# Patient Record
Sex: FEMALE | Race: WHITE | NOT HISPANIC OR LATINO | Employment: OTHER | URBAN - NONMETROPOLITAN AREA
[De-identification: names, ages, dates, MRNs, and addresses within clinical notes are randomized per-mention and may not be internally consistent; named-entity substitution may affect disease eponyms.]

---

## 2017-01-06 ENCOUNTER — HOSPITAL ENCOUNTER (EMERGENCY)
Facility: HOSPITAL | Age: 27
Discharge: HOME | End: 2017-01-06
Attending: NURSE PRACTITIONER | Admitting: NURSE PRACTITIONER
Payer: COMMERCIAL

## 2017-01-06 VITALS
OXYGEN SATURATION: 97 % | TEMPERATURE: 98.1 F | SYSTOLIC BLOOD PRESSURE: 109 MMHG | DIASTOLIC BLOOD PRESSURE: 70 MMHG | HEART RATE: 94 BPM | RESPIRATION RATE: 18 BRPM

## 2017-01-06 DIAGNOSIS — R07.0 THROAT PAIN: ICD-10-CM

## 2017-01-06 LAB
DEPRECATED S PYO AG THROAT QL EIA: NORMAL
MICRO REPORT STATUS: NORMAL
SPECIMEN SOURCE: NORMAL

## 2017-01-06 PROCEDURE — 99213 OFFICE O/P EST LOW 20 MIN: CPT

## 2017-01-06 PROCEDURE — 87081 CULTURE SCREEN ONLY: CPT | Performed by: FAMILY MEDICINE

## 2017-01-06 PROCEDURE — 99213 OFFICE O/P EST LOW 20 MIN: CPT | Performed by: NURSE PRACTITIONER

## 2017-01-06 PROCEDURE — 87880 STREP A ASSAY W/OPTIC: CPT | Performed by: FAMILY MEDICINE

## 2017-01-06 ASSESSMENT — ENCOUNTER SYMPTOMS
FEVER: 0
APPETITE CHANGE: 0
SORE THROAT: 1
COUGH: 1
NAUSEA: 1
FATIGUE: 1

## 2017-01-06 NOTE — ED AVS SNAPSHOT
[facility]    [address] [address]    Phone:  [phone]                                       Kathia Milton   MRN: [id number]    Department:  [facility]   Date of Visit:  1/6/2017           After Visit Summary Signature Page     I have received my discharge instructions, and my questions have been answered. I have discussed any challenges I see with this plan with the nurse or doctor.    ..........................................................................................................................................  Patient/Patient Representative Signature      ..........................................................................................................................................  Patient Representative Print Name and Relationship to Patient    ..................................................               ................................................  Date                                            Time    ..........................................................................................................................................  Reviewed by Signature/Title    ...................................................              ..............................................  Date                                                            Time

## 2017-01-06 NOTE — ED PROVIDER NOTES
"  History     Chief Complaint   Patient presents with     Pharyngitis     since yesterday am     The history is provided by the patient. No  was used.     Kathia Milton is a 26 year old female who presents with a CC of sore throat and nausea x 2 days.  She has a positive exposure to strep, there are 4 others in her household who have strep.  No fever.  She has been eating and drinking ok.  She has not taken anything for pain.      I have reviewed the Medications, Allergies, Past Medical and Surgical History, and Social History in the Epic system.    Review of Systems   Constitutional: Positive for fatigue. Negative for fever and appetite change.   HENT: Positive for sore throat. Negative for ear pain.    Respiratory: Positive for cough (\"off and on\").    Gastrointestinal: Positive for nausea.       Physical Exam   BP: 109/70 mmHg  Pulse: 94  Temp: 98.1  F (36.7  C)  Resp: 18  SpO2: 97 %    Physical Exam   Constitutional: She is oriented to person, place, and time. She appears well-developed and well-nourished. She is cooperative.  Non-toxic appearance. She appears ill.   HENT:   Head: Normocephalic and atraumatic.   Right Ear: Tympanic membrane, external ear and ear canal normal.   Left Ear: Tympanic membrane, external ear and ear canal normal.   Mouth/Throat: Uvula is midline. No posterior oropharyngeal edema or posterior oropharyngeal erythema.   Eyes: Conjunctivae are normal.   Neck: Normal range of motion. Neck supple.   Cardiovascular: Normal rate.    Pulmonary/Chest: Effort normal.   Neurological: She is alert and oriented to person, place, and time.   Psychiatric: She has a normal mood and affect. Her behavior is normal.   Nursing note and vitals reviewed.      ED Course   Procedures    Results for orders placed or performed during the hospital encounter of 01/06/17   Rapid strep screen   Result Value Ref Range    Specimen Description Throat     Rapid Strep A Screen       NEGATIVE: No " "Group A streptococcal antigen detected by immunoassay, await   culture report.      Micro Report Status FINAL 01/06/2017    Beta strep group A culture   Result Value Ref Range    Specimen Description Throat     Culture Micro Culture negative monitoring continues     Micro Report Status Pending        Assessments & Plan (with Medical Decision Making)     I have reviewed the nursing notes.    I have reviewed the findings, diagnosis, plan and need for follow up with the patient.  ASSESSMENT / PLAN:  (R07.0) Throat pain  Comment: mild  Plan:  The rapid strep was negative, the strep culture is pending, we will call you with positive results only and treat with antibiotics as needed   Warm salt water gargles several times per day   Ibuprofen and tylenol per package directions for pain/fever   Cepacol lozenges OTC per package directions   Increase fluids, wash hands frequently, rest   Patient verbally educated and given appropriate education sheets for their diagnoses and has no questions.   Return to ED/UC if symptoms increase or concerns develop, red flag symptoms as discussed and per discharge instructions, increasing throat pain, trouble swallowing,  \"hot potato voice\", drooling, shortness of breath/airway compromise   Follow up with your Primary Care provider if symptoms do not improve in 2-3 days        Discharge Medication List as of 1/6/2017 11:27 AM          Final diagnoses:   Throat pain       1/6/2017   [facility]      Lissette Whittaker NP  01/07/17 1348  "

## 2017-01-06 NOTE — ED AVS SNAPSHOT
[facility]    [address] [address]    Phone:  [phone]                                       Kathia Milton   MRN: [id number]    Department:  [facility]   Date of Visit:  1/6/2017           Patient Information     Date Of Birth          1990        Your diagnoses for this visit were:     Throat pain        You were seen by Lissette Whittaker NP.      Follow-up Information     Follow up with [facility].    Specialty:  EMERGENCY MEDICINE    Why:  As needed, If symptoms worsen, or concerns develop    Contact information:    [address] [address] [phone]    Additional information:    From [facility]: Take [address]. Turn left at [address]. Turn left at the first stoplight on [address]. At the first stop sign, take a right onto [address]. Take a left into the parking lot and continue through until you reach the North enterance of the building.       From [place]: Take [address]. Take the [address] ramp towards [place]. Turn left onto [address]. Take a slight right onto [address]. Turn left at the first stoplight on [address]. At the first stop sign, take a right onto [address]. Take a left into the parking lot and continue through until you reach the North enterance of the building.       From [place]: Take [address]. Take a right at [address]. At the first stop sign, take a right onto [address]. Take a left into the parking lot and continue through until you reach the North enterance of the building.         Follow up with Lainey Rose MD.    Specialty:  Family Practice    Why:  As needed, if symptoms do not improve    Contact information:     FAMILY PRACTICE  [address] [facility] [address]  [phone]           Review of your medicines      Our records show that you are taking the medicines  "listed below. If these are incorrect, please call your family doctor or clinic.        Dose / Directions Last dose taken    azelastine 0.05 % Soln ophthalmic solution   Commonly known as:  OPTIVAR   Dose:  1 drop   Quantity:  1 Bottle        Apply 1 drop to eye 2 times daily   Refills:  3        etonogestrel 68 MG Impl   Commonly known as:  IMPLANON/NEXPLANON   Dose:  1 each        1 each (68 mg) by Subdermal route continuous   Refills:  0                Procedures and tests performed during your visit     Beta strep group A culture    Rapid strep screen      Orders Needing Specimen Collection     None      Pending Results     Date and Time Order Name Status Description    2017 1030 Beta strep group A culture In process             Pending Culture Results     Date and Time Order Name Status Description    2017 1030 Beta strep group A culture In process             Thank you for choosing [facility]       Thank you for choosing [facility] for your care. Our goal is always to provide you with excellent care. Hearing back from our patients is one way we can continue to improve our services. Please take a few minutes to complete the written survey that you may receive in the mail after you visit with us. Thank you!        [employer] Information     [employer] lets you send messages to your doctor, view your test results, renew your prescriptions, schedule appointments and more. To sign up, go to [email or web address] . Click on \"Log in\" on the left side of the screen, which will take you to the Welcome page. Then click on \"Sign up Now\" on the right side of the page.     You will be asked to enter the access code listed below, as well as some personal information. Please follow the directions to create your username and password.     Your access code is: [email or web address]  Expires: 2017 11:27 AM     Your access code will  in 90 days. If you need help or a new code, please call your [facility] clinic or [phone].   "      Care EveryWhere ID     This is your Care EveryWhere ID. This could be used by other organizations to access your [facility] medical records  [id number]        After Visit Summary       This is your record. Keep this with you and show to your community pharmacist(s) and doctor(s) at your next visit.

## 2017-01-08 LAB
BACTERIA SPEC CULT: NORMAL
MICRO REPORT STATUS: NORMAL
SPECIMEN SOURCE: NORMAL

## 2017-11-26 ENCOUNTER — HEALTH MAINTENANCE LETTER (OUTPATIENT)
Age: 27
End: 2017-11-26

## 2018-01-18 ENCOUNTER — TRANSFERRED RECORDS (OUTPATIENT)
Dept: HEALTH INFORMATION MANAGEMENT | Facility: HOSPITAL | Age: 28
End: 2018-01-18

## 2018-01-18 LAB — PAP-ABSTRACT: NORMAL

## 2018-01-22 ENCOUNTER — APPOINTMENT (OUTPATIENT)
Dept: GENERAL RADIOLOGY | Facility: HOSPITAL | Age: 28
End: 2018-01-22
Attending: NURSE PRACTITIONER
Payer: COMMERCIAL

## 2018-01-22 ENCOUNTER — HOSPITAL ENCOUNTER (EMERGENCY)
Facility: HOSPITAL | Age: 28
Discharge: HOME | End: 2018-01-22
Attending: NURSE PRACTITIONER | Admitting: NURSE PRACTITIONER
Payer: COMMERCIAL

## 2018-01-22 VITALS
TEMPERATURE: 97.4 F | SYSTOLIC BLOOD PRESSURE: 140 MMHG | DIASTOLIC BLOOD PRESSURE: 84 MMHG | OXYGEN SATURATION: 100 % | RESPIRATION RATE: 16 BRPM

## 2018-01-22 DIAGNOSIS — S09.92XA NASAL TRAUMA, INITIAL ENCOUNTER: ICD-10-CM

## 2018-01-22 DIAGNOSIS — J34.89 NASAL PAIN: ICD-10-CM

## 2018-01-22 PROCEDURE — 25000128 H RX IP 250 OP 636: Performed by: NURSE PRACTITIONER

## 2018-01-22 PROCEDURE — 99213 OFFICE O/P EST LOW 20 MIN: CPT | Performed by: NURSE PRACTITIONER

## 2018-01-22 PROCEDURE — 70160 X-RAY EXAM OF NASAL BONES: CPT | Mod: TC

## 2018-01-22 PROCEDURE — 96372 THER/PROPH/DIAG INJ SC/IM: CPT

## 2018-01-22 PROCEDURE — G0463 HOSPITAL OUTPT CLINIC VISIT: HCPCS | Mod: 25

## 2018-01-22 RX ORDER — KETOROLAC TROMETHAMINE 10 MG/1
10 TABLET, FILM COATED ORAL EVERY 6 HOURS PRN
Qty: 20 TABLET | Refills: 0 | Status: SHIPPED | OUTPATIENT
Start: 2018-01-22 | End: 2018-05-15

## 2018-01-22 RX ORDER — KETOROLAC TROMETHAMINE 30 MG/ML
60 INJECTION, SOLUTION INTRAMUSCULAR; INTRAVENOUS ONCE
Status: COMPLETED | OUTPATIENT
Start: 2018-01-22 | End: 2018-01-22

## 2018-01-22 RX ADMIN — KETOROLAC TROMETHAMINE 60 MG: 60 INJECTION, SOLUTION INTRAMUSCULAR at 21:59

## 2018-01-22 ASSESSMENT — ENCOUNTER SYMPTOMS
FATIGUE: 0
CHILLS: 0
SINUS PAIN: 1
DIAPHORESIS: 0
APPETITE CHANGE: 0
FEVER: 0
SORE THROAT: 0

## 2018-01-22 NOTE — ED AVS SNAPSHOT
[facility]    [address] [address]    Phone:  [phone]                                       Kathia Milton   MRN: [id number]    Department[facility]  [facility]   Date of Visit:  1/22/2018           After Visit Summary Signature Page     I have received my discharge instructions, and my questions have been answered. I have discussed any challenges I see with this plan with the nurse or doctor.    ..........................................................................................................................................  Patient/Patient Representative Signature      ..........................................................................................................................................  Patient Representative Print Name and Relationship to Patient    ..................................................               ................................................  Date                                            Time    ..........................................................................................................................................  Reviewed by Signature/Title    ...................................................              ..............................................  Date                                                            Time

## 2018-01-22 NOTE — ED AVS SNAPSHOT
[facility]    [address] [address]    Phone:  [phone]                                       Kathia Milton   MRN: [id number]    Department[facility]   Date of Visit:  1/22/2018           Patient Information     Date Of Birth          1990        Your diagnoses for this visit were:     Nasal trauma, initial encounter     Nasal pain        You were seen by Lissette Whittaker NP.      Follow-up Information     Follow up with [facility].    Specialty:  EMERGENCY MEDICINE    Why:  As needed, If symptoms worsen, or concerns develop    Contact information:    [address] [place] [address] [phone]    Additional information:    From [facility]: Take [address]. Turn left at [address]. Turn left at the first stoplight on [address]. At the first stop sign, take a right onto [address]. Take a left into the parking lot and continue through until you reach the North enterance of the building.       From [place]: Take [address]. Take the [address] ramp towards [place]. Turn left onto [address]. Take a slight right onto [address]. Turn left at the first stoplight on [address]. At the first stop sign, take a right onto [address]. Take a left into the parking lot and continue through until you reach the North enterance of the building.       From [place]: Take [address]. Take a right at [address]. At the first stop sign, take a right onto [address]. Take a left into the parking lot and continue through until you reach the North enterance of the building.         Follow up with Lainey Rose MD.    Specialty:  Family Practice    Why:  As needed, if symptoms do not improve    Contact information:    [facility]  [address]  [facility] [address]  [phone]        Discharge References/Attachments     NASAL  CONTUSION (ENGLISH)         Review of your medicines      Our records show that you are taking the medicines listed below. If these are incorrect, please call your family doctor or clinic.        Dose / Directions Last dose taken    azelastine 0.05 % Soln ophthalmic solution   Commonly known as:  OPTIVAR   Dose:  1 drop   Quantity:  1 Bottle        Apply 1 drop to eye 2 times daily   Refills:  3        etonogestrel 68 MG Impl   Commonly known as:  IMPLANON/NEXPLANON   Dose:  1 each        1 each (68 mg) by Subdermal route continuous   Refills:  0                Procedures and tests performed during your visit     XR Nasal Bones 3 Views      Orders Needing Specimen Collection     None      Pending Results     Date and Time Order Name Status Description    1/22/2018 2110 XR Nasal Bones 3 Views In process             Pending Culture Results     No orders found from 1/20/2018 to 1/23/2018.            Thank you for choosing [facility]       Thank you for choosing [facility] for your care. Our goal is always to provide you with excellent care. Hearing back from our patients is one way we can continue to improve our services. Please take a few minutes to complete the written survey that you may receive in the mail after you visit with us. Thank you!        [employer] Information     [employer] gives you secure access to your electronic health record. If you see a primary care provider, you can also send messages to your care team and make appointments. If you have questions, please call your primary care clinic.  If you do not have a primary care provider, please call [phone] and they will assist you.        Care EveryWhere ID     This is your Care EveryWhere ID. This could be used by other organizations to access your [facility] medical records  [id number]        Equal Access to Services     JOESPH OAKLEY : Leighton bowmano Queta, waaxda lubonadaha, qaybta kaaltyra goodson, terrell angel. So  [facility] [phone].    ATENCIÓN: Si habla español, tiene a black disposición servicios gratuitos de asistencia lingüística. Llame al [phone].    We comply with applicable federal civil rights laws and [place] laws. We do not discriminate on the basis of race, color, national origin, age, disability, sex, sexual orientation, or gender identity.            After Visit Summary       This is your record. Keep this with you and show to your community pharmacist(s) and doctor(s) at your next visit.
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
## 2018-01-29 ENCOUNTER — TRANSFERRED RECORDS (OUTPATIENT)
Dept: HEALTH INFORMATION MANAGEMENT | Facility: HOSPITAL | Age: 28
End: 2018-01-29

## 2018-05-15 ENCOUNTER — HOSPITAL ENCOUNTER (EMERGENCY)
Facility: HOSPITAL | Age: 28
Discharge: HOME | End: 2018-05-15
Attending: PHYSICIAN ASSISTANT | Admitting: PHYSICIAN ASSISTANT
Payer: COMMERCIAL

## 2018-05-15 ENCOUNTER — APPOINTMENT (OUTPATIENT)
Dept: CT IMAGING | Facility: HOSPITAL | Age: 28
End: 2018-05-15
Attending: PHYSICIAN ASSISTANT
Payer: COMMERCIAL

## 2018-05-15 ENCOUNTER — APPOINTMENT (OUTPATIENT)
Dept: ULTRASOUND IMAGING | Facility: HOSPITAL | Age: 28
End: 2018-05-15
Attending: PHYSICIAN ASSISTANT
Payer: COMMERCIAL

## 2018-05-15 VITALS
OXYGEN SATURATION: 98 % | RESPIRATION RATE: 16 BRPM | SYSTOLIC BLOOD PRESSURE: 109 MMHG | TEMPERATURE: 99.1 F | HEART RATE: 106 BPM | DIASTOLIC BLOOD PRESSURE: 65 MMHG

## 2018-05-15 DIAGNOSIS — N83.202 CYST OF LEFT OVARY: ICD-10-CM

## 2018-05-15 LAB
ALBUMIN UR-MCNC: NEGATIVE MG/DL
ANION GAP SERPL CALCULATED.3IONS-SCNC: 5 MMOL/L (ref 3–14)
APPEARANCE UR: CLEAR
BACTERIA #/AREA URNS HPF: ABNORMAL /HPF
BILIRUB UR QL STRIP: NEGATIVE
BUN SERPL-MCNC: 4 MG/DL (ref 7–30)
CALCIUM SERPL-MCNC: 8.5 MG/DL (ref 8.5–10.1)
CHLORIDE SERPL-SCNC: 107 MMOL/L (ref 94–109)
CO2 SERPL-SCNC: 27 MMOL/L (ref 20–32)
COLOR UR AUTO: ABNORMAL
CREAT SERPL-MCNC: 0.57 MG/DL (ref 0.52–1.04)
CRP SERPL-MCNC: <2.9 MG/L (ref 0–8)
ERYTHROCYTE [DISTWIDTH] IN BLOOD BY AUTOMATED COUNT: 13.2 % (ref 10–15)
GFR SERPL CREATININE-BSD FRML MDRD: >90 ML/MIN/1.7M2
GLUCOSE SERPL-MCNC: 90 MG/DL (ref 70–99)
GLUCOSE UR STRIP-MCNC: NEGATIVE MG/DL
HCG UR QL: NEGATIVE
HCT VFR BLD AUTO: 37.9 % (ref 35–47)
HGB BLD-MCNC: 13 G/DL (ref 11.7–15.7)
HGB UR QL STRIP: NEGATIVE
KETONES UR STRIP-MCNC: NEGATIVE MG/DL
LEUKOCYTE ESTERASE UR QL STRIP: ABNORMAL
MCH RBC QN AUTO: 28.8 PG (ref 26.5–33)
MCHC RBC AUTO-ENTMCNC: 34.3 G/DL (ref 31.5–36.5)
MCV RBC AUTO: 84 FL (ref 78–100)
MUCOUS THREADS #/AREA URNS LPF: PRESENT /LPF
NITRATE UR QL: NEGATIVE
PH UR STRIP: 7 PH (ref 4.7–8)
PLATELET # BLD AUTO: 289 10E9/L (ref 150–450)
POTASSIUM SERPL-SCNC: 3.8 MMOL/L (ref 3.4–5.3)
RBC # BLD AUTO: 4.51 10E12/L (ref 3.8–5.2)
RBC #/AREA URNS AUTO: 1 /HPF (ref 0–2)
SODIUM SERPL-SCNC: 139 MMOL/L (ref 133–144)
SOURCE: ABNORMAL
SP GR UR STRIP: 1 (ref 1–1.03)
SQUAMOUS #/AREA URNS AUTO: 5 /HPF (ref 0–1)
UROBILINOGEN UR STRIP-MCNC: NORMAL MG/DL (ref 0–2)
WBC # BLD AUTO: 7.5 10E9/L (ref 4–11)
WBC #/AREA URNS AUTO: 4 /HPF (ref 0–5)

## 2018-05-15 PROCEDURE — 96375 TX/PRO/DX INJ NEW DRUG ADDON: CPT

## 2018-05-15 PROCEDURE — 81025 URINE PREGNANCY TEST: CPT | Performed by: FAMILY MEDICINE

## 2018-05-15 PROCEDURE — 96361 HYDRATE IV INFUSION ADD-ON: CPT

## 2018-05-15 PROCEDURE — 36415 COLL VENOUS BLD VENIPUNCTURE: CPT | Performed by: PHYSICIAN ASSISTANT

## 2018-05-15 PROCEDURE — 96376 TX/PRO/DX INJ SAME DRUG ADON: CPT

## 2018-05-15 PROCEDURE — 25000132 ZZH RX MED GY IP 250 OP 250 PS 637: Performed by: PHYSICIAN ASSISTANT

## 2018-05-15 PROCEDURE — 99285 EMERGENCY DEPT VISIT HI MDM: CPT | Mod: 25

## 2018-05-15 PROCEDURE — 85027 COMPLETE CBC AUTOMATED: CPT | Performed by: PHYSICIAN ASSISTANT

## 2018-05-15 PROCEDURE — 87086 URINE CULTURE/COLONY COUNT: CPT | Performed by: PHYSICIAN ASSISTANT

## 2018-05-15 PROCEDURE — 81001 URINALYSIS AUTO W/SCOPE: CPT | Performed by: FAMILY MEDICINE

## 2018-05-15 PROCEDURE — 74176 CT ABD & PELVIS W/O CONTRAST: CPT | Mod: TC

## 2018-05-15 PROCEDURE — 99284 EMERGENCY DEPT VISIT MOD MDM: CPT | Performed by: PHYSICIAN ASSISTANT

## 2018-05-15 PROCEDURE — 80048 BASIC METABOLIC PNL TOTAL CA: CPT | Performed by: PHYSICIAN ASSISTANT

## 2018-05-15 PROCEDURE — 96374 THER/PROPH/DIAG INJ IV PUSH: CPT

## 2018-05-15 PROCEDURE — 86140 C-REACTIVE PROTEIN: CPT | Performed by: PHYSICIAN ASSISTANT

## 2018-05-15 PROCEDURE — 25000128 H RX IP 250 OP 636: Performed by: PHYSICIAN ASSISTANT

## 2018-05-15 PROCEDURE — 76830 TRANSVAGINAL US NON-OB: CPT | Mod: TC

## 2018-05-15 RX ORDER — ONDANSETRON 4 MG
4 TABLET,DISINTEGRATING ORAL ONCE
Status: DISCONTINUED | OUTPATIENT
Start: 2018-05-15 | End: 2018-05-16 | Stop reason: HOSPADM

## 2018-05-15 RX ORDER — HYDROCODONE BITARTRATE AND ACETAMINOPHEN 5; 325 MG/1; MG/1
1 TABLET ORAL ONCE
Status: COMPLETED | OUTPATIENT
Start: 2018-05-15 | End: 2018-05-15

## 2018-05-15 RX ORDER — ONDANSETRON 4 MG/1
4 TABLET, ORALLY DISINTEGRATING ORAL EVERY 8 HOURS PRN
Qty: 10 TABLET | Refills: 0 | Status: SHIPPED | OUTPATIENT
Start: 2018-05-15 | End: 2019-02-05

## 2018-05-15 RX ORDER — CETIRIZINE HYDROCHLORIDE 10 MG/1
10 TABLET ORAL DAILY
COMMUNITY
End: 2018-08-06

## 2018-05-15 RX ORDER — ONDANSETRON 2 MG/ML
4 INJECTION INTRAMUSCULAR; INTRAVENOUS ONCE
Status: COMPLETED | OUTPATIENT
Start: 2018-05-15 | End: 2018-05-15

## 2018-05-15 RX ORDER — MORPHINE SULFATE 4 MG/ML
2 INJECTION, SOLUTION INTRAMUSCULAR; INTRAVENOUS ONCE
Status: COMPLETED | OUTPATIENT
Start: 2018-05-15 | End: 2018-05-15

## 2018-05-15 RX ORDER — KETOROLAC TROMETHAMINE 30 MG/ML
30 INJECTION, SOLUTION INTRAMUSCULAR; INTRAVENOUS ONCE
Status: COMPLETED | OUTPATIENT
Start: 2018-05-15 | End: 2018-05-15

## 2018-05-15 RX ORDER — HYDROCODONE BITARTRATE AND ACETAMINOPHEN 5; 325 MG/1; MG/1
1 TABLET ORAL EVERY 6 HOURS PRN
Qty: 6 TABLET | Refills: 0 | Status: SHIPPED | OUTPATIENT
Start: 2018-05-15 | End: 2018-05-23

## 2018-05-15 RX ADMIN — KETOROLAC TROMETHAMINE 30 MG: 30 INJECTION, SOLUTION INTRAMUSCULAR at 16:53

## 2018-05-15 RX ADMIN — MORPHINE SULFATE 2 MG: 4 INJECTION INTRAVENOUS at 18:05

## 2018-05-15 RX ADMIN — HYDROCODONE BITARTRATE AND ACETAMINOPHEN 1 TABLET: 5; 325 TABLET ORAL at 21:09

## 2018-05-15 RX ADMIN — MORPHINE SULFATE 2 MG: 4 INJECTION INTRAVENOUS at 18:53

## 2018-05-15 RX ADMIN — ONDANSETRON 4 MG: 2 INJECTION INTRAMUSCULAR; INTRAVENOUS at 18:33

## 2018-05-15 RX ADMIN — ONDANSETRON 4 MG: 2 INJECTION INTRAMUSCULAR; INTRAVENOUS at 16:51

## 2018-05-15 RX ADMIN — SODIUM CHLORIDE 1000 ML: 9 INJECTION, SOLUTION INTRAVENOUS at 16:50

## 2018-05-15 ASSESSMENT — ENCOUNTER SYMPTOMS
NAUSEA: 1
HEMATURIA: 0
RESPIRATORY NEGATIVE: 1
DIFFICULTY URINATING: 0
ABDOMINAL PAIN: 1
VOMITING: 0
CARDIOVASCULAR NEGATIVE: 1
CHILLS: 0
FREQUENCY: 1
FLANK PAIN: 1
DYSURIA: 1
FEVER: 0
CONSTITUTIONAL NEGATIVE: 1

## 2018-05-15 NOTE — ED NOTES
Pt ambulated to room 7 independently, gown placed, call light in reach. Voided UA specimen obtained.  Intermittent abd pain x 2 weeks.  Back pain started this past week, getting worse. Nausea for the past week.  Using AZO.  Reports abd pain pain 9, constant aching, pain goal 2.

## 2018-05-15 NOTE — LETTER
[address] [address]  Main: [phone]  Dept: [phone]      May 15, 2018      Re: Kathia Milton      TO WHOM IT MAY CONCERN:    Kathia Milton was evaluated in the emergency room for acute illness. She may return to work on 5/17/18.       Sincerely,    STACI Paulino

## 2018-05-15 NOTE — ED AVS SNAPSHOT
[facility]    [address] [address]    Phone:  [phone]                                       Kathia Milton   MRN: [id number]    Department[facility]  [facility]   Date of Visit:  5/15/2018           After Visit Summary Signature Page     I have received my discharge instructions, and my questions have been answered. I have discussed any challenges I see with this plan with the nurse or doctor.    ..........................................................................................................................................  Patient/Patient Representative Signature      ..........................................................................................................................................  Patient Representative Print Name and Relationship to Patient    ..................................................               ................................................  Date                                            Time    ..........................................................................................................................................  Reviewed by Signature/Title    ...................................................              ..............................................  Date                                                            Time

## 2018-05-15 NOTE — ED PROVIDER NOTES
History     Chief Complaint   Patient presents with     Flank Pain     left flank pain, frequency, dysuria     HPI  Kathia Milton is a 28 year old female with Hx endometriosis who presents ambulatory to the ER c/o left flank and left sided abdominal pain for 1 week. She thought she had a UTI, took AZO with some relief for 2 days, but symptoms returned. Pain is achy to stabbing and associated with intermittent nausea. She denies hematuria. No Hx kidney stones. No pelvic pain, discharge.     Problem List:    Patient Active Problem List    Diagnosis Date Noted     Seasonal allergies      Priority: Medium     Anemia in pregnancy 2013     Priority: Medium      labor 2013     Priority: Medium     Abdominal pain, acute 2013     Priority: Medium     Encounter for supervision of other normal pregnancy 2013     Priority: Medium     Diagnosis updated by automated process. Provider to review and confirm.       Tobacco use disorder 10/18/2012     Priority: Medium        Past Medical History:    Past Medical History:   Diagnosis Date     Benign neoplasm of vagina 2011     Condyloma acuminatum 2011     Seasonal allergies      Supervision of other normal pregnancy      Tobacco use disorder 10/18/2012       Past Surgical History:    Past Surgical History:   Procedure Laterality Date     APPENDECTOMY  14    Dongola's      SECTION      C section     COLONOSCOPY  11/10/15    Dr. Singh     DILATION AND CURETTAGE SUCTION, TREAT MISSED , 1ST TRIMESTER       ENDOSCOPY  11/10/15    Dr. Singh      LAPAROSCOPY  11/30/15    Dr. Sun; lysis of pelvic adhesions, cauterization of minimal endometriosis     LAPAROSCOPY  2018    lysis of adhesions; pelvic pain, endometriosis; Dr. Sun       Family History:    Family History   Problem Relation Age of Onset     DIABETES Other      Grandmother     HEART DISEASE Other      Heart Disease - Grandmother     Other - See  Comments Other      Renal Disease - Grandmother     Breast Cancer No family hx of      Unknown/Adopted Mother      Unknown/Adopted Father      Unknown/Adopted Maternal Grandmother      Unknown/Adopted Maternal Grandfather      Unknown/Adopted Paternal Grandmother      Unknown/Adopted Paternal Grandfather        Social History:  Marital Status:  Single [1]  Social History   Substance Use Topics     Smoking status: Current Some Day Smoker     Types: Cigarettes     Last attempt to quit: 11/26/2012     Smokeless tobacco: Never Used      Comment: Tried to Quit (YES) (2011)     Alcohol use 0.0 oz/week     0 Standard drinks or equivalent per week      Comment: occasional        Medications:      cetirizine (ZYRTEC) 10 MG tablet   HYDROcodone-acetaminophen (NORCO) 5-325 MG per tablet   ondansetron (ZOFRAN ODT) 4 MG ODT tab   azelastine (OPTIVAR) 0.05 % SOLN   estradiol cypionate (DEPO-ESTRADIOL) 5 MG/ML injection         Review of Systems   Constitutional: Negative.  Negative for chills and fever.   Respiratory: Negative.    Cardiovascular: Negative.    Gastrointestinal: Positive for abdominal pain and nausea. Negative for vomiting.   Genitourinary: Positive for dysuria, flank pain, frequency and urgency. Negative for difficulty urinating, hematuria, pelvic pain, vaginal bleeding, vaginal discharge and vaginal pain.   Skin: Negative.  Negative for rash.       Physical Exam   BP: 124/76  Pulse: 106  Heart Rate: 88  Temp: 98.1  F (36.7  C)  Resp: 16  SpO2: 99 %      Physical Exam   Constitutional: She is oriented to person, place, and time. She appears well-developed and well-nourished. No distress.   HENT:   Head: Normocephalic and atraumatic.   Right Ear: External ear normal.   Left Ear: External ear normal.   Mouth/Throat: Oropharynx is clear and moist.   Eyes: Conjunctivae are normal.   Cardiovascular: Normal rate and normal heart sounds.    Pulmonary/Chest: Effort normal and breath sounds normal.   Abdominal: Soft. Bowel  sounds are normal. There is tenderness. There is CVA tenderness (left).       Neurological: She is alert and oriented to person, place, and time.   Skin: Skin is warm and dry.   Psychiatric: She has a normal mood and affect.   Nursing note and vitals reviewed.      ED Course     ED Course     Procedures        Results for orders placed or performed during the hospital encounter of 05/15/18 (from the past 24 hour(s))   UA reflex to Microscopic and Culture   Result Value Ref Range    Color Urine Straw     Appearance Urine Clear     Glucose Urine Negative NEG^Negative mg/dL    Bilirubin Urine Negative NEG^Negative    Ketones Urine Negative NEG^Negative mg/dL    Specific Gravity Urine 1.004 1.003 - 1.035    Blood Urine Negative NEG^Negative    pH Urine 7.0 4.7 - 8.0 pH    Protein Albumin Urine Negative NEG^Negative mg/dL    Urobilinogen mg/dL Normal 0.0 - 2.0 mg/dL    Nitrite Urine Negative NEG^Negative    Leukocyte Esterase Urine Trace (A) NEG^Negative    Source Midstream Urine     RBC Urine 1 0 - 2 /HPF    WBC Urine 4 0 - 5 /HPF    Bacteria Urine Few (A) NEG^Negative /HPF    Squamous Epithelial /HPF Urine 5 (H) 0 - 1 /HPF    Mucous Urine Present (A) NEG^Negative /LPF   HCG qualitative urine (UPT)   Result Value Ref Range    HCG Qual Urine Negative NEG^Negative   CBC with platelets   Result Value Ref Range    WBC 7.5 4.0 - 11.0 10e9/L    RBC Count 4.51 3.8 - 5.2 10e12/L    Hemoglobin 13.0 11.7 - 15.7 g/dL    Hematocrit 37.9 35.0 - 47.0 %    MCV 84 78 - 100 fl    MCH 28.8 26.5 - 33.0 pg    MCHC 34.3 31.5 - 36.5 g/dL    RDW 13.2 10.0 - 15.0 %    Platelet Count 289 150 - 450 10e9/L   CRP inflammation   Result Value Ref Range    CRP Inflammation <2.9 0.0 - 8.0 mg/L   Basic metabolic panel   Result Value Ref Range    Sodium 139 133 - 144 mmol/L    Potassium 3.8 3.4 - 5.3 mmol/L    Chloride 107 94 - 109 mmol/L    Carbon Dioxide 27 20 - 32 mmol/L    Anion Gap 5 3 - 14 mmol/L    Glucose 90 70 - 99 mg/dL    Urea Nitrogen 4 (L)  7 - 30 mg/dL    Creatinine 0.57 0.52 - 1.04 mg/dL    GFR Estimate >90 >60 mL/min/1.7m2    GFR Estimate If Black >90 >60 mL/min/1.7m2    Calcium 8.5 8.5 - 10.1 mg/dL   Abd/pelvis CT - no contrast - Stone Protocol    Narrative    EXAMINATION: CT ABDOMEN PELVIS W/O CONTRAST, 5/15/2018 6:02 PM    TECHNIQUE:  Helical CT images from the lung bases through the  symphysis pubis were obtained  with IV contrast. Contrast dose:    COMPARISON: none    HISTORY: left flank/abdominal pain;     FINDINGS:    There is dependent atelectasis at the lung bases.    The liver is free of masses or biliary ductal enlargement. No  calcified gallstones are seen.    The the spleen and pancreas appear normal.    The adrenal glands are normal.    The right and left kidneys are free of masses or hydronephrosis. No  renal or ureteric calculi are seen.    The periaortic lymph nodes are normal in caliber.    No intraperitoneal masses or inflammatory changes are noted.    In the pelvis the bladder and rectum appear normal. There is a 5.8 cm  in diameter left adnexal mass. This is most likely ovarian. Ultrasound  evaluation is recommended. It is low density and most likely  represents an ovarian cystic mass.    The regional skeleton is intact      Impression    IMPRESSION: 5.8 cm left adnexal mass most likely a cystic mass of the  left ovary. Ultrasound follow-up is recommended     JORDON RAINEY MD   US Pelvic Complete with Transvaginal    Narrative    PROCEDURE: US PELVIC COMPLETE WITH TRANSVAGINAL    HISTORY: r/o torsion;     TECHNIQUE: Transabdominal and transvaginal ultrasound of the pelvis.    COMPARISON: none    FINDINGS:     MEASUREMENTS:  Uterus: 8.5 x 3.9 x 5.8 cm (Length x Height x Width)  Endometrium: 5 mm in thickness  Right Ovary: 3.1 x 1.7 x 3.0 cm (Length x Height x Width)  Left Ovary:  6.4 x 5.2 x 5.8 cm (Length x Height x Width)    UTERUS: Uterus is normal in appearance.    ADNEXA: There is a cystic mass in the left ovary measuring  5.4 x 2.8 x  4.3 cm.    MISC: Arterial and venous flow was identified both ovaries. There is  no evidence of ovarian torsion. There is trace free fluid in the  pelvic cul-de-sac.      Impression    IMPRESSION: 5.4 cm cystic mass in the left ovary.        JORDON RAINEY MD       Medications   ondansetron (ZOFRAN-ODT) 4 tablets ed starter pack 4 mg (4 mg Oral Not Given 5/15/18 2203)   HYDROcodone-acetaminophen (NORCO) 5-325 MG 6 tab ED starter pack 1 tablet (1 tablet Oral Not Given 5/15/18 2203)   0.9% sodium chloride BOLUS (0 mLs Intravenous Stopped 5/15/18 1804)   ondansetron (ZOFRAN) injection 4 mg (4 mg Intravenous Given 5/15/18 1651)   ketorolac (TORADOL) injection 30 mg (30 mg Intravenous Given 5/15/18 1653)   morphine (PF) injection 2 mg (2 mg Intravenous Given 5/15/18 1805)   morphine (PF) injection 2 mg (2 mg Intravenous Given 5/15/18 1853)   ondansetron (ZOFRAN) injection 4 mg (4 mg Intravenous Given 5/15/18 1833)   HYDROcodone-acetaminophen (NORCO) 5-325 MG per tablet 1 tablet (1 tablet Oral Given 5/15/18 2109)       Assessments & Plan (with Medical Decision Making)     I have reviewed the nursing notes.    I have reviewed the findings, diagnosis, plan and need for follow up with the patient.      Discharge Medication List as of 5/15/2018  9:00 PM      START taking these medications    Details   HYDROcodone-acetaminophen (NORCO) 5-325 MG per tablet Take 1 tablet by mouth every 6 hours as needed for pain, Disp-6 tablet, R-0, Local Print      ondansetron (ZOFRAN ODT) 4 MG ODT tab Take 1 tablet (4 mg) by mouth every 8 hours as needed for nausea, Disp-10 tablet, R-0, E-Prescribe             Final diagnoses:   Cyst of left ovary   Labs acceptable. Urine with epis, so culture added on and pending. CT reveals left adnexal mass 5.3cm, so US is ordered and torsion is ruled out. Pt is advised to f/u with PCP Friday/Monday. She is requesting referral to OBGYN that completed her recent surgery for endometriosis and a  referral is placed. She will rotate ibu/tylenol and may use lortab for pain >5/10 sparingly until f/u. Zofran for nausea as needed. She is agreeable to plan and discharged in stable condition. She will return here with fevers, worsening, concerns prior to follow up.     5/15/2018   [facility]     Chip Bassett PA  05/15/18 0625

## 2018-05-15 NOTE — ED AVS SNAPSHOT
[facility]    [address] [address]    Phone:  [phone]                                       Kathia Milton   MRN: [id number]    Department:  [facility]   Date of Visit:  5/15/2018           Patient Information     Date Of Birth          1990        Your diagnoses for this visit were:     Cyst of left ovary        You were seen by Chip Bassett PA.      Follow-up Information     Follow up with Lainey Rose MD.    Specialty:  Family Practice    Why:  Friday ideally    Contact information:    [address]  [place] [address] [phone]          Follow up with [facility].    Specialty:  EMERGENCY MEDICINE    Why:  If symptoms worsen    Contact information:    [address] [address] [phone]    Additional information:    From [facility]: Take [address]. Turn left at [address]. Turn left at the first stoplight on [address]. At the first stop sign, take a right onto [facility]. Take a left into the parking lot and continue through until you reach the North enterance of the building.       From [place]: Take [address]. Take the [address] ramp towards [place]. Turn left onto [address]. Take a slight right onto [address]. Turn left at the first stoplight on [address]. At the first stop sign, take a right onto [address]. Take a left into the parking lot and continue through until you reach the North enterance of the building.       From [place]: Take [address]. Take a right at [address]. At the first stop sign, take a right onto [address]. Take a left into the parking lot and continue through until you reach the North enterance of the building.         Discharge Instructions       1. zofran for nausea  2. Rotate ibuprofen and tylenol every 3-6 hrs for 2-3 days. Lortab for pain >5/10. NO more than 4000mg  tylenol in 24 hrs.   3. Follow up with primary later this week ideally, OBGYN as discussed    * Back here with fevers/worsening.     Discharge References/Attachments     CYSTS, OVARIAN (ENGLISH)    OVARIAN CYSTS, TREATMENT FOR  (ENGLISH)      ED Discharge Orders     OB/GYN REFERRAL       Your provider has referred you to: Tony Sorenson    Please be aware that coverage of these services is subject to the terms and limitations of your health insurance plan.  Call member services at your health plan with any benefit or coverage questions.      Please bring the following with you to your appointment:    (1) Any X-Rays, CTs or MRIs which have been performed.  Contact the facility where they were done to arrange for  prior to your scheduled appointment.   (2) List of current medications   (3) This referral request   (4) Any documents/labs given to you for this referral                     Review of your medicines      START taking        Dose / Directions Last dose taken    HYDROcodone-acetaminophen 5-325 MG per tablet   Commonly known as:  NORCO   Dose:  1 tablet   Quantity:  6 tablet        Take 1 tablet by mouth every 6 hours as needed for pain   Refills:  0        ondansetron 4 MG ODT tab   Commonly known as:  ZOFRAN ODT   Dose:  4 mg   Quantity:  10 tablet        Take 1 tablet (4 mg) by mouth every 8 hours as needed for nausea   Refills:  0          Our records show that you are taking the medicines listed below. If these are incorrect, please call your family doctor or clinic.        Dose / Directions Last dose taken    azelastine 0.05 % Soln ophthalmic solution   Commonly known as:  OPTIVAR   Dose:  1 drop   Quantity:  1 Bottle        Apply 1 drop to eye 2 times daily   Refills:  3        cetirizine 10 MG tablet   Commonly known as:  zyrTEC   Dose:  10 mg        Take 10 mg by mouth daily   Refills:  0        estradiol cypionate 5 MG/ML injection   Commonly known as:  DEPO-ESTRADIOL         Inject into the muscle every 28 days   Refills:  0                Information about OPIOIDS     PRESCRIPTION OPIOIDS: WHAT YOU NEED TO KNOW   You have a prescription for an opioid (narcotic) pain medicine. Opioids can cause addiction. If you have a history of chemical dependency of any type, you are at a higher risk of becoming addicted to opioids. Only take this medicine after all other options have been tried. Take it for as short a time and as few doses as possible.     Do not:    Drive. If you drive while taking these medicines, you could be arrested for driving under the influence (DUI).    Operate heavy machinery    Do any other dangerous activities while taking these medicines.     Drink any alcohol while taking these medicines.      Take with any other medicines that contain acetaminophen. Read all labels carefully. Look for the word  acetaminophen  or  Tylenol.  Ask your pharmacist if you have questions or are unsure.    Store your pills in a secure place, locked if possible. We will not replace any lost or stolen medicine. If you don t finish your medicine, please throw away (dispose) as directed by your pharmacist. The [place] Pollution Control Agency has more information about safe disposal: https://www.pca.[facility].[place].us/living-green/managing-unwanted-medications    All opioids tend to cause constipation. Drink plenty of water and eat foods that have a lot of fiber, such as fruits, vegetables, prune juice, apple juice and high-fiber cereal. Take a laxative (Miralax, milk of magnesia, Colace, Senna) if you don t move your bowels at least every other day.         Prescriptions were sent or printed at these locations (2 Prescriptions)                   [facility] [address] [place] [address] [address] AT [facility] [address] [place] [address]    Telephone:  [phone]   Fax:  [phone]   Hours:                  E-Prescribed (1 of 2)         ondansetron (ZOFRAN ODT) 4 MG  ODT tab                 Printed at Department/Unit printer (1 of 2)         HYDROcodone-acetaminophen (NORCO) 5-325 MG per tablet                Procedures and tests performed during your visit     Abd/pelvis CT - no contrast - Stone Protocol    Basic metabolic panel    CBC with platelets    CRP inflammation    HCG qualitative urine (UPT)    UA reflex to Microscopic and Culture    US Pelvic Complete with Transvaginal      Orders Needing Specimen Collection     None      Pending Results     No orders found from 5/13/2018 to 5/16/2018.            Pending Culture Results     No orders found from 5/13/2018 to 5/16/2018.            Thank you for choosing [facility]       Thank you for choosing [facility] for your care. Our goal is always to provide you with excellent care. Hearing back from our patients is one way we can continue to improve our services. Please take a few minutes to complete the written survey that you may receive in the mail after you visit with us. Thank you!        [employer] Information     [employer] gives you secure access to your electronic health record. If you see a primary care provider, you can also send messages to your care team and make appointments. If you have questions, please call your primary care clinic.  If you do not have a primary care provider, please call [phone] and they will assist you.        Care EveryWhere ID     This is your Care EveryWhere ID. This could be used by other organizations to access your [facility] medical records  [id number]        Equal Access to Services     JOESPH OAKLEY : Leighton Birch, waaxda lubonadaha, qaybta kaalmada kellee, terrell angel. So [facility] [phone].    ATENCIÓN: Si habla español, tiene a black disposición servicios gratuitos de asistencia lingüística. Llame al [phone].    We comply with applicable federal civil rights laws and [place] laws. We do not discriminate on the basis of race, color,  national origin, age, disability, sex, sexual orientation, or gender identity.            After Visit Summary       This is your record. Keep this with you and show to your community pharmacist(s) and doctor(s) at your next visit.

## 2018-05-16 NOTE — ED NOTES
Patient brought back her original RX.  TH packs dispensed as documented.  Home with friends/family.

## 2018-05-16 NOTE — ED NOTES
Patient given US disk from DI.  Patient is suppose to have TH medications 2nd to the pharmacy being closed before she got there.  Patient is aware that before the narcotic can be filled we would need the printed script back from earlier.  Patient states her mom has this in the car, she will run out to get it and be right back.

## 2018-05-16 NOTE — DISCHARGE INSTRUCTIONS
1. zofran for nausea  2. Rotate ibuprofen and tylenol every 3-6 hrs for 2-3 days. Lortab for pain >5/10. NO more than 4000mg tylenol in 24 hrs.   3. Follow up with primary later this week ideally, OBGYN as discussed    * Back here with fevers/worsening.

## 2018-05-17 LAB
BACTERIA SPEC CULT: ABNORMAL
SPECIMEN SOURCE: ABNORMAL

## 2018-05-19 ENCOUNTER — MYC REFILL (OUTPATIENT)
Dept: FAMILY MEDICINE | Facility: OTHER | Age: 28
End: 2018-05-19

## 2018-05-19 DIAGNOSIS — J30.2 SEASONAL ALLERGIC RHINITIS, UNSPECIFIED CHRONICITY, UNSPECIFIED TRIGGER: Primary | ICD-10-CM

## 2018-05-19 DIAGNOSIS — J30.2 SEASONAL ALLERGIES: ICD-10-CM

## 2018-05-21 RX ORDER — AZELASTINE HYDROCHLORIDE 0.5 MG/ML
1 SOLUTION/ DROPS OPHTHALMIC 2 TIMES DAILY
Qty: 1 BOTTLE | Refills: 0 | Status: SHIPPED | OUTPATIENT
Start: 2018-05-21 | End: 2018-08-06

## 2018-05-21 NOTE — TELEPHONE ENCOUNTER
Message from [employer]:  Original authorizing provider: MD Kathia Billings would like a refill of the following medications:  azelastine (OPTIVAR) 0.05 % SOLN [Lainey Jones MD]    Preferred pharmacy: [facility] [address] [place] [address] [address] AT [facility] OF  [address]    Comment:

## 2018-05-21 NOTE — TELEPHONE ENCOUNTER
Optivar last filled on 5.10.16 #1 bottle with 3 refills.Last office visit on 11.3.15.No upcoming appointments.Pended.Thank you.

## 2018-05-22 ENCOUNTER — TELEPHONE (OUTPATIENT)
Dept: FAMILY MEDICINE | Facility: OTHER | Age: 28
End: 2018-05-22

## 2018-05-22 NOTE — TELEPHONE ENCOUNTER
7:52 AM    Reason for Call: OVERBOOK    Patient is having the following symptoms: ER follow up/ovarian cyst for 1 weeks.    The patient is requesting an appointment for later this afternoon with Dr Gupta (PCP out).    Was an appointment offered for this call? Yes  If yes : Appointment type short with PCP              Date  05/23/18    Preferred method for responding to this message: Telephone Call  What is your phone number ? [phone]    If we cannot reach you directly, may we leave a detailed response at the number you provided? Yes    Can this message wait until your PCP/provider returns, if unavailable today? No, PCP out    Ewa Mead

## 2018-05-23 ENCOUNTER — OFFICE VISIT (OUTPATIENT)
Dept: FAMILY MEDICINE | Facility: OTHER | Age: 28
End: 2018-05-23
Attending: FAMILY MEDICINE
Payer: COMMERCIAL

## 2018-05-23 VITALS
HEART RATE: 107 BPM | TEMPERATURE: 99.5 F | WEIGHT: 147 LBS | RESPIRATION RATE: 18 BRPM | DIASTOLIC BLOOD PRESSURE: 76 MMHG | OXYGEN SATURATION: 99 % | SYSTOLIC BLOOD PRESSURE: 102 MMHG | BODY MASS INDEX: 24.46 KG/M2

## 2018-05-23 DIAGNOSIS — R10.32 LLQ ABDOMINAL PAIN: Primary | ICD-10-CM

## 2018-05-23 DIAGNOSIS — N83.8 OVARIAN MASS, LEFT: ICD-10-CM

## 2018-05-23 PROCEDURE — 99213 OFFICE O/P EST LOW 20 MIN: CPT | Performed by: FAMILY MEDICINE

## 2018-05-23 ASSESSMENT — ANXIETY QUESTIONNAIRES
1. FEELING NERVOUS, ANXIOUS, OR ON EDGE: NOT AT ALL
GAD7 TOTAL SCORE: 3
3. WORRYING TOO MUCH ABOUT DIFFERENT THINGS: SEVERAL DAYS
4. TROUBLE RELAXING: SEVERAL DAYS
5. BEING SO RESTLESS THAT IT IS HARD TO SIT STILL: NOT AT ALL
2. NOT BEING ABLE TO STOP OR CONTROL WORRYING: NOT AT ALL
7. FEELING AFRAID AS IF SOMETHING AWFUL MIGHT HAPPEN: NOT AT ALL
6. BECOMING EASILY ANNOYED OR IRRITABLE: SEVERAL DAYS
IF YOU CHECKED OFF ANY PROBLEMS ON THIS QUESTIONNAIRE, HOW DIFFICULT HAVE THESE PROBLEMS MADE IT FOR YOU TO DO YOUR WORK, TAKE CARE OF THINGS AT HOME, OR GET ALONG WITH OTHER PEOPLE: SOMEWHAT DIFFICULT

## 2018-05-23 ASSESSMENT — PAIN SCALES - GENERAL: PAINLEVEL: EXTREME PAIN (8)

## 2018-05-23 NOTE — NURSING NOTE
"Chief Complaint   Patient presents with     ER F/U       Initial /76 (BP Location: Right arm, Patient Position: Chair, Cuff Size: Adult Regular)  Pulse 107  Temp 99.5  F (37.5  C) (Tympanic)  Resp 18  Wt 147 lb (66.7 kg)  SpO2 99%  BMI 24.46 kg/m2 Estimated body mass index is 24.46 kg/(m^2) as calculated from the following:    Height as of 1/28/16: 5' 5\" (1.651 m).    Weight as of this encounter: 147 lb (66.7 kg).  Medication Reconciliation: complete    Nicci Saleh LPN    "

## 2018-05-23 NOTE — MR AVS SNAPSHOT
After Visit Summary   5/23/2018    Kathia Milton    MRN: [id number]           Patient Information     Date Of Birth          1990        Visit Information        Provider Department      5/23/2018 3:00 PM Lainey Rose MD [facility]        Today's Diagnoses     LLQ abdominal pain    -  1    Ovarian mass, left          Care Instructions    Page out to Dr. Fletcher, who is on call.  Keep appointment with Dr. Sun.  If pain acutely worsening, present to ER.            Follow-ups after your visit        Additional Services     OB/GYN REFERRAL       Your provider has referred you to:  [facility] [phone]  [email or web address]    Has seen Dr. Dino Jimenez.  Gone this week.  Worsening pain with 5 cm ovarian cyst.      Please be aware that coverage of these services is subject to the terms and limitations of your health insurance plan.  Call member services at your health plan with any benefit or coverage questions.      Please bring the following with you to your appointment:    (1) Any X-Rays, CTs or MRIs which have been performed.  Contact the facility where they were done to arrange for  prior to your scheduled appointment.   (2) List of current medications   (3) This referral request   (4) Any documents/labs given to you for this referral                  Who to contact     If you have questions or need follow up information about today's clinic visit or your schedule please contact [facility] directly at [phone].  Normal or non-critical lab and imaging results will be communicated to you by MyChart, letter or phone within 4 business days after the clinic has received the results. If you do not hear from us within 7 days, please contact the clinic through MyChart or phone. If you have a critical or abnormal lab result, we will notify you by phone as soon as possible.  Submit refill  requests through [employer] or call your pharmacy and they will forward the refill request to us. Please allow 3 business days for your refill to be completed.          Additional Information About Your Visit        MD Lingohart Information     [employer] gives you secure access to your electronic health record. If you see a primary care provider, you can also send messages to your care team and make appointments. If you have questions, please call your primary care clinic.  If you do not have a primary care provider, please call [phone] and they will assist you.        Care EveryWhere ID     This is your Care EveryWhere ID. This could be used by other organizations to access your [place] medical records  [id number]        Your Vitals Were     Pulse Temperature Respirations Pulse Oximetry BMI (Body Mass Index)       107 99.5  F (37.5  C) (Tympanic) 18 99% 24.46 kg/m2        Blood Pressure from Last 3 Encounters:   05/23/18 102/76   05/15/18 109/65   01/22/18 140/84    Weight from Last 3 Encounters:   05/23/18 147 lb (66.7 kg)   01/28/16 140 lb (63.5 kg)   11/13/15 143 lb (64.9 kg)              We Performed the Following     OB/GYN REFERRAL        Primary Care Provider Office Phone # Fax #    Lainey Rose [phone]  [facility] [address]        Equal Access to Services     [facility]: Hadii aad ku hadasho Soomaali, waaxda luqadaha, qaybta kaalmada adeegyada, terrell resendiz . So [facility] [phone].    ATENCIÓN: Si habla español, tiene a black disposición servicios gratuitos de asistencia lingüística. Llame al [phone].    We comply with applicable federal civil rights laws and [place] laws. We do not discriminate on the basis of race, color, national origin, age, disability, sex, sexual orientation, or gender identity.            Thank you!     Thank you for choosing [facility]  for your care. Our goal is always to provide you with  excellent care. Hearing back from our patients is one way we can continue to improve our services. Please take a few minutes to complete the written survey that you may receive in the mail after your visit with us. Thank you!             Your Updated Medication List - Protect others around you: Learn how to safely use, store and throw away your medicines at www.disposemymeds.org.          This list is accurate as of 5/23/18  3:39 PM.  Always use your most recent med list.                   Brand Name Dispense Instructions for use Diagnosis    azelastine 0.05 % Soln ophthalmic solution    OPTIVAR    1 Bottle    Apply 1 drop to eye 2 times daily    Seasonal allergic rhinitis, unspecified chronicity, unspecified trigger       cetirizine 10 MG tablet    zyrTEC     Take 10 mg by mouth daily        estradiol cypionate 5 MG/ML injection    DEPO-ESTRADIOL     Inject into the muscle every 28 days        ZOFRAN PO      Take 4 mg by mouth every 8 hours as needed for nausea or vomiting

## 2018-05-23 NOTE — PATIENT INSTRUCTIONS
Page out to Dr. Fletcher, who is on call.  Keep appointment with Dr. Sun.  If pain acutely worsening, present to ER.

## 2018-05-23 NOTE — PROGRESS NOTES
SUBJECTIVE:   Kathia Milton is a 28 year old female who presents to clinic today for the following health issues:      ED/UC Followup:    [place]  [facility] of visit: 05/15/18  Reason for visit: Left abdominal pain and flank pain  Current Status: still having pain at times its worse then when she went to the Er     U/S pelvis, showed 5.4 cm cystic mass in left ovary.  CT showed 5.8 cm left adnexal mass, mostly likely cystic.  Labs were negative - CBC, CRP, BMP, urine pregnancy test.  Did have GYN referral placed by ED staff to her GYN whom did her endometriosis laparoscopy.    She did sese Dr. Sun, her GYN, and they advised monitoring.  If still present after 2 cycles or worsening pain, consider surgical intervention.  Has follow up scheduled in upcoming weeks.  Dr. Sun is out of office this week.    Patient having ongoing symptoms.  Wanting to get in sooner.    Problem list and histories reviewed & adjusted, as indicated.  Additional history: as documented    Current Outpatient Prescriptions   Medication     Ondansetron HCl (ZOFRAN PO)     azelastine (OPTIVAR) 0.05 % SOLN ophthalmic solution     cetirizine (ZYRTEC) 10 MG tablet     estradiol cypionate (DEPO-ESTRADIOL) 5 MG/ML injection     No current facility-administered medications for this visit.        Patient Active Problem List   Diagnosis     Encounter for supervision of other normal pregnancy     Abdominal pain, acute      labor     Anemia in pregnancy     Seasonal allergies     Tobacco use disorder     Endometriosis of pelvis     Condyloma acuminatum due to human papillomavirus      delivery     Past Surgical History:   Procedure Laterality Date     APPENDECTOMY  15    [place]guada's      SECTION      C section     COLONOSCOPY  11/10/15    Dr. Singh     DILATION AND CURETTAGE SUCTION, TREAT MISSED , 1ST TRIMESTER       ENDOSCOPY  11/10/15    Dr. Singh      LAPAROSCOPY  11/30/15    Dr. Sun; lysis of pelvic  adhesions, cauterization of minimal endometriosis     LAPAROSCOPY  01/29/2018    lysis of adhesions; pelvic pain, endometriosis; Dr. Sun       Social History   Substance Use Topics     Smoking status: Current Some Day Smoker     Types: Cigarettes     Last attempt to quit: 11/26/2012     Smokeless tobacco: Never Used      Comment: Tried to Quit (YES) (2011)     Alcohol use 0.0 oz/week     0 Standard drinks or equivalent per week      Comment: occasional     Family History   Problem Relation Age of Onset     DIABETES Other      Grandmother     HEART DISEASE Other      Heart Disease - Grandmother     Other - See Comments Other      Renal Disease - Grandmother     Unknown/Adopted Mother      Unknown/Adopted Father      Unknown/Adopted Maternal Grandmother      Unknown/Adopted Maternal Grandfather      Unknown/Adopted Paternal Grandmother      Unknown/Adopted Paternal Grandfather      Breast Cancer No family hx of            Reviewed and updated as needed this visit by clinical staff  Tobacco  Allergies  Meds  Problems  Med Hx  Surg Hx  Fam Hx  Soc Hx        Reviewed and updated as needed this visit by Provider  Tobacco  Allergies  Meds  Problems  Med Hx  Surg Hx  Fam Hx  Soc Hx          ROS:  CONSTITUTIONAL:NEGATIVE for fever, chills, change in weight  INTEGUMENTARY/SKIN: NEGATIVE for worrisome rashes  GI: POSITIVE for abdominal pain LLQ and nausea and NEGATIVE for vomiting  : as above    OBJECTIVE:     /76 (BP Location: Right arm, Patient Position: Chair, Cuff Size: Adult Regular)  Pulse 107  Temp 99.5  F (37.5  C) (Tympanic)  Resp 18  Wt 147 lb (66.7 kg)  SpO2 99%  BMI 24.46 kg/m2  Body mass index is 24.46 kg/(m^2).     GENERAL: healthy, alert and no distress  RESP: lungs clear to auscultation - no rales, rhonchi or wheezes  CV: regular rate and rhythm, normal S1 S2, no S3 or S4, no murmur, click or rub, no peripheral edema and peripheral pulses strong  ABDOMEN: bowel sounds normal and  soft, non-distended, no organomegaly or masses; tender to palpation left lower quadrant  SKIN: no suspicious lesions or rashes  PSYCH: mentation appears normal and affect flat      ASSESSMENT/PLAN:     (R10.32) LLQ abdominal pain  (primary encounter diagnosis)    (N83.9) Ovarian mass, left    Dr Fletcher in surgery. He is on call.  Will discuss case with him when available.  See if appropriate to get her in sooner or repeat imaging if necessary.    Patient Instructions   Page out to Dr. Fletcher, who is on call.  Keep appointment with Dr. Sun.  If pain acutely worsening, present to ER.        Addendum: Did discuss with Dr. Fletcher.  He will see patient tomorrow at 10am.  Will order U/S and see if can be done prior to appointment.  If not, will still see patient.  May need surgery.    Lainey Jones MD  [facility]

## 2018-05-24 ENCOUNTER — OFFICE VISIT (OUTPATIENT)
Dept: OBGYN | Facility: OTHER | Age: 28
End: 2018-05-24
Attending: FAMILY MEDICINE
Payer: COMMERCIAL

## 2018-05-24 ENCOUNTER — HOSPITAL ENCOUNTER (OUTPATIENT)
Dept: ULTRASOUND IMAGING | Facility: HOSPITAL | Age: 28
Discharge: HOME | End: 2018-05-24
Attending: FAMILY MEDICINE | Admitting: FAMILY MEDICINE
Payer: COMMERCIAL

## 2018-05-24 ENCOUNTER — ANESTHESIA EVENT (OUTPATIENT)
Dept: SURGERY | Facility: HOSPITAL | Age: 28
End: 2018-05-24
Payer: COMMERCIAL

## 2018-05-24 VITALS
BODY MASS INDEX: 24.49 KG/M2 | HEART RATE: 64 BPM | WEIGHT: 147 LBS | TEMPERATURE: 98.4 F | DIASTOLIC BLOOD PRESSURE: 68 MMHG | HEIGHT: 65 IN | SYSTOLIC BLOOD PRESSURE: 110 MMHG

## 2018-05-24 DIAGNOSIS — R10.32 LLQ ABDOMINAL PAIN: ICD-10-CM

## 2018-05-24 DIAGNOSIS — R10.2 PELVIC PAIN IN FEMALE: ICD-10-CM

## 2018-05-24 DIAGNOSIS — N83.8 OVARIAN MASS, LEFT: ICD-10-CM

## 2018-05-24 DIAGNOSIS — N80.9 ENDOMETRIOSIS: ICD-10-CM

## 2018-05-24 DIAGNOSIS — N83.202 CYST OF LEFT OVARY: Primary | ICD-10-CM

## 2018-05-24 PROCEDURE — 76856 US EXAM PELVIC COMPLETE: CPT | Mod: TC

## 2018-05-24 PROCEDURE — 99203 OFFICE O/P NEW LOW 30 MIN: CPT | Mod: 57 | Performed by: OBSTETRICS & GYNECOLOGY

## 2018-05-24 RX ORDER — OXYCODONE AND ACETAMINOPHEN 5; 325 MG/1; MG/1
1-2 TABLET ORAL EVERY 6 HOURS PRN
Qty: 30 TABLET | Refills: 0 | Status: SHIPPED | OUTPATIENT
Start: 2018-05-24 | End: 2018-06-16

## 2018-05-24 ASSESSMENT — PAIN SCALES - GENERAL: PAINLEVEL: EXTREME PAIN (9)

## 2018-05-24 ASSESSMENT — PATIENT HEALTH QUESTIONNAIRE - PHQ9: SUM OF ALL RESPONSES TO PHQ QUESTIONS 1-9: 5

## 2018-05-24 ASSESSMENT — ANXIETY QUESTIONNAIRES: GAD7 TOTAL SCORE: 3

## 2018-05-24 NOTE — NURSING NOTE
"Chief Complaint   Patient presents with     Consult     Consult from ER ovarian cyst       Initial /68 (BP Location: Left arm, Patient Position: Sitting, Cuff Size: Adult Regular)  Pulse 64  Temp 98.4  F (36.9  C) (Tympanic)  Ht 5' 5\" (1.651 m)  Wt 147 lb (66.7 kg)  BMI 24.46 kg/m2 Estimated body mass index is 24.46 kg/(m^2) as calculated from the following:    Height as of this encounter: 5' 5\" (1.651 m).    Weight as of this encounter: 147 lb (66.7 kg).  Medication Reconciliation: complete         ARIE JUNIOR LPN      "

## 2018-05-24 NOTE — MR AVS SNAPSHOT
After Visit Summary   5/24/2018    Kathia Milton    MRN: [id number]           Patient Information     Date Of Birth          1990        Visit Information        Provider Department      5/24/2018 10:00 AM Damon Fletcher MD [facility]        Today's Diagnoses     Cyst of left ovary    -  1    Pelvic pain in female        Endometriosis          Care Instructions    Nothing to eat or drink after midnight prior to procedure.            Follow-ups after your visit        Your next 10 appointments already scheduled     May 25, 2018   Procedure with Damon Fletcher MD   [facility] ([facility] )    [address] [place] [id number] [phone]              Future tests that were ordered for you today     Open Future Orders        Priority Expected Expires Ordered    US Pelvic Complete with Transvaginal Routine  5/23/2019 5/23/2018            Who to contact     If you have questions or need follow up information about today's clinic visit or your schedule please contact [facility] directly at [phone].  Normal or non-critical lab and imaging results will be communicated to you by [employer], letter or phone within 4 business days after the clinic has received the results. If you do not hear from us within 7 days, please contact the clinic through [employer] or phone. If you have a critical or abnormal lab result, we will notify you by phone as soon as possible.  Submit refill requests through [employer] or call your pharmacy and they will forward the refill request to us. Please allow 3 business days for your refill to be completed.          Additional Information About Your Visit        MyChart Information     [employer] gives you secure access to your electronic health record. If you see a primary care provider, you can also send messages to your care team and make appointments. If you have questions, please call your primary care clinic.  If you  "do not have a primary care provider, please call [phone] and they will assist you.        Care EveryWhere ID     This is your Care EveryWhere ID. This could be used by other organizations to access your [place] medical records  [id number]        Your Vitals Were     Pulse Temperature Height BMI (Body Mass Index)          64 98.4  F (36.9  C) (Tympanic) 5' 5\" (1.651 m) 24.46 kg/m2         Blood Pressure from Last 3 Encounters:   05/24/18 110/68   05/23/18 102/76   05/15/18 109/65    Weight from Last 3 Encounters:   05/24/18 147 lb (66.7 kg)   05/23/18 147 lb (66.7 kg)   01/28/16 140 lb (63.5 kg)              We Performed the Following     SURGERY ORDER          Today's Medication Changes          These changes are accurate as of 5/24/18  1:01 PM.  If you have any questions, ask your nurse or doctor.               Start taking these medicines.        Dose/Directions    oxyCODONE-acetaminophen 5-325 MG per tablet   Commonly known as:  PERCOCET   Used for:  Cyst of left ovary   Started by:  Damon Fletcher MD        Dose:  1-2 tablet   Take 1-2 tablets by mouth every 6 hours as needed for pain   Quantity:  30 tablet   Refills:  0            Where to get your medicines      Some of these will need a paper prescription and others can be bought over the counter.  Ask your nurse if you have questions.     Bring a paper prescription for each of these medications     oxyCODONE-acetaminophen 5-325 MG per tablet               Information about OPIOIDS     PRESCRIPTION OPIOIDS: WHAT YOU NEED TO KNOW   You have a prescription for an opioid (narcotic) pain medicine. Opioids can cause addiction. If you have a history of chemical dependency of any type, you are at a higher risk of becoming addicted to opioids. Only take this medicine after all other options have been tried. Take it for as short a time and as few doses as possible.     Do not:    Drive. If you drive while taking these medicines, you could be arrested for " driving under the influence (DUI).    Operate heavy machinery    Do any other dangerous activities while taking these medicines.     Drink any alcohol while taking these medicines.      Take with any other medicines that contain acetaminophen. Read all labels carefully. Look for the word  acetaminophen  or  Tylenol.  Ask your pharmacist if you have questions or are unsure.    Store your pills in a secure place, locked if possible. We will not replace any lost or stolen medicine. If you don t finish your medicine, please throw away (dispose) as directed by your pharmacist. The [place] Pollution Control Agency has more information about safe disposal: https://www.pca.[facility].mn.us/living-green/managing-unwanted-medications    All opioids tend to cause constipation. Drink plenty of water and eat foods that have a lot of fiber, such as fruits, vegetables, prune juice, apple juice and high-fiber cereal. Take a laxative (Miralax, milk of magnesia, Colace, Senna) if you don t move your bowels at least every other day.          Primary Care Provider Office Phone # Fax #    Lainey Rose [phone] [address]  [facility] [address]        Equal Access to Services     [facility] : Hadii aad ku hadasho Soomaali, waaxda luqadaha, qaybta kaalmada adeizzyyada, terrell resendiz . So [facility] [phone].    ATENCIÓN: Si habla español, tiene a black disposición servicios gratuitos de asistencia lingüística. Llame al [phone].    We comply with applicable federal civil rights laws and [place] laws. We do not discriminate on the basis of race, color, national origin, age, disability, sex, sexual orientation, or gender identity.            Thank you!     Thank you for choosing [facility]  for your care. Our goal is always to provide you with excellent care. Hearing back from our patients is one way we can continue to improve our services. Please take a few minutes  to complete the written survey that you may receive in the mail after your visit with us. Thank you!             Your Updated Medication List - Protect others around you: Learn how to safely use, store and throw away your medicines at www.disposemymeds.org.          This list is accurate as of 5/24/18  1:01 PM.  Always use your most recent med list.                   Brand Name Dispense Instructions for use Diagnosis    azelastine 0.05 % Soln ophthalmic solution    OPTIVAR    1 Bottle    Apply 1 drop to eye 2 times daily    Seasonal allergic rhinitis, unspecified chronicity, unspecified trigger       cetirizine 10 MG tablet    zyrTEC     Take 10 mg by mouth daily        estradiol cypionate 5 MG/ML injection    DEPO-ESTRADIOL     Inject into the muscle every 28 days        oxyCODONE-acetaminophen 5-325 MG per tablet    PERCOCET    30 tablet    Take 1-2 tablets by mouth every 6 hours as needed for pain    Cyst of left ovary       ZOFRAN PO      Take 4 mg by mouth every 8 hours as needed for nausea or vomiting

## 2018-05-24 NOTE — PROGRESS NOTES
Chief Complaint:  Consult from Dr. Rose for  for Pelvic pain/ovarian cyst  HPI:  Kathia Milton is a 28 year old female is a   Para1 (CS).  No LMP recorded. Patient has had an injection.  Menses are rare (on Depo-Provera) She describes pelvic pain for  1-2  Months.She had nml labs in ED. Ct neg except for left ovarian cyst.  Pelvic US showed 5cm mostly simple ovarian cyst.  She has h/o endometriosis and usually sees Dr. Triana in [place] but he is gone this week. H/o endometriosis/adhesions with laparoscopy  and  and previous Depo-Lupron therapy.   Pain is gradually worsening and not improving.  Denies fever.  + nausea, urinary frequency, LBP.   See previous evals by ED and Dr. Rose.    Location: left side  Aggravating factors activity  Alleviating factors: rest  Radiation no  Prior treatment/tests yes    Patients records are available and reviewed at today's visit.    Past GYN history: As above       Last PAP smear:  Normal      Past Medical History:   Diagnosis Date     Benign neoplasm of vagina 2011     Condyloma acuminatum 2011     Seasonal allergies      Supervision of other normal pregnancy     MICHELLE: 2013     Tobacco use disorder 10/18/2012       Past Surgical History:   Procedure Laterality Date     APPENDECTOMY  14    [facility]      SECTION      C section     COLONOSCOPY  11/10/15    Dr. Singh     DILATION AND CURETTAGE SUCTION, TREAT MISSED , 1ST TRIMESTER       ENDOSCOPY  11/10/15    Dr. Singh      LAPAROSCOPY  11/30/15    Dr. Sun; lysis of pelvic adhesions, cauterization of minimal endometriosis     LAPAROSCOPY  2018    lysis of adhesions; pelvic pain, endometriosis; Dr. Sun       Family History   Problem Relation Age of Onset     DIABETES Other      Grandmother     HEART DISEASE Other      Heart Disease - Grandmother     Other - See Comments Other      Renal Disease - Grandmother     Unknown/Adopted Mother      Unknown/Adopted Father   "    Unknown/Adopted Maternal Grandmother      Unknown/Adopted Maternal Grandfather      Unknown/Adopted Paternal Grandmother      Unknown/Adopted Paternal Grandfather      Breast Cancer No family hx of        Allergies: Seafood and Shellfish allergy      ROS:  CONSTITUTIONAL: NEGATIVE for fever, chills, change in weight  GI: NEGATIVE for  heartburn, or change in bowel habits  : NEGATIVE for, dysuria, hematuria, vaginal discharge, or irregular bleeding      EXAM:  Blood pressure 110/68, pulse 64, temperature 98.4  F (36.9  C), temperature source Tympanic, height 5' 5\" (1.651 m), weight 147 lb (66.7 kg).   BMI= Body mass index is 24.46 kg/(m^2).  General - pleasant female in no acute distress.   Neurological -  mental status normal.  Alert and oriented.  Abdomen - soft, nontender, nondistended, no hepatosplenomegaly.  Pelvic - not repeated.   Rectovaginal - deferred.  Ext:  No edema  Neurological -  mental status normal.  Alert and oriented.  Pap smear done:  No  Cervical cultures:No          Damon Fletcher MD        Results for orders placed or performed during the hospital encounter of 05/15/18 (from the past 24 hour(s))   UA reflex to Microscopic and Culture   Result Value Ref Range     Color Urine Straw       Appearance Urine Clear       Glucose Urine Negative NEG^Negative mg/dL     Bilirubin Urine Negative NEG^Negative     Ketones Urine Negative NEG^Negative mg/dL     Specific Gravity Urine 1.004 1.003 - 1.035     Blood Urine Negative NEG^Negative     pH Urine 7.0 4.7 - 8.0 pH     Protein Albumin Urine Negative NEG^Negative mg/dL     Urobilinogen mg/dL Normal 0.0 - 2.0 mg/dL     Nitrite Urine Negative NEG^Negative     Leukocyte Esterase Urine Trace (A) NEG^Negative     Source Midstream Urine       RBC Urine 1 0 - 2 /HPF     WBC Urine 4 0 - 5 /HPF     Bacteria Urine Few (A) NEG^Negative /HPF     Squamous Epithelial /HPF Urine 5 (H) 0 - 1 /HPF     Mucous Urine Present (A) NEG^Negative /LPF   HCG qualitative urine " (UPT)   Result Value Ref Range     HCG Qual Urine Negative NEG^Negative   CBC with platelets   Result Value Ref Range     WBC 7.5 4.0 - 11.0 10e9/L     RBC Count 4.51 3.8 - 5.2 10e12/L     Hemoglobin 13.0 11.7 - 15.7 g/dL     Hematocrit 37.9 35.0 - 47.0 %     MCV 84 78 - 100 fl     MCH 28.8 26.5 - 33.0 pg     MCHC 34.3 31.5 - 36.5 g/dL     RDW 13.2 10.0 - 15.0 %     Platelet Count 289 150 - 450 10e9/L   CRP inflammation   Result Value Ref Range     CRP Inflammation <2.9 0.0 - 8.0 mg/L   Basic metabolic panel   Result Value Ref Range     Sodium 139 133 - 144 mmol/L     Potassium 3.8 3.4 - 5.3 mmol/L     Chloride 107 94 - 109 mmol/L     Carbon Dioxide 27 20 - 32 mmol/L     Anion Gap 5 3 - 14 mmol/L     Glucose 90 70 - 99 mg/dL     Urea Nitrogen 4 (L) 7 - 30 mg/dL     Creatinine 0.57 0.52 - 1.04 mg/dL     GFR Estimate >90 >60 mL/min/1.7m2     GFR Estimate If Black >90 >60 mL/min/1.7m2     Calcium 8.5 8.5 - 10.1 mg/dL   Abd/pelvis CT - no contrast - Stone Protocol     Narrative     EXAMINATION: CT ABDOMEN PELVIS W/O CONTRAST, 5/15/2018 6:02 PM     TECHNIQUE:  Helical CT images from the lung bases through the  symphysis pubis were obtained  with IV contrast. Contrast dose:     COMPARISON: none     HISTORY: left flank/abdominal pain;      FINDINGS:     There is dependent atelectasis at the lung bases.     The liver is free of masses or biliary ductal enlargement. No  calcified gallstones are seen.     The the spleen and pancreas appear normal.     The adrenal glands are normal.     The right and left kidneys are free of masses or hydronephrosis. No  renal or ureteric calculi are seen.     The periaortic lymph nodes are normal in caliber.     No intraperitoneal masses or inflammatory changes are noted.     In the pelvis the bladder and rectum appear normal. There is a 5.8 cm  in diameter left adnexal mass. This is most likely ovarian. Ultrasound  evaluation is recommended. It is low density and most likely  represents an  ovarian cystic mass.     The regional skeleton is intact     Impression     IMPRESSION: 5.8 cm left adnexal mass most likely a cystic mass of the  left ovary. Ultrasound follow-up is recommended      JORDON RAINEY MD   US Pelvic Complete with Transvaginal     Narrative     PROCEDURE: US PELVIC COMPLETE WITH TRANSVAGINAL     HISTORY: r/o torsion;      TECHNIQUE: Transabdominal and transvaginal ultrasound of the pelvis.     COMPARISON: none     FINDINGS:      MEASUREMENTS:  Uterus: 8.5 x 3.9 x 5.8 cm (Length x Height x Width)  Endometrium: 5 mm in thickness  Right Ovary: 3.1 x 1.7 x 3.0 cm (Length x Height x Width)  Left Ovary:  6.4 x 5.2 x 5.8 cm (Length x Height x Width)     UTERUS: Uterus is normal in appearance.     ADNEXA: There is a cystic mass in the left ovary measuring 5.4 x 2.8 x  4.3 cm.     MISC: Arterial and venous flow was identified both ovaries. There is  no evidence of ovarian torsion. There is trace free fluid in the  pelvic cul-de-sac.     Impression     IMPRESSION: 5.4 cm cystic mass in the left ovary.                ASSESSMENT/PLAN:      Worsening pelvic pain, ovarian cyst(L), h/o endometriosis/adhesions.     Recommend laparoscopy, ovarian cystectomy/drainage, indicated procedures.  Scheduled 5/25/18.   Reviewed goals, risks, alternatives for planned procedure.  Including risk of bleeding, infection, damage to nerves, blood vessels, bowel and bladder. Discussed recovery period and expected discomfort.. All questions were answered.  Preoperative instructions discussed.  NPO after midnight.  Pt desires to proceed.  35 minutes were spent with the patient with greater than 50% of the visit spent in face-to-face counseling and coordination of care.

## 2018-05-24 NOTE — H&P (VIEW-ONLY)
SUBJECTIVE:   Kathia Milton is a 28 year old female who presents to clinic today for the following health issues:      ED[place]UC Followup:    [place]  [facility] [place]: [place]15/18  Reason for visit: Left abdominal pain and flank pain  Current Status: still having pain at times its worse then when she went to the Er     U/S pelvis, showed 5.4 cm cystic mass in left ovary.  CT showed 5.8 cm left adnexal mass, mostly likely cystic.  Labs were negative - CBC, CRP, BMP, urine pregnancy test.  Did have GYN referral placed by ED staff to her GYN whom did her endometriosis laparoscopy.    She did sese Dr. Sun, her GYN, and they advised monitoring.  If still present after 2 cycles or worsening pain, consider surgical intervention.  Has follow up scheduled in upcoming weeks.  Dr. Sun is out of office this week.    Patient having ongoing symptoms.  Wanting to get in sooner.    Problem list and histories reviewed & adjusted, as indicated.  Additional history: as documented    Current Outpatient Prescriptions   Medication     Ondansetron HCl (ZOFRAN PO)     azelastine (OPTIVAR) 0.05 % SOLN ophthalmic solution     cetirizine (ZYRTEC) 10 MG tablet     estradiol cypionate (DEPO-ESTRADIOL) 5 MG/ML injection     No current facility-administered medications for this visit.        Patient Active Problem List   Diagnosis     Encounter for supervision of other normal pregnancy     Abdominal pain, acute      labor     Anemia in pregnancy     Seasonal allergies     Tobacco use disorder     Endometriosis of pelvis     Condyloma acuminatum due to human papillomavirus      delivery     Past Surgical History:   Procedure Laterality Date     APPENDECTOMY  14    [facility]      SECTION      C section     COLONOSCOPY  11/10/15    Dr. Singh     DILATION AND CURETTAGE SUCTION, TREAT MISSED , 1ST TRIMESTER       ENDOSCOPY  11/10/15    Dr. Singh      LAPAROSCOPY  11/30/15    Dr. Sun; lysis of pelvic  adhesions, cauterization of minimal endometriosis     LAPAROSCOPY  01/29/2018    lysis of adhesions; pelvic pain, endometriosis; Dr. Sun       Social History   Substance Use Topics     Smoking status: Current Some Day Smoker     Types: Cigarettes     Last attempt to quit: 11/26/2012     Smokeless tobacco: Never Used      Comment: Tried to Quit (YES) (2011)     Alcohol use 0.0 oz/week     0 Standard drinks or equivalent per week      Comment: occasional     Family History   Problem Relation Age of Onset     DIABETES Other      Grandmother     HEART DISEASE Other      Heart Disease - Grandmother     Other - See Comments Other      Renal Disease - Grandmother     Unknown/Adopted Mother      Unknown/Adopted Father      Unknown/Adopted Maternal Grandmother      Unknown/Adopted Maternal Grandfather      Unknown/Adopted Paternal Grandmother      Unknown/Adopted Paternal Grandfather      Breast Cancer No family hx of            Reviewed and updated as needed this visit by clinical staff  Tobacco  Allergies  Meds  Problems  Med Hx  Surg Hx  Fam Hx  Soc Hx        Reviewed and updated as needed this visit by Provider  Tobacco  Allergies  Meds  Problems  Med Hx  Surg Hx  Fam Hx  Soc Hx          ROS:  CONSTITUTIONAL:NEGATIVE for fever, chills, change in weight  INTEGUMENTARY/SKIN: NEGATIVE for worrisome rashes  GI: POSITIVE for abdominal pain LLQ and nausea and NEGATIVE for vomiting  : as above    OBJECTIVE:     /76 (BP Location: Right arm, Patient Position: Chair, Cuff Size: Adult Regular)  Pulse 107  Temp 99.5  F (37.5  C) (Tympanic)  Resp 18  Wt 147 lb (66.7 kg)  SpO2 99%  BMI 24.46 kg/m2  Body mass index is 24.46 kg/(m^2).     GENERAL: healthy, alert and no distress  RESP: lungs clear to auscultation - no rales, rhonchi or wheezes  CV: regular rate and rhythm, normal S1 S2, no S3 or S4, no murmur, click or rub, no peripheral edema and peripheral pulses strong  ABDOMEN: bowel sounds normal and  soft, non-distended, no organomegaly or masses; tender to palpation left lower quadrant  SKIN: no suspicious lesions or rashes  PSYCH: mentation appears normal and affect flat      ASSESSMENT/PLAN:     (R10.32) LLQ abdominal pain  (primary encounter diagnosis)    (N83.9) Ovarian mass, left    Dr Fletcher in surgery. He is on call.  Will discuss case with him when available.  See if appropriate to get her in sooner or repeat imaging if necessary.    Patient Instructions   Page out to Dr. Fletcher, who is on call.  Keep appointment with Dr. Sun.  If pain acutely worsening, present to ER.        Addendum: Did discuss with Dr. Fletcher.  He will see patient tomorrow at 10am.  Will order U/S and see if can be done prior to appointment.  If not, will still see patient.  May need surgery.    Lainey Jones MD  [facility]

## 2018-05-25 ENCOUNTER — SURGERY (OUTPATIENT)
Age: 28
End: 2018-05-25

## 2018-05-25 ENCOUNTER — HOSPITAL ENCOUNTER (OUTPATIENT)
Facility: HOSPITAL | Age: 28
Discharge: HOME | End: 2018-05-25
Attending: OBSTETRICS & GYNECOLOGY | Admitting: OBSTETRICS & GYNECOLOGY
Payer: COMMERCIAL

## 2018-05-25 ENCOUNTER — APPOINTMENT (OUTPATIENT)
Dept: LAB | Facility: HOSPITAL | Age: 28
End: 2018-05-25
Attending: FAMILY MEDICINE
Payer: COMMERCIAL

## 2018-05-25 ENCOUNTER — ANESTHESIA (OUTPATIENT)
Dept: SURGERY | Facility: HOSPITAL | Age: 28
End: 2018-05-25
Payer: COMMERCIAL

## 2018-05-25 VITALS
SYSTOLIC BLOOD PRESSURE: 99 MMHG | RESPIRATION RATE: 18 BRPM | TEMPERATURE: 97.7 F | OXYGEN SATURATION: 97 % | DIASTOLIC BLOOD PRESSURE: 75 MMHG

## 2018-05-25 DIAGNOSIS — Z98.890 POST-OPERATIVE STATE: ICD-10-CM

## 2018-05-25 LAB
ABO + RH BLD: NORMAL
ABO + RH BLD: NORMAL
BASOPHILS # BLD AUTO: 0.1 10E9/L (ref 0–0.2)
BASOPHILS NFR BLD AUTO: 0.8 %
BLD GP AB SCN SERPL QL: NORMAL
BLOOD BANK CMNT PATIENT-IMP: NORMAL
DIFFERENTIAL METHOD BLD: NORMAL
EOSINOPHIL # BLD AUTO: 0.6 10E9/L (ref 0–0.7)
EOSINOPHIL NFR BLD AUTO: 9.8 %
ERYTHROCYTE [DISTWIDTH] IN BLOOD BY AUTOMATED COUNT: 13.2 % (ref 10–15)
HCG UR QL: NEGATIVE
HCT VFR BLD AUTO: 40 % (ref 35–47)
HGB BLD-MCNC: 13.7 G/DL (ref 11.7–15.7)
IMM GRANULOCYTES # BLD: 0 10E9/L (ref 0–0.4)
IMM GRANULOCYTES NFR BLD: 0.2 %
LYMPHOCYTES # BLD AUTO: 2.5 10E9/L (ref 0.8–5.3)
LYMPHOCYTES NFR BLD AUTO: 38.8 %
MCH RBC QN AUTO: 28.9 PG (ref 26.5–33)
MCHC RBC AUTO-ENTMCNC: 34.3 G/DL (ref 31.5–36.5)
MCV RBC AUTO: 84 FL (ref 78–100)
MONOCYTES # BLD AUTO: 0.5 10E9/L (ref 0–1.3)
MONOCYTES NFR BLD AUTO: 7.6 %
NEUTROPHILS # BLD AUTO: 2.7 10E9/L (ref 1.6–8.3)
NEUTROPHILS NFR BLD AUTO: 42.8 %
NRBC # BLD AUTO: 0 10*3/UL
NRBC BLD AUTO-RTO: 0 /100
PLATELET # BLD AUTO: 341 10E9/L (ref 150–450)
RBC # BLD AUTO: 4.74 10E12/L (ref 3.8–5.2)
SPECIMEN EXP DATE BLD: NORMAL
WBC # BLD AUTO: 6.4 10E9/L (ref 4–11)

## 2018-05-25 PROCEDURE — 40000305 ZZH STATISTIC PRE PROC ASSESS I: Performed by: OBSTETRICS & GYNECOLOGY

## 2018-05-25 PROCEDURE — 36000058 ZZH SURGERY LEVEL 3 EA 15 ADDTL MIN: Performed by: OBSTETRICS & GYNECOLOGY

## 2018-05-25 PROCEDURE — 36415 COLL VENOUS BLD VENIPUNCTURE: CPT | Performed by: OBSTETRICS & GYNECOLOGY

## 2018-05-25 PROCEDURE — 25000128 H RX IP 250 OP 636: Performed by: OBSTETRICS & GYNECOLOGY

## 2018-05-25 PROCEDURE — 25000128 H RX IP 250 OP 636: Performed by: NURSE ANESTHETIST, CERTIFIED REGISTERED

## 2018-05-25 PROCEDURE — 25000132 ZZH RX MED GY IP 250 OP 250 PS 637: Performed by: OBSTETRICS & GYNECOLOGY

## 2018-05-25 PROCEDURE — 86901 BLOOD TYPING SEROLOGIC RH(D): CPT | Performed by: OBSTETRICS & GYNECOLOGY

## 2018-05-25 PROCEDURE — 58662 LAPAROSCOPY EXCISE LESIONS: CPT | Mod: AS | Performed by: NURSE PRACTITIONER

## 2018-05-25 PROCEDURE — 25000566 ZZH SEVOFLURANE, EA 15 MIN: Performed by: NURSE ANESTHETIST, CERTIFIED REGISTERED

## 2018-05-25 PROCEDURE — 85025 COMPLETE CBC W/AUTO DIFF WBC: CPT | Performed by: OBSTETRICS & GYNECOLOGY

## 2018-05-25 PROCEDURE — 25000132 ZZH RX MED GY IP 250 OP 250 PS 637: Performed by: NURSE ANESTHETIST, CERTIFIED REGISTERED

## 2018-05-25 PROCEDURE — 25000125 ZZHC RX 250: Performed by: NURSE ANESTHETIST, CERTIFIED REGISTERED

## 2018-05-25 PROCEDURE — 37000009 ZZH ANESTHESIA TECHNICAL FEE, EACH ADDTL 15 MIN: Performed by: OBSTETRICS & GYNECOLOGY

## 2018-05-25 PROCEDURE — 71000027 ZZH RECOVERY PHASE 2 EACH 15 MINS: Performed by: OBSTETRICS & GYNECOLOGY

## 2018-05-25 PROCEDURE — 27210794 ZZH OR GENERAL SUPPLY STERILE: Performed by: OBSTETRICS & GYNECOLOGY

## 2018-05-25 PROCEDURE — 71000014 ZZH RECOVERY PHASE 1 LEVEL 2 FIRST HR: Performed by: OBSTETRICS & GYNECOLOGY

## 2018-05-25 PROCEDURE — 86900 BLOOD TYPING SEROLOGIC ABO: CPT | Performed by: OBSTETRICS & GYNECOLOGY

## 2018-05-25 PROCEDURE — 37000008 ZZH ANESTHESIA TECHNICAL FEE, 1ST 30 MIN: Performed by: OBSTETRICS & GYNECOLOGY

## 2018-05-25 PROCEDURE — 81025 URINE PREGNANCY TEST: CPT | Performed by: OBSTETRICS & GYNECOLOGY

## 2018-05-25 PROCEDURE — 36000056 ZZH SURGERY LEVEL 3 1ST 30 MIN: Performed by: OBSTETRICS & GYNECOLOGY

## 2018-05-25 PROCEDURE — 88305 TISSUE EXAM BY PATHOLOGIST: CPT | Mod: TC | Performed by: OBSTETRICS & GYNECOLOGY

## 2018-05-25 PROCEDURE — 58662 LAPAROSCOPY EXCISE LESIONS: CPT | Performed by: OBSTETRICS & GYNECOLOGY

## 2018-05-25 PROCEDURE — 27110028 ZZH OR GENERAL SUPPLY NON-STERILE: Performed by: OBSTETRICS & GYNECOLOGY

## 2018-05-25 PROCEDURE — 01999 UNLISTED ANES PROCEDURE: CPT | Performed by: NURSE ANESTHETIST, CERTIFIED REGISTERED

## 2018-05-25 PROCEDURE — 86850 RBC ANTIBODY SCREEN: CPT | Performed by: OBSTETRICS & GYNECOLOGY

## 2018-05-25 PROCEDURE — 37000011 ZZH ANESTHESIA WARD SERVICE: Performed by: NURSE ANESTHETIST, CERTIFIED REGISTERED

## 2018-05-25 RX ORDER — CEFAZOLIN SODIUM 1 G/50ML
1 INJECTION, SOLUTION INTRAVENOUS SEE ADMIN INSTRUCTIONS
Status: DISCONTINUED | OUTPATIENT
Start: 2018-05-25 | End: 2018-05-25 | Stop reason: HOSPADM

## 2018-05-25 RX ORDER — HYDRALAZINE HYDROCHLORIDE 20 MG/ML
2.5-5 INJECTION INTRAMUSCULAR; INTRAVENOUS EVERY 10 MIN PRN
Status: DISCONTINUED | OUTPATIENT
Start: 2018-05-25 | End: 2018-05-25 | Stop reason: HOSPADM

## 2018-05-25 RX ORDER — SODIUM CHLORIDE, SODIUM LACTATE, POTASSIUM CHLORIDE, CALCIUM CHLORIDE 600; 310; 30; 20 MG/100ML; MG/100ML; MG/100ML; MG/100ML
INJECTION, SOLUTION INTRAVENOUS CONTINUOUS
Status: DISCONTINUED | OUTPATIENT
Start: 2018-05-25 | End: 2018-05-25 | Stop reason: HOSPADM

## 2018-05-25 RX ORDER — BUPIVACAINE HYDROCHLORIDE 2.5 MG/ML
INJECTION, SOLUTION EPIDURAL; INFILTRATION; INTRACAUDAL
Status: DISCONTINUED
Start: 2018-05-25 | End: 2018-05-25 | Stop reason: HOSPADM

## 2018-05-25 RX ORDER — CYCLOBENZAPRINE HCL 10 MG
5-10 TABLET ORAL 3 TIMES DAILY PRN
Qty: 30 TABLET | Refills: 1 | Status: SHIPPED | OUTPATIENT
Start: 2018-05-25 | End: 2018-08-06

## 2018-05-25 RX ORDER — MEPERIDINE HYDROCHLORIDE 50 MG/ML
12.5 INJECTION INTRAMUSCULAR; INTRAVENOUS; SUBCUTANEOUS
Status: DISCONTINUED | OUTPATIENT
Start: 2018-05-25 | End: 2018-05-25 | Stop reason: HOSPADM

## 2018-05-25 RX ORDER — PROMETHAZINE HYDROCHLORIDE 25 MG/ML
12.5 INJECTION, SOLUTION INTRAMUSCULAR; INTRAVENOUS
Status: DISCONTINUED | OUTPATIENT
Start: 2018-05-25 | End: 2018-05-25 | Stop reason: HOSPADM

## 2018-05-25 RX ORDER — KETOROLAC TROMETHAMINE 30 MG/ML
30 INJECTION, SOLUTION INTRAMUSCULAR; INTRAVENOUS ONCE
Status: COMPLETED | OUTPATIENT
Start: 2018-05-25 | End: 2018-05-25

## 2018-05-25 RX ORDER — DEXAMETHASONE SODIUM PHOSPHATE 10 MG/ML
INJECTION, SOLUTION INTRAMUSCULAR; INTRAVENOUS PRN
Status: DISCONTINUED | OUTPATIENT
Start: 2018-05-25 | End: 2018-05-25

## 2018-05-25 RX ORDER — ROPIVACAINE HYDROCHLORIDE 5 MG/ML
INJECTION, SOLUTION EPIDURAL; INFILTRATION; PERINEURAL PRN
Status: DISCONTINUED | OUTPATIENT
Start: 2018-05-25 | End: 2018-05-25

## 2018-05-25 RX ORDER — FENTANYL CITRATE 50 UG/ML
25-50 INJECTION, SOLUTION INTRAMUSCULAR; INTRAVENOUS
Status: DISCONTINUED | OUTPATIENT
Start: 2018-05-25 | End: 2018-05-25 | Stop reason: HOSPADM

## 2018-05-25 RX ORDER — PROPOFOL 10 MG/ML
INJECTION, EMULSION INTRAVENOUS PRN
Status: DISCONTINUED | OUTPATIENT
Start: 2018-05-25 | End: 2018-05-25

## 2018-05-25 RX ORDER — ONDANSETRON 4 MG/1
4 TABLET, ORALLY DISINTEGRATING ORAL
Status: DISCONTINUED | OUTPATIENT
Start: 2018-05-25 | End: 2018-05-25 | Stop reason: HOSPADM

## 2018-05-25 RX ORDER — ONDANSETRON 4 MG/1
4 TABLET, FILM COATED ORAL EVERY 6 HOURS PRN
Qty: 30 TABLET | Refills: 1 | Status: SHIPPED | OUTPATIENT
Start: 2018-05-25 | End: 2018-08-10

## 2018-05-25 RX ORDER — ONDANSETRON 2 MG/ML
INJECTION INTRAMUSCULAR; INTRAVENOUS PRN
Status: DISCONTINUED | OUTPATIENT
Start: 2018-05-25 | End: 2018-05-25

## 2018-05-25 RX ORDER — CEFAZOLIN SODIUM 2 G/100ML
2 INJECTION, SOLUTION INTRAVENOUS
Status: COMPLETED | OUTPATIENT
Start: 2018-05-25 | End: 2018-05-25

## 2018-05-25 RX ORDER — ONDANSETRON 2 MG/ML
4 INJECTION INTRAMUSCULAR; INTRAVENOUS EVERY 30 MIN PRN
Status: DISCONTINUED | OUTPATIENT
Start: 2018-05-25 | End: 2018-05-25 | Stop reason: HOSPADM

## 2018-05-25 RX ORDER — OXYCODONE AND ACETAMINOPHEN 5; 325 MG/1; MG/1
1 TABLET ORAL
Status: COMPLETED | OUTPATIENT
Start: 2018-05-25 | End: 2018-05-25

## 2018-05-25 RX ORDER — OXYCODONE AND ACETAMINOPHEN 5; 325 MG/1; MG/1
2 TABLET ORAL
Status: COMPLETED | OUTPATIENT
Start: 2018-05-25 | End: 2018-05-25

## 2018-05-25 RX ORDER — LIDOCAINE HYDROCHLORIDE 20 MG/ML
INJECTION, SOLUTION INFILTRATION; PERINEURAL PRN
Status: DISCONTINUED | OUTPATIENT
Start: 2018-05-25 | End: 2018-05-25

## 2018-05-25 RX ORDER — IBUPROFEN 800 MG/1
800 TABLET, FILM COATED ORAL EVERY 8 HOURS PRN
Qty: 40 TABLET | Refills: 1 | Status: SHIPPED | OUTPATIENT
Start: 2018-05-25 | End: 2018-08-10

## 2018-05-25 RX ORDER — HYDROMORPHONE HYDROCHLORIDE 1 MG/ML
.3-.5 INJECTION, SOLUTION INTRAMUSCULAR; INTRAVENOUS; SUBCUTANEOUS EVERY 10 MIN PRN
Status: DISCONTINUED | OUTPATIENT
Start: 2018-05-25 | End: 2018-05-25 | Stop reason: HOSPADM

## 2018-05-25 RX ORDER — ONDANSETRON 4 MG/1
4 TABLET, ORALLY DISINTEGRATING ORAL EVERY 30 MIN PRN
Status: DISCONTINUED | OUTPATIENT
Start: 2018-05-25 | End: 2018-05-25 | Stop reason: HOSPADM

## 2018-05-25 RX ORDER — FENTANYL CITRATE 50 UG/ML
INJECTION, SOLUTION INTRAMUSCULAR; INTRAVENOUS PRN
Status: DISCONTINUED | OUTPATIENT
Start: 2018-05-25 | End: 2018-05-25

## 2018-05-25 RX ORDER — ALBUTEROL SULFATE 0.83 MG/ML
2.5 SOLUTION RESPIRATORY (INHALATION) EVERY 4 HOURS PRN
Status: DISCONTINUED | OUTPATIENT
Start: 2018-05-25 | End: 2018-05-25 | Stop reason: HOSPADM

## 2018-05-25 RX ORDER — SCOLOPAMINE TRANSDERMAL SYSTEM 1 MG/1
1 PATCH, EXTENDED RELEASE TRANSDERMAL ONCE
Status: COMPLETED | OUTPATIENT
Start: 2018-05-25 | End: 2018-05-25

## 2018-05-25 RX ORDER — FENTANYL CITRATE 50 UG/ML
50 INJECTION, SOLUTION INTRAMUSCULAR; INTRAVENOUS
Status: DISCONTINUED | OUTPATIENT
Start: 2018-05-25 | End: 2018-05-25 | Stop reason: HOSPADM

## 2018-05-25 RX ORDER — NALOXONE HYDROCHLORIDE 0.4 MG/ML
.1-.4 INJECTION, SOLUTION INTRAMUSCULAR; INTRAVENOUS; SUBCUTANEOUS
Status: DISCONTINUED | OUTPATIENT
Start: 2018-05-25 | End: 2018-05-25 | Stop reason: HOSPADM

## 2018-05-25 RX ADMIN — FENTANYL CITRATE 50 MCG: 50 INJECTION INTRAMUSCULAR; INTRAVENOUS at 11:36

## 2018-05-25 RX ADMIN — KETOROLAC TROMETHAMINE 30 MG: 30 INJECTION, SOLUTION INTRAMUSCULAR at 08:44

## 2018-05-25 RX ADMIN — CEFAZOLIN SODIUM 2 G: 2 INJECTION, SOLUTION INTRAVENOUS at 10:13

## 2018-05-25 RX ADMIN — SODIUM CHLORIDE, POTASSIUM CHLORIDE, SODIUM LACTATE AND CALCIUM CHLORIDE: 600; 310; 30; 20 INJECTION, SOLUTION INTRAVENOUS at 08:44

## 2018-05-25 RX ADMIN — FENTANYL CITRATE 100 MCG: 50 INJECTION, SOLUTION INTRAMUSCULAR; INTRAVENOUS at 10:04

## 2018-05-25 RX ADMIN — MIDAZOLAM 2 MG: 1 INJECTION INTRAMUSCULAR; INTRAVENOUS at 09:58

## 2018-05-25 RX ADMIN — ROPIVACAINE HYDROCHLORIDE 20 ML: 5 INJECTION, SOLUTION EPIDURAL; INFILTRATION; PERINEURAL at 11:06

## 2018-05-25 RX ADMIN — LIDOCAINE HYDROCHLORIDE 40 MG: 20 INJECTION, SOLUTION INFILTRATION; PERINEURAL at 10:04

## 2018-05-25 RX ADMIN — OXYCODONE HYDROCHLORIDE AND ACETAMINOPHEN 1 TABLET: 5; 325 TABLET ORAL at 12:45

## 2018-05-25 RX ADMIN — SCOPALAMINE 1 PATCH: 1 PATCH, EXTENDED RELEASE TRANSDERMAL at 08:50

## 2018-05-25 RX ADMIN — ONDANSETRON 4 MG: 2 INJECTION INTRAMUSCULAR; INTRAVENOUS at 10:04

## 2018-05-25 RX ADMIN — DEXAMETHASONE SODIUM PHOSPHATE 5 MG: 10 INJECTION, SOLUTION INTRAMUSCULAR; INTRAVENOUS at 11:08

## 2018-05-25 RX ADMIN — FENTANYL CITRATE 50 MCG: 50 INJECTION, SOLUTION INTRAMUSCULAR; INTRAVENOUS at 10:31

## 2018-05-25 RX ADMIN — Medication 80 MG: at 10:04

## 2018-05-25 RX ADMIN — PROPOFOL 200 MG: 10 INJECTION, EMULSION INTRAVENOUS at 10:04

## 2018-05-25 RX ADMIN — ROCURONIUM BROMIDE 10 MG: 10 INJECTION INTRAVENOUS at 10:04

## 2018-05-25 RX ADMIN — DEXAMETHASONE SODIUM PHOSPHATE 10 MG: 10 INJECTION, SOLUTION INTRAMUSCULAR; INTRAVENOUS at 10:04

## 2018-05-25 RX ADMIN — OXYCODONE HYDROCHLORIDE AND ACETAMINOPHEN 1 TABLET: 5; 325 TABLET ORAL at 13:21

## 2018-05-25 RX ADMIN — FENTANYL CITRATE 50 MCG: 50 INJECTION INTRAMUSCULAR; INTRAVENOUS at 12:25

## 2018-05-25 RX ADMIN — FENTANYL CITRATE 50 MCG: 50 INJECTION INTRAMUSCULAR; INTRAVENOUS at 13:08

## 2018-05-25 RX ADMIN — FENTANYL CITRATE 50 MCG: 50 INJECTION INTRAMUSCULAR; INTRAVENOUS at 11:44

## 2018-05-25 RX ADMIN — DEXAMETHASONE SODIUM PHOSPHATE 5 MG: 10 INJECTION, SOLUTION INTRAMUSCULAR; INTRAVENOUS at 11:06

## 2018-05-25 RX ADMIN — ROPIVACAINE HYDROCHLORIDE 20 ML: 5 INJECTION, SOLUTION EPIDURAL; INFILTRATION; PERINEURAL at 11:08

## 2018-05-25 ASSESSMENT — LIFESTYLE VARIABLES: TOBACCO_USE: 1

## 2018-05-25 NOTE — ANESTHESIA POSTPROCEDURE EVALUATION
Patient: Kathia Milton    Procedure(s):  EXPLORATORY  LAPAROSCOPY, LEFT OVARIAN CYSTECTOMY - Wound Class: II-Clean Contaminated    Diagnosis:PELVIC PAIN OVARIAN CYST, ENDOMETRIOSIS, LEFT  Diagnosis Additional Information: No value filed.    Anesthesia Type:  General, ETT, Periph. Nerve Block for postop pain    Note:  Anesthesia Post Evaluation    Patient location during evaluation: Phase 2  Patient participation: Able to fully participate in evaluation  Level of consciousness: awake and alert  Pain management: adequate  Airway patency: patent  Cardiovascular status: acceptable  Respiratory status: acceptable  Hydration status: acceptable  PONV: none             Last vitals:  Vitals:    05/25/18 1215 05/25/18 1230 05/25/18 1245   BP: 103/67 103/65 113/65   Resp: 16 16    Temp:      SpO2: 94% 93% 95%         Electronically Signed By: CARLENE Bernal CRNA  May 25, 2018  1:07 PM

## 2018-05-25 NOTE — ANESTHESIA PREPROCEDURE EVALUATION
Anesthesia Evaluation     . Pt has had prior anesthetic.     History of anesthetic complications   - PONV        ROS/MED HX    ENT/Pulmonary:     (+)allergic rhinitis, tobacco use, Current use , . .    Neurologic:  - neg neurologic ROS     Cardiovascular:  - neg cardiovascular ROS       METS/Exercise Tolerance:  >4 METS   Hematologic:  - neg hematologic  ROS       Musculoskeletal:  - neg musculoskeletal ROS       GI/Hepatic:  - neg GI/hepatic ROS       Renal/Genitourinary:  - ROS Renal section negative       Endo:  - neg endo ROS       Psychiatric:  - neg psychiatric ROS       Infectious Disease:  - neg infectious disease ROS       Malignancy:      - no malignancy   Other:    - neg other ROS                 Physical Exam  Normal systems: cardiovascular and pulmonary    Airway   Mallampati: III  TM distance: >3 FB  Neck ROM: full    Dental   (+) loose  Comment: Poor dentation    Cardiovascular   Rhythm and rate: regular and normal      Pulmonary    breath sounds clear to auscultation                    Anesthesia Plan      History & Physical Review  History and physical reviewed and following examination; no interval change.    ASA Status:  2 .    NPO Status:  > 8 hours    Plan for General, ETT and Periph. Nerve Block for postop pain with Intravenous induction. Maintenance will be Balanced.    PONV prophylaxis:  Ondansetron (or other 5HT-3) and Scopolamine patch  hcg neg      Postoperative Care      Consents  Anesthetic plan, risks, benefits and alternatives discussed with:  Patient..                          .

## 2018-05-25 NOTE — DISCHARGE INSTRUCTIONS
Remove the scopolamine patch behind your right ear after 24 hours after application.   After removing the patch, wash your hands and the area behind your ear thoroughly with soap and water.   The patch will still contain some medicine after use.   To avoid accidental contact or ingestion by children or pets, fold the used patch in half with the sticky side together and throw away in the trash out of the reach of children and pets.     Call MD prior to DC.  No driving today or while on pain meds  Pelvic rest for 2 weeks  Schedule PO check 1-2 weeks Dr. Fletcher  Call Dr. Fletcher [phone] as necessary if problems in interim  No heavy lifting, vigorous activity, swim, bath, exercise for 1 week

## 2018-05-25 NOTE — ANESTHESIA PROCEDURE NOTES
Peripheral nerve/Neuraxial procedure note : TAP  Pre-Procedure    Location: OR    Procedure Times:5/25/2018 11:32 AM and 5/25/2018 11:19 AM  Pre-Anesthestic Checklist: patient identified, IV checked, site marked, risks and benefits discussed, informed consent, monitors and equipment checked, pre-op evaluation, at physician/surgeon's request and post-op pain management    Timeout  Correct Patient: Yes   Correct Procedure: Yes   Correct Site: Yes   Correct Laterality: Yes   Correct Position: Yes   Site Marked: Yes   .   Procedure Documentation    .    Procedure:  bilateral  TAP.     Ultrasound used to identify targeted nerve, plexus, or vascular marker and placed a needle adjacent to it., Ultrasound was used to visualize the spread of the anesthetic in close proximity to the above stated nerve. A permanent image is entered into the patient's record.  Patient Prep;mask, chlorhexidine gluconate and isopropyl alcohol.  .  Needle: other Needle Gauge: 20.    Needle Length (Inches) 4  Insertion Method: Single Shot.       Assessment/Narrative  Paresthesias: No.  Injection made incrementally with aspirations every 5 mL..  The placement was negative for: blood aspirated, painful injection and site bleeding.  Bolus given via needle..   Secured via.   Complications: none. Comments:  Nicholas SIGALA performed with CRNA observing.

## 2018-05-25 NOTE — OP NOTE
[facility]  Operative Note    Pre-operative diagnosis: PELVIC PAIN OVARIAN CYST   Post-operative diagnosis Left ovarian cyst.  Otherwise normal laparoscopy.  Pathology pending   Procedure: Procedure(s):  EXPLORATORY  LAPAROSCOPY, LEFT OVARIAN CYSTECTOMY - Wound Class: II-Clean Contaminated   Surgeon:  Assistant: MD Consuelo White NP (Assistance required for retraction, exposure, instrument handling, and wound closure)    Anesthesia: General    Estimated blood loss: Minimal   Blood transfusion: No transfusion was given during surgery   Drains: None   Specimens: Left ovarian cyst   Findings: 5 cm simple left ovarian cyst.  Otherwise normal pelvic and abdominal anatomy.  Negative for adhesions or active endometriosis.    Complications: None   Condition: Stable       OPERATIVE NARRATION: The patient was brought to the Operating Room and uneventfully placed under general anesthesia. She was prepped and draped in the dorsal lithotomy position and her bladder drained. The cervix was visualized with a speculum and grasped anteriorly with a fine tooth tenaculum and a uterine manipulating device placed. We then changed gloves and proceeded to the abdominal portion of the case. A 5 mm infraumbilical skin incision was performed with a scalpel. A Veress needle was introduced without difficulty and a water drop test performed. The abdomen was insufflated with several liters of carbon dioxide. A 5 mm trocar and a laparoscope were introduced. Visualization was excellent. Next, a 8 mm suprapubic  port and 5mm LLQ port were placed under direct visualization.  The uterus was elevated and abdominal and pelvic exploration was performed with findings as described above. The left ovary was grasped and an incision made over the cyst with monopolar point cautery.  The cyst was opened/drained and the cyst wall grasped and peeled away from the ovary and removed.  Light monopolar cautery was used for hemostasis of  the ovarian fossa.   At this point the pelvis was irrigated and  there was excellent hemostasis so we proceeded to closure. The  trocars were removed and excess carbon dioxide expressed from the abdomen. The subcutaneous spaces were irrigated and checked for hemostasis. The skin incisions were closed with surgical glue. There were no complications. The uterine manipulating devise was removed.  The patient was transferred to the Recovery Room in excellent and stable condition.     BERTRAND JACOBS MD

## 2018-05-25 NOTE — IP AVS SNAPSHOT
[facility] Preop/Phase II    [address] [address]    Phone:  [phone]                                       After Visit Summary   5/25/2018    Kathia Milton    MRN: [id number]           After Visit Summary Signature Page     I have received my discharge instructions, and my questions have been answered. I have discussed any challenges I see with this plan with the nurse or doctor.    ..........................................................................................................................................  Patient/Patient Representative Signature      ..........................................................................................................................................  Patient Representative Print Name and Relationship to Patient    ..................................................               ................................................  Date                                            Time    ..........................................................................................................................................  Reviewed by Signature/Title    ...................................................              ..............................................  Date                                                            Time

## 2018-05-25 NOTE — OR NURSING
Voided in BR.No vaginal flow.Patient and responsible adult given discharge instructions with no questions regarding instructions. Haris score 19. Pain level 3/10.  Discharged from unit via w/c. Patient discharged to home.

## 2018-05-25 NOTE — ANESTHESIA CARE TRANSFER NOTE
Patient: Kathia Milton    Procedure(s):  EXPLORATORY  LAPAROSCOPY, LEFT OVARIAN CYSTECTOMY - Wound Class: II-Clean Contaminated    Diagnosis: PELVIC PAIN OVARIAN CYST, ENDOMETRIOSIS, LEFT  Diagnosis Additional Information: No value filed.    Anesthesia Type:   General, ETT, Periph. Nerve Block for postop pain     Note:  Airway :Nasal Cannula  Patient transferred to:PACU  Handoff Report: Identifed the Patient, Identified the Reponsible Provider, Reviewed the pertinent medical history, Discussed the surgical course, Reviewed Intra-OP anesthesia mangement and issues during anesthesia, Set expectations for post-procedure period and Allowed opportunity for questions and acknowledgement of understanding      Vitals: (Last set prior to Anesthesia Care Transfer)    CRNA VITALS  5/25/2018 1044 - 5/25/2018 1118      5/25/2018             NIBP: 104/72    Pulse: 93    NIBP Mean: 84    SpO2: 100 %    Resp Rate (observed): (!)  3                Electronically Signed By: CARLENE Prince CRNA  May 25, 2018  11:18 AM

## 2018-05-25 NOTE — IP AVS SNAPSHOT
MRN:[id number]                      After Visit Summary   5/25/2018    Kathia Milton    MRN: [id number]           Thank you!     Thank you for choosing [facility] for your care. Our goal is always to provide you with excellent care. Hearing back from our patients is one way we can continue to improve our services. Please take a few minutes to complete the written survey that you may receive in the mail after you visit with us. Thank you!        Patient Information     Date Of Birth          1990        About your hospital stay     You were admitted on:  May 25, 2018 You last received care in the:  [facility]    You were discharged on:  May 25, 2018       Who to Call     For medical emergencies, please call 911.  For non-urgent questions about your medical care, please call your primary care provider or clinic, [phone]  For questions related to your surgery, please call your surgery clinic        Attending Provider     Provider Specialty    Damon Fletcher MD OB/Gyn       Primary Care Provider Office Phone # Fax #    Lainey Rose [phone]      Further instructions from your care team       Remove the scopolamine patch behind your right ear after 24 hours after application.   After removing the patch, wash your hands and the area behind your ear thoroughly with soap and water.   The patch will still contain some medicine after use.   To avoid accidental contact or ingestion by children or pets, fold the used patch in half with the sticky side together and throw away in the trash out of the reach of children and pets.     Call MD prior to DC.  No driving today or while on pain meds  Pelvic rest for 2 weeks  Schedule PO check 1-2 weeks Dr. Fletcher  Call Dr. Fletcher [phone] as necessary if problems in interim  No heavy lifting, vigorous activity, swim, bath, exercise for 1 week    Pending Results     Date and Time Order Name Status Description    5/25/2018  1043 Surgical pathology exam In process             Admission Information     Date & Time Provider Department Dept. Phone    5/25/2018 Damon Fletcher MD [facility]/Phase [phone]      Your Vitals Were     Blood Pressure Temperature Respirations Pulse Oximetry          112/74 97.7  F (36.5  C) (Oral) 16 96%        MyChart Information     [employer] gives you secure access to your electronic health record. If you see a primary care provider, you can also send messages to your care team and make appointments. If you have questions, please call your primary care clinic.  If you do not have a primary care provider, please call [phone] and they will assist you.        Care EveryWhere ID     This is your Care EveryWhere ID. This could be used by other organizations to access your [facility] medical records  [id number]        Equal Access to Services     JOESPH OAKLEY : Leighton Birch, ramiro george, leonides goodson, terrell angel. So [facility] [phone].    ATENCIÓN: Si habla español, tiene a black disposición servicios gratuitos de asistencia lingüística. [facility] [phone].    We comply with applicable federal civil rights laws and [place] laws. We do not discriminate on the basis of race, color, national origin, age, disability, sex, sexual orientation, or gender identity.               Review of your medicines      START taking        Dose / Directions    cyclobenzaprine 10 MG tablet   Commonly known as:  FLEXERIL   Used for:  Post-operative state        Dose:  5-10 mg   Take 0.5-1 tablets (5-10 mg) by mouth 3 times daily as needed for muscle spasms   Quantity:  30 tablet   Refills:  1       ibuprofen 800 MG tablet   Commonly known as:  ADVIL/MOTRIN   Used for:  Endometriosis of pelvis        Dose:  800 mg   Take 1 tablet (800 mg) by mouth every 8 hours as needed for moderate pain   Quantity:  40 tablet   Refills:  1         CONTINUE these medicines which may  have CHANGED, or have new prescriptions. If we are uncertain of the size of tablets/capsules you have at home, strength may be listed as something that might have changed.        Dose / Directions    * ZOFRAN PO   This may have changed:  Another medication with the same name was added. Make sure you understand how and when to take each.        Dose:  4 mg   Take 4 mg by mouth every 8 hours as needed for nausea or vomiting   Refills:  0       * ondansetron 4 MG tablet   Commonly known as:  ZOFRAN   This may have changed:  You were already taking a medication with the same name, and this prescription was added. Make sure you understand how and when to take each.   Used for:  Post-operative state        Dose:  4 mg   Take 1 tablet (4 mg) by mouth every 6 hours as needed for nausea or vomiting   Quantity:  30 tablet   Refills:  1       * Notice:  This list has 2 medication(s) that are the same as other medications prescribed for you. Read the directions carefully, and ask your doctor or other care provider to review them with you.      CONTINUE these medicines which have NOT CHANGED        Dose / Directions    azelastine 0.05 % Soln ophthalmic solution   Commonly known as:  OPTIVAR   Used for:  Seasonal allergic rhinitis, unspecified chronicity, unspecified trigger        Dose:  1 drop   Apply 1 drop to eye 2 times daily   Quantity:  1 Bottle   Refills:  0       cetirizine 10 MG tablet   Commonly known as:  zyrTEC        Dose:  10 mg   Take 10 mg by mouth daily   Refills:  0       DEPO-PROVERA IM        Dose:  150 mg   Inject 150 mg into the muscle every 3 months   Refills:  0       oxyCODONE-acetaminophen 5-325 MG per tablet   Commonly known as:  PERCOCET   Used for:  Cyst of left ovary        Dose:  1-2 tablet   Take 1-2 tablets by mouth every 6 hours as needed for pain   Quantity:  30 tablet   Refills:  0            Where to get your medicines      These medications were sent to [id number] - [place],  [place] - [address] AT [facility] [address]  [address], [place] [address]     Phone:  [phone]     cyclobenzaprine 10 MG tablet    ibuprofen 800 MG tablet    ondansetron 4 MG tablet                Protect others around you: Learn how to safely use, store and throw away your medicines at www.disposemymeds.org.             Medication List: This is a list of all your medications and when to take them. Check marks below indicate your daily home schedule. Keep this list as a reference.      Medications           Morning Afternoon Evening Bedtime As Needed    azelastine 0.05 % Soln ophthalmic solution   Commonly known as:  OPTIVAR   Apply 1 drop to eye 2 times daily                                cetirizine 10 MG tablet   Commonly known as:  zyrTEC   Take 10 mg by mouth daily                                cyclobenzaprine 10 MG tablet   Commonly known as:  FLEXERIL   Take 0.5-1 tablets (5-10 mg) by mouth 3 times daily as needed for muscle spasms                                DEPO-PROVERA IM   Inject 150 mg into the muscle every 3 months                                ibuprofen 800 MG tablet   Commonly known as:  ADVIL/MOTRIN   Take 1 tablet (800 mg) by mouth every 8 hours as needed for moderate pain                                oxyCODONE-acetaminophen 5-325 MG per tablet   Commonly known as:  PERCOCET   Take 1-2 tablets by mouth every 6 hours as needed for pain   Last time this was given:  1 tablet on 5/25/2018  1:21 PM                                * ZOFRAN PO   Take 4 mg by mouth every 8 hours as needed for nausea or vomiting                                * ondansetron 4 MG tablet   Commonly known as:  ZOFRAN   Take 1 tablet (4 mg) by mouth every 6 hours as needed for nausea or vomiting                                * Notice:  This list has 2 medication(s) that are the same as other medications prescribed for you. Read the directions carefully, and ask your doctor or other care provider to review  them with you.              More Information        Pelvic Laparoscopy    Laparoscopy is a type of surgery done using very small incisions. This type of surgery is possible because of the laparoscope (a long, slender tool with a camera and light). It lets your surgeon see inside the stomach. To perform the surgery, special instruments are inserted into the stomach through small incisions. Pelvic laparoscopy is often used to diagnose and treat the causes of pelvic problems, such as pain and infertility. Laparoscopy often involves:    A short hospital stay. (You can most likely go home the same day.)    A quick recovery    Minimal anesthesia    Small external scars    Mild to moderate postoperative pain  Getting ready  To prepare for surgery:    Tell your surgeon about any medicines you take. Include herbs, supplements, and over-the-counter medicines. You may need to stop taking certain medicines, such as aspirin, for 7 to 10 days before surgery.    Do not eat or drink anything after the midnight before surgery.    Arrange for a ride home after surgery.  Before the procedure  You will most likely be given general anesthesia to make you sleep during the procedure. A catheter may be inserted to drain urine from the bladder.   How pelvic laparoscopy is done  One or more small (quarter- or half-inch) incisions are made near the navel or the pubic hairline, or on either side of the lower belly. The laparoscope is inserted through an incision. It sends images to a video screen, allowing the surgeon a close-up view of the organs. Gas is used to inflate the belly, allowing the surgeon room to see and work. Depending on what is found, surgery to treat the problem may be done at this time.  After the procedure    You ll be taken to a post-op area to wake up and recover from anesthesia.    You may feel some shoulder pain. This is due to irritation from the gas used to inflate the belly.    You may have some discharge from the  vagina. If so, ask the nurse for a pad.    You will be asked to walk around to improve breathing and blood flow.    If you had a catheter, it will most likely be removed before you go home.    You can go home as soon as you recover from anesthesia and your condition is stable.  Your recovery  Recovery time depends on which procedure was done through the laparoscope. Your recovery from pelvic laparoscopy may take up to 6 weeks. If it was a simple procedure, like tubal ligation, then 2 weeks is reasonable. For laparoscopic hysterectomies, the recovery may be closer to 6 weeks. While you recover, be sure to follow your healthcare provider s instructions. During this time:    Take pain medicine as prescribed.    Start eating solid food when you feel ready. To avoid constipation, eat fruits, vegetables, and whole grains. Drink plenty of fluids.    Don t lift anything heavy until your healthcare provider says it s safe.    Take it easy for a few days. Ask your healthcare provider when you can return to work, exercise, and sex.    Arrange for a follow-up visit with your healthcare provider to discuss the results of the procedure.  Call your healthcare provider  Contact your healthcare provider right away if you have any of the following:    Have chills, or a fever of 100.4 F (38 C) or higher, or as directed by your healthcare provider    Notice that the incision is red, swollen, or draining    Have heavy, bright-red vaginal bleeding or a smelly discharge    Have difficulty urinating    Experience severe belly pain or bloating    Have leg pain, redness, or swelling    Have persistent nausea or vomiting    Are not improving daily    Fainting    Trouble breathing   Date Last Reviewed: 12/1/2016 2000-2017 [employer]. [address] [place] [address], [place] [address]. All rights reserved. This information is not intended as a substitute for professional medical care. Always follow your healthcare professional's  instructions.

## 2018-05-27 ENCOUNTER — HOSPITAL ENCOUNTER (EMERGENCY)
Facility: HOSPITAL | Age: 28
Discharge: HOME | End: 2018-05-27
Attending: PHYSICIAN ASSISTANT | Admitting: PHYSICIAN ASSISTANT
Payer: COMMERCIAL

## 2018-05-27 VITALS
WEIGHT: 147 LBS | RESPIRATION RATE: 18 BRPM | OXYGEN SATURATION: 99 % | TEMPERATURE: 96.8 F | DIASTOLIC BLOOD PRESSURE: 73 MMHG | SYSTOLIC BLOOD PRESSURE: 117 MMHG | HEART RATE: 93 BPM | BODY MASS INDEX: 24.46 KG/M2

## 2018-05-27 DIAGNOSIS — Z48.89 ENCOUNTER FOR POSTOPERATIVE WOUND CHECK: ICD-10-CM

## 2018-05-27 PROCEDURE — 99212 OFFICE O/P EST SF 10 MIN: CPT | Mod: 24 | Performed by: PHYSICIAN ASSISTANT

## 2018-05-27 PROCEDURE — G0463 HOSPITAL OUTPT CLINIC VISIT: HCPCS

## 2018-05-27 ASSESSMENT — ENCOUNTER SYMPTOMS
FEVER: 0
NEUROLOGICAL NEGATIVE: 1
CARDIOVASCULAR NEGATIVE: 1
WOUND: 1
PSYCHIATRIC NEGATIVE: 1

## 2018-05-27 NOTE — ED AVS SNAPSHOT
[facility]    [address] [address]    Phone:  [phone]                                       Kathia Milton   MRN: [id number]    Department:  [facility]   Date of Visit:  5/27/2018           Patient Information     Date Of Birth          1990        Your diagnoses for this visit were:     Encounter for postoperative wound check        You were seen by Lory Muse PA.      Follow-up Information     Follow up with Damon Fletcher MD.    Specialty:  OB/Gyn    Why:  As already appointed, sooner if your symptoms increase    Contact information:    [address]  [facility] [address]  [phone]          Follow up with [facility].    Specialty:  EMERGENCY MEDICINE    Why:  if fever/further concerns develop    Contact information:    [address] [address] [phone]    Additional information:    From [facility]: Take [address]. Turn left at [address]. Turn left at the first stoplight on [address] [place] [address]. At the first stop sign, take a right onto [address]. Take a left into the parking lot and continue through until you reach the North enterance of the building.       From [place]: Take [address]. Take the [address] ramp towards [place]. Turn left onto [address]. Take a slight right onto [address]. Turn left at the first stoplight on [address]. At the first stop sign, take a right onto [address]. Take a left into the parking lot and continue through until you reach the North enterance of the building.       From [place]: Take [address]. Take a right at [address]. At the first stop sign, take a right onto [address]. Take a left into the parking lot and continue through until you reach the North enterance of the building.       Discharge References/Attachments     WOUND CARE (ENGLISH)      Your next 10 appointments  already scheduled     Jun 05, 2018  8:30 AM CDT   (Arrive by 8:15 AM)   Post Op with Damon Fletcher MD   [facility] [place] ([facility] )    [address]  [place] [address]   [phone]                 Review of your medicines      Our records show that you are taking the medicines listed below. If these are incorrect, please call your family doctor or clinic.        Dose / Directions Last dose taken    azelastine 0.05 % Soln ophthalmic solution   Commonly known as:  OPTIVAR   Dose:  1 drop   Quantity:  1 Bottle        Apply 1 drop to eye 2 times daily   Refills:  0        cetirizine 10 MG tablet   Commonly known as:  zyrTEC   Dose:  10 mg        Take 10 mg by mouth daily   Refills:  0        cyclobenzaprine 10 MG tablet   Commonly known as:  FLEXERIL   Dose:  5-10 mg   Quantity:  30 tablet        Take 0.5-1 tablets (5-10 mg) by mouth 3 times daily as needed for muscle spasms   Refills:  1        DEPO-PROVERA IM   Dose:  150 mg        Inject 150 mg into the muscle every 3 months   Refills:  0        ibuprofen 800 MG tablet   Commonly known as:  ADVIL/MOTRIN   Dose:  800 mg   Quantity:  40 tablet        Take 1 tablet (800 mg) by mouth every 8 hours as needed for moderate pain   Refills:  1        oxyCODONE-acetaminophen 5-325 MG per tablet   Commonly known as:  PERCOCET   Dose:  1-2 tablet   Quantity:  30 tablet        Take 1-2 tablets by mouth every 6 hours as needed for pain   Refills:  0        * ZOFRAN PO   Dose:  4 mg        Take 4 mg by mouth every 8 hours as needed for nausea or vomiting   Refills:  0        * ondansetron 4 MG tablet   Commonly known as:  ZOFRAN   Dose:  4 mg   Quantity:  30 tablet        Take 1 tablet (4 mg) by mouth every 6 hours as needed for nausea or vomiting   Refills:  1        * Notice:  This list has 2 medication(s) that are the same as other medications prescribed for you. Read the directions carefully, and ask your doctor or other care provider to  review them with you.            Procedures and tests performed during your visit     Wound care      Orders Needing Specimen Collection     None      Pending Results     Date and Time Order Name Status Description    5/25/2018 1043 Surgical pathology exam In process             Pending Culture Results     Date and Time Order Name Status Description    5/25/2018 1043 Surgical pathology exam In process             Thank you for choosing [facility]       Thank you for choosing [facility] for your care. Our goal is always to provide you with excellent care. Hearing back from our patients is one way we can continue to improve our services. Please take a few minutes to complete the written survey that you may receive in the mail after you visit with us. Thank you!        [employer] Information     [employer] gives you secure access to your electronic health record. If you see a primary care provider, you can also send messages to your care team and make appointments. If you have questions, please call your primary care clinic.  If you do not have a primary care provider, please call [phone] and they will assist you.        Care EveryWhere ID     This is your Care EveryWhere ID. This could be used by other organizations to access your [facility] medical records  [id number]        Equal Access to Services     JOESPH OAKLEY : Hadii alessandro khan Sorosy, waaxda luqadaha, qaybta kaalmalakeisha goodson, terrell angel. So [facility] [phone].    ATENCIÓN: Si habla español, tiene a black disposición servicios gratuitos de asistencia lingüística. Llame al [phone].    We comply with applicable federal civil rights laws and [place] laws. We do not discriminate on the basis of race, color, national origin, age, disability, sex, sexual orientation, or gender identity.            After Visit Summary       This is your record. Keep this with you and show to your community pharmacist(s) and doctor(s) at your next  visit.

## 2018-05-27 NOTE — ED TRIAGE NOTES
"Pt presents today with SO and daughter with c/o pt had a laparoscopic surgery on Friday to remove a cyst. Pt is concerned because the site \"popped open\"  "

## 2018-05-27 NOTE — ED AVS SNAPSHOT
[facility]    [address] [address]    Phone:  [phone]                                       Kathia Milton   MRN: [id number]    Department:  [facility]   Date of Visit:  5/27/2018           After Visit Summary Signature Page     I have received my discharge instructions, and my questions have been answered. I have discussed any challenges I see with this plan with the nurse or doctor.    ..........................................................................................................................................  Patient/Patient Representative Signature      ..........................................................................................................................................  Patient Representative Print Name and Relationship to Patient    ..................................................               ................................................  Date                                            Time    ..........................................................................................................................................  Reviewed by Signature/Title    ...................................................              ..............................................  Date                                                            Time

## 2018-05-27 NOTE — ED NOTES
Pt had laparoscopy with cyst removal with Dr Toussaint last Friday.  Pt has small dot of blood in umbilical area.

## 2018-05-28 NOTE — ED PROVIDER NOTES
History     Chief Complaint   Patient presents with     Post-op Problem     The history is provided by the patient. No  was used.     Kathia Milton is a 28 year old female who has bleeding from laparoscopic site in her umbilical area. 2 days ago she had an and ovarian cyst removed.  No fever. Her other two sites are closed and scabbed over.         Problem List:    Patient Active Problem List    Diagnosis Date Noted     Endometriosis of pelvis 2016     Priority: Medium     Seasonal allergies      Priority: Medium      delivery 05/15/2013     Priority: Medium     Anemia in pregnancy 2013     Priority: Medium      labor 2013     Priority: Medium     Abdominal pain, acute 2013     Priority: Medium     Encounter for supervision of other normal pregnancy 2013     Priority: Medium     Diagnosis updated by automated process. Provider to review and confirm.       Tobacco use disorder 10/18/2012     Priority: Medium     Condyloma acuminatum due to human papillomavirus 2011     Priority: Medium     Overview:   IMO Update 10/11          Past Medical History:    Past Medical History:   Diagnosis Date     Benign neoplasm of vagina 2011     Condyloma acuminatum 2011     Seasonal allergies      Supervision of other normal pregnancy      Tobacco use disorder 10/18/2012       Past Surgical History:    Past Surgical History:   Procedure Laterality Date     APPENDECTOMY  14    Boronda's      SECTION      C section     COLONOSCOPY  11/10/15    Dr. Singh     DILATION AND CURETTAGE SUCTION, TREAT MISSED , 1ST TRIMESTER       ENDOSCOPY  11/10/15    Dr. Singh      LAPAROSCOPY  11/30/15    Dr. Sun; lysis of pelvic adhesions, cauterization of minimal endometriosis     LAPAROSCOPY  2018    lysis of adhesions; pelvic pain, endometriosis; Dr. Sun       Family History:    Family History   Problem Relation Age of Onset      DIABETES Other      Grandmother     HEART DISEASE Other      Heart Disease - Grandmother     Other - See Comments Other      Renal Disease - Grandmother     Unknown/Adopted Mother      Unknown/Adopted Father      Unknown/Adopted Maternal Grandmother      Unknown/Adopted Maternal Grandfather      Unknown/Adopted Paternal Grandmother      Unknown/Adopted Paternal Grandfather      Breast Cancer No family hx of        Social History:  Marital Status:  Single [1]  Social History   Substance Use Topics     Smoking status: Current Some Day Smoker     Types: Cigarettes     Last attempt to quit: 11/26/2012     Smokeless tobacco: Never Used      Comment: Tried to Quit (YES) (2011)     Alcohol use 0.0 oz/week     0 Standard drinks or equivalent per week      Comment: occasional        Medications:      azelastine (OPTIVAR) 0.05 % SOLN ophthalmic solution   cetirizine (ZYRTEC) 10 MG tablet   cyclobenzaprine (FLEXERIL) 10 MG tablet   ibuprofen (ADVIL/MOTRIN) 800 MG tablet   MedroxyPROGESTERone Acetate (DEPO-PROVERA IM)   ondansetron (ZOFRAN) 4 MG tablet   Ondansetron HCl (ZOFRAN PO)   oxyCODONE-acetaminophen (PERCOCET) 5-325 MG per tablet         Review of Systems   Constitutional: Negative for fever.   HENT: Negative.    Cardiovascular: Negative.    Skin: Positive for wound.   Neurological: Negative.    Psychiatric/Behavioral: Negative.        Physical Exam   BP: 117/73  Pulse: 93  Temp: 96.8  F (36  C)  Resp: 18  Weight: 66.7 kg (147 lb)  SpO2: 99 %      Physical Exam   Constitutional: She is oriented to person, place, and time. She appears well-developed and well-nourished. No distress.   Cardiovascular: Normal rate.    Pulmonary/Chest: Effort normal.   Neurological: She is alert and oriented to person, place, and time.   Skin: She is not diaphoretic.   Umbilical lap site: minimal bleeding. Moderate TTP. I cleansed the area with tap water and antibiotic ointment.  4x4 gauze and foam tape applied.  Bleeding resolved.    Psychiatric: She has a normal mood and affect.   Nursing note and vitals reviewed.      ED Course     ED Course     Procedures          Assessments & Plan (with Medical Decision Making)     I have reviewed the nursing notes.    I have reviewed the findings, diagnosis, plan and need for follow up with the patient.      Final diagnoses:   Encounter for postoperative wound check         Keep area clean and dry  F/u with Dr. Fletcher as already appointed, sooner if s/s increase  F/u with ED/UC if fever/further concerns develop  Apply direct pressure if it starts to bleed again  Patient verbally educated and given appropriate education sheets for the diagnoses and has no questions.  Take medications as directed.     Lory Muse Certified  Physician Assistant  5/27/2018  7:33 PM  UR[facility]ARE CL[facility]      5/27/2018   [facility]     Lory Muse PA  05/27/18 1934

## 2018-05-29 ENCOUNTER — MYC MEDICAL ADVICE (OUTPATIENT)
Dept: OBGYN | Facility: OTHER | Age: 28
End: 2018-05-29

## 2018-05-30 LAB — COPATH REPORT: NORMAL

## 2018-05-30 NOTE — TELEPHONE ENCOUNTER
It does not look infected.  If you want to come in tomorrow we may be able to put some steri-strips on it and check it.    Aimee please schedule.

## 2018-05-30 NOTE — TELEPHONE ENCOUNTER
Left message for patient and let her know it does not look infected and to call back if she would like to come in and be seen tomorrow.

## 2018-06-13 ENCOUNTER — MYC MEDICAL ADVICE (OUTPATIENT)
Dept: OBGYN | Facility: OTHER | Age: 28
End: 2018-06-13

## 2018-06-16 ENCOUNTER — HOSPITAL ENCOUNTER (EMERGENCY)
Facility: HOSPITAL | Age: 28
Discharge: HOME | End: 2018-06-16
Attending: PHYSICIAN ASSISTANT | Admitting: PHYSICIAN ASSISTANT
Payer: COMMERCIAL

## 2018-06-16 VITALS
SYSTOLIC BLOOD PRESSURE: 122 MMHG | DIASTOLIC BLOOD PRESSURE: 81 MMHG | TEMPERATURE: 96 F | HEART RATE: 97 BPM | RESPIRATION RATE: 18 BRPM | WEIGHT: 140 LBS | OXYGEN SATURATION: 99 % | BODY MASS INDEX: 23.3 KG/M2

## 2018-06-16 DIAGNOSIS — T14.8XXA OPEN WOUND: ICD-10-CM

## 2018-06-16 PROCEDURE — 12001 RPR S/N/AX/GEN/TRNK 2.5CM/<: CPT | Performed by: PHYSICIAN ASSISTANT

## 2018-06-16 PROCEDURE — 12001 RPR S/N/AX/GEN/TRNK 2.5CM/<: CPT

## 2018-06-16 PROCEDURE — G0463 HOSPITAL OUTPT CLINIC VISIT: HCPCS

## 2018-06-16 PROCEDURE — 40000268 ZZH STATISTIC NO CHARGES

## 2018-06-16 NOTE — ED AVS SNAPSHOT
[facility]    [address] [address]    Phone:  [phone]                                       Kathia Milton   MRN: [id number]    Department:  [facility]   Date of Visit:  6/16/2018           After Visit Summary Signature Page     I have received my discharge instructions, and my questions have been answered. I have discussed any challenges I see with this plan with the nurse or doctor.    ..........................................................................................................................................  Patient/Patient Representative Signature      ..........................................................................................................................................  Patient Representative Print Name and Relationship to Patient    ..................................................               ................................................  Date                                            Time    ..........................................................................................................................................  Reviewed by Signature/Title    ...................................................              ..............................................  Date                                                            Time

## 2018-06-17 ASSESSMENT — ENCOUNTER SYMPTOMS
FEVER: 0
NEUROLOGICAL NEGATIVE: 1
WOUND: 1
CARDIOVASCULAR NEGATIVE: 1
PSYCHIATRIC NEGATIVE: 1

## 2018-06-17 NOTE — ED PROVIDER NOTES
History     Chief Complaint   Patient presents with     Wound Check     lap incision open     The history is provided by the patient. No  was used.     Kathia Milton is a 28 year old female who is here again concerning the surgical sites from her laparosopic right ovarian cyst removal on 25 May 2018.  From what the pt is saying, it sounds like she is continuously checking the wounds. No n/v/d/f/c. The site she is concerned about is midline on the past  scar,  It is still open and she says it seems to be getting deeper.      Problem List:    Patient Active Problem List    Diagnosis Date Noted     Endometriosis of pelvis 2016     Priority: Medium     Seasonal allergies      Priority: Medium      delivery 05/15/2013     Priority: Medium     Anemia in pregnancy 2013     Priority: Medium      labor 2013     Priority: Medium     Abdominal pain, acute 2013     Priority: Medium     Encounter for supervision of other normal pregnancy 2013     Priority: Medium     Diagnosis updated by automated process. Provider to review and confirm.       Tobacco use disorder 10/18/2012     Priority: Medium     Condyloma acuminatum due to human papillomavirus 2011     Priority: Medium     Overview:   IMO Update 10/11          Past Medical History:    Past Medical History:   Diagnosis Date     Benign neoplasm of vagina 2011     Condyloma acuminatum 2011     Seasonal allergies      Supervision of other normal pregnancy      Tobacco use disorder 10/18/2012       Past Surgical History:    Past Surgical History:   Procedure Laterality Date     APPENDECTOMY  14    [facility]'s      SECTION      C section     COLONOSCOPY  11/10/15    Dr. Singh     DILATION AND CURETTAGE SUCTION, TREAT MISSED , 1ST TRIMESTER       ENDOSCOPY  11/10/15    Dr. Singh      LAPAROSCOPIC CYSTECTOMY OVARIAN (BENIGN) Left 2018    Procedure: LAPAROSCOPIC  CYSTECTOMY OVARIAN (BENIGN);  EXPLORATORY  LAPAROSCOPY, LEFT OVARIAN CYSTECTOMY;  Surgeon: Damon Fletcher MD;  Location: [facility]     LAPAROSCOPY  11/30/15    Dr. Sun; lysis of pelvic adhesions, cauterization of minimal endometriosis     LAPAROSCOPY  01/29/2018    lysis of adhesions; pelvic pain, endometriosis; Dr. Sun       Family History:    Family History   Problem Relation Age of Onset     DIABETES Other      Grandmother     HEART DISEASE Other      Heart Disease - Grandmother     Other - See Comments Other      Renal Disease - Grandmother     Unknown/Adopted Mother      Unknown/Adopted Father      Unknown/Adopted Maternal Grandmother      Unknown/Adopted Maternal Grandfather      Unknown/Adopted Paternal Grandmother      Unknown/Adopted Paternal Grandfather      Breast Cancer No family hx of        Social History:  Marital Status:  Single [1]  Social History   Substance Use Topics     Smoking status: Current Some Day Smoker     Types: Cigarettes     Last attempt to quit: 11/26/2012     Smokeless tobacco: Never Used      Comment: Tried to Quit (YES) (2011)     Alcohol use 0.0 oz/week     0 Standard drinks or equivalent per week      Comment: occasional        Medications:      azelastine (OPTIVAR) 0.05 % SOLN ophthalmic solution   cetirizine (ZYRTEC) 10 MG tablet   ibuprofen (ADVIL/MOTRIN) 800 MG tablet   MedroxyPROGESTERone Acetate (DEPO-PROVERA IM)   cyclobenzaprine (FLEXERIL) 10 MG tablet   ondansetron (ZOFRAN) 4 MG tablet         Review of Systems   Constitutional: Negative for fever.   HENT: Negative.    Cardiovascular: Negative.    Skin: Positive for wound.   Neurological: Negative.    Psychiatric/Behavioral: Negative.        Physical Exam   BP: 122/81  Pulse: 97  Temp: 96  F (35.6  C)  Resp: 18  Weight: 63.5 kg (140 lb)  SpO2: 99 %      Physical Exam   Constitutional: She is oriented to person, place, and time. She appears well-developed and well-nourished. No distress.   Cardiovascular: Normal rate.     Pulmonary/Chest: Effort normal.   Neurological: She is alert and oriented to person, place, and time.   Skin: She is not diaphoretic.   Along the  scar line, there is the open laparoscopic entry wound is open, approx 5 mm long and 3 mm deep. No e/e/e/e. Minimal ttp. Inside the wound it does not appear granulated.    Psychiatric: She has a normal mood and affect.   Nursing note and vitals reviewed.      ED Course     ED Course     Procedures     pt wound like me to close the wound.  I cleansed the area w/ p. Iodine. I jnzected 3 cc lidocaine 1% w/ epi. I placed 5 close staples. Wound edges approximated well. Pt tolerated well. Bleeding resolved. Anti biotic ointment and bandaide applied.      Assessments & Plan (with Medical Decision Making)     I have reviewed the nursing notes.    I have reviewed the findings, diagnosis, plan and need for follow up with the patient.      Final diagnoses:   Open wound - s/p laperscopic right Ovary cyst removal on 25 May 2018         Keep area clean and dry.  Educations sheet given  Pt educated and has no further questions.  Follow up with PCP in 10 days for staple removal, sooner if redness/swelling develop.  Follow up the UC/ED if fever/further concerns develop  2018   [facility]     Lory Muse PA  18 1420       Lory Muse PA  18 1420

## 2018-06-17 NOTE — ED TRIAGE NOTES
Pt presents today alone for c/o ABD incisions that have opened and there is drainage that is distressing to her. The incision to her LLQ is finally closed she said it was open yesterday, it is slightly red and she says its been painful. The incision that is just above her pubic bone is open and draining.

## 2018-06-25 ENCOUNTER — HOSPITAL ENCOUNTER (EMERGENCY)
Facility: HOSPITAL | Age: 28
Discharge: HOME | End: 2018-06-25
Attending: PHYSICIAN ASSISTANT | Admitting: PHYSICIAN ASSISTANT
Payer: COMMERCIAL

## 2018-06-25 VITALS
HEART RATE: 92 BPM | OXYGEN SATURATION: 98 % | SYSTOLIC BLOOD PRESSURE: 122 MMHG | DIASTOLIC BLOOD PRESSURE: 79 MMHG | TEMPERATURE: 97.7 F | RESPIRATION RATE: 14 BRPM

## 2018-06-25 DIAGNOSIS — Z48.02 REMOVAL OF STAPLES: ICD-10-CM

## 2018-06-25 PROCEDURE — 40000268 ZZH STATISTIC NO CHARGES

## 2018-06-25 PROCEDURE — G0463 HOSPITAL OUTPT CLINIC VISIT: HCPCS

## 2018-06-25 ASSESSMENT — ENCOUNTER SYMPTOMS
RESPIRATORY NEGATIVE: 1
ROS SKIN COMMENTS: STAPLE REMOVAL
PSYCHIATRIC NEGATIVE: 1
CONSTITUTIONAL NEGATIVE: 1

## 2018-06-25 NOTE — ED AVS SNAPSHOT
[facility]    [address] [address]    Phone:  [phone]                                       Kathia Milton   MRN: [id number]    Department:  [facility]   Date of Visit:  6/25/2018           After Visit Summary Signature Page     I have received my discharge instructions, and my questions have been answered. I have discussed any challenges I see with this plan with the nurse or doctor.    ..........................................................................................................................................  Patient/Patient Representative Signature      ..........................................................................................................................................  Patient Representative Print Name and Relationship to Patient    ..................................................               ................................................  Date                                            Time    ..........................................................................................................................................  Reviewed by Signature/Title    ...................................................              ..............................................  Date                                                            Time

## 2018-06-25 NOTE — ED AVS SNAPSHOT
[facility]    [address]    [place] [address]    Phone:  [phone]                                       Kathia Milton   MRN: [id number]    Department:  [facility]   Date of Visit:  6/25/2018           Patient Information     Date Of Birth          1990        Your diagnoses for this visit were:     Removal of staples        You were seen by Chip Bassett PA.      Follow-up Information     Follow up with Lainey Rose MD.    Specialty:  Family Practice    Why:  As needed    Contact information:    [address] [place] [address]  [phone]          Follow up with [facility].    Specialty:  EMERGENCY MEDICINE    Why:  If symptoms worsen    Contact information:    [address]  [place] [address] [phone]    Additional information:    From [facility]: Take [address]. Turn left at [address]. Turn left at the first stoplight on [address] [place] [address]. At the first stop sign, take a right onto [address]. Take a left into the parking lot and continue through until you reach the North enterance of the building.       From [place]: Take [address]. Take the [address] ramp towards [place]. Turn left onto [address]. Take a slight right onto [address]. Turn left at the first stoplight on [address]. At the first stop sign, take a right onto [address]. Take a left into the parking lot and continue through until you reach the North enterance of the building.       From [place]: Take [address]. Take a right at [address]. At the first stop sign, take a right onto [address]. Take a left into the parking lot and continue through until you reach the North enterance of the building.         Discharge Instructions       - let steri strips fall off, may replace  - back here with any worsening redness, drainage, concerns.        Review of your  medicines      Our records show that you are taking the medicines listed below. If these are incorrect, please call your family doctor or clinic.        Dose / Directions Last dose taken    azelastine 0.05 % Soln ophthalmic solution   Commonly known as:  OPTIVAR   Dose:  1 drop   Quantity:  1 Bottle        Apply 1 drop to eye 2 times daily   Refills:  0        cetirizine 10 MG tablet   Commonly known as:  zyrTEC   Dose:  10 mg        Take 10 mg by mouth daily   Refills:  0        cyclobenzaprine 10 MG tablet   Commonly known as:  FLEXERIL   Dose:  5-10 mg   Quantity:  30 tablet        Take 0.5-1 tablets (5-10 mg) by mouth 3 times daily as needed for muscle spasms   Refills:  1        DEPO-PROVERA IM   Dose:  150 mg        Inject 150 mg into the muscle every 3 months   Refills:  0        ibuprofen 800 MG tablet   Commonly known as:  ADVIL/MOTRIN   Dose:  800 mg   Quantity:  40 tablet        Take 1 tablet (800 mg) by mouth every 8 hours as needed for moderate pain   Refills:  1        ondansetron 4 MG tablet   Commonly known as:  ZOFRAN   Dose:  4 mg   Quantity:  30 tablet        Take 1 tablet (4 mg) by mouth every 6 hours as needed for nausea or vomiting   Refills:  1                Orders Needing Specimen Collection     None      Pending Results     No orders found from 6/23/2018 to 6/26/2018.            Pending Culture Results     No orders found from 6/23/2018 to 6/26/2018.            Thank you for choosing [facility]       Thank you for choosing [facility] for your care. Our goal is always to provide you with excellent care. Hearing back from our patients is one way we can continue to improve our services. Please take a few minutes to complete the written survey that you may receive in the mail after you visit with us. Thank you!        [employer] Information     [employer] gives you secure access to your electronic health record. If you see a primary care provider, you can also send messages to your care team and make  appointments. If you have questions, please call your primary care clinic.  If you do not have a primary care provider, please call [phone] and they will assist you.        Care EveryWhere ID     This is your Care EveryWhere ID. This could be used by other organizations to access your [place] medical records  [id number]        Equal Access to Services     JOESPH OAKLEY : Leighton Birch, ramiro george, terrell phan. So [facility] [phone].    ATENCIÓN: Si habla español, tiene a black disposición servicios gratuitos de asistencia lingüística. Llame al [phone].    We comply with applicable federal civil rights laws and [place] laws. We do not discriminate on the basis of race, color, national origin, age, disability, sex, sexual orientation, or gender identity.            After Visit Summary       This is your record. Keep this with you and show to your community pharmacist(s) and doctor(s) at your next visit.

## 2018-06-26 NOTE — DISCHARGE INSTRUCTIONS
- let steri strips fall off, may replace  - back here with any worsening redness, drainage, concerns.

## 2018-06-26 NOTE — ED PROVIDER NOTES
History     Chief Complaint   Patient presents with     Suture Removal     had laproscopic procdure, needs staples removed     HPI  Kathia Milton is a 28 year old female who presents to the urgent care for staple removal. She had staples placed after incision opened up after laparoscopic surgery on 18. She denies any redness, drainage.     Problem List:    Patient Active Problem List    Diagnosis Date Noted     Endometriosis of pelvis 2016     Priority: Medium     Seasonal allergies      Priority: Medium      delivery 05/15/2013     Priority: Medium     Anemia in pregnancy 2013     Priority: Medium      labor 2013     Priority: Medium     Abdominal pain, acute 2013     Priority: Medium     Encounter for supervision of other normal pregnancy 2013     Priority: Medium     Diagnosis updated by automated process. Provider to review and confirm.       Tobacco use disorder 10/18/2012     Priority: Medium     Condyloma acuminatum due to human papillomavirus 2011     Priority: Medium     Overview:   IMO Update 10/11          Past Medical History:    Past Medical History:   Diagnosis Date     Benign neoplasm of vagina 2011     Condyloma acuminatum 2011     Seasonal allergies      Supervision of other normal pregnancy      Tobacco use disorder 10/18/2012       Past Surgical History:    Past Surgical History:   Procedure Laterality Date     APPENDECTOMY  14    [place]enzie's      SECTION      C section     COLONOSCOPY  11/10/15    Dr. Singh     DILATION AND CURETTAGE SUCTION, TREAT MISSED , 1ST TRIMESTER       ENDOSCOPY  11/10/15    Dr. Singh      LAPAROSCOPIC CYSTECTOMY OVARIAN (BENIGN) Left 2018    Procedure: LAPAROSCOPIC CYSTECTOMY OVARIAN (BENIGN);  EXPLORATORY  LAPAROSCOPY, LEFT OVARIAN CYSTECTOMY;  Surgeon: Damon Fletcher MD;  [facility]     LAPAROSCOPY  11/30/15    Dr. Sun; lysis of pelvic adhesions, cauterization of  minimal endometriosis     LAPAROSCOPY  01/29/2018    lysis of adhesions; pelvic pain, endometriosis; Dr. Sun       Family History:    Family History   Problem Relation Age of Onset     Diabetes Other      Grandmother     HEART DISEASE Other      Heart Disease - Grandmother     Other - See Comments Other      Renal Disease - Grandmother     Unknown/Adopted Mother      Unknown/Adopted Father      Unknown/Adopted Maternal Grandmother      Unknown/Adopted Maternal Grandfather      Unknown/Adopted Paternal Grandmother      Unknown/Adopted Paternal Grandfather      Breast Cancer No family hx of        Social History:  Marital Status:  Single [1]  Social History   Substance Use Topics     Smoking status: Current Some Day Smoker     Types: Cigarettes     Last attempt to quit: 11/26/2012     Smokeless tobacco: Never Used      Comment: Tried to Quit (YES) (2011)     Alcohol use 0.0 oz/week     0 Standard drinks or equivalent per week      Comment: occasional        Medications:      azelastine (OPTIVAR) 0.05 % SOLN ophthalmic solution   cetirizine (ZYRTEC) 10 MG tablet   cyclobenzaprine (FLEXERIL) 10 MG tablet   ibuprofen (ADVIL/MOTRIN) 800 MG tablet   MedroxyPROGESTERone Acetate (DEPO-PROVERA IM)   ondansetron (ZOFRAN) 4 MG tablet         Review of Systems   Constitutional: Negative.    Respiratory: Negative.    Skin:        Staple removal   Psychiatric/Behavioral: Negative.        Physical Exam   BP: 122/79  Pulse: 92  Temp: 97.7  F (36.5  C)  Resp: 14  SpO2: 98 %      Physical Exam   Constitutional: She is oriented to person, place, and time. She appears well-developed and well-nourished. No distress.   Eyes: Conjunctivae are normal.   Cardiovascular: Normal rate.    Pulmonary/Chest: Effort normal.   Abdominal:   Surgical incision is flesh colored. Staples visible.   Neurological: She is alert and oriented to person, place, and time.   Skin: Skin is warm and dry.   Psychiatric: She has a normal mood and affect.    Nursing note and vitals reviewed.      ED Course     ED Course     Staple removal  Date/Time: 6/25/2018 9:38 PM  Performed by: PITER BASSETT  Authorized by: PITER BASSETT   Consent: Verbal consent obtained.  Consent given by: patient  Body area: trunk  Location details: abdomen  Wound Appearance: clean  Staples Removed: 5  Post-removal: Steri-Strips applied  Patient tolerance: Patient tolerated the procedure well with no immediate complications          Assessments & Plan (with Medical Decision Making)     I have reviewed the nursing notes.  I have reviewed the findings, diagnosis, plan and need for follow up with the patient.      Discharge Medication List as of 6/25/2018  9:39 PM          Final diagnoses:   Removal of staples   5 staples removed. Incision remains approx 1mm wide with flesh colored tissue. Steri strips applied. F/U PRN. Pt discharged home stable.     6/25/2018   [facility]     Piter Bassett PA  06/25/18 2148       Piter Bassett PA  07/02/18 1040

## 2018-06-26 NOTE — ED TRIAGE NOTES
Pt presents today with c/o wanting staples removed. Pt states they are supposed to be taken out tomorrow but she has to work so she came in today.

## 2018-08-06 ENCOUNTER — APPOINTMENT (OUTPATIENT)
Dept: GENERAL RADIOLOGY | Facility: HOSPITAL | Age: 28
End: 2018-08-06
Attending: PHYSICIAN ASSISTANT
Payer: COMMERCIAL

## 2018-08-06 ENCOUNTER — HOSPITAL ENCOUNTER (EMERGENCY)
Facility: HOSPITAL | Age: 28
Discharge: HOME | End: 2018-08-06
Attending: PHYSICIAN ASSISTANT | Admitting: PHYSICIAN ASSISTANT
Payer: COMMERCIAL

## 2018-08-06 VITALS
OXYGEN SATURATION: 99 % | TEMPERATURE: 98.4 F | SYSTOLIC BLOOD PRESSURE: 121 MMHG | RESPIRATION RATE: 18 BRPM | HEART RATE: 126 BPM | DIASTOLIC BLOOD PRESSURE: 77 MMHG

## 2018-08-06 DIAGNOSIS — K59.00 CONSTIPATION, UNSPECIFIED CONSTIPATION TYPE: ICD-10-CM

## 2018-08-06 DIAGNOSIS — Z33.1 PREGNANCY, INCIDENTAL: ICD-10-CM

## 2018-08-06 LAB
ALBUMIN UR-MCNC: NEGATIVE MG/DL
ANION GAP SERPL CALCULATED.3IONS-SCNC: 6 MMOL/L (ref 3–14)
APPEARANCE UR: CLEAR
B-HCG SERPL-ACNC: 202 IU/L (ref 0–5)
BASOPHILS # BLD AUTO: 0.1 10E9/L (ref 0–0.2)
BASOPHILS NFR BLD AUTO: 0.7 %
BILIRUB UR QL STRIP: NEGATIVE
BUN SERPL-MCNC: 6 MG/DL (ref 7–30)
CALCIUM SERPL-MCNC: 8.7 MG/DL (ref 8.5–10.1)
CHLORIDE SERPL-SCNC: 105 MMOL/L (ref 94–109)
CO2 SERPL-SCNC: 27 MMOL/L (ref 20–32)
COLOR UR AUTO: YELLOW
CREAT SERPL-MCNC: 0.74 MG/DL (ref 0.52–1.04)
CRP SERPL-MCNC: <2.9 MG/L (ref 0–8)
DIFFERENTIAL METHOD BLD: NORMAL
EOSINOPHIL # BLD AUTO: 0.3 10E9/L (ref 0–0.7)
EOSINOPHIL NFR BLD AUTO: 2.8 %
ERYTHROCYTE [DISTWIDTH] IN BLOOD BY AUTOMATED COUNT: 11.8 % (ref 10–15)
GFR SERPL CREATININE-BSD FRML MDRD: >90 ML/MIN/1.7M2
GLUCOSE SERPL-MCNC: 83 MG/DL (ref 70–99)
GLUCOSE UR STRIP-MCNC: NEGATIVE MG/DL
HCG UR QL: POSITIVE
HCT VFR BLD AUTO: 39.5 % (ref 35–47)
HGB BLD-MCNC: 13.7 G/DL (ref 11.7–15.7)
HGB UR QL STRIP: NEGATIVE
IMM GRANULOCYTES # BLD: 0 10E9/L (ref 0–0.4)
IMM GRANULOCYTES NFR BLD: 0.3 %
KETONES UR STRIP-MCNC: NEGATIVE MG/DL
LEUKOCYTE ESTERASE UR QL STRIP: NEGATIVE
LYMPHOCYTES # BLD AUTO: 2.7 10E9/L (ref 0.8–5.3)
LYMPHOCYTES NFR BLD AUTO: 30.4 %
MCH RBC QN AUTO: 28.4 PG (ref 26.5–33)
MCHC RBC AUTO-ENTMCNC: 34.7 G/DL (ref 31.5–36.5)
MCV RBC AUTO: 82 FL (ref 78–100)
MONOCYTES # BLD AUTO: 0.6 10E9/L (ref 0–1.3)
MONOCYTES NFR BLD AUTO: 7.1 %
NEUTROPHILS # BLD AUTO: 5.3 10E9/L (ref 1.6–8.3)
NEUTROPHILS NFR BLD AUTO: 58.7 %
NITRATE UR QL: NEGATIVE
NRBC # BLD AUTO: 0 10*3/UL
NRBC BLD AUTO-RTO: 0 /100
PH UR STRIP: 6.5 PH (ref 4.7–8)
PLATELET # BLD AUTO: 306 10E9/L (ref 150–450)
POTASSIUM SERPL-SCNC: 3.5 MMOL/L (ref 3.4–5.3)
RBC # BLD AUTO: 4.82 10E12/L (ref 3.8–5.2)
SODIUM SERPL-SCNC: 138 MMOL/L (ref 133–144)
SOURCE: NORMAL
SP GR UR STRIP: 1.01 (ref 1–1.03)
UROBILINOGEN UR STRIP-MCNC: NORMAL MG/DL (ref 0–2)
WBC # BLD AUTO: 9 10E9/L (ref 4–11)

## 2018-08-06 PROCEDURE — 96372 THER/PROPH/DIAG INJ SC/IM: CPT

## 2018-08-06 PROCEDURE — 25000128 H RX IP 250 OP 636: Performed by: PHYSICIAN ASSISTANT

## 2018-08-06 PROCEDURE — 81003 URINALYSIS AUTO W/O SCOPE: CPT | Performed by: PHYSICIAN ASSISTANT

## 2018-08-06 PROCEDURE — 99284 EMERGENCY DEPT VISIT MOD MDM: CPT | Performed by: PHYSICIAN ASSISTANT

## 2018-08-06 PROCEDURE — 86140 C-REACTIVE PROTEIN: CPT | Performed by: PHYSICIAN ASSISTANT

## 2018-08-06 PROCEDURE — 80048 BASIC METABOLIC PNL TOTAL CA: CPT | Performed by: PHYSICIAN ASSISTANT

## 2018-08-06 PROCEDURE — 74019 RADEX ABDOMEN 2 VIEWS: CPT | Mod: TC

## 2018-08-06 PROCEDURE — 81025 URINE PREGNANCY TEST: CPT | Performed by: PHYSICIAN ASSISTANT

## 2018-08-06 PROCEDURE — 84702 CHORIONIC GONADOTROPIN TEST: CPT | Performed by: PHYSICIAN ASSISTANT

## 2018-08-06 PROCEDURE — 99284 EMERGENCY DEPT VISIT MOD MDM: CPT | Mod: 25

## 2018-08-06 PROCEDURE — 36415 COLL VENOUS BLD VENIPUNCTURE: CPT | Performed by: PHYSICIAN ASSISTANT

## 2018-08-06 PROCEDURE — 85025 COMPLETE CBC W/AUTO DIFF WBC: CPT | Performed by: PHYSICIAN ASSISTANT

## 2018-08-06 PROCEDURE — 25000125 ZZHC RX 250: Performed by: PHYSICIAN ASSISTANT

## 2018-08-06 RX ORDER — ONDANSETRON 4 MG/1
4 TABLET, ORALLY DISINTEGRATING ORAL ONCE
Status: COMPLETED | OUTPATIENT
Start: 2018-08-06 | End: 2018-08-06

## 2018-08-06 RX ORDER — BISACODYL 10 MG
10 SUPPOSITORY, RECTAL RECTAL DAILY PRN
Qty: 2 SUPPOSITORY | Refills: 0 | Status: SHIPPED | OUTPATIENT
Start: 2018-08-06 | End: 2018-08-19

## 2018-08-06 RX ORDER — KETOROLAC TROMETHAMINE 30 MG/ML
60 INJECTION, SOLUTION INTRAMUSCULAR; INTRAVENOUS ONCE
Status: COMPLETED | OUTPATIENT
Start: 2018-08-06 | End: 2018-08-06

## 2018-08-06 RX ADMIN — ONDANSETRON 4 MG: 4 TABLET, ORALLY DISINTEGRATING ORAL at 21:01

## 2018-08-06 RX ADMIN — KETOROLAC TROMETHAMINE 60 MG: 30 INJECTION, SOLUTION INTRAMUSCULAR at 21:01

## 2018-08-06 ASSESSMENT — ENCOUNTER SYMPTOMS
HEMATURIA: 0
NAUSEA: 1
DIFFICULTY URINATING: 0
VOMITING: 0
CHILLS: 0
CARDIOVASCULAR NEGATIVE: 1
ABDOMINAL PAIN: 1
DYSURIA: 0
FLANK PAIN: 0
FEVER: 0
CONSTIPATION: 1
CONSTITUTIONAL NEGATIVE: 1
FREQUENCY: 0
RESPIRATORY NEGATIVE: 1

## 2018-08-06 NOTE — ED AVS SNAPSHOT
[facility]    [address] [address]    Phone:  [phone]                                       Kathia Milton   MRN: [id number]    Department:  [facility]   Date of Visit:  8/6/2018           After Visit Summary Signature Page     I have received my discharge instructions, and my questions have been answered. I have discussed any challenges I see with this plan with the nurse or doctor.    ..........................................................................................................................................  Patient/Patient Representative Signature      ..........................................................................................................................................  Patient Representative Print Name and Relationship to Patient    ..................................................               ................................................  Date                                            Time    ..........................................................................................................................................  Reviewed by Signature/Title    ...................................................              ..............................................  Date                                                            Time

## 2018-08-06 NOTE — ED AVS SNAPSHOT
[facility]    [address] [address]    Phone:  [phone]                                       Kathia Milton   MRN: [id number]    Department:  [facility]   Date of Visit:  8/6/2018           Patient Information     Date Of Birth          1990        Your diagnoses for this visit were:     Constipation, unspecified constipation type     Pregnancy, incidental        You were seen by Chip Bassett PA.      Follow-up Information     Follow up with Lainey Rose MD.    Specialty:  Family Practice    Why:  3-7 days    Contact information:    [address]  [facility] [address] [phone]          Follow up with [facility].    Specialty:  EMERGENCY MEDICINE    Why:  If symptoms worsen    Contact information:    [address] [address] [phone]    Additional information:    From [facility]: Take [address]. Turn left at [address]. Turn left at the first stoplight on [address] [place] [address]. At the first stop sign, take a right onto [address]. Take a left into the parking lot and continue through until you reach the North enterance of the building.       From [place]: Take [address]. Take the [address] ramp towards [place]. Turn left onto [address]. Take a slight right onto [address]. Turn left at the first stoplight on [address]. At the first stop sign, take a right onto [address]. Take a left into the parking lot and continue through until you reach the North enterance of the building.       From [place]: Take [address]. Take a right at [address]. At the first stop sign, take a right onto [address]. Take a left into the parking lot and continue through until you reach the North enterance of the building.         Discharge Instructions       - metamucil and hydrate (ths will bulk yet keep stool soft)  - may use milk of mag  and if needed, suppository  - quantitative HCG is 202, <28 days from conception.   - ONLY tylenol now.  * Follow up with primary, eventually OB.     Discharge References/Attachments     DIET, EATING A HIGH-FIBER  (ENGLISH)    PRENATAL CARE, WHAT IS (ENGLISH)         Review of your medicines      START taking        Dose / Directions Last dose taken    bisacodyl 10 MG Suppository   Commonly known as:  DULCOLAX   Dose:  10 mg   Quantity:  2 suppository        Place 1 suppository (10 mg) rectally daily as needed for constipation   Refills:  0          Our records show that you are taking the medicines listed below. If these are incorrect, please call your family doctor or clinic.        Dose / Directions Last dose taken    DEPO-PROVERA IM   Dose:  150 mg        Inject 150 mg into the muscle every 3 months   Refills:  0        ibuprofen 800 MG tablet   Commonly known as:  ADVIL/MOTRIN   Dose:  800 mg   Quantity:  40 tablet        Take 1 tablet (800 mg) by mouth every 8 hours as needed for moderate pain   Refills:  1        ondansetron 4 MG tablet   Commonly known as:  ZOFRAN   Dose:  4 mg   Quantity:  30 tablet        Take 1 tablet (4 mg) by mouth every 6 hours as needed for nausea or vomiting   Refills:  1                Prescriptions were sent or printed at these locations (1 Prescription)                   Other Prescriptions                Printed at Department/Unit printer (1 of 1)         bisacodyl (DULCOLAX) 10 MG Suppository                Procedures and tests performed during your visit     Basic metabolic panel    CBC with platelets differential    CRP inflammation    HCG qualitative urine    HCG quantitative pregnancy (blood)    UA reflex to Microscopic and Culture    XR Abdomen 2 Views      Orders Needing Specimen Collection     None      Pending Results     Date and Time Order Name Status Description    8/6/2018 2016 XR Abdomen 2 Views In process             Pending Culture Results     No orders found from  8/4/2018 to 8/7/2018.            Thank you for choosing [facility]       Thank you for choosing [facility] for your care. Our goal is always to provide you with excellent care. Hearing back from our patients is one way we can continue to improve our services. Please take a few minutes to complete the written survey that you may receive in the mail after you visit with us. Thank you!        [facility] Information     [facility] gives you secure access to your electronic health record. If you see a primary care provider, you can also send messages to your care team and make appointments. If you have questions, please call your primary care clinic.  If you do not have a primary care provider, please call [phone] and they will assist you.        Care EveryWhere ID     This is your Care EveryWhere ID. This could be used by other organizations to access your [facility] medical records  [id number]        Equal Access to Services     JOESPH OAKLEY : Leighton Birch, ramiro george, terrell phan. So [facility] [phone].    ATENCIÓN: Si habla español, tiene a black disposición servicios gratuitos de asistencia lingüística. Llame al [phone].    We comply with applicable federal civil rights laws and [place] laws. We do not discriminate on the basis of race, color, national origin, age, disability, sex, sexual orientation, or gender identity.            After Visit Summary       This is your record. Keep this with you and show to your community pharmacist(s) and doctor(s) at your next visit.

## 2018-08-07 DIAGNOSIS — Z34.90 PREGNANCY AT EARLY STAGE: Primary | ICD-10-CM

## 2018-08-07 NOTE — ED PROVIDER NOTES
History     Chief Complaint   Patient presents with     Abdominal Pain     Constipation     having hard, very small BM, difficult to pass, feeling hemorrhoid     HPI  Kathia Milton is a 28 year old female who presents ambulatory to the ER c/o constipation and generalized abdominal pain. She does have Hx endometriosis, s/p lap 9 weeks ago and reports her bowel patterns have not been the same since. She has been constipated this time for 3 days.  She is nauseated, denies V/D. She denies pelvic pain, LUTS. LMP 2018. No fever.     Problem List:    Patient Active Problem List    Diagnosis Date Noted     Endometriosis of pelvis 2016     Priority: Medium     Seasonal allergies      Priority: Medium      delivery 05/15/2013     Priority: Medium     Anemia in pregnancy 2013     Priority: Medium      labor 2013     Priority: Medium     Abdominal pain, acute 2013     Priority: Medium     Encounter for supervision of other normal pregnancy 2013     Priority: Medium     Diagnosis updated by automated process. Provider to review and confirm.       Tobacco use disorder 10/18/2012     Priority: Medium     Condyloma acuminatum due to human papillomavirus 2011     Priority: Medium     Overview:   IMO Update 10/11          Past Medical History:    Past Medical History:   Diagnosis Date     Benign neoplasm of vagina 2011     Condyloma acuminatum 2011     Seasonal allergies      Supervision of other normal pregnancy      Tobacco use disorder 10/18/2012       Past Surgical History:    Past Surgical History:   Procedure Laterality Date     APPENDECTOMY  14    [facility]      SECTION      C section     COLONOSCOPY  11/10/15    Dr. Singh     DILATION AND CURETTAGE SUCTION, TREAT MISSED , 1ST TRIMESTER       ENDOSCOPY  11/10/15    Dr. Singh      LAPAROSCOPIC CYSTECTOMY OVARIAN (BENIGN) Left 2018    Procedure: LAPAROSCOPIC CYSTECTOMY OVARIAN  (BENIGN);  EXPLORATORY  LAPAROSCOPY, LEFT OVARIAN CYSTECTOMY;  Surgeon: Damon Fletcher MD;  Location: [facility]     LAPAROSCOPY  11/30/15    Dr. Sun; lysis of pelvic adhesions, cauterization of minimal endometriosis     LAPAROSCOPY  01/29/2018    lysis of adhesions; pelvic pain, endometriosis; Dr. Sun       Family History:    Family History   Problem Relation Age of Onset     Diabetes Other      Grandmother     HEART DISEASE Other      Heart Disease - Grandmother     Other - See Comments Other      Renal Disease - Grandmother     Unknown/Adopted Mother      Unknown/Adopted Father      Unknown/Adopted Maternal Grandmother      Unknown/Adopted Maternal Grandfather      Unknown/Adopted Paternal Grandmother      Unknown/Adopted Paternal Grandfather      Breast Cancer No family hx of        Social History:  Marital Status:  Single [1]  Social History   Substance Use Topics     Smoking status: Current Some Day Smoker     Types: Cigarettes     Last attempt to quit: 11/26/2012     Smokeless tobacco: Never Used      Comment: Tried to Quit (YES) (2011)     Alcohol use 0.0 oz/week     0 Standard drinks or equivalent per week      Comment: occasional        Medications:      bisacodyl (DULCOLAX) 10 MG Suppository   ibuprofen (ADVIL/MOTRIN) 800 MG tablet   MedroxyPROGESTERone Acetate (DEPO-PROVERA IM)   ondansetron (ZOFRAN) 4 MG tablet         Review of Systems   Constitutional: Negative.  Negative for chills and fever.   Respiratory: Negative.    Cardiovascular: Negative.    Gastrointestinal: Positive for abdominal pain, constipation and nausea. Negative for vomiting.   Genitourinary: Negative for difficulty urinating, dysuria, flank pain, frequency, hematuria, pelvic pain, urgency and vaginal discharge.   Skin: Negative.  Negative for rash.       Physical Exam   BP: 125/78  Pulse: (!) 126  Temp: 98.5  F (36.9  C)  Resp: 14  SpO2: 99 %      Physical Exam   Constitutional: She is oriented to person, place, and time. She  appears well-developed and well-nourished. No distress.   Eyes: Conjunctivae are normal.   Cardiovascular: Normal rate and normal heart sounds.    HR 96 auscultation   Pulmonary/Chest: Effort normal and breath sounds normal.   Abdominal: Soft. Bowel sounds are normal. She exhibits no distension. There is tenderness (generalized). There is no rebound.   Genitourinary: Rectal exam shows external hemorrhoid and internal hemorrhoid. Rectal exam shows no tenderness and anal tone normal.   Neurological: She is alert and oriented to person, place, and time.   Skin: Skin is warm and dry.   Psychiatric: She has a normal mood and affect.       ED Course     ED Course     Procedures      Results for orders placed or performed during the hospital encounter of 08/06/18 (from the past 24 hour(s))   HCG quantitative pregnancy (blood)   Result Value Ref Range    HCG Quantitative Serum 202 (H) 0 - 5 IU/L   Basic metabolic panel   Result Value Ref Range    Sodium 138 133 - 144 mmol/L    Potassium 3.5 3.4 - 5.3 mmol/L    Chloride 105 94 - 109 mmol/L    Carbon Dioxide 27 20 - 32 mmol/L    Anion Gap 6 3 - 14 mmol/L    Glucose 83 70 - 99 mg/dL    Urea Nitrogen 6 (L) 7 - 30 mg/dL    Creatinine 0.74 0.52 - 1.04 mg/dL    GFR Estimate >90 >60 mL/min/1.7m2    GFR Estimate If Black >90 >60 mL/min/1.7m2    Calcium 8.7 8.5 - 10.1 mg/dL   CRP inflammation   Result Value Ref Range    CRP Inflammation <2.9 0.0 - 8.0 mg/L   CBC with platelets differential   Result Value Ref Range    WBC 9.0 4.0 - 11.0 10e9/L    RBC Count 4.82 3.8 - 5.2 10e12/L    Hemoglobin 13.7 11.7 - 15.7 g/dL    Hematocrit 39.5 35.0 - 47.0 %    MCV 82 78 - 100 fl    MCH 28.4 26.5 - 33.0 pg    MCHC 34.7 31.5 - 36.5 g/dL    RDW 11.8 10.0 - 15.0 %    Platelet Count 306 150 - 450 10e9/L    Diff Method Automated Method     % Neutrophils 58.7 %    % Lymphocytes 30.4 %    % Monocytes 7.1 %    % Eosinophils 2.8 %    % Basophils 0.7 %    % Immature Granulocytes 0.3 %    Nucleated RBCs 0 0  /100    Absolute Neutrophil 5.3 1.6 - 8.3 10e9/L    Absolute Lymphocytes 2.7 0.8 - 5.3 10e9/L    Absolute Monocytes 0.6 0.0 - 1.3 10e9/L    Absolute Eosinophils 0.3 0.0 - 0.7 10e9/L    Absolute Basophils 0.1 0.0 - 0.2 10e9/L    Abs Immature Granulocytes 0.0 0 - 0.4 10e9/L    Absolute Nucleated RBC 0.0    UA reflex to Microscopic and Culture   Result Value Ref Range    Color Urine Yellow     Appearance Urine Clear     Glucose Urine Negative NEG^Negative mg/dL    Bilirubin Urine Negative NEG^Negative    Ketones Urine Negative NEG^Negative mg/dL    Specific Gravity Urine 1.009 1.003 - 1.035    Blood Urine Negative NEG^Negative    pH Urine 6.5 4.7 - 8.0 pH    Protein Albumin Urine Negative NEG^Negative mg/dL    Urobilinogen mg/dL Normal 0.0 - 2.0 mg/dL    Nitrite Urine Negative NEG^Negative    Leukocyte Esterase Urine Negative NEG^Negative    Source Midstream Urine    HCG qualitative urine   Result Value Ref Range    HCG Qual Urine Positive (A) NEG^Negative       Medications   ondansetron (ZOFRAN-ODT) ODT tab 4 mg (4 mg Oral Given 8/6/18 2101)   ketorolac (TORADOL) injection 60 mg (60 mg Intramuscular Given 8/6/18 2101)       Assessments & Plan (with Medical Decision Making)     I have reviewed the nursing notes.  I have reviewed the findings, diagnosis, plan and need for follow up with the patient.     New Prescriptions    BISACODYL (DULCOLAX) 10 MG SUPPOSITORY    Place 1 suppository (10 mg) rectally daily as needed for constipation       Final diagnoses:   Constipation, unspecified constipation type   Pregnancy, incidental   Hemorroids present on exam. Toradol and zofran with improved symptoms. Labs WNL. Urine HCG was resulted AFTER pt went to XR and is positive. Quant HCG is 202. I discussed positive Pg test, gestational age <28days with Kathia in addition to XR exposure. She is more concerned that she is unexpectedly pregnant and would like something for constipation. I advised metamucil, hydration, milk of mag and  may use dulcolax if absolutely needed. She is advised to f/u with PCP with poor progression, OB within 4 weeks. She will return here with concern for worsening, fevers, focal abdominal pain. She is agreeable to plan and discharged home in stable condition, only to use tylenol for pain.     8/6/2018   [facility]     Chip Bassett PA  08/06/18 1262

## 2018-08-07 NOTE — ED NOTES
Patient verbalizes understanding of discharge instructions. Afebrile. Rates abdominal pain 7/10. No BM while in ED. Paper prescription for suppository sent home with pt.

## 2018-08-07 NOTE — ED NOTES
Presents to emergency room with complaints of constipation, rectal pain, and hemorrhoids. States last BM was 6 days ago and when she did go 6 days ago her stool was thin strips and small amount. States she has taken an over the counter stool softener and laxative without relief. States constipation has been an on going thing for 1 month. C/o mucous like stool on and off. No appetite. Abdomen soft and tender. C/o increased gas today. C/o burning with urination. Took an azo yesterday and denies dysuria today. Afebrile. Anxious.

## 2018-08-07 NOTE — DISCHARGE INSTRUCTIONS
- metamucil and hydrate (ths will bulk yet keep stool soft)  - may use milk of mag and if needed, suppository  - quantitative HCG is 202, <28 days from conception.   - ONLY tylenol now.  * Follow up with primary, eventually OB.

## 2018-08-07 NOTE — ED NOTES
Pt states she has been taking Ibu 800mg a few times a week for a few weeks d/t dental pain. Provider aware.

## 2018-08-08 DIAGNOSIS — Z34.90 PREGNANCY AT EARLY STAGE: ICD-10-CM

## 2018-08-08 LAB — B-HCG SERPL-ACNC: 430 IU/L (ref 0–5)

## 2018-08-08 PROCEDURE — 84702 CHORIONIC GONADOTROPIN TEST: CPT | Performed by: OBSTETRICS & GYNECOLOGY

## 2018-08-08 PROCEDURE — 36415 COLL VENOUS BLD VENIPUNCTURE: CPT | Performed by: OBSTETRICS & GYNECOLOGY

## 2018-08-10 ENCOUNTER — HOSPITAL ENCOUNTER (EMERGENCY)
Facility: HOSPITAL | Age: 28
Discharge: HOME | End: 2018-08-10
Attending: FAMILY MEDICINE | Admitting: FAMILY MEDICINE
Payer: COMMERCIAL

## 2018-08-10 VITALS
RESPIRATION RATE: 16 BRPM | TEMPERATURE: 98.3 F | SYSTOLIC BLOOD PRESSURE: 109 MMHG | HEART RATE: 86 BPM | OXYGEN SATURATION: 100 % | DIASTOLIC BLOOD PRESSURE: 75 MMHG

## 2018-08-10 DIAGNOSIS — K59.01 SLOW TRANSIT CONSTIPATION: ICD-10-CM

## 2018-08-10 PROCEDURE — 25000125 ZZHC RX 250: Performed by: FAMILY MEDICINE

## 2018-08-10 PROCEDURE — 99283 EMERGENCY DEPT VISIT LOW MDM: CPT

## 2018-08-10 PROCEDURE — 99284 EMERGENCY DEPT VISIT MOD MDM: CPT | Performed by: FAMILY MEDICINE

## 2018-08-10 RX ORDER — ONDANSETRON 4 MG/1
4 TABLET, ORALLY DISINTEGRATING ORAL ONCE
Status: COMPLETED | OUTPATIENT
Start: 2018-08-10 | End: 2018-08-10

## 2018-08-10 RX ORDER — MAGNESIUM CARB/ALUMINUM HYDROX 105-160MG
296 TABLET,CHEWABLE ORAL ONCE
Qty: 1 BOTTLE | Refills: 0 | Status: SHIPPED | OUTPATIENT
Start: 2018-08-10 | End: 2019-02-05

## 2018-08-10 RX ORDER — ONDANSETRON 4 MG/1
4 TABLET, ORALLY DISINTEGRATING ORAL EVERY 8 HOURS PRN
Qty: 10 TABLET | Refills: 0 | Status: SHIPPED | OUTPATIENT
Start: 2018-08-10 | End: 2019-02-05

## 2018-08-10 RX ADMIN — ONDANSETRON 4 MG: 4 TABLET, ORALLY DISINTEGRATING ORAL at 13:16

## 2018-08-10 ASSESSMENT — ENCOUNTER SYMPTOMS
SHORTNESS OF BREATH: 0
NEUROLOGICAL NEGATIVE: 1
DYSURIA: 0
ACTIVITY CHANGE: 1
FATIGUE: 0
ABDOMINAL PAIN: 1
FREQUENCY: 0
NERVOUS/ANXIOUS: 1
MUSCULOSKELETAL NEGATIVE: 1
DIARRHEA: 0
CONSTIPATION: 1
NAUSEA: 1
DIAPHORESIS: 0
FEVER: 0

## 2018-08-10 NOTE — DISCHARGE INSTRUCTIONS
Treating Constipation    Constipation is a common and often uncomfortable problem. Constipation means you have bowel movements fewer than 3 times per week, or strain to pass hard, dry stool. It can last a short time. Or it can be a problem that never seems to go away. The good news is that it can often be treated and controlled.  Eat more fiber  One of the best ways to help treat constipation is to increase your fiber intake. You can do this either through diet or by using fiber supplements. Fiber (in whole grains, fruits, and vegetables) adds bulk and absorbs water to soften the stool. This helps the stool pass through the colon more easily. When you increase your fiber intake, do it slowly to avoid side effects such as bloating. Also increase the amount of water that you drink. Eating more of the following foods can add fiber to your diet.    High-fiber cereals    Whole grains, bran, and brown rice    Vegetables such as carrots, broccoli, and greens    Fresh fruits (especially apples, pears, and dried fruits like raisins and apricots)    Nuts and legumes (especially beans such as lentils, kidney beans, and lima beans)  Get physically active  Exercise helps improve the working of your colon which helps ease constipation. Try to get some physical activity every day. If you haven t been active for a while, talk to your healthcare provider before starting again.  Laxatives  Your healthcare provider may suggest an over-the-counter product to help ease your constipation. He or she may suggest the use of bulk-forming agents or laxatives. The use of laxatives, if used as directed, is common and safe. Follow directions carefully when using them. See your healthcare provider for new-onset constipation, or long-term constipation, to rule out other causes such as medicines or thyroid disease.  Date Last Reviewed: 7/1/2016 2000-2017 [employer]. [address] [place] [address], [place] [address]. All rights reserved.  This information is not intended as a substitute for professional medical care. Always follow your healthcare professional's instructions.

## 2018-08-10 NOTE — ED NOTES
Pt presents to ED with c/o constipation for about 2 weeks.  Pt is pregnant, about 4-5 weeks.  Pt has not had a BM in about 12 days.  Pt has tried miralax, stool softeners, milk of magnesia, suppositories and fleets enema.   Pt reports vomiting x 2 today, pain in LLQ. Pt states she was seen in ED on Monday this week, has only been having liquid results after taking meds or suppositories.

## 2018-08-10 NOTE — ED AVS SNAPSHOT
[facility]    [address] [address]    Phone:  [phone]                                       Kathia Milton   MRN: [id number]    Department:  [facility]   Date of Visit:  8/10/2018           After Visit Summary Signature Page     I have received my discharge instructions, and my questions have been answered. I have discussed any challenges I see with this plan with the nurse or doctor.    ..........................................................................................................................................  Patient/Patient Representative Signature      ..........................................................................................................................................  Patient Representative Print Name and Relationship to Patient    ..................................................               ................................................  Date                                            Time    ..........................................................................................................................................  Reviewed by Signature/Title    ...................................................              ..............................................  Date                                                            Time

## 2018-08-10 NOTE — ED PROVIDER NOTES
History     Chief Complaint   Patient presents with     Constipation     has been trying all sorts of meds to relieve symptoms but is just having liquid stool for last two weeks. pt still feels like she has BM. pt is also 4-5 weeks pregnant. liquid yellowish emesis today      HPI  Kathia Milton is a 28 year old female who presents the emergency room after constipation that is been going on for 2 weeks.  She was seen here on , at that time she received Dulcolax suppositories to use, has also been using MiraLAX and stool softeners and has tried milk of magnesia on the advice for OB.  Patient has a constant sensation that she needs to have a bowel movement, goes in the bathroom and pushes and the only thing she has been able to get out despite all of these interventions is liquid.  Today she had a liquid emesis of yellow material.  Incidental finding is the patient is 4-5 weeks pregnant.  She believes the constipation started when she had her laparoscopy for her endometriosis, she states her bowels have never been the same since then.    Problem List:    Patient Active Problem List    Diagnosis Date Noted     Endometriosis of pelvis 2016     Priority: Medium     Seasonal allergies      Priority: Medium      delivery 05/15/2013     Priority: Medium     Anemia in pregnancy 2013     Priority: Medium      labor 2013     Priority: Medium     Abdominal pain, acute 2013     Priority: Medium     Encounter for supervision of other normal pregnancy 2013     Priority: Medium     Diagnosis updated by automated process. Provider to review and confirm.       Tobacco use disorder 10/18/2012     Priority: Medium     Condyloma acuminatum due to human papillomavirus 2011     Priority: Medium     Overview:   IMO Update 10/11          Past Medical History:    Past Medical History:   Diagnosis Date     Benign neoplasm of vagina 2011     Condyloma acuminatum 2011      Seasonal allergies      Supervision of other normal pregnancy      Tobacco use disorder 10/18/2012       Past Surgical History:    Past Surgical History:   Procedure Laterality Date     APPENDECTOMY  14    [facility]      SECTION      C section     COLONOSCOPY  11/10/15    Dr. Singh     DILATION AND CURETTAGE SUCTION, TREAT MISSED , 1ST TRIMESTER       ENDOSCOPY  11/10/15    Dr. Singh      LAPAROSCOPIC CYSTECTOMY OVARIAN (BENIGN) Left 2018    Procedure: LAPAROSCOPIC CYSTECTOMY OVARIAN (BENIGN);  EXPLORATORY  LAPAROSCOPY, LEFT OVARIAN CYSTECTOMY;  Surgeon: Damon Fletcher MD;  Location: [facility]     LAPAROSCOPY  11/30/15    Dr. Sun; lysis of pelvic adhesions, cauterization of minimal endometriosis     LAPAROSCOPY  2018    lysis of adhesions; pelvic pain, endometriosis; Dr. Sun       Family History:    Family History   Problem Relation Age of Onset     Diabetes Other      Grandmother     HEART DISEASE Other      Heart Disease - Grandmother     Other - See Comments Other      Renal Disease - Grandmother     Unknown/Adopted Mother      Unknown/Adopted Father      Unknown/Adopted Maternal Grandmother      Unknown/Adopted Maternal Grandfather      Unknown/Adopted Paternal Grandmother      Unknown/Adopted Paternal Grandfather      Breast Cancer No family hx of        Social History:  Marital Status:  Single [1]  Social History   Substance Use Topics     Smoking status: Current Some Day Smoker     Types: Cigarettes     Last attempt to quit: 2012     Smokeless tobacco: Never Used      Comment: trying to quit 8/10/18     Alcohol use No      Comment: occasional        Medications:      bisacodyl (DULCOLAX) 10 MG Suppository   ondansetron (ZOFRAN ODT) 4 MG ODT tab         Review of Systems   Constitutional: Positive for activity change. Negative for diaphoresis, fatigue and fever.   HENT: Negative.    Respiratory: Negative for shortness of breath.    Cardiovascular: Negative for chest  pain.   Gastrointestinal: Positive for abdominal pain, constipation and nausea. Negative for diarrhea.   Genitourinary: Negative for dysuria, frequency, urgency and vaginal bleeding.   Musculoskeletal: Negative.    Skin: Negative.    Neurological: Negative.    Psychiatric/Behavioral: The patient is nervous/anxious.        Physical Exam   BP: 119/81  Pulse: 111  Heart Rate: 86  Temp: 98.8  F (37.1  C)  Resp: 16  SpO2: 98 %      Physical Exam   Constitutional: She is oriented to person, place, and time. She appears well-developed and well-nourished. No distress.   HENT:   Head: Normocephalic and atraumatic.   Mouth/Throat: Oropharynx is clear and moist.   Neck: Normal range of motion. Neck supple.   Cardiovascular: Normal rate, regular rhythm, normal heart sounds and intact distal pulses.    No murmur heard.  Pulmonary/Chest: Effort normal and breath sounds normal.   Abdominal: Soft. Bowel sounds are normal. She exhibits no distension. There is no tenderness.   Musculoskeletal: Normal range of motion.   Neurological: She is alert and oriented to person, place, and time.   Skin: Skin is warm and dry.   Psychiatric: Her speech is normal. Judgment normal. Her affect is blunt. Cognition and memory are normal. She exhibits a depressed mood.   Nursing note and vitals reviewed.      ED Course     ED Course     Procedures    No results found for this or any previous visit (from the past 24 hour(s)).    Medications   ondansetron (ZOFRAN-ODT) ODT tab 4 mg (4 mg Oral Given 8/10/18 1316)       Assessments & Plan (with Medical Decision Making)   Patient was given 700 cc of soapsuds enema without any results except for brown water.  She has still got a sensation of fullness and cannot defecate any further.  Discussed different remedies with her, recommended that she use magnesium citrate tonight and then again in the morning if she has not had allowed large bowel movement.  She is aware that this may cause liquid bowel movement as  well, but she should hopefully have relief from her pressure sensation.  Follow-up with primary care if symptoms do not clear.    I have reviewed the nursing notes.    I have reviewed the findings, diagnosis, plan and need for follow up with the patient.  Discharge Medication List as of 8/10/2018  3:05 PM      START taking these medications    Details   magnesium citrate (EQ MAGNESIUM CITRATE) 1.745 GM/30ML solution Take 296 mLs by mouth once for 1 dose Repeat in 12 hours if no large bowel movement, Disp-1 Bottle, R-0, E-Prescribe      ondansetron (ZOFRAN ODT) 4 MG ODT tab Take 1 tablet (4 mg) by mouth every 8 hours as needed for nausea, Disp-10 tablet, R-0, E-Prescribe             Final diagnoses:   Slow transit constipation       8/10/2018   [facility]     Lorraine Tran MD  08/12/18 7610

## 2018-08-10 NOTE — ED AVS SNAPSHOT
[facility]    [address]    [place] [address]    Phone:  [phone]                                       Kathia Milton   MRN: [id number]    Department:  [facility]   Date of Visit:  8/10/2018           Patient Information     Date Of Birth          1990        Your diagnoses for this visit were:     Slow transit constipation        You were seen by Lorraine Tran MD.      Follow-up Information     Follow up with Lainey Rose MD In 3 days.    Specialty:  Family Practice    Why:  As needed, If symptoms worsen, or fail to improve    Contact information:    [place] [id number] [phone]          Discharge Instructions         Treating Constipation    Constipation is a common and often uncomfortable problem. Constipation means you have bowel movements fewer than 3 times per week, or strain to pass hard, dry stool. It can last a short time. Or it can be a problem that never seems to go away. The good news is that it can often be treated and controlled.  Eat more fiber  One of the best ways to help treat constipation is to increase your fiber intake. You can do this either through diet or by using fiber supplements. Fiber (in whole grains, fruits, and vegetables) adds bulk and absorbs water to soften the stool. This helps the stool pass through the colon more easily. When you increase your fiber intake, do it slowly to avoid side effects such as bloating. Also increase the amount of water that you drink. Eating more of the following foods can add fiber to your diet.    High-fiber cereals    Whole grains, bran, and brown rice    Vegetables such as carrots, broccoli, and greens    Fresh fruits (especially apples, pears, and dried fruits like raisins and apricots)    Nuts and legumes (especially beans such as lentils, kidney beans, and lima beans)  Get physically active  Exercise helps improve the working of your colon which helps  ease constipation. Try to get some physical activity every day. If you haven t been active for a while, talk to your healthcare provider before starting again.  Laxatives  Your healthcare provider may suggest an over-the-counter product to help ease your constipation. He or she may suggest the use of bulk-forming agents or laxatives. The use of laxatives, if used as directed, is common and safe. Follow directions carefully when using them. See your healthcare provider for new-onset constipation, or long-term constipation, to rule out other causes such as medicines or thyroid disease.  Date Last Reviewed: 7/1/2016 2000-2017 [employer]. [address] [place] [address]. All rights reserved. This information is not intended as a substitute for professional medical care. Always follow your healthcare professional's instructions.          Your next 10 appointments already scheduled     Aug 21, 2018  3:45 PM CDT   (Arrive by 3:30 PM)   ESTABLISHED PRENATAL with Damon Fletcher MD   [facility] ([facility] )    [address] [place] [address]   [phone]                 Review of your medicines      START taking        Dose / Directions Last dose taken    magnesium citrate 1.745 GM/30ML solution   Commonly known as:  EQ MAGNESIUM CITRATE   Dose:  296 mL   Quantity:  1 Bottle        Take 296 mLs by mouth once for 1 dose Repeat in 12 hours if no large bowel movement   Refills:  0        ondansetron 4 MG ODT tab   Commonly known as:  ZOFRAN ODT   Dose:  4 mg   Quantity:  10 tablet        Take 1 tablet (4 mg) by mouth every 8 hours as needed for nausea   Refills:  0          Our records show that you are taking the medicines listed below. If these are incorrect, please call your family doctor or clinic.        Dose / Directions Last dose taken    bisacodyl 10 MG Suppository   Commonly known as:  DULCOLAX   Dose:  10 mg   Quantity:  2 suppository        Place 1  suppository (10 mg) rectally daily as needed for constipation   Refills:  0                Prescriptions were sent or printed at these locations (2 Prescriptions)                   [facility] [place] [facility] [address] AT [facility] [address]   [address], [place] [address]    Telephone:  [phone]   Fax:  [phone]   Hours:                  E-Prescribed (2 of 2)         magnesium citrate (EQ MAGNESIUM CITRATE) 1.745 GM/30ML solution               ondansetron (ZOFRAN ODT) 4 MG ODT tab                Procedures and tests performed during your visit     Soap suds enema      Orders Needing Specimen Collection     None      Pending Results     No orders found from 8/8/2018 to 8/11/2018.            Pending Culture Results     No orders found from 8/8/2018 to 8/11/2018.            Thank you for choosing [facility]       Thank you for choosing [facility] for your care. Our goal is always to provide you with excellent care. Hearing back from our patients is one way we can continue to improve our services. Please take a few minutes to complete the written survey that you may receive in the mail after you visit with us. Thank you!        [employer] Information     [employer] gives you secure access to your electronic health record. If you see a primary care provider, you can also send messages to your care team and make appointments. If you have questions, please call your primary care clinic.  If you do not have a primary care provider, please call [phone] and they will assist you.        Care EveryWhere ID     This is your Care EveryWhere ID. This could be used by other organizations to access your [facility] medical records  [id number]        Equal Access to Services     [facility] : Leighton Birch, wacarmen george, qaterrell martinez. So [facility] [phone].    ATENCIÓN: Si habla español, tiene a black disposición servicios  hilary de asistencia lingüística. Sarai sifuentes [phone].    We comply with applicable federal civil rights laws and [place] laws. We do not discriminate on the basis of race, color, national origin, age, disability, sex, sexual orientation, or gender identity.            After Visit Summary       This is your record. Keep this with you and show to your community pharmacist(s) and doctor(s) at your next visit.

## 2018-08-10 NOTE — ED NOTES
Updated MD on enema results.  Pt just finished with BM but only had liquid results from enema. Held 500 ml for about 10 minutes.

## 2018-08-15 ENCOUNTER — MYC MEDICAL ADVICE (OUTPATIENT)
Dept: OBGYN | Facility: OTHER | Age: 28
End: 2018-08-15

## 2018-08-15 ENCOUNTER — APPOINTMENT (OUTPATIENT)
Dept: ULTRASOUND IMAGING | Facility: HOSPITAL | Age: 28
End: 2018-08-15
Attending: PHYSICIAN ASSISTANT
Payer: COMMERCIAL

## 2018-08-15 ENCOUNTER — HOSPITAL ENCOUNTER (EMERGENCY)
Facility: HOSPITAL | Age: 28
Discharge: HOME | End: 2018-08-15
Attending: PHYSICIAN ASSISTANT | Admitting: PHYSICIAN ASSISTANT
Payer: COMMERCIAL

## 2018-08-15 VITALS
TEMPERATURE: 99.2 F | OXYGEN SATURATION: 99 % | RESPIRATION RATE: 17 BRPM | SYSTOLIC BLOOD PRESSURE: 113 MMHG | HEART RATE: 91 BPM | DIASTOLIC BLOOD PRESSURE: 73 MMHG

## 2018-08-15 DIAGNOSIS — N83.201 CYSTS OF BOTH OVARIES: ICD-10-CM

## 2018-08-15 DIAGNOSIS — O26.899 PELVIC PAIN IN PREGNANCY: ICD-10-CM

## 2018-08-15 DIAGNOSIS — N83.202 CYSTS OF BOTH OVARIES: ICD-10-CM

## 2018-08-15 DIAGNOSIS — R10.2 PELVIC PAIN IN PREGNANCY: ICD-10-CM

## 2018-08-15 DIAGNOSIS — Z34.90 INTRAUTERINE PREGNANCY: ICD-10-CM

## 2018-08-15 LAB
ALBUMIN SERPL-MCNC: 3.7 G/DL (ref 3.4–5)
ALP SERPL-CCNC: 55 U/L (ref 40–150)
ALT SERPL W P-5'-P-CCNC: 17 U/L (ref 0–50)
ANION GAP SERPL CALCULATED.3IONS-SCNC: 8 MMOL/L (ref 3–14)
AST SERPL W P-5'-P-CCNC: 15 U/L (ref 0–45)
B-HCG SERPL-ACNC: 6262 IU/L (ref 0–5)
BASOPHILS # BLD AUTO: 0 10E9/L (ref 0–0.2)
BASOPHILS NFR BLD AUTO: 0.5 %
BILIRUB SERPL-MCNC: 0.1 MG/DL (ref 0.2–1.3)
BUN SERPL-MCNC: 7 MG/DL (ref 7–30)
CALCIUM SERPL-MCNC: 8.5 MG/DL (ref 8.5–10.1)
CHLORIDE SERPL-SCNC: 107 MMOL/L (ref 94–109)
CO2 SERPL-SCNC: 25 MMOL/L (ref 20–32)
CREAT SERPL-MCNC: 0.62 MG/DL (ref 0.52–1.04)
DIFFERENTIAL METHOD BLD: ABNORMAL
EOSINOPHIL # BLD AUTO: 0.4 10E9/L (ref 0–0.7)
EOSINOPHIL NFR BLD AUTO: 4.5 %
ERYTHROCYTE [DISTWIDTH] IN BLOOD BY AUTOMATED COUNT: 11.9 % (ref 10–15)
GFR SERPL CREATININE-BSD FRML MDRD: >90 ML/MIN/1.7M2
GLUCOSE SERPL-MCNC: 115 MG/DL (ref 70–99)
HCT VFR BLD AUTO: 34.8 % (ref 35–47)
HGB BLD-MCNC: 12.3 G/DL (ref 11.7–15.7)
IMM GRANULOCYTES # BLD: 0 10E9/L (ref 0–0.4)
IMM GRANULOCYTES NFR BLD: 0.4 %
LYMPHOCYTES # BLD AUTO: 2.3 10E9/L (ref 0.8–5.3)
LYMPHOCYTES NFR BLD AUTO: 29.4 %
MCH RBC QN AUTO: 29.4 PG (ref 26.5–33)
MCHC RBC AUTO-ENTMCNC: 35.3 G/DL (ref 31.5–36.5)
MCV RBC AUTO: 83 FL (ref 78–100)
MONOCYTES # BLD AUTO: 0.6 10E9/L (ref 0–1.3)
MONOCYTES NFR BLD AUTO: 7 %
NEUTROPHILS # BLD AUTO: 4.6 10E9/L (ref 1.6–8.3)
NEUTROPHILS NFR BLD AUTO: 58.2 %
NRBC # BLD AUTO: 0 10*3/UL
NRBC BLD AUTO-RTO: 0 /100
PLATELET # BLD AUTO: 274 10E9/L (ref 150–450)
POTASSIUM SERPL-SCNC: 3.6 MMOL/L (ref 3.4–5.3)
PROT SERPL-MCNC: 7 G/DL (ref 6.8–8.8)
RBC # BLD AUTO: 4.19 10E12/L (ref 3.8–5.2)
SODIUM SERPL-SCNC: 140 MMOL/L (ref 133–144)
WBC # BLD AUTO: 8 10E9/L (ref 4–11)

## 2018-08-15 PROCEDURE — 76817 TRANSVAGINAL US OBSTETRIC: CPT | Mod: TC

## 2018-08-15 PROCEDURE — 36415 COLL VENOUS BLD VENIPUNCTURE: CPT | Performed by: PHYSICIAN ASSISTANT

## 2018-08-15 PROCEDURE — 99284 EMERGENCY DEPT VISIT MOD MDM: CPT | Mod: 25

## 2018-08-15 PROCEDURE — 99284 EMERGENCY DEPT VISIT MOD MDM: CPT | Performed by: PHYSICIAN ASSISTANT

## 2018-08-15 PROCEDURE — 84702 CHORIONIC GONADOTROPIN TEST: CPT | Performed by: PHYSICIAN ASSISTANT

## 2018-08-15 PROCEDURE — 85025 COMPLETE CBC W/AUTO DIFF WBC: CPT | Performed by: PHYSICIAN ASSISTANT

## 2018-08-15 PROCEDURE — 80053 COMPREHEN METABOLIC PANEL: CPT | Performed by: PHYSICIAN ASSISTANT

## 2018-08-15 ASSESSMENT — ENCOUNTER SYMPTOMS
VOMITING: 0
ABDOMINAL DISTENTION: 0
FATIGUE: 0
CHILLS: 0
SHORTNESS OF BREATH: 0
FLANK PAIN: 0
DIFFICULTY URINATING: 0
NAUSEA: 1
FEVER: 0
FREQUENCY: 0

## 2018-08-15 NOTE — ED AVS SNAPSHOT
[facility]    [address] [address]    Phone:  [phone]                                       Kathia Milton   MRN: [id number]    Department:  [facility]   Date of Visit:  8/15/2018           Patient Information     Date Of Birth          1990        Your diagnoses for this visit were:     Pelvic pain in pregnancy     Intrauterine pregnancy     Cysts of both ovaries        You were seen by Steven Dickson PA-C.      Follow-up Information     Schedule an appointment as soon as possible for a visit with Lainey Rose MD.    Specialty:  Family Practice    Contact information:    [address] [facility] [address]  [phone]          Schedule an appointment as soon as possible for a visit with Damon Fletcehr MD.    Specialty:  OB/Gyn    Contact information:    [address] [place] [address]  [phone]          Follow up with [facility].    Specialty:  EMERGENCY MEDICINE    Why:  If symptoms worsen    Contact information:    [address] [address] [phone]    Additional information:    From [facility]: Take [address]. Turn left at [address]. Turn left at the first stoplight on [address]. At the first stop sign, take a right onto [address]. Take a left into the parking lot and continue through until you reach the North enterance of the building.       From [place]: Take [address]. Take the [address] ramp towards [place]. Turn left onto [address]. Take a slight right onto [address]. Turn left at the first stoplight on [address]. At the first stop sign, take a right onto [address]. Take a left into the parking lot and continue through until you reach the North enterance of the building.       From [place]: Take [address]. Take a right at [address]. At the first stop sign, take a right onto [address].  Take a left into the parking lot and continue through until you reach the North enterance of the building.         Discharge Instructions       Rest and stay hydrated.     Follow-up with Dr. Rose and Dr. Fletcher.     Tylenol as needed for pain.     Please return here for ANY worsening pain, vaginal bleeding, fevers, or other concerns.     Your next 10 appointments already scheduled     Aug 21, 2018  3:45 PM CDT   (Arrive by 3:30 PM)   ESTABLISHED PRENATAL with Damon Fletcher MD   [facility] [place] ([facility] )    [address]  [place] [address]   [phone]                 Review of your medicines      Our records show that you are taking the medicines listed below. If these are incorrect, please call your family doctor or clinic.        Dose / Directions Last dose taken    bisacodyl 10 MG Suppository   Commonly known as:  DULCOLAX   Dose:  10 mg   Quantity:  2 suppository        Place 1 suppository (10 mg) rectally daily as needed for constipation   Refills:  0        ZOFRAN PO        Refills:  0                Procedures and tests performed during your visit     CBC with platelets differential    Comprehensive metabolic panel    HCG quantitative pregnancy    US OB < 14 Weeks w Transvaginal      Orders Needing Specimen Collection     None      Pending Results     Date and Time Order Name Status Description    8/15/2018 1609 US OB < 14 Weeks w Transvaginal In process             Pending Culture Results     No orders found from 8/13/2018 to 8/16/2018.            Thank you for choosing [facility]       Thank you for choosing [facility] for your care. Our goal is always to provide you with excellent care. Hearing back from our patients is one way we can continue to improve our services. Please take a few minutes to complete the written survey that you may receive in the mail after you visit with us. Thank you!        [employer]hart Information     [employer] gives you secure access to your  electronic health record. If you see a primary care provider, you can also send messages to your care team and make appointments. If you have questions, please call your primary care clinic.  If you do not have a primary care provider, please call [phone] and they will assist you.        Care EveryWhere ID     This is your Care EveryWhere ID. This could be used by other organizations to access your [facility] medical records  [id number]        Equal Access to Services     [facility] : Leighton Birch, ramiro george, leonides goodson, terrell angel. So [facility] [phone].    ATENCIÓN: Si habla español, tiene a black disposición servicios gratuitos de asistencia lingüística. Llame al [phone].    We comply with applicable federal civil rights laws and [place] laws. We do not discriminate on the basis of race, color, national origin, age, disability, sex, sexual orientation, or gender identity.            After Visit Summary       This is your record. Keep this with you and show to your community pharmacist(s) and doctor(s) at your next visit.

## 2018-08-15 NOTE — ED NOTES
Discharge instructions given. Verbalized understanding. States pain is the same and rates 8/10. Ambulated out of ED independently with friend at side.

## 2018-08-15 NOTE — ED AVS SNAPSHOT
[facility]    [address] [address]    Phone:  [phone]                                       Kathia Milton   MRN: [id number]    Department:  [facility]   Date of Visit:  8/15/2018           After Visit Summary Signature Page     I have received my discharge instructions, and my questions have been answered. I have discussed any challenges I see with this plan with the nurse or doctor.    ..........................................................................................................................................  Patient/Patient Representative Signature      ..........................................................................................................................................  Patient Representative Print Name and Relationship to Patient    ..................................................               ................................................  Date                                            Time    ..........................................................................................................................................  Reviewed by Signature/Title    ...................................................              ..............................................  Date                                                            Time

## 2018-08-15 NOTE — DISCHARGE INSTRUCTIONS
Rest and stay hydrated.     Follow-up with Dr. Rose and Dr. Fletcher.     Tylenol as needed for pain.     Please return here for ANY worsening pain, vaginal bleeding, fevers, or other concerns.

## 2018-08-15 NOTE — ED PROVIDER NOTES
"  History     Chief Complaint   Patient presents with     Abdominal Pain     lt lower abd pain and nausea, states seen on mon and thurs for same and tx for constipation, notes pain getting worse. states \"early pregnancy and even tell you how far along I am\". denies vaginal bleeding or discharge     The history is provided by the patient.     Kathia Milton is a 28 year old female who presented to the emergency department ambulatory for evaluation of left lower abdominal pain.  She has been seen here multiple times recently and diagnosed with constipation.  Recently found out that she was pregnant.  Believe she is approximately 4 weeks along.  Normal bowel movement yesterday.  Denies any vaginal bleeding or discharge.  She is complaining mostly of left-sided pelvic discomfort.  Denies any fevers or chills.  Does have some mild nausea.  Denies any vomiting.  Denies any diarrhea.  Denies any abdominal distention.    Problem List:    Patient Active Problem List    Diagnosis Date Noted     Endometriosis of pelvis 2016     Priority: Medium     Seasonal allergies      Priority: Medium      delivery 05/15/2013     Priority: Medium     Anemia in pregnancy 2013     Priority: Medium      labor 2013     Priority: Medium     Abdominal pain, acute 2013     Priority: Medium     Encounter for supervision of other normal pregnancy 2013     Priority: Medium     Diagnosis updated by automated process. Provider to review and confirm.       Tobacco use disorder 10/18/2012     Priority: Medium     Condyloma acuminatum due to human papillomavirus 2011     Priority: Medium     Overview:   IMO Update 10/11          Past Medical History:    Past Medical History:   Diagnosis Date     Benign neoplasm of vagina 2011     Condyloma acuminatum 2011     Seasonal allergies      Supervision of other normal pregnancy      Tobacco use disorder 10/18/2012       Past Surgical History:  "   Past Surgical History:   Procedure Laterality Date     APPENDECTOMY  14    [place]      SECTION      C section     COLONOSCOPY  11/10/15    Dr. Singh     DILATION AND CURETTAGE SUCTION, TREAT MISSED , 1ST TRIMESTER       ENDOSCOPY  11/10/15    Dr. Singh      LAPAROSCOPIC CYSTECTOMY OVARIAN (BENIGN) Left 2018    Procedure: LAPAROSCOPIC CYSTECTOMY OVARIAN (BENIGN);  EXPLORATORY  LAPAROSCOPY, LEFT OVARIAN CYSTECTOMY;  Surgeon: Damon Fletcher MD;  Location: [facility]     LAPAROSCOPY  11/30/15    Dr. uSn; lysis of pelvic adhesions, cauterization of minimal endometriosis     LAPAROSCOPY  2018    lysis of adhesions; pelvic pain, endometriosis; Dr. Sun       Family History:    Family History   Problem Relation Age of Onset     Diabetes Other      Grandmother     HEART DISEASE Other      Heart Disease - Grandmother     Other - See Comments Other      Renal Disease - Grandmother     Unknown/Adopted Mother      Unknown/Adopted Father      Unknown/Adopted Maternal Grandmother      Unknown/Adopted Maternal Grandfather      Unknown/Adopted Paternal Grandmother      Unknown/Adopted Paternal Grandfather      Breast Cancer No family hx of        Social History:  Marital Status:  Single [1]  Social History   Substance Use Topics     Smoking status: Current Some Day Smoker     Types: Cigarettes     Last attempt to quit: 2012     Smokeless tobacco: Never Used      Comment: trying to quit 8/10/18     Alcohol use No      Comment: occasional        Medications:      Ondansetron HCl (ZOFRAN PO)   bisacodyl (DULCOLAX) 10 MG Suppository         Review of Systems   Constitutional: Negative for chills, fatigue and fever.   Respiratory: Negative for shortness of breath.    Gastrointestinal: Positive for nausea. Negative for abdominal distention and vomiting.   Genitourinary: Positive for pelvic pain. Negative for difficulty urinating, flank pain, frequency, urgency, vaginal bleeding, vaginal  discharge and vaginal pain.   Skin: Negative.        Physical Exam   BP: 118/79  Pulse: 91  Heart Rate: 97  Temp: 99.2  F (37.3  C)  Resp: 14  SpO2: 99 %      Physical Exam   Constitutional: She is oriented to person, place, and time. She appears well-developed and well-nourished. No distress.   Playing on phone    Cardiovascular: Normal rate and regular rhythm.    Pulmonary/Chest: Effort normal and breath sounds normal.   Abdominal: Soft. There is tenderness. There is no guarding.       Neurological: She is alert and oriented to person, place, and time.   Skin: Skin is warm and dry.   Psychiatric: She has a normal mood and affect.   Nursing note and vitals reviewed.      ED Course     ED Course     Procedures               Critical Care time:  none               Results for orders placed or performed during the hospital encounter of 08/15/18 (from the past 24 hour(s))   CBC with platelets differential   Result Value Ref Range    WBC 8.0 4.0 - 11.0 10e9/L    RBC Count 4.19 3.8 - 5.2 10e12/L    Hemoglobin 12.3 11.7 - 15.7 g/dL    Hematocrit 34.8 (L) 35.0 - 47.0 %    MCV 83 78 - 100 fl    MCH 29.4 26.5 - 33.0 pg    MCHC 35.3 31.5 - 36.5 g/dL    RDW 11.9 10.0 - 15.0 %    Platelet Count 274 150 - 450 10e9/L    Diff Method Automated Method     % Neutrophils 58.2 %    % Lymphocytes 29.4 %    % Monocytes 7.0 %    % Eosinophils 4.5 %    % Basophils 0.5 %    % Immature Granulocytes 0.4 %    Nucleated RBCs 0 0 /100    Absolute Neutrophil 4.6 1.6 - 8.3 10e9/L    Absolute Lymphocytes 2.3 0.8 - 5.3 10e9/L    Absolute Monocytes 0.6 0.0 - 1.3 10e9/L    Absolute Eosinophils 0.4 0.0 - 0.7 10e9/L    Absolute Basophils 0.0 0.0 - 0.2 10e9/L    Abs Immature Granulocytes 0.0 0 - 0.4 10e9/L    Absolute Nucleated RBC 0.0    Comprehensive metabolic panel   Result Value Ref Range    Sodium 140 133 - 144 mmol/L    Potassium 3.6 3.4 - 5.3 mmol/L    Chloride 107 94 - 109 mmol/L    Carbon Dioxide 25 20 - 32 mmol/L    Anion Gap 8 3 - 14 mmol/L     Glucose 115 (H) 70 - 99 mg/dL    Urea Nitrogen 7 7 - 30 mg/dL    Creatinine 0.62 0.52 - 1.04 mg/dL    GFR Estimate >90 >60 mL/min/1.7m2    GFR Estimate If Black >90 >60 mL/min/1.7m2    Calcium 8.5 8.5 - 10.1 mg/dL    Bilirubin Total 0.1 (L) 0.2 - 1.3 mg/dL    Albumin 3.7 3.4 - 5.0 g/dL    Protein Total 7.0 6.8 - 8.8 g/dL    Alkaline Phosphatase 55 40 - 150 U/L    ALT 17 0 - 50 U/L    AST 15 0 - 45 U/L   HCG quantitative pregnancy   Result Value Ref Range    HCG Quantitative Serum 6262 (H) 0 - 5 IU/L   US OB < 14 Weeks w Transvaginal    Narrative    US OB <14 WEEKS WITH TRANSVAGINAL SINGLE    Gestational age by LMP: 5 weeks 4 days    History: Left-sided pelvic pain, positive pregnancy test.    Comparison: Ultrasound 5/24/2018    Findings: There is an intrauterine gestational sac with a mean sac  diameter of 1 cm for a estimated gestational age of 5 weeks 4 days. A  yolk sac is seen. No definite fetal pole or fetal cardiac activity is  seen at this time. No subchorionic hemorrhage is appreciated. There is  a 2.5 cm simple right ovarian cyst. There is a 3.2 cm simple left  ovarian cyst.     Mild pelvic free fluid.      Impression    Impression:  Early intrauterine pregnancy with a mean sac diameter  suggesting a gestational age of 5 weeks 4 days.    Mild pelvic free fluid. Bilateral ovarian cysts, measuring up to 2.5  cm on the right and 3.2 cm on the left.    MERCEDES CALDWELL MD       Medications - No data to display    Assessments & Plan (with Medical Decision Making)   Preliminary US shows IUP and bilateral ovarian cysts.  She was found pleasant, talkative, and playing on cell phone on each recheck.  History of present illness, workup, and exam are not consistent with tubo-ovarian abscess, ovarian torsion, ectopic pregnancy, mesenteric ischemia, aortic dissection, or other intra-abdominal catastrophe.  She has a close follow-up with OB/GYN in 1 week.  She can follow-up with her primary care provider for further  workup as well.  Discussed pelvic exam and she declined.  This is certainly reasonable.  Previous urinalysis was negative.  Return to the emergency department for any worsening symptoms, fevers, vaginal bleeding, or other concerns.    (Please note that this note was completed with a voice recognition program.  Every attempt was made to edit the dictations, but inevitably there remain words that are mis-transcribed.)        I have reviewed the nursing notes.    I have reviewed the findings, diagnosis, plan and need for follow up with the patient.       Discharge Medication List as of 8/15/2018  5:59 PM          Final diagnoses:   Pelvic pain in pregnancy   Intrauterine pregnancy   Cysts of both ovaries       8/15/2018   [facility]     Steven Dickson PA-C  08/15/18 1812

## 2018-08-16 ENCOUNTER — MYC MEDICAL ADVICE (OUTPATIENT)
Dept: OBGYN | Facility: OTHER | Age: 28
End: 2018-08-16

## 2018-08-16 NOTE — TELEPHONE ENCOUNTER
Patient notified and has a pre new appointment with Dr. Fletcher on 8/21/18. She will keep her appointment and then go from there if another US needed to be ordered per Dr. Fletcher.

## 2018-08-16 NOTE — TELEPHONE ENCOUNTER
I reviewed the US.  There were some small cysts which are common in early pregnancy.  Free fluid can be present normally or if a cyst was leaking or ruptured.  It would probably be good to repeat the US in 1-2 weeks to make sure everything is coming along normally and hopefully see baby and heart beat by then.  Argelia or Aimee Please order Rad US.

## 2018-08-19 ENCOUNTER — HOSPITAL ENCOUNTER (EMERGENCY)
Facility: HOSPITAL | Age: 28
Discharge: HOME | End: 2018-08-19
Attending: PHYSICIAN ASSISTANT | Admitting: PHYSICIAN ASSISTANT
Payer: COMMERCIAL

## 2018-08-19 ENCOUNTER — APPOINTMENT (OUTPATIENT)
Dept: ULTRASOUND IMAGING | Facility: HOSPITAL | Age: 28
End: 2018-08-19
Attending: PHYSICIAN ASSISTANT
Payer: COMMERCIAL

## 2018-08-19 VITALS
WEIGHT: 140 LBS | TEMPERATURE: 97.8 F | HEART RATE: 87 BPM | SYSTOLIC BLOOD PRESSURE: 104 MMHG | OXYGEN SATURATION: 98 % | BODY MASS INDEX: 23.3 KG/M2 | RESPIRATION RATE: 12 BRPM | DIASTOLIC BLOOD PRESSURE: 75 MMHG

## 2018-08-19 DIAGNOSIS — R10.2 PELVIC PAIN IN PREGNANCY: ICD-10-CM

## 2018-08-19 DIAGNOSIS — O26.899 PELVIC PAIN IN PREGNANCY: ICD-10-CM

## 2018-08-19 LAB
ALBUMIN UR-MCNC: NEGATIVE MG/DL
ANION GAP SERPL CALCULATED.3IONS-SCNC: 6 MMOL/L (ref 3–14)
APPEARANCE UR: CLEAR
BASOPHILS # BLD AUTO: 0.1 10E9/L (ref 0–0.2)
BASOPHILS NFR BLD AUTO: 0.7 %
BILIRUB UR QL STRIP: NEGATIVE
BUN SERPL-MCNC: 7 MG/DL (ref 7–30)
CALCIUM SERPL-MCNC: 8.3 MG/DL (ref 8.5–10.1)
CHLORIDE SERPL-SCNC: 104 MMOL/L (ref 94–109)
CO2 SERPL-SCNC: 27 MMOL/L (ref 20–32)
COLOR UR AUTO: NORMAL
CREAT SERPL-MCNC: 0.64 MG/DL (ref 0.52–1.04)
DIFFERENTIAL METHOD BLD: NORMAL
EOSINOPHIL # BLD AUTO: 0.4 10E9/L (ref 0–0.7)
EOSINOPHIL NFR BLD AUTO: 5.1 %
ERYTHROCYTE [DISTWIDTH] IN BLOOD BY AUTOMATED COUNT: 11.9 % (ref 10–15)
GFR SERPL CREATININE-BSD FRML MDRD: >90 ML/MIN/1.7M2
GLUCOSE SERPL-MCNC: 101 MG/DL (ref 70–99)
GLUCOSE UR STRIP-MCNC: NEGATIVE MG/DL
HCT VFR BLD AUTO: 36.7 % (ref 35–47)
HGB BLD-MCNC: 12.8 G/DL (ref 11.7–15.7)
HGB UR QL STRIP: NEGATIVE
IMM GRANULOCYTES # BLD: 0 10E9/L (ref 0–0.4)
IMM GRANULOCYTES NFR BLD: 0.4 %
KETONES UR STRIP-MCNC: NEGATIVE MG/DL
LEUKOCYTE ESTERASE UR QL STRIP: NEGATIVE
LYMPHOCYTES # BLD AUTO: 2.7 10E9/L (ref 0.8–5.3)
LYMPHOCYTES NFR BLD AUTO: 36.3 %
MCH RBC QN AUTO: 29 PG (ref 26.5–33)
MCHC RBC AUTO-ENTMCNC: 34.9 G/DL (ref 31.5–36.5)
MCV RBC AUTO: 83 FL (ref 78–100)
MONOCYTES # BLD AUTO: 0.5 10E9/L (ref 0–1.3)
MONOCYTES NFR BLD AUTO: 7.3 %
NEUTROPHILS # BLD AUTO: 3.7 10E9/L (ref 1.6–8.3)
NEUTROPHILS NFR BLD AUTO: 50.2 %
NITRATE UR QL: NEGATIVE
NRBC # BLD AUTO: 0 10*3/UL
NRBC BLD AUTO-RTO: 0 /100
PH UR STRIP: 7.5 PH (ref 4.7–8)
PLATELET # BLD AUTO: 308 10E9/L (ref 150–450)
POTASSIUM SERPL-SCNC: 3.4 MMOL/L (ref 3.4–5.3)
RBC # BLD AUTO: 4.41 10E12/L (ref 3.8–5.2)
SODIUM SERPL-SCNC: 137 MMOL/L (ref 133–144)
SOURCE: NORMAL
SP GR UR STRIP: 1 (ref 1–1.03)
UROBILINOGEN UR STRIP-MCNC: NORMAL MG/DL (ref 0–2)
WBC # BLD AUTO: 7.4 10E9/L (ref 4–11)

## 2018-08-19 PROCEDURE — 96375 TX/PRO/DX INJ NEW DRUG ADDON: CPT

## 2018-08-19 PROCEDURE — 36415 COLL VENOUS BLD VENIPUNCTURE: CPT | Performed by: PHYSICIAN ASSISTANT

## 2018-08-19 PROCEDURE — 85025 COMPLETE CBC W/AUTO DIFF WBC: CPT | Performed by: PHYSICIAN ASSISTANT

## 2018-08-19 PROCEDURE — 76801 OB US < 14 WKS SINGLE FETUS: CPT | Mod: TC

## 2018-08-19 PROCEDURE — 25000132 ZZH RX MED GY IP 250 OP 250 PS 637: Performed by: PHYSICIAN ASSISTANT

## 2018-08-19 PROCEDURE — 81003 URINALYSIS AUTO W/O SCOPE: CPT | Performed by: PHYSICIAN ASSISTANT

## 2018-08-19 PROCEDURE — 25000128 H RX IP 250 OP 636: Performed by: PHYSICIAN ASSISTANT

## 2018-08-19 PROCEDURE — 99285 EMERGENCY DEPT VISIT HI MDM: CPT | Mod: 25

## 2018-08-19 PROCEDURE — 99284 EMERGENCY DEPT VISIT MOD MDM: CPT | Performed by: PHYSICIAN ASSISTANT

## 2018-08-19 PROCEDURE — 96374 THER/PROPH/DIAG INJ IV PUSH: CPT

## 2018-08-19 PROCEDURE — 80048 BASIC METABOLIC PNL TOTAL CA: CPT | Performed by: PHYSICIAN ASSISTANT

## 2018-08-19 RX ORDER — ONDANSETRON 2 MG/ML
4 INJECTION INTRAMUSCULAR; INTRAVENOUS EVERY 30 MIN PRN
Status: DISCONTINUED | OUTPATIENT
Start: 2018-08-19 | End: 2018-08-20 | Stop reason: HOSPADM

## 2018-08-19 RX ORDER — HYDROCODONE BITARTRATE AND ACETAMINOPHEN 5; 325 MG/1; MG/1
1 TABLET ORAL ONCE
Status: COMPLETED | OUTPATIENT
Start: 2018-08-19 | End: 2018-08-19

## 2018-08-19 RX ORDER — MORPHINE SULFATE 4 MG/ML
4 INJECTION, SOLUTION INTRAMUSCULAR; INTRAVENOUS
Status: COMPLETED | OUTPATIENT
Start: 2018-08-19 | End: 2018-08-19

## 2018-08-19 RX ORDER — PRENATAL VIT/IRON FUM/FOLIC AC 27MG-0.8MG
1 TABLET ORAL DAILY
COMMUNITY
End: 2019-05-02

## 2018-08-19 RX ADMIN — ONDANSETRON 4 MG: 2 INJECTION INTRAMUSCULAR; INTRAVENOUS at 20:53

## 2018-08-19 RX ADMIN — MORPHINE SULFATE 4 MG: 4 INJECTION INTRAVENOUS at 20:54

## 2018-08-19 RX ADMIN — HYDROCODONE BITARTRATE AND ACETAMINOPHEN 1 TABLET: 5; 325 TABLET ORAL at 22:19

## 2018-08-19 ASSESSMENT — ENCOUNTER SYMPTOMS
FEVER: 0
SHORTNESS OF BREATH: 0
ABDOMINAL PAIN: 1
NAUSEA: 0
HEMATURIA: 0
BACK PAIN: 0
FLANK PAIN: 0
DIZZINESS: 1
VOMITING: 0

## 2018-08-19 NOTE — ED AVS SNAPSHOT
[facility]    [address] [address]    Phone:  [phone]                                       Kathia Milton   MRN: [id number]    Department:  [facility]   Date of Visit:  8/19/2018           After Visit Summary Signature Page     I have received my discharge instructions, and my questions have been answered. I have discussed any challenges I see with this plan with the nurse or doctor.    ..........................................................................................................................................  Patient/Patient Representative Signature      ..........................................................................................................................................  Patient Representative Print Name and Relationship to Patient    ..................................................               ................................................  Date                                            Time    ..........................................................................................................................................  Reviewed by Signature/Title    ...................................................              ..............................................  Date                                                            Time

## 2018-08-19 NOTE — ED AVS SNAPSHOT
[facility]    [address] [address]    Phone:  [phone]                                       Kathia Milton   MRN: [id number]    Department:  [facility]   Date of Visit:  8/19/2018           Patient Information     Date Of Birth          1990        Your diagnoses for this visit were:     Pelvic pain in pregnancy        You were seen by Steven Dickson PA-C.      Follow-up Information     Follow up with Lainey Rose MD.    Specialty:  Family Practice    Why:  As needed    Contact information:    [address] [facility] [address]  [phone]          Follow up with Damon Fletcher MD.    Specialty:  OB/Gyn    Contact information:    [address] [facility] [address]  [phone]          Follow up with [facility].    Specialty:  EMERGENCY MEDICINE    Why:  If symptoms worsen    Contact information:    [address] [address] [phone]    Additional information:    From [place] Area: Take [address]. Turn left at [address]. Turn left at the first stoplight on [address]. At the first stop sign, take a right onto [address]. Take a left into the parking lot and continue through until you reach the North enterance of the building.       From [place]: Take [address]. Take the [address] ramp towards [place]. Turn left onto [address]. Take a slight right onto [address]. Turn left at the first stoplight on [address]. At the first stop sign, take a right onto [address]. Take a left into the parking lot and continue through until you reach the North enterance of the building.       From [place]: Take [address]. Take a right at [address]. At the first stop sign, take a right onto [address]. Take a left into the parking lot and continue through until you reach the North enterance of the building.         Discharge  Instructions       Rest and stay hydrated.     Tylenol as needed for pain.     Follow-up with OB as scheduled.     Return here for any other concerns or questions.     Your next 10 appointments already scheduled     Aug 21, 2018  3:45 PM CDT   (Arrive by 3:30 PM)   ESTABLISHED PRENATAL with Damon Fletcher MD   [facility]    [address] [place] [address]   [phone]                 Review of your medicines      Our records show that you are taking the medicines listed below. If these are incorrect, please call your family doctor or clinic.        Dose / Directions Last dose taken    prenatal multivitamin plus iron 27-0.8 MG Tabs per tablet   Dose:  1 tablet        Take 1 tablet by mouth daily   Refills:  0                Procedures and tests performed during your visit     Basic metabolic panel    CBC with platelets differential    Peripheral IV catheter    UA reflex to Microscopic    US OB < 14 Weeks w Transvaginal      Orders Needing Specimen Collection     None      Pending Results     Date and Time Order Name Status Description    8/19/2018 2205 UA reflex to Microscopic In process     8/19/2018 2106 US OB < 14 Weeks w Transvaginal In process             Pending Culture Results     Date and Time Order Name Status Description    8/19/2018 2205 UA reflex to Microscopic In process             Thank you for choosing [facility]       Thank you for choosing [facility] for your care. Our goal is always to provide you with excellent care. Hearing back from our patients is one way we can continue to improve our services. Please take a few minutes to complete the written survey that you may receive in the mail after you visit with us. Thank you!        [employer] Information     [facility] gives you secure access to your electronic health record. If you see a primary care provider, you can also send messages to your care team and make appointments. If you have questions,  please call your primary care clinic.  If you do not have a primary care provider, please call [phone] and they will assist you.        Care EveryWhere ID     This is your Care EveryWhere ID. This could be used by other organizations to access your [facility] medical records  [id number]        Equal Access to Services     JOESPH OAKLEY : Leighton Birch, ramiro george, terrell phan. So [facility] [phone].    ATENCIÓN: Si habla español, tiene a black disposición servicios gratuitos de asistencia lingüística. Llame al [phone].    We comply with applicable federal civil rights laws and [place] laws. We do not discriminate on the basis of race, color, national origin, age, disability, sex, sexual orientation, or gender identity.            After Visit Summary       This is your record. Keep this with you and show to your community pharmacist(s) and doctor(s) at your next visit.

## 2018-08-20 NOTE — ED NOTES
"Patient being evaluated today for RLQ/pelvic pain that started abruptly this am after \"moving funny.\" Patient had recent positive pregnancy test, and estimates gestation at 6 weeks. She has been evaluated for similar pain several times and reports multiple complicated pregnancies previously. She appears anxious and tearful. Patient has been in contact with Dr. Fletcher and sees him on Tuesday. She denies any vaginal bleeding. Patient resting on ED cart. Call light in reach.   "
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
## 2018-08-20 NOTE — ED NOTES
Patient discharged to home with friend. Patient and family verbalize/demonstrate understanding of all written and verbal instructions. All questions answered.

## 2018-08-20 NOTE — DISCHARGE INSTRUCTIONS
Rest and stay hydrated.     Tylenol as needed for pain.     Follow-up with OB as scheduled.     Return here for any other concerns or questions.

## 2018-08-20 NOTE — ED TRIAGE NOTES
States today sharp, right sides abdominal pain with some cramping today. States she feels she is 6 weeks pregnant. States dizziness started today. Denies any vaginal discharge.

## 2018-08-21 ENCOUNTER — PRENATAL OFFICE VISIT (OUTPATIENT)
Dept: OBGYN | Facility: OTHER | Age: 28
End: 2018-08-21
Attending: OBSTETRICS & GYNECOLOGY
Payer: COMMERCIAL

## 2018-08-21 VITALS
DIASTOLIC BLOOD PRESSURE: 78 MMHG | OXYGEN SATURATION: 99 % | SYSTOLIC BLOOD PRESSURE: 126 MMHG | HEIGHT: 65 IN | HEART RATE: 94 BPM | WEIGHT: 148 LBS | BODY MASS INDEX: 24.66 KG/M2

## 2018-08-21 DIAGNOSIS — Z34.81 ENCOUNTER FOR SUPERVISION OF OTHER NORMAL PREGNANCY, FIRST TRIMESTER: ICD-10-CM

## 2018-08-21 DIAGNOSIS — O34.219 PREVIOUS CESAREAN DELIVERY AFFECTING PREGNANCY: ICD-10-CM

## 2018-08-21 DIAGNOSIS — O20.0 THREATENED MISCARRIAGE: Primary | ICD-10-CM

## 2018-08-21 PROCEDURE — 99207 ZZC FIRST OB VISIT: CPT | Mod: 25 | Performed by: OBSTETRICS & GYNECOLOGY

## 2018-08-21 PROCEDURE — 76817 TRANSVAGINAL US OBSTETRIC: CPT | Performed by: OBSTETRICS & GYNECOLOGY

## 2018-08-21 ASSESSMENT — PAIN SCALES - GENERAL: PAINLEVEL: NO PAIN (0)

## 2018-08-21 NOTE — PROGRESS NOTES
"  HPI:  Kathia Milton is a 28 year old female  Patient's last menstrual period was 2018 (approximate). at 6w3d, Estimated Date of Delivery: 2019.  She denies vaginal bleeding and vomiting.   No other c/o.+ RLQ pain last week slowly improving.  US showed viable IUP with concordant dates in ED and small CL cyst.    H/o prior CS for breech  delivery 27 weeks t-incision. Nml uterus at that time.     Past Medical History:   Diagnosis Date     Benign neoplasm of vagina 2011     Condyloma acuminatum 2011     Seasonal allergies      Supervision of other normal pregnancy     MICHELLE: 2013     Tobacco use disorder 10/18/2012       Past Surgical History:   Procedure Laterality Date     APPENDECTOMY  14    [facility]      SECTION      C section     COLONOSCOPY  11/10/15    Dr. Singh     DILATION AND CURETTAGE SUCTION, TREAT MISSED , 1ST TRIMESTER       ENDOSCOPY  11/10/15    Dr. Singh      LAPAROSCOPIC CYSTECTOMY OVARIAN (BENIGN) Left 2018    Procedure: LAPAROSCOPIC CYSTECTOMY OVARIAN (BENIGN);  EXPLORATORY  LAPAROSCOPY, LEFT OVARIAN CYSTECTOMY;  Surgeon: Damon Fletcher MD;  Location: [facility]     LAPAROSCOPY  11/30/15    Dr. Sun; lysis of pelvic adhesions, cauterization of minimal endometriosis     LAPAROSCOPY  2018    lysis of adhesions; pelvic pain, endometriosis; Dr. Sun       Allergies: Seafood and Shellfish allergy     EXAM:  Blood pressure 126/78, pulse 94, height 5' 5\" (1.651 m), weight 148 lb (67.1 kg), last menstrual period 2018, SpO2 99 %.   BMI= Body mass index is 24.63 kg/(m^2).  General - pleasant female in no acute distress.  Abdomen - soft, nontender, nondistended, no hepatosplenomegaly.  Pelvic - EG: normal adult female, BUS: within normal limits,Rectovaginal - deferred.    US:    Transvaginal:Yes  Yolk sac present:Yes  FCA present:Yes  EGA 6w 3d  MICHELLE:cwd          ASSESSMENT/PLAN:  (O20.0) Threatened miscarriage  (primary " encounter diagnosis)  Comment: Repat US for viability in 2 weeks.   Plan: US OB < 14 Weeks Single, ABO/Rh type and         screen, HIV Antigen Antibody Combo, Rubella         Antibody IgG Quantitative, Hepatitis B surface         antigen, Treponema Abs w Reflex to RPR and         Titer, Urine Culture Aerobic Bacterial, UA with        Microscopic, CBC with platelets, Drug Screen         Urine (Range)         Prior CD (T-incision).  Prior PTL.    Smoking cessation (quit)  Consider Hydoxy progesterone 16-18 weeks, cervical length US 16-22 weeks.      Repeat CS 37 weeks recommended.  Level 2 US    1st Trimester precautions and testing discussed.  New OB Labs ordered and exam scheduled.  Aneuploidy testing options discussed.  Patient has my card and phone number to call prn if problems in interim.    Damon Fletcher

## 2018-08-21 NOTE — MR AVS SNAPSHOT
After Visit Summary   2018    Kathia Milton    MRN: [id number]           Patient Information     Date Of Birth          1990        Visit Information        Provider Department      2018 3:45 PM Damon Fletcher MD [facility]        Today's Diagnoses     Threatened miscarriage    -  1    Encounter for supervision of other normal pregnancy, first trimester        Previous  delivery affecting pregnancy         delivery          Care Instructions    F/u 4 weeks          Follow-ups after your visit        Your next 10 appointments already scheduled     Sep 13, 2018  3:15 PM CDT   (Arrive by 3:00 PM)   New Prenatal with Consuelo Quiles NP   [facility] [place] ([facility] [place] )    [address]  [place] [address]   [phone]              Future tests that were ordered for you today     Open Future Orders        Priority Expected Expires Ordered    ABO/Rh type and screen Routine 2018    HIV Antigen Antibody Combo Routine 2018    Rubella Antibody IgG Quantitative Routine 2018    Hepatitis B surface antigen Routine 2018    Treponema Abs w Reflex to RPR and Titer Routine 2018    Urine Culture Aerobic Bacterial Routine 2018    UA with Microscopic Routine 2018    CBC with platelets Routine 2018    Drug Screen Urine (Range) Routine 2018    US OB < 14 Weeks Single Routine 2018            Who to contact     If you have questions or need follow up information about today's clinic visit or your schedule please contact [facility] directly at [phone].  Normal or non-critical lab and imaging results will be communicated to you by MyChart, letter or phone within 4 business days after  "the clinic has received the results. If you do not hear from us within 7 days, please contact the clinic through [employer] or phone. If you have a critical or abnormal lab result, we will notify you by phone as soon as possible.  Submit refill requests through [employer] or call your pharmacy and they will forward the refill request to us. Please allow 3 business days for your refill to be completed.          Additional Information About Your Visit        [employer] Information     [employer] gives you secure access to your electronic health record. If you see a primary care provider, you can also send messages to your care team and make appointments. If you have questions, please call your primary care clinic.  If you do not have a primary care provider, please call [phone] and they will assist you.        Care EveryWhere ID     This is your Care EveryWhere ID. This could be used by other organizations to access your [facility] medical records  [id number]        Your Vitals Were     Pulse Height Last Period Pulse Oximetry BMI (Body Mass Index)       94 5' 5\" (1.651 m) 07/07/2018 (Approximate) 99% 24.63 kg/m2        Blood Pressure from Last 3 Encounters:   08/21/18 126/78   08/19/18 104/75   08/15/18 113/73    Weight from Last 3 Encounters:   08/21/18 148 lb (67.1 kg)   08/19/18 140 lb (63.5 kg)   06/16/18 140 lb (63.5 kg)              We Performed the Following     US OB TRANSVAGINAL        Primary Care Provider Office Phone # Fax #    Lainey Rose [phone]       [address] [place] [address]        Equal Access to Services     [facility] AH: Hadii aad ku hadasho Soomaali, waaxda luqadaha, qaybta kaalmada adeegyalakeisha, terrell angel. So [facility] [phone].    ATENCIÓN: Si habla español, tiene a black disposición servicios gratuitos de asistencia lingüística. Llame al [phone].    We comply with applicable federal civil rights laws and [place] laws. We do not " discriminate on the basis of race, color, national origin, age, disability, sex, sexual orientation, or gender identity.            Thank you!     Thank you for choosing [facility]  for your care. Our goal is always to provide you with excellent care. Hearing back from our patients is one way we can continue to improve our services. Please take a few minutes to complete the written survey that you may receive in the mail after your visit with us. Thank you!             Your Updated Medication List - Protect others around you: Learn how to safely use, store and throw away your medicines at www.disposemymeds.org.          This list is accurate as of 8/21/18  5:13 PM.  Always use your most recent med list.                   Brand Name Dispense Instructions for use Diagnosis    prenatal multivitamin plus iron 27-0.8 MG Tabs per tablet      Take 1 tablet by mouth daily

## 2018-08-21 NOTE — NURSING NOTE
"Chief Complaint   Patient presents with     Prenatal Care     pre new LMP 7/8/18 GA 6 weeks and 2 days       Initial /78 (BP Location: Left arm, Cuff Size: Adult Regular)  Pulse 94  Ht 5' 5\" (1.651 m)  Wt 148 lb (67.1 kg)  LMP 07/08/2018  SpO2 99%  BMI 24.63 kg/m2 Estimated body mass index is 24.63 kg/(m^2) as calculated from the following:    Height as of this encounter: 5' 5\" (1.651 m).    Weight as of this encounter: 148 lb (67.1 kg).  Medication Reconciliation: complete    Aimee Leahy LPN  "

## 2018-09-04 ENCOUNTER — HOSPITAL ENCOUNTER (OUTPATIENT)
Dept: ULTRASOUND IMAGING | Facility: HOSPITAL | Age: 28
Discharge: HOME | End: 2018-09-04
Attending: OBSTETRICS & GYNECOLOGY | Admitting: OBSTETRICS & GYNECOLOGY
Payer: COMMERCIAL

## 2018-09-04 DIAGNOSIS — O20.0 THREATENED MISCARRIAGE: ICD-10-CM

## 2018-09-04 PROCEDURE — 76801 OB US < 14 WKS SINGLE FETUS: CPT | Mod: TC

## 2018-09-17 ENCOUNTER — MYC MEDICAL ADVICE (OUTPATIENT)
Dept: OBGYN | Facility: OTHER | Age: 28
End: 2018-09-17

## 2018-09-17 NOTE — TELEPHONE ENCOUNTER
It is unlikely that insurance would pay for this test for you as you don't fall into the correct high risk categories.  We can discuss this further on Friday.

## 2018-09-21 ENCOUNTER — PRENATAL OFFICE VISIT (OUTPATIENT)
Dept: OBGYN | Facility: OTHER | Age: 28
End: 2018-09-21
Attending: NURSE PRACTITIONER
Payer: COMMERCIAL

## 2018-09-21 VITALS
SYSTOLIC BLOOD PRESSURE: 102 MMHG | HEIGHT: 65 IN | WEIGHT: 153 LBS | BODY MASS INDEX: 25.49 KG/M2 | DIASTOLIC BLOOD PRESSURE: 60 MMHG

## 2018-09-21 DIAGNOSIS — Z34.81 ENCOUNTER FOR SUPERVISION OF OTHER NORMAL PREGNANCY, FIRST TRIMESTER: ICD-10-CM

## 2018-09-21 DIAGNOSIS — O20.0 THREATENED MISCARRIAGE: ICD-10-CM

## 2018-09-21 DIAGNOSIS — Z34.81 ENCOUNTER FOR SUPERVISION OF OTHER NORMAL PREGNANCY IN FIRST TRIMESTER: Primary | ICD-10-CM

## 2018-09-21 DIAGNOSIS — O09.211 HIGH RISK PREGNANCY DUE TO HISTORY OF PRETERM LABOR IN FIRST TRIMESTER: ICD-10-CM

## 2018-09-21 DIAGNOSIS — F17.200 TOBACCO USE DISORDER: ICD-10-CM

## 2018-09-21 LAB
ABO + RH BLD: NORMAL
ABO + RH BLD: NORMAL
ALBUMIN UR-MCNC: 30 MG/DL
AMPHETAMINES UR QL SCN: NEGATIVE
APPEARANCE UR: ABNORMAL
BACTERIA #/AREA URNS HPF: ABNORMAL /HPF
BARBITURATES UR QL: NEGATIVE
BENZODIAZ UR QL: NEGATIVE
BILIRUB UR QL STRIP: NEGATIVE
BLD GP AB SCN SERPL QL: NORMAL
BLOOD BANK CMNT PATIENT-IMP: NORMAL
CANNABINOIDS UR QL SCN: NEGATIVE
COCAINE UR QL: NEGATIVE
COLOR UR AUTO: YELLOW
ERYTHROCYTE [DISTWIDTH] IN BLOOD BY AUTOMATED COUNT: 12.4 % (ref 10–15)
GLUCOSE UR STRIP-MCNC: NEGATIVE MG/DL
HCT VFR BLD AUTO: 36.8 % (ref 35–47)
HGB BLD-MCNC: 13.2 G/DL (ref 11.7–15.7)
HGB UR QL STRIP: NEGATIVE
KETONES UR STRIP-MCNC: 5 MG/DL
LEUKOCYTE ESTERASE UR QL STRIP: ABNORMAL
MCH RBC QN AUTO: 29.1 PG (ref 26.5–33)
MCHC RBC AUTO-ENTMCNC: 35.9 G/DL (ref 31.5–36.5)
MCV RBC AUTO: 81 FL (ref 78–100)
METHADONE UR QL SCN: NEGATIVE
MUCOUS THREADS #/AREA URNS LPF: PRESENT /LPF
NITRATE UR QL: NEGATIVE
OPIATES UR QL SCN: NEGATIVE
PCP UR QL SCN: NEGATIVE
PH UR STRIP: 5.5 PH (ref 4.7–8)
PLATELET # BLD AUTO: 312 10E9/L (ref 150–450)
RBC # BLD AUTO: 4.54 10E12/L (ref 3.8–5.2)
RBC #/AREA URNS AUTO: 10 /HPF (ref 0–2)
SOURCE: ABNORMAL
SP GR UR STRIP: 1.02 (ref 1–1.03)
SPECIMEN EXP DATE BLD: NORMAL
SQUAMOUS #/AREA URNS AUTO: 32 /HPF (ref 0–1)
UROBILINOGEN UR STRIP-MCNC: NORMAL MG/DL (ref 0–2)
WBC # BLD AUTO: 8.1 10E9/L (ref 4–11)
WBC #/AREA URNS AUTO: 20 /HPF (ref 0–5)

## 2018-09-21 PROCEDURE — 81001 URINALYSIS AUTO W/SCOPE: CPT | Mod: 59 | Performed by: OBSTETRICS & GYNECOLOGY

## 2018-09-21 PROCEDURE — 85027 COMPLETE CBC AUTOMATED: CPT | Performed by: OBSTETRICS & GYNECOLOGY

## 2018-09-21 PROCEDURE — 86901 BLOOD TYPING SEROLOGIC RH(D): CPT | Performed by: OBSTETRICS & GYNECOLOGY

## 2018-09-21 PROCEDURE — 86762 RUBELLA ANTIBODY: CPT | Mod: 90 | Performed by: OBSTETRICS & GYNECOLOGY

## 2018-09-21 PROCEDURE — 36415 COLL VENOUS BLD VENIPUNCTURE: CPT | Performed by: OBSTETRICS & GYNECOLOGY

## 2018-09-21 PROCEDURE — 86900 BLOOD TYPING SEROLOGIC ABO: CPT | Performed by: OBSTETRICS & GYNECOLOGY

## 2018-09-21 PROCEDURE — 87389 HIV-1 AG W/HIV-1&-2 AB AG IA: CPT | Mod: 90 | Performed by: OBSTETRICS & GYNECOLOGY

## 2018-09-21 PROCEDURE — 87086 URINE CULTURE/COLONY COUNT: CPT | Performed by: OBSTETRICS & GYNECOLOGY

## 2018-09-21 PROCEDURE — 99207 ZZC PRENATAL VISIT: CPT | Performed by: NURSE PRACTITIONER

## 2018-09-21 PROCEDURE — 80307 DRUG TEST PRSMV CHEM ANLYZR: CPT | Performed by: OBSTETRICS & GYNECOLOGY

## 2018-09-21 PROCEDURE — 86850 RBC ANTIBODY SCREEN: CPT | Performed by: OBSTETRICS & GYNECOLOGY

## 2018-09-21 PROCEDURE — 99000 SPECIMEN HANDLING OFFICE-LAB: CPT | Performed by: OBSTETRICS & GYNECOLOGY

## 2018-09-21 PROCEDURE — 87491 CHLMYD TRACH DNA AMP PROBE: CPT | Performed by: NURSE PRACTITIONER

## 2018-09-21 PROCEDURE — 86780 TREPONEMA PALLIDUM: CPT | Mod: 90 | Performed by: OBSTETRICS & GYNECOLOGY

## 2018-09-21 PROCEDURE — 87340 HEPATITIS B SURFACE AG IA: CPT | Mod: 90 | Performed by: OBSTETRICS & GYNECOLOGY

## 2018-09-21 PROCEDURE — 87591 N.GONORRHOEAE DNA AMP PROB: CPT | Performed by: NURSE PRACTITIONER

## 2018-09-21 RX ORDER — ONDANSETRON 4 MG/1
TABLET, FILM COATED ORAL
Refills: 1 | COMMUNITY
Start: 2018-08-31 | End: 2018-11-08

## 2018-09-21 ASSESSMENT — PAIN SCALES - GENERAL: PAINLEVEL: NO PAIN (0)

## 2018-09-21 NOTE — PROGRESS NOTES
"  HPI:  Kathia Milton is a 28 year old female Patient's last menstrual period was 2018 (approximate). at 10w6d, Estimated Date of Delivery: 2019.  She denies vaginal bleeding, vomiting, abdominal pain and headaches. No other c/o.    Past Medical History:   Diagnosis Date     Benign neoplasm of vagina 2011     Condyloma acuminatum 2011     Seasonal allergies      Supervision of other normal pregnancy     MICHELLE: 2013     Tobacco use disorder 10/18/2012       Past Surgical History:   Procedure Laterality Date     APPENDECTOMY  14    [facility]      SECTION      C section     COLONOSCOPY  11/10/15    Dr. Singh     DILATION AND CURETTAGE SUCTION, TREAT MISSED , 1ST TRIMESTER       ENDOSCOPY  11/10/15    Dr. Snigh      LAPAROSCOPIC CYSTECTOMY OVARIAN (BENIGN) Left 2018    Procedure: LAPAROSCOPIC CYSTECTOMY OVARIAN (BENIGN);  EXPLORATORY  LAPAROSCOPY, LEFT OVARIAN CYSTECTOMY;  Surgeon: Damon Fletcher MD;  Location: [facility]     LAPAROSCOPY  11/30/15    Dr. Sun; lysis of pelvic adhesions, cauterization of minimal endometriosis     LAPAROSCOPY  2018    lysis of adhesions; pelvic pain, endometriosis; Dr. Sun       Allergies: Seafood and Shellfish allergy     EXAM:  Blood pressure 102/60, height 5' 5\" (1.651 m), weight 153 lb (69.4 kg), last menstrual period 2018.   BMI= Body mass index is 25.46 kg/(m^2).  General - pleasant female in no acute distress.  Neck - supple without lymphadenopathy or thyromegaly.  Lungs - clear to auscultation bilaterally.  Heart - regular rate and rhythm without murmur.  Abdomen - soft, nontender, nondistended, no hepatosplenomegaly.  Pelvic - EG: normal adult female, BUS: within normal limits, Vagina: well rugated, no discharge, Cervix: no lesions or CMT, closed/long Uterus: gravid, consistant with dates, mobile, Adnexae: no masses or tenderness.  Rectovaginal - deferred.  Musculoskeletal - no gross " deformities.  Neurological - normal strength, sensation, and mental status.    Doptones were 170    ASSESSMENT/PLAN:  (Z34.81) Encounter for supervision of other normal pregnancy in first trimester  (primary encounter diagnosis)  Comment:   Plan: US OB < 14 Weeks Nuchal Translucency, First         Trimester Screen Biochem Markers, US OB         Transvaginal Only, US OB Transvaginal Only, MFM        Telemedicine US Comprehensive Single,         GC/Chlamydia by PCR - [facility]            (F17.200) Tobacco use disorder  Comment:   Plan: quit    (O60.10X0)  delivery  Comment:   Plan: US OB Transvaginal Only, US OB Transvaginal         Only            (O09.211) High risk pregnancy due to history of  labor in first trimester  Comment:   Plan: RTC 4 weeks      Weight gain and exercise during pregnancy was discussed at today's visit.  The patient will return to clinic in 4 weeks for continued prenatal care.

## 2018-09-21 NOTE — PATIENT INSTRUCTIONS
"Best Practices in Breastfeeding   Q: What are \"best practices\" in breastfeeding?   A: The best hospitals strive to follow \"best practices.\" Research shows that human milk offers the best nutrition for babies. Best practices in breastfeeding means the staff cares for moms and babies in a way that promotes successful breastfeeding. Breastfeeding moms succeed more often when the care team:    Teaches all pregnant women about the benefits of breastfeeding.    Helps mothers start breastfeeding within one hour of birth.    Shows mothers how to breastfeed and how to keep their milk supply up, even if they are apart from their babies.    Gives only human milk to  babies. (No other food or drink unless there's a medical need.)    Helps mothers and babies stay together 24 hours a day. (This is called \"rooming in.\")    Encourages frequent breastfeeding, so moms can make plenty of milk.    Does not give pacifiers or bottles to babies who are breastfeeding.    Explains who mothers should call if they need help breastfeeding after they leave the hospital or clinic.  The hospital must also train all members of the care team in the skills they need to follow this policy.   Q: Do all of the [facility] follow these practices?  A: All of our hospitals are adopting best practices in breastfeeding. The steps listed will soon be in place in all hospitals.   Q: Why is breastfeeding so important?  A: Breastfeeding is healthy for both babies and mothers.    Your breast milk is the perfect food for your baby. It has all the nutrients your baby needs, plus it has antibodies to help your baby fight common infections (like ear infections and pneumonia).    It is convenient and does not cost any money.    It helps protect moms from some kinds of cancer.    It helps prevent some childhood diseases like diabetes and allergies. It can also help protect your baby from Sudden Infant Death Syndrome (SIDS).    It helps moms lose their " pregnancy weight faster.  If you are unable to produce enough milk in the hospital, your care team may recommend donor human milk for your baby.  Q: Will I be forced to breastfeed, even if I don't want to?   A: Absolutely not! Not all moms wish to breastfeed, and a very few cannot breastfeed for some reason. We will respect and support your choices.   We will discuss the ways in which breastfeeding is the perfect food for your baby: Any amount of breast milk is great even just one feeding. But if you still prefer to use formula, we will provide the formula and teach you how to feed your baby.   If you breastfeed, it is important to give only human milk and not bottles of formula. This way, your body will make enough milk, and you and your baby will get lots of practice. If you would like to breastfeed but want to try a bottle of formula in the hospital, we will discuss the reasons we do not recommend this .   Q: What else might I notice about best practices in breastfeeding?  A: You will notice the following:    Soon after birth, if you and your baby are able, we will place your baby on your chest for skin-to-skin contact. This helps your baby stay warm and adjust to life outside the womb. If you plan to breastfeed, we will help you and your baby breastfeed within the first hour after birth.    The care team will care for you and your baby in your room, except during medical tests and treatments. This is a critical time for getting to know your baby and learning how to care for him or her. It is important to have your baby with you while there are plenty of people around to help you.  Staying close to your baby night and day will help you make more milk. Also, studies show that both moms and babies sleep better if they sleep in the same room. Your nurses will help you get the extra rest you need.    We will not give your baby a pacifier unless there is a medical need. Instead, we will teach you many other ways to  comfort your baby.   When learning to breastfeed, it is best for babies to satisfy their sucking needs at the breast for at least 2 to 4 weeks after birth. This teaches your baby the correct way to latch onto the breast, and it helps you build a good milk supply.    You'll get a lot of support for breastfeeding. We will also tell you who to call for support after you get home.  Q: What can I do to make breastfeeding a success?  A: Try the ideas below.    Get good information about breastfeeding. Call [employer] at [phone] for details about breastfeeding classes. Some sites offer financial support if you need it.    Tell friends and family about your decision to breastfeed. Ask for their support.    Plan ahead to get help with other tasks after your baby is born. This way, you can focus on breastfeeding, resting and caring for yourself.  For more information, go to www.babyfriendlyusa.org, call your clinic's care team, or speak with a childbirth or breastfeeding educator.  For informational purposes only. Not to replace the advice of your health care provider.   Copyright   2010 [facility]. All rights reserved. [employer] 931199   REV 07/17.    Dental Care for Pregnant Women  Pregnancy is a time when your oral health needs more attention. Hormone changes during pregnancy can cause certain problems with teeth and gums, and make treatment more complicated.  How pregnancy affects oral health  During pregnancy, hormone changes can cause swollen, bleeding, and irritated gums (gingivitis). Your gums may be very sore, and brushing and flossing may cause discomfort. If left untreated, gingivitis can lead to a more serious gum disease called periodontitis. Severe periodontitis can lead to tooth loss.  Some pregnant women also have small bright-red growths on their gums that bleed easily. These are often called  pregnancy tumors.  They are not cancer. They usually go away right after birth.  Talk with your dentist or healthcare provider if you have concerns.  Keeping a healthy mouth    Brush twice daily with fluoride toothpaste. Floss at least once a day.    If you have morning sickness, rinse your mouth with a teaspoon of baking soda mixed with water after vomiting. Do not brush your teeth right after vomiting. This can remove tooth enamel.    See your dentist for cleanings and checkups more often if needed. This is especially true in your second and third trimesters.    Ask your dentist or healthcare provider if you should use a special mouth rinse to help prevent gingivitis.    Tell your dentist or healthcare provider about any changes in your mouth, such as soreness or bleeding.  Safety concerns  Make sure to tell your dentist that you re pregnant. He or she can help you stay safe. If you need to have dental X-rays during pregnancy, he or she will make sure you are fully protected. You will wear a lead apron over your belly during the X-ray process. The apron helps block radiation from the X-rays.  If you need to take medicines like antibiotics or pain relievers for dental problems, talk with your healthcare providers first. Your providers will discuss the risks and benefits of taking these during pregnancy.  If you have a high-risk pregnancy, your dentist and your healthcare provider may advise that certain dental treatments wait until after you give birth.  Date Last Reviewed: 12/1/2017 2000-2017 [employer]. [address] [place] [address]. All rights reserved. This information is not intended as a substitute for professional medical care. Always follow your healthcare professional's instructions.        Comfort Tips During Pregnancy  Talk with your healthcare provider before using pain-relieving medicine at any time during your pregnancy.    First trimester tips  Nausea    Get up slowly. Eat a few unsalted crackers before you get out of bed.    Avoid smells that bother  you.    Eat small bland low fat, light high-protein meals at frequent intervals.    Sip on water, weak tea, or clear soft drinks, like ginger ale. Eat ice chips.  Fatigue    Take catnaps when you can.    Get regular exercise.    Accept help from others.    Practice good sleep habits, like going to bed and getting up at the same time each day. Use your bed only for sleep and sex.  Mood swings    Talk about your feelings with others, including other mothers.    Limit sugar, chocolate, and caffeine.    Eat a healthy diet. Don t skip meals.    Get regular exercise.  Headaches    Get fresh air and exercise.    Relax and get enough rest.    Check with your healthcare provider before taking any pain medicines.  Second trimester tips  Here are some suggestions to help you cope:    To limit ankle swelling, sit with your feet raised or wear support hose.    If you have pain in your groin and stomach (round ligament pain), avoid sudden twisting movements.    For leg cramps, flexing your foot often brings immediate relief. You also may try massaging your calf in long, downward strokes, or stretching your legs before going to bed. Get enough exercise and wear shoes with flexible soles.  Third trimester tips  Reducing heartburn    Eat small, light meals throughout the day rather than 3 large ones.    Sleep with your upper body raised 6 inches. Don t lie down until 2 hours after you eat.    Don't eat greasy, fried, or spicy foods.    Avoid citrus fruits and juices.  Treating constipation    Eat foods high in fiber (whole-grain foods, fresh fruit and vegetables).    Drink plenty of water.    Get regular exercise.    Discuss other medicines (like docusate and psyllium) with your healthcare provider.  Taking care of your breasts    Avoid using harsh soaps or alcohol, which can cause excessive dryness.    Wear nursing bras. They provide more support than regular bras and can be used after pregnancy if you breastfeed.  Getting a good  night s sleep    Take a warm shower before bed.    Sleep on a firm mattress.    Lie on your side with 1 leg crossed over the other.    Use pillows to support arms, legs, and belly.  Date Last Reviewed: 10/1/2017    2100-1490 [employer]. [address] [place] [address]. All rights reserved. This information is not intended as a substitute for professional medical care. Always follow your healthcare professional's instructions.

## 2018-09-21 NOTE — NURSING NOTE
12 Week Visit    Patient education provided on the following:  Benefits of breastfeeding  Importance of early skin-to-skin contact    We reviewed the [place] [facility] P[facility] in the [facility] Resource Book.  Patient questions and concerns addressed and reviewed. Support and encouragement provided.

## 2018-09-21 NOTE — MR AVS SNAPSHOT
"              After Visit Summary   2018    Kathia Milton    MRN: [id number]           Patient Information     Date Of Birth          1990        Visit Information        Provider Department      2018 1:00 PM Consuelo Quiles NP [facility] [place]        Today's Diagnoses     Encounter for supervision of other normal pregnancy in first trimester    -  1    Tobacco use disorder         delivery        High risk pregnancy due to history of  labor in first trimester          Care Instructions    Best Practices in Breastfeeding   Q: What are \"best practices\" in breastfeeding?   A: The best hospitals strive to follow \"best practices.\" Research shows that human milk offers the best nutrition for babies. Best practices in breastfeeding means the staff cares for moms and babies in a way that promotes successful breastfeeding. Breastfeeding moms succeed more often when the care team:    Teaches all pregnant women about the benefits of breastfeeding.    Helps mothers start breastfeeding within one hour of birth.    Shows mothers how to breastfeed and how to keep their milk supply up, even if they are apart from their babies.    Gives only human milk to  babies. (No other food or drink unless there's a medical need.)    Helps mothers and babies stay together 24 hours a day. (This is called \"rooming in.\")    Encourages frequent breastfeeding, so moms can make plenty of milk.    Does not give pacifiers or bottles to babies who are breastfeeding.    Explains who mothers should call if they need help breastfeeding after they leave the hospital or clinic.  The hospital must also train all members of the care team in the skills they need to follow this policy.   Q: Do all of the [facility] follow these practices?  A: All of our hospitals are adopting best practices in breastfeeding. The steps listed will soon be in place in all hospitals.   Q: Why is breastfeeding so " important?  A: Breastfeeding is healthy for both babies and mothers.    Your breast milk is the perfect food for your baby. It has all the nutrients your baby needs, plus it has antibodies to help your baby fight common infections (like ear infections and pneumonia).    It is convenient and does not cost any money.    It helps protect moms from some kinds of cancer.    It helps prevent some childhood diseases like diabetes and allergies. It can also help protect your baby from Sudden Infant Death Syndrome (SIDS).    It helps moms lose their pregnancy weight faster.  If you are unable to produce enough milk in the hospital, your care team may recommend donor human milk for your baby.  Q: Will I be forced to breastfeed, even if I don't want to?   A: Absolutely not! Not all moms wish to breastfeed, and a very few cannot breastfeed for some reason. We will respect and support your choices.   We will discuss the ways in which breastfeeding is the perfect food for your baby: Any amount of breast milk is great--even just one feeding. But if you still prefer to use formula, we will provide the formula and teach you how to feed your baby.   If you breastfeed, it is important to give only human milk and not bottles of formula. This way, your body will make enough milk, and you and your baby will get lots of practice. If you would like to breastfeed but want to try a bottle of formula in the hospital, we will discuss the reasons we do not recommend this .   Q: What else might I notice about best practices in breastfeeding?  A: You will notice the following:    Soon after birth, if you and your baby are able, we will place your baby on your chest for skin-to-skin contact. This helps your baby stay warm and adjust to life outside the womb. If you plan to breastfeed, we will help you and your baby breastfeed within the first hour after birth.    The care team will care for you and your baby in your room, except during medical  tests and treatments. This is a critical time for getting to know your baby and learning how to care for him or her. It is important to have your baby with you while there are plenty of people around to help you.  Staying close to your baby night and day will help you make more milk. Also, studies show that both moms and babies sleep better if they sleep in the same room. Your nurses will help you get the extra rest you need.    We will not give your baby a pacifier unless there is a medical need. Instead, we will teach you many other ways to comfort your baby.   When learning to breastfeed, it is best for babies to satisfy their sucking needs at the breast for at least 2 to 4 weeks after birth. This teaches your baby the correct way to latch onto the breast, and it helps you build a good milk supply.    You'll get a lot of support for breastfeeding. We will also tell you who to call for support after you get home.  Q: What can I do to make breastfeeding a success?  A: Try the ideas below.    Get good information about breastfeeding. Call [employer] at [phone] for details about breastfeeding classes. Some sites offer financial support if you need it.    Tell friends and family about your decision to breastfeed. Ask for their support.    Plan ahead to get help with other tasks after your baby is born. This way, you can focus on breastfeeding, resting and caring for yourself.  For more information, go to www.babyfriendlyusa.org, call your clinic's care team, or speak with a childbirth or breastfeeding educator.  For informational purposes only. Not to replace the advice of your health care provider.   Copyright   2010 [employer] All rights reserved. [employer] 787398 - REV 07/17.    Dental Care for Pregnant Women  Pregnancy is a time when your oral health needs more attention. Hormone changes during pregnancy can cause certain problems with teeth and gums, and make treatment more  complicated.  How pregnancy affects oral health  During pregnancy, hormone changes can cause swollen, bleeding, and irritated gums (gingivitis). Your gums may be very sore, and brushing and flossing may cause discomfort. If left untreated, gingivitis can lead to a more serious gum disease called periodontitis. Severe periodontitis can lead to tooth loss.  Some pregnant women also have small bright-red growths on their gums that bleed easily. These are often called  pregnancy tumors.  They are not cancer. They usually go away right after birth. Talk with your dentist or healthcare provider if you have concerns.  Keeping a healthy mouth    Brush twice daily with fluoride toothpaste. Floss at least once a day.    If you have morning sickness, rinse your mouth with a teaspoon of baking soda mixed with water after vomiting. Do not brush your teeth right after vomiting. This can remove tooth enamel.    See your dentist for cleanings and checkups more often if needed. This is especially true in your second and third trimesters.    Ask your dentist or healthcare provider if you should use a special mouth rinse to help prevent gingivitis.    Tell your dentist or healthcare provider about any changes in your mouth, such as soreness or bleeding.  Safety concerns  Make sure to tell your dentist that you re pregnant. He or she can help you stay safe. If you need to have dental X-rays during pregnancy, he or she will make sure you are fully protected. You will wear a lead apron over your belly during the X-ray process. The apron helps block radiation from the X-rays.  If you need to take medicines like antibiotics or pain relievers for dental problems, talk with your healthcare providers first. Your providers will discuss the risks and benefits of taking these during pregnancy.  If you have a high-risk pregnancy, your dentist and your healthcare provider may advise that certain dental treatments wait until after you give  birth.  Date Last Reviewed: 12/1/2017 2000-2017 [employer]. [address], [place] [address]. All rights reserved. This information is not intended as a substitute for professional medical care. Always follow your healthcare professional's instructions.        Comfort Tips During Pregnancy  Talk with your healthcare provider before using pain-relieving medicine at any time during your pregnancy.    First trimester tips  Nausea    Get up slowly. Eat a few unsalted crackers before you get out of bed.    Avoid smells that bother you.    Eat small bland low fat, light high-protein meals at frequent intervals.    Sip on water, weak tea, or clear soft drinks, like ginger ale. Eat ice chips.  Fatigue    Take catnaps when you can.    Get regular exercise.    Accept help from others.    Practice good sleep habits, like going to bed and getting up at the same time each day. Use your bed only for sleep and sex.  Mood swings    Talk about your feelings with others, including other mothers.    Limit sugar, chocolate, and caffeine.    Eat a healthy diet. Don t skip meals.    Get regular exercise.  Headaches    Get fresh air and exercise.    Relax and get enough rest.    Check with your healthcare provider before taking any pain medicines.  Second trimester tips  Here are some suggestions to help you cope:    To limit ankle swelling, sit with your feet raised or wear support hose.    If you have pain in your groin and stomach (round ligament pain), avoid sudden twisting movements.    For leg cramps, flexing your foot often brings immediate relief. You also may try massaging your calf in long, downward strokes, or stretching your legs before going to bed. Get enough exercise and wear shoes with flexible soles.  Third trimester tips  Reducing heartburn    Eat small, light meals throughout the day rather than 3 large ones.    Sleep with your upper body raised 6 inches. Don t lie down until 2 hours after you  eat.    Don't eat greasy, fried, or spicy foods.    Avoid citrus fruits and juices.  Treating constipation    Eat foods high in fiber (whole-grain foods, fresh fruit and vegetables).    Drink plenty of water.    Get regular exercise.    Discuss other medicines (like docusate and psyllium) with your healthcare provider.  Taking care of your breasts    Avoid using harsh soaps or alcohol, which can cause excessive dryness.    Wear nursing bras. They provide more support than regular bras and can be used after pregnancy if you breastfeed.  Getting a good night s sleep    Take a warm shower before bed.    Sleep on a firm mattress.    Lie on your side with 1 leg crossed over the other.    Use pillows to support arms, legs, and belly.  Date Last Reviewed: 10/1/2017    1176-4351 [employer]. [address] [place] [address]. All rights reserved. This information is not intended as a substitute for professional medical care. Always follow your healthcare professional's instructions.                Follow-ups after your visit        Follow-up notes from your care team     Return in about 4 weeks (around 10/19/2018).      Your next 10 appointments already scheduled     Oct 25, 2018  2:45 PM CDT   (Arrive by 2:30 PM)   ESTABLISHED PRENATAL with Damon Fletcher MD   [facility] )    [address] [place] [address]   [phone]              Future tests that were ordered for you today     Open Future Orders        Priority Expected Expires Ordered    MFM Telemedicine US Comprehensive Single Routine 11/13/2018 12/11/2018 9/21/2018    US OB < 14 Weeks Nuchal Translucency Routine 9/22/2018 10/11/2018 9/21/2018    First Trimester Screen Biochem Markers Routine 9/22/2018 10/12/2018 9/21/2018    US OB Transvaginal Only Routine 10/26/2018 10/29/2018 9/21/2018    US OB Transvaginal Only Routine 12/7/2018 12/10/2018 9/21/2018            Who to contact      "If you have questions or need follow up information about today's clinic visit or your schedule please contact [facility] directly at [phone].  Normal or non-critical lab and imaging results will be communicated to you by MyChart, letter or phone within 4 business days after the clinic has received the results. If you do not hear from us within 7 days, please contact the clinic through MyChart or phone. If you have a critical or abnormal lab result, we will notify you by phone as soon as possible.  Submit refill requests through [employer] or call your pharmacy and they will forward the refill request to us. Please allow 3 business days for your refill to be completed.          Additional Information About Your Visit        [email or web address] Information     [employer] gives you secure access to your electronic health record. If you see a primary care provider, you can also send messages to your care team and make appointments. If you have questions, please call your primary care clinic.  If you do not have a primary care provider, please call [phone] and they will assist you.        Care EveryWhere ID     This is your Care EveryWhere ID. This could be used by other organizations to access your [facility] medical records  [id number]        Your Vitals Were     Height Last Period BMI (Body Mass Index)             5' 5\" (1.651 m) 07/07/2018 (Approximate) 25.46 kg/m2          Blood Pressure from Last 3 Encounters:   09/21/18 102/60   08/21/18 126/78   08/19/18 104/75    Weight from Last 3 Encounters:   09/21/18 153 lb (69.4 kg)   08/21/18 148 lb (67.1 kg)   08/19/18 140 lb (63.5 kg)              We Performed the Following     GC/Chlamydia by PCR - [facility]        Primary Care Provider Office Phone # Fax #    Lainey Rose [phone] [address] [place] [address]        Equal Access to Services     JOESPH OAKLEY AH: Hadii alessandro bowmano Sorosy, waaxda luqadaha, qaybta " terrell delunaizzy resendiz ah. So [facility] [phone].    ATENCIÓN: Si habla dennys, tiene a black disposición servicios gratuitos de asistencia lingüística. Sarai al [phone].    We comply with applicable federal civil rights laws and [place] laws. We do not discriminate on the basis of race, color, national origin, age, disability, sex, sexual orientation, or gender identity.            Thank you!     Thank you for choosing [facility]  for your care. Our goal is always to provide you with excellent care. Hearing back from our patients is one way we can continue to improve our services. Please take a few minutes to complete the written survey that you may receive in the mail after your visit with us. Thank you!             Your Updated Medication List - Protect others around you: Learn how to safely use, store and throw away your medicines at www.disposemymeds.org.          This list is accurate as of 9/21/18  3:05 PM.  Always use your most recent med list.                   Brand Name Dispense Instructions for use Diagnosis    ondansetron 4 MG tablet    ZOFRAN     TK 1 T PO Q 6 H PRF NAUSEA OR VOMITING.        prenatal multivitamin plus iron 27-0.8 MG Tabs per tablet      Take 1 tablet by mouth daily

## 2018-09-22 LAB
BACTERIA SPEC CULT: ABNORMAL
C TRACH DNA SPEC QL PROBE+SIG AMP: NOT DETECTED
N GONORRHOEA DNA SPEC QL PROBE+SIG AMP: NOT DETECTED
SPECIMEN SOURCE: ABNORMAL
SPECIMEN SOURCE: NORMAL

## 2018-09-22 ASSESSMENT — PATIENT HEALTH QUESTIONNAIRE - PHQ9: SUM OF ALL RESPONSES TO PHQ QUESTIONS 1-9: 2

## 2018-09-24 ENCOUNTER — MYC MEDICAL ADVICE (OUTPATIENT)
Dept: OBGYN | Facility: OTHER | Age: 28
End: 2018-09-24

## 2018-09-25 LAB
HBV SURFACE AG SERPL QL IA: NONREACTIVE
HIV 1+2 AB+HIV1 P24 AG SERPL QL IA: NONREACTIVE

## 2018-09-27 LAB
RUBV IGG SERPL IA-ACNC: 19 IU/ML
T PALLIDUM AB SER QL: NONREACTIVE

## 2018-09-28 ENCOUNTER — MYC MEDICAL ADVICE (OUTPATIENT)
Dept: OBGYN | Facility: OTHER | Age: 28
End: 2018-09-28

## 2018-10-02 ENCOUNTER — HOSPITAL ENCOUNTER (OUTPATIENT)
Dept: ULTRASOUND IMAGING | Facility: HOSPITAL | Age: 28
Discharge: HOME | End: 2018-10-02
Attending: NURSE PRACTITIONER | Admitting: NURSE PRACTITIONER
Payer: COMMERCIAL

## 2018-10-02 DIAGNOSIS — Z34.81 ENCOUNTER FOR SUPERVISION OF OTHER NORMAL PREGNANCY IN FIRST TRIMESTER: ICD-10-CM

## 2018-10-02 PROCEDURE — 84163 PAPPA SERUM: CPT | Performed by: NURSE PRACTITIONER

## 2018-10-02 PROCEDURE — 82105 ALPHA-FETOPROTEIN SERUM: CPT | Performed by: NURSE PRACTITIONER

## 2018-10-02 PROCEDURE — 76813 OB US NUCHAL MEAS 1 GEST: CPT | Mod: TC

## 2018-10-02 PROCEDURE — 36415 COLL VENOUS BLD VENIPUNCTURE: CPT | Performed by: NURSE PRACTITIONER

## 2018-10-02 PROCEDURE — 84704 HCG FREE BETACHAIN TEST: CPT | Performed by: NURSE PRACTITIONER

## 2018-10-10 LAB — FIRST TRIMESTER SCREEN BIOCHEM MARKERS: NORMAL

## 2018-10-11 ENCOUNTER — MYC MEDICAL ADVICE (OUTPATIENT)
Dept: OBGYN | Facility: OTHER | Age: 28
End: 2018-10-11

## 2018-10-25 ENCOUNTER — PRENATAL OFFICE VISIT (OUTPATIENT)
Dept: OBGYN | Facility: OTHER | Age: 28
End: 2018-10-25
Attending: OBSTETRICS & GYNECOLOGY
Payer: COMMERCIAL

## 2018-10-25 ENCOUNTER — HOSPITAL ENCOUNTER (OUTPATIENT)
Dept: ULTRASOUND IMAGING | Facility: HOSPITAL | Age: 28
Discharge: HOME | End: 2018-10-25
Attending: NURSE PRACTITIONER | Admitting: NURSE PRACTITIONER
Payer: COMMERCIAL

## 2018-10-25 VITALS
HEART RATE: 86 BPM | HEIGHT: 65 IN | WEIGHT: 161 LBS | OXYGEN SATURATION: 98 % | DIASTOLIC BLOOD PRESSURE: 62 MMHG | BODY MASS INDEX: 26.82 KG/M2 | SYSTOLIC BLOOD PRESSURE: 104 MMHG

## 2018-10-25 DIAGNOSIS — O09.211 HIGH RISK PREGNANCY DUE TO HISTORY OF PRETERM LABOR IN FIRST TRIMESTER: ICD-10-CM

## 2018-10-25 DIAGNOSIS — O34.219 PREVIOUS CESAREAN DELIVERY AFFECTING PREGNANCY: ICD-10-CM

## 2018-10-25 DIAGNOSIS — Z34.81 ENCOUNTER FOR SUPERVISION OF OTHER NORMAL PREGNANCY IN FIRST TRIMESTER: ICD-10-CM

## 2018-10-25 PROCEDURE — 99207 ZZC PRENATAL VISIT: CPT | Performed by: OBSTETRICS & GYNECOLOGY

## 2018-10-25 PROCEDURE — 76817 TRANSVAGINAL US OBSTETRIC: CPT | Mod: TC

## 2018-10-25 ASSESSMENT — PAIN SCALES - GENERAL: PAINLEVEL: NO PAIN (0)

## 2018-10-25 NOTE — PROGRESS NOTES
Doing well.  No concerns today.  Discussed AFP screening. Patient wishes to defer  US for full fetal anatomical survey ordered.  Level 2  US Rad today for cervix length.  F/u 2 weeks with cvx length  In clinic and to start Francie  Return to clinic in 4 weeks    Damon Fletcher MD  10/25/2018

## 2018-10-25 NOTE — NURSING NOTE
"Chief Complaint   Patient presents with     Prenatal Care     15w5d       Initial /62  Pulse 86  Ht 5' 5\" (1.651 m)  Wt 161 lb (73 kg)  LMP 07/07/2018 (Approximate)  SpO2 98%  BMI 26.79 kg/m2 Estimated body mass index is 26.79 kg/(m^2) as calculated from the following:    Height as of this encounter: 5' 5\" (1.651 m).    Weight as of this encounter: 161 lb (73 kg).  Medication Reconciliation: complete    Moira Rivera LPN  "

## 2018-11-05 ENCOUNTER — HOSPITAL ENCOUNTER (EMERGENCY)
Facility: HOSPITAL | Age: 28
Discharge: HOME | End: 2018-11-05
Attending: PHYSICIAN ASSISTANT | Admitting: PHYSICIAN ASSISTANT
Payer: COMMERCIAL

## 2018-11-05 ENCOUNTER — MYC MEDICAL ADVICE (OUTPATIENT)
Dept: OBGYN | Facility: OTHER | Age: 28
End: 2018-11-05

## 2018-11-05 VITALS
RESPIRATION RATE: 18 BRPM | BODY MASS INDEX: 26.63 KG/M2 | SYSTOLIC BLOOD PRESSURE: 104 MMHG | DIASTOLIC BLOOD PRESSURE: 60 MMHG | TEMPERATURE: 97.9 F | WEIGHT: 160 LBS | OXYGEN SATURATION: 100 %

## 2018-11-05 DIAGNOSIS — J06.9 UPPER RESPIRATORY TRACT INFECTION, UNSPECIFIED TYPE: ICD-10-CM

## 2018-11-05 LAB
FLUAV+FLUBV RNA SPEC QL NAA+PROBE: NEGATIVE
FLUAV+FLUBV RNA SPEC QL NAA+PROBE: NEGATIVE
RSV RNA SPEC NAA+PROBE: NEGATIVE
SPECIMEN SOURCE: NORMAL

## 2018-11-05 PROCEDURE — G0463 HOSPITAL OUTPT CLINIC VISIT: HCPCS

## 2018-11-05 PROCEDURE — 99213 OFFICE O/P EST LOW 20 MIN: CPT | Performed by: PHYSICIAN ASSISTANT

## 2018-11-05 PROCEDURE — 87631 RESP VIRUS 3-5 TARGETS: CPT | Performed by: PHYSICIAN ASSISTANT

## 2018-11-05 RX ORDER — ALBUTEROL SULFATE 90 UG/1
2 AEROSOL, METERED RESPIRATORY (INHALATION) EVERY 6 HOURS PRN
Qty: 1 INHALER | Refills: 0 | Status: ON HOLD | OUTPATIENT
Start: 2018-11-05 | End: 2019-01-17

## 2018-11-05 RX ORDER — IPRATROPIUM BROMIDE 42 UG/1
2 SPRAY, METERED NASAL 3 TIMES DAILY
Qty: 1 BOX | Refills: 0 | Status: ON HOLD | OUTPATIENT
Start: 2018-11-05 | End: 2019-01-17

## 2018-11-05 ASSESSMENT — ENCOUNTER SYMPTOMS
LIGHT-HEADEDNESS: 1
VOMITING: 0
FEVER: 0
HEADACHES: 1
SINUS PAIN: 0
SINUS PRESSURE: 0
FATIGUE: 1
VOICE CHANGE: 0
COUGH: 1
CHEST TIGHTNESS: 1
ACTIVITY CHANGE: 1
SORE THROAT: 1
WHEEZING: 0
BACK PAIN: 0
ABDOMINAL PAIN: 0
WEAKNESS: 0
NAUSEA: 0
CHILLS: 1
DIZZINESS: 1
SHORTNESS OF BREATH: 1
TROUBLE SWALLOWING: 0
APPETITE CHANGE: 1

## 2018-11-05 NOTE — DISCHARGE INSTRUCTIONS
Rest and stay hydrated.     Tylenol as needed.     Albuterol to help with cough and shortness of breath.     Ipratropium nasal spray will help with nasal symptoms.     Please follow-up in the clinic for persistent symptoms.    Please return here for ANY worsening symptoms, fevers, productive purulent cough, any or concerns.

## 2018-11-05 NOTE — ED AVS SNAPSHOT
[facility]    [address] [address]    Phone:  [phone]                                       Kathia Milton   MRN: [id number]    Department:  [facility]   Date of Visit:  11/5/2018           After Visit Summary Signature Page     I have received my discharge instructions, and my questions have been answered. I have discussed any challenges I see with this plan with the nurse or doctor.    ..........................................................................................................................................  Patient/Patient Representative Signature      ..........................................................................................................................................  Patient Representative Print Name and Relationship to Patient    ..................................................               ................................................  Date                                   Time    ..........................................................................................................................................  Reviewed by Signature/Title    ...................................................              ..............................................  Date                                               Time          22EPIC Rev 08/18

## 2018-11-05 NOTE — ED AVS SNAPSHOT
[facility]    [address] [address]    Phone:  [phone]                                       Kathia Milton   MRN: [id number]    Department:  [facility]   Date of Visit:  11/5/2018           Patient Information     Date Of Birth          1990        Your diagnoses for this visit were:     Upper respiratory tract infection, unspecified type        You were seen by Steven Dickson PA-C.      Follow-up Information     Follow up with Lainey Rose MD.    Specialty:  Family Practice    Why:  As needed    Contact information:    [address] [facility] [address]  [phone]          Follow up with Damon Fletcher MD.    Specialty:  OB/Gyn    Why:  As needed    Contact information:    [address] [facility] [address]  [phone]          Follow up with [facility].    Specialty:  EMERGENCY MEDICINE    Why:  If symptoms worsen    Contact information:    [address] [address] [phone]    Additional information:    From [facility]: Take [address]. Turn left at [address]. Turn left at the first stoplight on [address]. At the first stop sign, take a right onto [address]. Take a left into the parking lot and continue through until you reach the North enterance of the building.       From [place]: Take [address]. Take the [address] ramp towards [place]. Turn left onto [address]. Take a slight right onto [address]. Turn left at the first stoplight on [address]. At the first stop sign, take a right onto [address]. Take a left into the parking lot and continue through until you reach the North enterance of the building.       From [place]: Take [address]. Take a right at [address]. At the first stop sign, take a right onto [address]. Take a left into the parking lot and continue through until you reach the North  enterance of the building.         Discharge Instructions       Rest and stay hydrated.     Tylenol as needed.     Albuterol to help with cough and shortness of breath.     Ipratropium nasal spray will help with nasal symptoms.     Please follow-up in the clinic for persistent symptoms.    Please return here for ANY worsening symptoms, fevers, productive purulent cough, any or concerns.     Discharge References/Attachments     URI, VIRAL, NO ABX (ADULT) (ENGLISH)      Your next 10 appointments already scheduled     Nov 08, 2018  4:00 PM CST   (Arrive by 3:45 PM)   ESTABLISHED PRENATAL with Damon Fletcher MD   [facility]    [address] [facility] [address]   [phone]            Nov 27, 2018  8:00 AM CST   US OB TELEMEDICINE LEVEL II COMPREHENSIVE SINGLE with HIUS2   [facility] ([facility] )    [address] [place] [address]   [phone]           Please bring a list of your medicines (including vitamins, minerals and over-the-counter drugs). Also, tell your doctor about any allergies you may have. Wear comfortable clothes and leave your valuables at home.  If you're less than 20 weeks drink four 8-ounce glasses of fluid an hour before your exam. If you need to empty your bladder before your exam, try to release only a little urine. Then, drink another glass of fluid.  We do not allow the use of cameras or video during the exam.  The sonographer will be happy to provide take home pictures.  You may have up to two adult family members in the exam room.  Although we encourage family participation, we ask that you consider not bringing young children to these appointments.  Background noise may interfere with the telemedicine connection.  Please call the Imaging Department at your exam site with any questions.            Nov 27, 2018  8:00 AM CST   MFM TELEMEDICINE US COMPREHENSIVE SINGLE with URMFMUSTELE   [facility] [place] ([facility])    [address]  [facility] [address]   [phone]            Nov 27, 2018  8:30 AM CST   Radiology MD with UR JOHNATHON TREVIZO   [facility] [place] ([facility])    [address]  [facility] [address]   [phone]           Please arrive at the time given for your first appointment. This visit is used internally to schedule the physician's time during your ultrasound.            Nov 30, 2018  9:30 AM CST   (Arrive by 9:15 AM)   ESTABLISHED PRENATAL with Damon Fletcher MD   [facility] ([facility] )    [address] [facility] [address]   [phone]            Dec 10, 2018  4:30 PM CST   US OB TRANSVAGINAL ONLY with HIUS2   [facility] ([facility] )    [address] [facility][1] [address]   [phone]           How do I prepare for my exam? (Food and drink instructions) Drink four 8-ounce glasses of fluid an hour before your exam. If you need to empty your bladder before your exam, try to release only a little urine. Then, drink another glass of fluid.  How do I prepare for my exam? (Other instructions) You may have up to two family members in the exam room. If you bring a small child, an adult must be there to care for him or her. No video or camera photography during the procedure.  What should I wear: Wear comfortable clothes.  How long does the exam take: Most ultrasounds take 30 to 60 minutes.  What should I bring: Bring a list of your medicines, including vitamins, minerals and over-the-counter drugs. It is safest to leave personal items at home.  Do I need a :  No  is needed.  What do I need to tell my doctor: Tell your doctor about any allergies you may have.  What should I do after the exam: No restrictions, You may resume normal activities.  What is this test: An ultrasound  uses sound waves to make pictures of the body. Sound waves do not cause pain. The only discomfort may be the pressure of the wand against your skin or full bladder.  Who should I call with questions: If you have any questions, please call the Imaging Department where you will have your exam. Directions, parking instructions, and other information is available on our website, [email or web address].                 Review of your medicines      START taking        Dose / Directions Last dose taken    albuterol 108 (90 Base) MCG/ACT inhaler   Commonly known as:  PROAIR HFA/PROVENTIL HFA/VENTOLIN HFA   Dose:  2 puff   Quantity:  1 Inhaler        Inhale 2 puffs into the lungs every 6 hours as needed for shortness of breath / dyspnea or wheezing   Refills:  0        ipratropium 0.06 % spray   Commonly known as:  ATROVENT   Dose:  2 spray   Quantity:  1 Box        Spray 2 sprays into both nostrils 3 times daily   Refills:  0          Our records show that you are taking the medicines listed below. If these are incorrect, please call your family doctor or clinic.        Dose / Directions Last dose taken    ondansetron 4 MG tablet   Commonly known as:  ZOFRAN        TK 1 T PO Q 6 H PRF NAUSEA OR VOMITING.   Refills:  1        prenatal multivitamin plus iron 27-0.8 MG Tabs per tablet   Dose:  1 tablet        Take 1 tablet by mouth daily   Refills:  0                Prescriptions were sent or printed at these locations (2 Prescriptions)                   [facility] [address] [place] [address] - [address] AT [facility] [address]   [address], [facility] [address]    Telephone:  [phone]   Fax:  [phone]   Hours:                  E-Prescribed (2 of 2)         albuterol (PROAIR HFA/PROVENTIL HFA/VENTOLIN HFA) 108 (90 Base) MCG/ACT inhaler               ipratropium (ATROVENT) 0.06 % spray                Procedures and tests performed during your visit     Influenza A and B and RSV PCR      Orders Needing  Specimen Collection     None      Pending Results     No orders found from 11/3/2018 to 11/6/2018.            Pending Culture Results     No orders found from 11/3/2018 to 11/6/2018.            Thank you for choosing [facility]       Thank you for choosing [facility] for your care. Our goal is always to provide you with excellent care. Hearing back from our patients is one way we can continue to improve our services. Please take a few minutes to complete the written survey that you may receive in the mail after you visit with us. Thank you!        [employer] Information     [employer] gives you secure access to your electronic health record. If you see a primary care provider, you can also send messages to your care team and make appointments. If you have questions, please call your primary care clinic.  If you do not have a primary care provider, please call [phone] and they will assist you.        Care EveryWhere ID     This is your Care EveryWhere ID. This could be used by other organizations to access your [facility] medical records  [id number]        Equal Access to Services     JOESPH OAKLEY AH: Hadii alessandro bowmano Sorosy, waaxda doris, qaybta kaalmalakeisha goodson, terrell angel. So [facility] [phone].    ATENCIÓN: Si habla español, tiene a black disposición servicios gratuitos de asistencia lingüística. Llame al [phone].    We comply with applicable federal civil rights laws and [place] laws. We do not discriminate on the basis of race, color, national origin, age, disability, sex, sexual orientation, or gender identity.            After Visit Summary       This is your record. Keep this with you and show to your community pharmacist(s) and doctor(s) at your next visit.

## 2018-11-05 NOTE — ED TRIAGE NOTES
Pt presents today with c/o cough, chest congestion, nasal congestion, headache, and hard time breathing. States she has been taking tylenol at home. Started yesterday. Pt is 17 weeks pregnant.

## 2018-11-05 NOTE — ED PROVIDER NOTES
History     Chief Complaint   Patient presents with     URI     cough and congestion since yesterday     The history is provided by the patient.     Kathia Milton is a 28 year old female who presented to the urgent care for evaluation of a 24-hour history of nasal congestion, cough, headache, body aches, and sore throat.  Symptoms began abruptly yesterday morning.  Denies any vomiting.  Denies any abdominal discomfort.  Denies any diarrhea.  The patient is 17 weeks gestation.  She has not received a flu shot.  Denies any unusual rashes.  Denies any nuchal rigidity.    Problem List:    Patient Active Problem List    Diagnosis Date Noted     High risk pregnancy due to history of  labor in first trimester 2018     Priority: Medium     Nuchal  Level II  Plan breastfeeding       Previous  delivery affecting pregnancy 2018     Priority: Medium     Previous LTCS with T-incision for breech  at 27 weeks with PTL.  Nml uterus at that time.   Repeat CS 37 weeks recommended.  Level 2 US       Endometriosis of pelvis 2016     Priority: Medium     Seasonal allergies      Priority: Medium      delivery 05/15/2013     Priority: Medium     Begin hydroxy progesterone at 16-18 weeks  Cervical length 16 - 22 weeks       Anemia in pregnancy 2013     Priority: Medium     Abdominal pain, acute 2013     Priority: Medium     Tobacco use disorder 10/18/2012     Priority: Medium     quit       Condyloma acuminatum due to human papillomavirus 2011     Priority: Medium     Overview:   IMO Update 10/11          Past Medical History:    Past Medical History:   Diagnosis Date     Benign neoplasm of vagina 2011     Condyloma acuminatum 2011     Seasonal allergies      Supervision of other normal pregnancy      Tobacco use disorder 10/18/2012       Past Surgical History:    Past Surgical History:   Procedure Laterality Date     APPENDECTOMY  14    Tatamy       SECTION      C section     COLONOSCOPY  11/10/15    Dr. Singh     DILATION AND CURETTAGE SUCTION, TREAT MISSED , 1ST TRIMESTER       ENDOSCOPY  11/10/15    Dr. Singh      LAPAROSCOPIC CYSTECTOMY OVARIAN (BENIGN) Left 2018    Procedure: LAPAROSCOPIC CYSTECTOMY OVARIAN (BENIGN);  EXPLORATORY  LAPAROSCOPY, LEFT OVARIAN CYSTECTOMY;  Surgeon: Damon Fletcher MD;  Location: [facility]     LAPAROSCOPY  11/30/15    Dr. Sun; lysis of pelvic adhesions, cauterization of minimal endometriosis     LAPAROSCOPY  2018    lysis of adhesions; pelvic pain, endometriosis; Dr. Sun       Family History:    Family History   Problem Relation Age of Onset     Diabetes Other      Grandmother     HEART DISEASE Other      Heart Disease - Grandmother     Other - See Comments Other      Renal Disease - Grandmother     Unknown/Adopted Mother      Unknown/Adopted Father      Unknown/Adopted Maternal Grandmother      Unknown/Adopted Maternal Grandfather      Unknown/Adopted Paternal Grandmother      Unknown/Adopted Paternal Grandfather      Breast Cancer No family hx of        Social History:  Marital Status:  Single [1]  Social History   Substance Use Topics     Smoking status: Former Smoker     Types: Cigarettes     Quit date: 2012     Smokeless tobacco: Never Used      Comment: trying to quit 8/10/18     Alcohol use No      Comment: occasional        Medications:      albuterol (PROAIR HFA/PROVENTIL HFA/VENTOLIN HFA) 108 (90 Base) MCG/ACT inhaler   ipratropium (ATROVENT) 0.06 % spray   Prenatal Vit-Fe Fumarate-FA (PRENATAL MULTIVITAMIN PLUS IRON) 27-0.8 MG TABS per tablet   ondansetron (ZOFRAN) 4 MG tablet         Review of Systems   Constitutional: Positive for activity change, appetite change, chills and fatigue. Negative for fever.   HENT: Positive for congestion, ear pain and sore throat. Negative for ear discharge, sinus pain, sinus pressure, trouble swallowing and voice change.    Respiratory: Positive  for cough, chest tightness and shortness of breath. Negative for wheezing.    Cardiovascular: Negative for chest pain.   Gastrointestinal: Negative for abdominal pain, nausea and vomiting.   Genitourinary: Negative.    Musculoskeletal: Negative for back pain.   Skin: Negative.    Neurological: Positive for dizziness, light-headedness and headaches. Negative for weakness.       Physical Exam   BP: 104/60  Heart Rate: 86  Temp: 97.9  F (36.6  C)  Resp: 18  Weight: 72.6 kg (160 lb)  SpO2: 100 %      Physical Exam   Constitutional: She is oriented to person, place, and time. She appears well-developed and well-nourished. No distress.   HENT:   Right Ear: External ear normal.   Left Ear: External ear normal.   Mouth/Throat: Oropharynx is clear and moist.   Mild bilateral cerumen impaction.  TMs that were visualized were negative   Eyes: Conjunctivae and EOM are normal. Pupils are equal, round, and reactive to light.   Neck: Normal range of motion. Neck supple.   Cardiovascular: Normal rate and regular rhythm.    Pulmonary/Chest: Effort normal and breath sounds normal. No respiratory distress. She has no wheezes.   Lymphadenopathy:     She has no cervical adenopathy.   Neurological: She is alert and oriented to person, place, and time.   Skin: Skin is warm and dry.   Psychiatric: She has a normal mood and affect.   Nursing note and vitals reviewed.      ED Course     ED Course     Procedures               Critical Care time:  none               Results for orders placed or performed during the hospital encounter of 11/05/18 (from the past 24 hour(s))   Influenza A and B and RSV PCR   Result Value Ref Range    Specimen Description Nares     Influenza A PCR Negative NEG^Negative    Influenza B PCR Negative NEG^Negative    Resp Syncytial Virus Negative NEG^Negative       Medications - No data to display    Assessments & Plan (with Medical Decision Making)   Kathia has all of the classic symptoms of a viral upper respiratory  tract infection.  Influenza and RSV are negative.  No red flags. Antibiotic therapy would have far more risks than benefits.  She has no fevers, tachycardia, and her room air oxygen saturation is 100%.  There is no reasonable or compelling medical indication for further workup, any imaging, or other intervention at this point.  We had a long and detailed discussion.  Rest and stay hydrated.  Tylenol as needed.  Albuterol for cough and mild dyspnea.  Ipratropium nasal spray for nasal symptoms.  Follow-up in the clinic for persistent symptoms.  Return to the emergency department for any worsening symptoms, fevers, productive purulent cough, or other concerns whatsoever.  Ms. Milton voiced complete understanding and was agreeable.    This document was prepared using a combination of typing and voice generated software.  While every attempt was made for accuracy, spelling and grammatical errors may exist.    I have reviewed the nursing notes.    I have reviewed the findings, diagnosis, plan and need for follow up with the patient.       New Prescriptions    ALBUTEROL (PROAIR HFA/PROVENTIL HFA/VENTOLIN HFA) 108 (90 BASE) MCG/ACT INHALER    Inhale 2 puffs into the lungs every 6 hours as needed for shortness of breath / dyspnea or wheezing    IPRATROPIUM (ATROVENT) 0.06 % SPRAY    Spray 2 sprays into both nostrils 3 times daily       Final diagnoses:   Upper respiratory tract infection, unspecified type       11/5/2018   [facility]     Steven Dickson PA-C  11/05/18 3537

## 2018-11-08 ENCOUNTER — PRENATAL OFFICE VISIT (OUTPATIENT)
Dept: OBGYN | Facility: OTHER | Age: 28
End: 2018-11-08
Attending: OBSTETRICS & GYNECOLOGY
Payer: COMMERCIAL

## 2018-11-08 VITALS — WEIGHT: 160 LBS | BODY MASS INDEX: 26.63 KG/M2 | DIASTOLIC BLOOD PRESSURE: 70 MMHG | SYSTOLIC BLOOD PRESSURE: 102 MMHG

## 2018-11-08 DIAGNOSIS — O09.892 HISTORY OF PRETERM DELIVERY, CURRENTLY PREGNANT IN SECOND TRIMESTER: Primary | ICD-10-CM

## 2018-11-08 DIAGNOSIS — O09.211 HIGH RISK PREGNANCY DUE TO HISTORY OF PRETERM LABOR IN FIRST TRIMESTER: ICD-10-CM

## 2018-11-08 PROCEDURE — 76817 TRANSVAGINAL US OBSTETRIC: CPT | Performed by: OBSTETRICS & GYNECOLOGY

## 2018-11-08 PROCEDURE — 99207 ZZC COMPLICATED OB VISIT: CPT | Mod: 25 | Performed by: OBSTETRICS & GYNECOLOGY

## 2018-11-08 PROCEDURE — 96372 THER/PROPH/DIAG INJ SC/IM: CPT | Performed by: OBSTETRICS & GYNECOLOGY

## 2018-11-08 RX ORDER — HYDROXYPROGESTERONE CAPROATE 250 MG/ML
250 INJECTION INTRAMUSCULAR
Qty: 1 ML | Refills: 20 | OUTPATIENT
Start: 2018-11-08 | End: 2018-12-18

## 2018-11-08 ASSESSMENT — PAIN SCALES - GENERAL: PAINLEVEL: NO PAIN (0)

## 2018-11-08 NOTE — PROGRESS NOTES
Evaluation for 17P   Is this current pregnancy a tee pregnancy? Yes  Has this patient experienced a spontaneous,  birth or  rupture of membranes between 20 - 37 weeks of a tee pregnancy? Yes    US today.  Cvx length 3.4 cm length without significant change or funnel.  +FM, nml fluid and .       Assessment:  Kathia is a 28 year old  at 17w5d with a history of prior spontaneous tee  birth.  Given this history, Kathia is at risk for recurrent  birth in this pregnancy.  I discussed the availability of 17-alpha hydroxyprogesterone caproate (17P), which has been demonstrated to reduce the incidence of recurrent  birth and Kathia does meet criteria for weekly 17P administration in this pregnancy.    Patient has no contraindications to 17P.    Informed patient that 17P requires a commitment to weekly injection which should begin before 20+6 weeks and abrupt discontinuation can increase the risk for  birth.  Patient agrees to proceed with weekly 17P injections.  Declines AFP screening.  Level 2 US 2 weeks.  Flu shot next visit (sick today)  Plan:   17P 250mg IM weekly beginning between 16-20 weeks through 36 weeks gestation  Serial transvaginal ultrasound for cervical length from 16 through 22-23 weeks gestation  Screen for asymptomatic bacteriuria at first prenatal visit and treat with appropriate antibiotics if present

## 2018-11-08 NOTE — PROGRESS NOTES
===View-only below this line===    >> ARIE JUNIOR Nov 8, 2018  4:30 PM  The following medication was given:     MEDICATION: Hydroxyprogesterone Caproate 250mg/mL in oil ([place])  ROUTE: IM  SITE: RUQ - Gluteus  DOSE: 250 mg/ml  LOT #: [id number]  :  [employer]  EXPIRATION DATE:  06/2020  NDC#: 9196-2456-56  ARIE JUNIOR

## 2018-11-08 NOTE — PATIENT INSTRUCTIONS
BIRTH AND 17-alpha hydroxyprogesterone caproate (17P) TREATMENT    What is  birth?      birth is the delivery of a baby before 37 weeks of gestation.  Approximately 1 in every 12 babies in [place] is born premature (that s approximately 6,000 babies every year)!     birth is often unexpected, but can happen for many reasons. There are some known risk factors, which include:   -pregnancy with twins or triplets   -being  race  -some problems with the uterus or cervix   -some medical problems like high blood pressure   -smoking, drinking, or using illegal drugs while pregnant   -infections like urinary tract infection, bacterial vaginosis and chlamydia while    pregnant  -depression, stress, and anxiety    But the biggest risk factor is having a previous  baby. In fact, women who have one  birth have a 30-50% chance of their next baby coming early too.     What are the risks to a baby that delivers prematurely?     birth is the number one cause of  death worldwide. This risk increases the earlier the baby is born.  Prematurity can also cause serious long term health problems for babies such as breathing problems, infections, bleeding into the brain, as well as long term developmental problems like cerebral palsy, learning impairments, hearing and vision problems.    How can I prevent  birth in my pregnancy?  Overall, the best thing to prevent  delivery is to stay healthy during your pregnancy, and follow up with your doctor for your regular visits and screening tests.  If you have a history of  birth in one of your past pregnancies, you may benefit from weekly injections of 17P.     What is 17P?  The full name is 17-alpha hydroxyprogesterone caproate. This is a form of your body s natural  pregnancy hormone , progesterone. The brand name of this is Francie, and it is FDA approved for prevention of  birth.  This medication  is given in once weekly injections from week 16-20 through the 36th week of pregnancy. Research shows that women taking 17P can reduce their risk of  birth from 50% to 36%. The treatment has also been shown to reduce the risk of complications after birth, such as bleeding in the brain and need for supplemental oxygen.    It is important to complete the treatment once you start, as the risk of  birth goes up if you stop the injections early.    How can I get started on the Francie treatment?  First, you should talk with your doctor to find out if this is a good option for you.  It is safe for pregnant women and for the fetus, but if you have some medical problems like uncontrolled blood pressure, breast cancer, or liver disease you should talk about it with your doctor first.  Your clinic will work with you to help determine insurance coverage and preauthorization if needed.  Then you will schedule weekly visits for 17P injections in addition to your routine prenatal visits with your doctor.    Side Effects of Mekena  The most common side effects  reported by [employer] users are   injection site reactions (pain [35%], swelling  [17%], pruritus (itching) [6%], nodule [5%]), urticaria (rash) (12%), pruritus (generalized itching (8%), nausea (6%), and diarrhea (2%).

## 2018-11-08 NOTE — MR AVS SNAPSHOT
After Visit Summary   2018    Kathia Milton    MRN: [id number]           Patient Information     Date Of Birth          1990        Visit Information        Provider Department      2018 4:00 PM Damon Fletcher MD [facility] [place]        Today's Diagnoses     History of  delivery, currently pregnant in second trimester    -  1    High risk pregnancy due to history of  labor in first trimester          Care Instructions       BIRTH AND 17-alpha hydroxyprogesterone caproate (17P) TREATMENT    What is  birth?      birth is the delivery of a baby before 37 weeks of gestation.  Approximately 1 in every 12 babies in [place] is born premature (that s approximately 6,000 babies every year)!     birth is often unexpected, but can happen for many reasons. There are some known risk factors, which include:   -pregnancy with twins or triplets   -being  race  -some problems with the uterus or cervix   -some medical problems like high blood pressure   -smoking, drinking, or using illegal drugs while pregnant   -infections like urinary tract infection, bacterial vaginosis and chlamydia while    pregnant  -depression, stress, and anxiety    But the biggest risk factor is having a previous  baby. In fact, women who have one  birth have a 30-50% chance of their next baby coming early too.     What are the risks to a baby that delivers prematurely?     birth is the number one cause of  death worldwide. This risk increases the earlier the baby is born.  Prematurity can also cause serious long term health problems for babies such as breathing problems, infections, bleeding into the brain, as well as long term developmental problems like cerebral palsy, learning impairments, hearing and vision problems.    How can I prevent  birth in my pregnancy?  Overall, the best thing to prevent  delivery is  to stay healthy during your pregnancy, and follow up with your doctor for your regular visits and screening tests.  If you have a history of  birth in one of your past pregnancies, you may benefit from weekly injections of 17P.     What is 17P?  The full name is 17-alpha hydroxyprogesterone caproate. This is a form of your body s natural  pregnancy hormone , progesterone. The brand name of this is [place], and it is FDA approved for prevention of  birth.  This medication is given in once weekly injections from week 16-20 through the 36th week of pregnancy. Research shows that women taking 17P can reduce their risk of  birth from 50% to 36%. The treatment has also been shown to reduce the risk of complications after birth, such as bleeding in the brain and need for supplemental oxygen.    It is important to complete the treatment once you start, as the risk of  birth goes up if you stop the injections early.    How can I get started on the [place] treatment?  First, you should talk with your doctor to find out if this is a good option for you.  It is safe for pregnant women and for the fetus, but if you have some medical problems like uncontrolled blood pressure, breast cancer, or liver disease you should talk about it with your doctor first.  Your clinic will work with you to help determine insurance coverage and preauthorization if needed.  Then you will schedule weekly visits for 17P injections in addition to your routine prenatal visits with your doctor.    Side Effects of M[place]ena  The most common side effects  reported by West Alexander users are   injection site reactions (pain [35%], swelling  [17%], pruritus (itching) [6%], nodule [5%]), urticaria (rash) (12%), pruritus (generalized itching (8%), nausea (6%), and diarrhea (2%).           Follow-ups after your visit        Your next 10 appointments already scheduled     Nov 15, 2018  4:45 PM CST   (Arrive by 4:30 PM)   Nurse Only with HC  OB/GYN NURSE   [facility]    [address]  [facility] [address]   [phone]            Nov 27, 2018  8:00 AM CST   US OB TELEMEDICINE LEVEL II COMPREHENSIVE SINGLE with HIUS2   [facility] ULTRASOUND ([facility] )    [address] [facility] [address]   [phone]           Please bring a list of your medicines (including vitamins, minerals and over-the-counter drugs). Also, tell your doctor about any allergies you may have. Wear comfortable clothes and leave your valuables at home.  If you're less than 20 weeks drink four 8-ounce glasses of fluid an hour before your exam. If you need to empty your bladder before your exam, try to release only a little urine. Then, drink another glass of fluid.  We do not allow the use of cameras or video during the exam.  The sonographer will be happy to provide take home pictures.  You may have up to two adult family members in the exam room.  Although we encourage family participation, we ask that you consider not bringing young children to these appointments.  Background noise may interfere with the telemedicine connection.  Please call the Imaging Department at your exam site with any questions.            Nov 27, 2018  8:00 AM CST   MFM TELEMEDICINE US COMPREHENSIVE SINGLE with ULYSSES   [facility])    [address]  [facility] [address]   [phone]            Nov 27, 2018  8:30 AM CST   Radiology MD with UR JOHNATHON TREVIZO   [facility])    [address]  [facility] [address]   [phone]           Please arrive at the time given for your first appointment. This visit is used internally to schedule the physician's time during your ultrasound.            Nov 30, 2018  9:30 AM CST   (Arrive by 9:15 AM)   ESTABLISHED  PRENATAL with Damon Fletcher MD   [facility]    [address]  [facility] [address]   [phone]            Dec 10, 2018  4:30 PM CST   US OB TRANSVAGINAL ONLY with HIUS2   [facility] ULTRASOUND ([facility] )    [address]  [facility] [address]   [phone]           How do I prepare for my exam? (Food and drink instructions) Drink four 8-ounce glasses of fluid an hour before your exam. If you need to empty your bladder before your exam, try to release only a little urine. Then, drink another glass of fluid.  How do I prepare for my exam? (Other instructions) You may have up to two family members in the exam room. If you bring a small child, an adult must be there to care for him or her. No video or camera photography during the procedure.  What should I wear: Wear comfortable clothes.  How long does the exam take: Most ultrasounds take 30 to 60 minutes.  What should I bring: Bring a list of your medicines, including vitamins, minerals and over-the-counter drugs. It is safest to leave personal items at home.  Do I need a :  No  is needed.  What do I need to tell my doctor: Tell your doctor about any allergies you may have.  What should I do after the exam: No restrictions, You may resume normal activities.  What is this test: An ultrasound uses sound waves to make pictures of the body. Sound waves do not cause pain. The only discomfort may be the pressure of the wand against your skin or full bladder.  Who should I call with questions: If you have any questions, please call the Imaging Department where you will have your exam. Directions, parking instructions, and other information is available on our website, [email or web address].              Who to contact     If you have questions or need follow up information about today's clinic visit or your schedule please contact [facility] directly at  [phone].  Normal or non-critical lab and imaging results will be communicated to you by Pressfliphart, letter or phone within 4 business days after the clinic has received the results. If you do not hear from us within 7 days, please contact the clinic through Motionloftt or phone. If you have a critical or abnormal lab result, we will notify you by phone as soon as possible.  Submit refill requests through "[employer] or call your pharmacy and they will forward the refill request to us. Please allow 3 business days for your refill to be completed.          Additional Information About Your Visit        Pressfliphart Information     "[employer] gives you secure access to your electronic health record. If you see a primary care provider, you can also send messages to your care team and make appointments. If you have questions, please call your primary care clinic.  If you do not have a primary care provider, please call [phone] and they will assist you.        Care EveryWhere ID     This is your Care EveryWhere ID. This could be used by other organizations to access your [place] medical records  [id number]        Your Vitals Were     Last Period BMI (Body Mass Index)                2018 (Approximate) 26.63 kg/m2           Blood Pressure from Last 3 Encounters:   18 102/70   18 104/60   10/25/18 104/62    Weight from Last 3 Encounters:   18 160 lb (72.6 kg)   18 160 lb (72.6 kg)   10/25/18 161 lb (73 kg)              We Performed the Following     HC INJ, HYDROXYPROGESTERONE CAPROATE (STEPHANIE), 10 MG     THER/PROPH/DIAG INJ, SC/IM     US OB TRANSVAGINAL          Today's Medication Changes          These changes are accurate as of 18 11:59 PM.  If you have any questions, ask your nurse or doctor.               Start taking these medicines.        Dose/Directions    HYDROXYprogesterone caproate in oil 250 mg/mL intramuscular injection   Commonly known as:  STEPHANIE   Used for:  History of   delivery, currently pregnant in second trimester   Started by:  Damon Fletcher MD        Dose:  250 mg   Inject 1 mL (250 mg) into the muscle every 7 days   Quantity:  1 mL   Refills:  20            Where to get your medicines      Some of these will need a paper prescription and others can be bought over the counter.  Ask your nurse if you have questions.     You don't need a prescription for these medications     HYDROXYprogesterone caproate in oil 250 mg/mL intramuscular injection                Primary Care Provider Office Phone # Fax #    Lainey CHRIS Rose [phone] [address]  [facility] [address]        Equal Access to Services     [facility]: Hadii aad ku hadasho Soomaali, waaxda luqadaha, qaybta kaalmada adeizzyyada, terrell resendiz . So [facility] [phone].    ATENCIÓN: Si habla español, tiene a black disposición servicios gratuitos de asistencia lingüística. Llame al [phone].    We comply with applicable federal civil rights laws and [place] laws. We do not discriminate on the basis of race, color, national origin, age, disability, sex, sexual orientation, or gender identity.            Thank you!     Thank you for choosing [facility]  for your care. Our goal is always to provide you with excellent care. Hearing back from our patients is one way we can continue to improve our services. Please take a few minutes to complete the written survey that you may receive in the mail after your visit with us. Thank you!             Your Updated Medication List - Protect others around you: Learn how to safely use, store and throw away your medicines at www.disposemymeds.org.          This list is accurate as of 11/8/18 11:59 PM.  Always use your most recent med list.                   Brand Name Dispense Instructions for use Diagnosis    albuterol 108 (90 Base) MCG/ACT inhaler    PROAIR HFA/PROVENTIL HFA/VENTOLIN HFA    1 Inhaler     Inhale 2 puffs into the lungs every 6 hours as needed for shortness of breath / dyspnea or wheezing        HYDROXYprogesterone caproate in oil 250 mg/mL intramuscular injection    STEPHANIE    1 mL    Inject 1 mL (250 mg) into the muscle every 7 days    History of  delivery, currently pregnant in second trimester       ipratropium 0.06 % spray    ATROVENT    1 Box    Spray 2 sprays into both nostrils 3 times daily        prenatal multivitamin plus iron 27-0.8 MG Tabs per tablet      Take 1 tablet by mouth daily

## 2018-11-12 ENCOUNTER — MYC MEDICAL ADVICE (OUTPATIENT)
Dept: OBGYN | Facility: OTHER | Age: 28
End: 2018-11-12

## 2018-11-15 ENCOUNTER — ALLIED HEALTH/NURSE VISIT (OUTPATIENT)
Dept: OBGYN | Facility: OTHER | Age: 28
End: 2018-11-15
Attending: OBSTETRICS & GYNECOLOGY
Payer: COMMERCIAL

## 2018-11-15 DIAGNOSIS — O09.212 HISTORY OF PRETERM LABOR, CURRENT PREGNANCY, SECOND TRIMESTER: Primary | ICD-10-CM

## 2018-11-15 PROCEDURE — 96372 THER/PROPH/DIAG INJ SC/IM: CPT

## 2018-11-15 NOTE — MR AVS SNAPSHOT
After Visit Summary   11/15/2018    Kathia Milton    MRN: [id number]           Patient Information     Date Of Birth          1990        Visit Information        Provider Department      11/15/2018 4:45 PM HC OB/GYN NURSE [facility]        Today's Diagnoses     History of  labor, current pregnancy, second trimester    -  1       Follow-ups after your visit        Your next 10 appointments already scheduled     Nov 15, 2018  4:45 PM CST   (Arrive by 4:30 PM)   Nurse Only with HC OB/GYN NURSE   [facility] ([facility] )    [address] [facility] [address]   [phone]            2018  9:00 AM CST   (Arrive by 8:45 AM)   Nurse Only with HC OB/GYN NURSE   [facility] ([facility] )    [address] [address]   [phone]            2018  8:00 AM CST   US OB TELEMEDICINE LEVEL II COMPREHENSIVE SINGLE with HIUS2   HI ULTRASOUND ([facility] )    [address] [place] [address]   [phone]           Please bring a list of your medicines (including vitamins, minerals and over-the-counter drugs). Also, tell your doctor about any allergies you may have. Wear comfortable clothes and leave your valuables at home.  If you're less than 20 weeks drink four 8-ounce glasses of fluid an hour before your exam. If you need to empty your bladder before your exam, try to release only a little urine. Then, drink another glass of fluid.  We do not allow the use of cameras or video during the exam.  The sonographer will be happy to provide take home pictures.  You may have up to two adult family members in the exam room.  Although we encourage family participation, we ask that you consider not bringing young children to these appointments.  Background noise may interfere with the telemedicine connection.  Please call the Imaging Department at  your exam site with any questions.            Nov 27, 2018  8:00 AM CST   MFM TELEMEDICINE US COMPREHENSIVE SINGLE with URMFMUSTFRANTZ   [facility] [place] ([facility])    [address]  [facility] [address]   [phone]            Nov 27, 2018  8:30 AM CST   Radiology MD with UR JOHNATHON TREVIZO   [facility] [place] ([facility])    [address]  [facility] [address]   [phone]           Please arrive at the time given for your first appointment. This visit is used internally to schedule the physician's time during your ultrasound.            Nov 30, 2018  9:30 AM CST   (Arrive by 9:15 AM)   ESTABLISHED PRENATAL with Daomn Fletcher MD   [facility]    [address] [facility] [address]   [phone]            Dec 10, 2018  4:30 PM CST   US OB TRANSVAGINAL ONLY with HIUS2   [facility] ULTRASOUND ([facility] )    [address] [facility] [address]   [phone]           How do I prepare for my exam? (Food and drink instructions) Drink four 8-ounce glasses of fluid an hour before your exam. If you need to empty your bladder before your exam, try to release only a little urine. Then, drink another glass of fluid.  How do I prepare for my exam? (Other instructions) You may have up to two family members in the exam room. If you bring a small child, an adult must be there to care for him or her. No video or camera photography during the procedure.  What should I wear: Wear comfortable clothes.  How long does the exam take: Most ultrasounds take 30 to 60 minutes.  What should I bring: Bring a list of your medicines, including vitamins, minerals and over-the-counter drugs. It is safest to leave personal items at home.  Do I need a :  No  is needed.  What do I need to tell my doctor: Tell your doctor  about any allergies you may have.  What should I do after the exam: No restrictions, You may resume normal activities.  What is this test: An ultrasound uses sound waves to make pictures of the body. Sound waves do not cause pain. The only discomfort may be the pressure of the wand against your skin or full bladder.  Who should I call with questions: If you have any questions, please call the Imaging Department where you will have your exam. Directions, parking instructions, and other information is available on our website, [email or web address].              Who to contact     If you have questions or need follow up information about today's clinic visit or your schedule please contact [facility] directly at [phone].  Normal or non-critical lab and imaging results will be communicated to you by MyChart, letter or phone within 4 business days after the clinic has received the results. If you do not hear from us within 7 days, please contact the clinic through Mobittohart or phone. If you have a critical or abnormal lab result, we will notify you by phone as soon as possible.  Submit refill requests through [employer] or call your pharmacy and they will forward the refill request to us. Please allow 3 business days for your refill to be completed.          Additional Information About Your Visit        MobittoharRUNform Information     [employer] gives you secure access to your electronic health record. If you see a primary care provider, you can also send messages to your care team and make appointments. If you have questions, please call your primary care clinic.  If you do not have a primary care provider, please call [phone] and they will assist you.        Care EveryWhere ID     This is your Care EveryWhere ID. This could be used by other organizations to access your [place] medical records  [id number]        Your Vitals Were     Last Period                   07/07/2018 (Approximate)             Blood Pressure from Last 3 Encounters:   11/08/18 102/70   11/05/18 104/60   10/25/18 104/62    Weight from Last 3 Encounters:   11/08/18 160 lb (72.6 kg)   11/05/18 160 lb (72.6 kg)   10/25/18 161 lb (73 kg)              We Performed the Following     HC INJ, HYDROXYPROGESTERONE CAPROATE (STEPHANIE), 10 MG     THER/PROPH/DIAG INJ, SC/IM        Primary Care Provider Office Phone # Fax #    Lainey Rose [phone] [address]  [facility] [address]        Equal Access to Services     [facility] : Hadii aad ku hadasho Sojaymeali, waaxda luqadaha, qaybta kaalmada adeizzyyada, terrell resendiz . So [facility] [phone].    ATENCIÓN: Si habla español, tiene a black disposición servicios gratuitos de asistencia lingüística. [facility] [phone].    We comply with applicable federal civil rights laws and [place] laws. We do not discriminate on the basis of race, color, national origin, age, disability, sex, sexual orientation, or gender identity.            Thank you!     Thank you for choosing [facility]  for your care. Our goal is always to provide you with excellent care. Hearing back from our patients is one way we can continue to improve our services. Please take a few minutes to complete the written survey that you may receive in the mail after your visit with us. Thank you!             Your Updated Medication List - Protect others around you: Learn how to safely use, store and throw away your medicines at www.disposemymeds.org.          This list is accurate as of 11/15/18  4:40 PM.  Always use your most recent med list.                   Brand Name Dispense Instructions for use Diagnosis    albuterol 108 (90 Base) MCG/ACT inhaler    PROAIR HFA/PROVENTIL HFA/VENTOLIN HFA    1 Inhaler    Inhale 2 puffs into the lungs every 6 hours as needed for shortness of breath / dyspnea or wheezing        HYDROXYprogesterone caproate in oil 250 mg/mL  intramuscular injection    STEPHANIE    1 mL    Inject 1 mL (250 mg) into the muscle every 7 days    History of  delivery, currently pregnant in second trimester       ipratropium 0.06 % spray    ATROVENT    1 Box    Spray 2 sprays into both nostrils 3 times daily        prenatal multivitamin plus iron 27-0.8 MG Tabs per tablet      Take 1 tablet by mouth daily

## 2018-11-15 NOTE — PROGRESS NOTES
The following medication was given:     MEDICATION: Hydroxyprogesterone Caproate 250mg/mL in oil (Francie)  ROUTE: IM  SITE: LUQ - Gluteus  DOSE: 250 mg/ml  LOT #: [id number]  :  [employer]  EXPIRATION DATE:  06/2020  NDC#: [id number]  ARIE JUNIOR

## 2018-11-16 ENCOUNTER — APPOINTMENT (OUTPATIENT)
Dept: ULTRASOUND IMAGING | Facility: HOSPITAL | Age: 28
End: 2018-11-16
Attending: FAMILY MEDICINE
Payer: COMMERCIAL

## 2018-11-16 ENCOUNTER — HOSPITAL ENCOUNTER (EMERGENCY)
Facility: HOSPITAL | Age: 28
Discharge: HOME | End: 2018-11-17
Attending: FAMILY MEDICINE | Admitting: FAMILY MEDICINE
Payer: COMMERCIAL

## 2018-11-16 DIAGNOSIS — O26.899 ABDOMINAL CRAMPING AFFECTING PREGNANCY: ICD-10-CM

## 2018-11-16 DIAGNOSIS — R10.9 ABDOMINAL CRAMPING AFFECTING PREGNANCY: ICD-10-CM

## 2018-11-16 LAB
ALBUMIN UR-MCNC: NEGATIVE MG/DL
APPEARANCE UR: CLEAR
BASOPHILS # BLD AUTO: 0 10E9/L (ref 0–0.2)
BASOPHILS NFR BLD AUTO: 0.4 %
BILIRUB UR QL STRIP: NEGATIVE
COLOR UR AUTO: ABNORMAL
DIFFERENTIAL METHOD BLD: ABNORMAL
EOSINOPHIL # BLD AUTO: 0.6 10E9/L (ref 0–0.7)
EOSINOPHIL NFR BLD AUTO: 5.1 %
ERYTHROCYTE [DISTWIDTH] IN BLOOD BY AUTOMATED COUNT: 12.6 % (ref 10–15)
GLUCOSE UR STRIP-MCNC: NEGATIVE MG/DL
HCT VFR BLD AUTO: 33.7 % (ref 35–47)
HGB BLD-MCNC: 11.6 G/DL (ref 11.7–15.7)
HGB UR QL STRIP: NEGATIVE
IMM GRANULOCYTES # BLD: 0.1 10E9/L (ref 0–0.4)
IMM GRANULOCYTES NFR BLD: 1.1 %
KETONES UR STRIP-MCNC: NEGATIVE MG/DL
LEUKOCYTE ESTERASE UR QL STRIP: NEGATIVE
LYMPHOCYTES # BLD AUTO: 3 10E9/L (ref 0.8–5.3)
LYMPHOCYTES NFR BLD AUTO: 27.2 %
MCH RBC QN AUTO: 28.6 PG (ref 26.5–33)
MCHC RBC AUTO-ENTMCNC: 34.4 G/DL (ref 31.5–36.5)
MCV RBC AUTO: 83 FL (ref 78–100)
MONOCYTES # BLD AUTO: 0.9 10E9/L (ref 0–1.3)
MONOCYTES NFR BLD AUTO: 7.8 %
NEUTROPHILS # BLD AUTO: 6.3 10E9/L (ref 1.6–8.3)
NEUTROPHILS NFR BLD AUTO: 58.4 %
NITRATE UR QL: NEGATIVE
NRBC # BLD AUTO: 0 10*3/UL
NRBC BLD AUTO-RTO: 0 /100
PH UR STRIP: 6.5 PH (ref 4.7–8)
PLATELET # BLD AUTO: 288 10E9/L (ref 150–450)
RBC # BLD AUTO: 4.05 10E12/L (ref 3.8–5.2)
SOURCE: ABNORMAL
SP GR UR STRIP: 1 (ref 1–1.03)
UROBILINOGEN UR STRIP-MCNC: NORMAL MG/DL (ref 0–2)
WBC # BLD AUTO: 10.8 10E9/L (ref 4–11)

## 2018-11-16 PROCEDURE — 36415 COLL VENOUS BLD VENIPUNCTURE: CPT | Performed by: FAMILY MEDICINE

## 2018-11-16 PROCEDURE — 76817 TRANSVAGINAL US OBSTETRIC: CPT | Mod: TC

## 2018-11-16 PROCEDURE — 99284 EMERGENCY DEPT VISIT MOD MDM: CPT

## 2018-11-16 PROCEDURE — 99284 EMERGENCY DEPT VISIT MOD MDM: CPT | Mod: 25

## 2018-11-16 PROCEDURE — 87210 SMEAR WET MOUNT SALINE/INK: CPT | Performed by: FAMILY MEDICINE

## 2018-11-16 PROCEDURE — 81003 URINALYSIS AUTO W/O SCOPE: CPT | Performed by: FAMILY MEDICINE

## 2018-11-16 PROCEDURE — 87086 URINE CULTURE/COLONY COUNT: CPT | Performed by: FAMILY MEDICINE

## 2018-11-16 PROCEDURE — 85025 COMPLETE CBC W/AUTO DIFF WBC: CPT | Performed by: FAMILY MEDICINE

## 2018-11-16 PROCEDURE — 99284 EMERGENCY DEPT VISIT MOD MDM: CPT | Mod: Z6 | Performed by: FAMILY MEDICINE

## 2018-11-16 PROCEDURE — 25000128 H RX IP 250 OP 636: Performed by: FAMILY MEDICINE

## 2018-11-16 RX ORDER — LIDOCAINE 40 MG/G
CREAM TOPICAL
Status: DISCONTINUED | OUTPATIENT
Start: 2018-11-16 | End: 2018-11-17 | Stop reason: HOSPADM

## 2018-11-16 RX ADMIN — SODIUM CHLORIDE 1000 ML: 9 INJECTION, SOLUTION INTRAVENOUS at 23:30

## 2018-11-16 NOTE — ED AVS SNAPSHOT
[facility]    [address] [address]    Phone:  [phone]                                       Kathia Milton   MRN: [id number]    Department:  [facility]   Date of Visit:  11/16/2018           After Visit Summary Signature Page     I have received my discharge instructions, and my questions have been answered. I have discussed any challenges I see with this plan with the nurse or doctor.    ..........................................................................................................................................  Patient/Patient Representative Signature      ..........................................................................................................................................  Patient Representative Print Name and Relationship to Patient    ..................................................               ................................................  Date                                   Time    ..........................................................................................................................................  Reviewed by Signature/Title    ...................................................              ..............................................  Date                                               Time          22EPIC Rev 08/18

## 2018-11-17 VITALS
DIASTOLIC BLOOD PRESSURE: 60 MMHG | RESPIRATION RATE: 16 BRPM | OXYGEN SATURATION: 99 % | SYSTOLIC BLOOD PRESSURE: 90 MMHG | TEMPERATURE: 98.1 F

## 2018-11-17 LAB
SPECIMEN SOURCE: NORMAL
WET PREP SPEC: NORMAL

## 2018-11-17 ASSESSMENT — ENCOUNTER SYMPTOMS
CONSTITUTIONAL NEGATIVE: 1
FREQUENCY: 0
PSYCHIATRIC NEGATIVE: 1
HEMATURIA: 0
FLANK PAIN: 0
CONSTIPATION: 0
DYSURIA: 0
RESPIRATORY NEGATIVE: 1
CARDIOVASCULAR NEGATIVE: 1

## 2018-11-17 NOTE — ED PROVIDER NOTES
History     Chief Complaint   Patient presents with     Abdominal Pain     19 weeks pregnant. Reporting abdominal cramping for past 2-3 days. No vaginal bleeding     HPI  Kathia Milton is a 28 year old female who presents for concerns of abdominal cramping over the past 2-3 days.  NO vaginal bleeding. Feels lots of pressure and tightness. Had normal cervical length 1 week ago . Is on injections for  labor prevention secondary to previous delivery at 27 weeks gestation  .Last cervical length 3.4 . No funneling. Called DR Toussaint office this afternoon and was told to be seen but did not get message until she got off work after 5 oclock. She denies UTI symptoms. NO fever , chills or other symptoms of infection     Problem List:    Patient Active Problem List    Diagnosis Date Noted     High risk pregnancy due to history of  labor in first trimester 2018     Priority: Medium     Nuchal nml, declines AFP  Level II  Plan breastfeeding  Needs flu shot/TDAP  17p weekly 16-36 weeks, cervical lenth 16-22 weeks       Previous  delivery affecting pregnancy 2018     Priority: Medium     Previous LTCS with T-incision for breech  at 27 weeks with PTL.  Nml uterus at that time.   Repeat CS 37 weeks recommended.  Level 2 US       Endometriosis of pelvis 2016     Priority: Medium     Seasonal allergies      Priority: Medium      delivery 05/15/2013     Priority: Medium     Begin hydroxy progesterone at 16-18 weeks  Cervical length 16 - 22 weeks       Anemia in pregnancy 2013     Priority: Medium     Abdominal pain, acute 2013     Priority: Medium     Tobacco use disorder 10/18/2012     Priority: Medium     quit       Condyloma acuminatum due to human papillomavirus 2011     Priority: Medium     Overview:   IMO Update 10/11          Past Medical History:    Past Medical History:   Diagnosis Date     Benign neoplasm of vagina 2011     Condyloma acuminatum  2011     Seasonal allergies      Supervision of other normal pregnancy      Tobacco use disorder 10/18/2012       Past Surgical History:    Past Surgical History:   Procedure Laterality Date     APPENDECTOMY  14    [facility]      SECTION      C section     COLONOSCOPY  11/10/15    Dr. Singh     DILATION AND CURETTAGE SUCTION, TREAT MISSED , 1ST TRIMESTER       ENDOSCOPY  11/10/15    Dr. Singh      LAPAROSCOPIC CYSTECTOMY OVARIAN (BENIGN) Left 2018    Procedure: LAPAROSCOPIC CYSTECTOMY OVARIAN (BENIGN);  EXPLORATORY  LAPAROSCOPY, LEFT OVARIAN CYSTECTOMY;  Surgeon: Damon Fletcher MD;  Location: [facility]     LAPAROSCOPY  11/30/15    Dr. Sun; lysis of pelvic adhesions, cauterization of minimal endometriosis     LAPAROSCOPY  2018    lysis of adhesions; pelvic pain, endometriosis; Dr. Sun       Family History:    Family History   Problem Relation Age of Onset     Diabetes Other      Grandmother     HEART DISEASE Other      Heart Disease - Grandmother     Other - See Comments Other      Renal Disease - Grandmother     Unknown/Adopted Mother      Unknown/Adopted Father      Unknown/Adopted Maternal Grandmother      Unknown/Adopted Maternal Grandfather      Unknown/Adopted Paternal Grandmother      Unknown/Adopted Paternal Grandfather      Breast Cancer No family hx of        Social History:  Marital Status:  Single [1]  Social History   Substance Use Topics     Smoking status: Former Smoker     Types: Cigarettes     Quit date: 2012     Smokeless tobacco: Never Used      Comment: trying to quit 8/10/18     Alcohol use No        Medications:      albuterol (PROAIR HFA/PROVENTIL HFA/VENTOLIN HFA) 108 (90 Base) MCG/ACT inhaler   HYDROXYprogesterone caproate in oil 250 mg/mL (STEPHANIE) intramuscular injection   ipratropium (ATROVENT) 0.06 % spray   Prenatal Vit-Fe Fumarate-FA (PRENATAL MULTIVITAMIN PLUS IRON) 27-0.8 MG TABS per tablet         Review of Systems   Constitutional:  Negative.    HENT: Negative.    Respiratory: Negative.    Cardiovascular: Negative.    Gastrointestinal: Negative for constipation.   Genitourinary: Negative for dysuria, flank pain, frequency, genital sores, hematuria, pelvic pain, urgency, vaginal bleeding, vaginal discharge and vaginal pain.        Cramping and increased pelvic pressure    Psychiatric/Behavioral: Negative.        Physical Exam   BP: 140/73  Heart Rate: 86  Temp: 97.3  F (36.3  C)  Resp: 16  SpO2: 100 %      Physical Exam   Constitutional: She is oriented to person, place, and time. She appears well-developed and well-nourished. No distress.   Cardiovascular: Normal rate, regular rhythm and normal heart sounds.  Exam reveals no gallop and no friction rub.    No murmur heard.  Pulmonary/Chest: Effort normal and breath sounds normal. No respiratory distress. She has no wheezes. She has no rales. She exhibits no tenderness.   Abdominal: Soft. Bowel sounds are normal. She exhibits no distension. There is no tenderness.   Genitourinary:   Genitourinary Comments: Wet prep done    Neurological: She is alert and oriented to person, place, and time.   Skin: She is not diaphoretic.   Psychiatric: She has a normal mood and affect.   Nursing note and vitals reviewed.      ED Course     ED Course     Procedures          Patient is 28 year old 19 weeks pregnant with history of  delivery at 27 weeks who presents to ER with concern of increased cramping and pelvic pressure . Patient triaged to exam room Vital signs reviewed. Medical records reviewed. History and exam completed. Peripheral IV inserted. Normal saline bolus ordered. Cervical length ultrasound, UA , wet prep completed. Cervical length unchanged with length of 3.5 without funneling. UA without evidence of infection and wet prep normal Consult with DR Mcnulty of ob who felt patient could be discharged from ER with instructions to contact DR Toussaint office in AM to schedule follow up .  Given high  risk pregnancy I recommend she receive instructions from DR Fletcher prior to return to work .   Results for orders placed or performed during the hospital encounter of 11/16/18   UA reflex to Microscopic   Result Value Ref Range    Color Urine Light Yellow     Appearance Urine Clear     Glucose Urine Negative NEG^Negative mg/dL    Bilirubin Urine Negative NEG^Negative    Ketones Urine Negative NEG^Negative mg/dL    Specific Gravity Urine 1.002 (L) 1.003 - 1.035    Blood Urine Negative NEG^Negative    pH Urine 6.5 4.7 - 8.0 pH    Protein Albumin Urine Negative NEG^Negative mg/dL    Urobilinogen mg/dL Normal 0.0 - 2.0 mg/dL    Nitrite Urine Negative NEG^Negative    Leukocyte Esterase Urine Negative NEG^Negative    Source Midstream Urine    CBC with platelets differential   Result Value Ref Range    WBC 10.8 4.0 - 11.0 10e9/L    RBC Count 4.05 3.8 - 5.2 10e12/L    Hemoglobin 11.6 (L) 11.7 - 15.7 g/dL    Hematocrit 33.7 (L) 35.0 - 47.0 %    MCV 83 78 - 100 fl    MCH 28.6 26.5 - 33.0 pg    MCHC 34.4 31.5 - 36.5 g/dL    RDW 12.6 10.0 - 15.0 %    Platelet Count 288 150 - 450 10e9/L    Diff Method Automated Method     % Neutrophils 58.4 %    % Lymphocytes 27.2 %    % Monocytes 7.8 %    % Eosinophils 5.1 %    % Basophils 0.4 %    % Immature Granulocytes 1.1 %    Nucleated RBCs 0 0 /100    Absolute Neutrophil 6.3 1.6 - 8.3 10e9/L    Absolute Lymphocytes 3.0 0.8 - 5.3 10e9/L    Absolute Monocytes 0.9 0.0 - 1.3 10e9/L    Absolute Eosinophils 0.6 0.0 - 0.7 10e9/L    Absolute Basophils 0.0 0.0 - 0.2 10e9/L    Abs Immature Granulocytes 0.1 0 - 0.4 10e9/L    Absolute Nucleated RBC 0.0    Wet prep   Result Value Ref Range    Specimen Description Vagina     Wet Prep Rare  WBC'S seen       Wet Prep No Trichomonas seen     Wet Prep No clue cells seen     Wet Prep No yeast seen            No results found for this or any previous visit (from the past 24 hour(s)).    Medications   0.9% sodium chloride BOLUS (not administered)        Assessments & Plan (with Medical Decision Making)     I have reviewed the nursing notes.    I have reviewed the findings, diagnosis, plan and need for follow up with the patient.      New Prescriptions    No medications on file       Final diagnoses:   Abdominal cramping affecting pregnancy       11/16/2018   [facility]     Matilda Mendoza MD  11/17/18 0627

## 2018-11-17 NOTE — ED NOTES
Vaginal exam by Dr. Santana, wet prep specimen collected and sent to lab.  Pt tolerated procedure well.

## 2018-11-17 NOTE — ED NOTES
Ambulated to room 6 independently, gown placed, call light in reach.  Lower abd pressure - intermittent over the past 2 days.  Hx of premature delivery.  Messaged Dr. Fletcher today and advised to come in for evaluation.  Intermittent pressure for the last couple hours.  Discharge when coughs.  MICHELLE - 4/12/19.  Rates pain at 4, pressure.  Pain goal is zero.  Pt reports felling fetal movement.

## 2018-11-17 NOTE — DISCHARGE INSTRUCTIONS
Drink plenty of fluids. Call DR Toussaint office to schedule a follow up appointment tomorrow     Return if you develop increased abdominal pain , bleeding or any other concerns .,

## 2018-11-18 LAB
BACTERIA SPEC CULT: ABNORMAL
SPECIMEN SOURCE: ABNORMAL

## 2018-11-23 ENCOUNTER — ALLIED HEALTH/NURSE VISIT (OUTPATIENT)
Dept: OBGYN | Facility: OTHER | Age: 28
End: 2018-11-23
Attending: OBSTETRICS & GYNECOLOGY
Payer: COMMERCIAL

## 2018-11-23 DIAGNOSIS — Z87.51 HISTORY OF PRETERM LABOR: Primary | ICD-10-CM

## 2018-11-23 PROCEDURE — 96372 THER/PROPH/DIAG INJ SC/IM: CPT

## 2018-11-23 NOTE — PROGRESS NOTES
The following medication was given:     MEDICATION: Hydroxyprogesterone Caproate 250mg/mL in oil (Francie)  ROUTE: IM  SITE: RUQ - Gluteus  DOSE: 1m  LOT #: [id number]  :  [employer]  EXPIRATION DATE:  06/2020  NDC#: 2982-3873-11  Moira Rivera  Patient next week for the next injection.

## 2018-11-23 NOTE — MR AVS SNAPSHOT
After Visit Summary   2018    Kathia Milton    MRN: [id number]           Patient Information     Date Of Birth          1990        Visit Information        Provider Department      2018 9:00 AM [facility] OB/GYN NURSE [facility]        Today's Diagnoses     History of  labor    -  1       Follow-ups after your visit        Your next 10 appointments already scheduled     2018  8:00 AM CST   US OB TELEMEDICINE LEVEL II COMPREHENSIVE SINGLE with HIUS2   [facility] ([facility] )    [address] [facility] [address]   [phone]           Please bring a list of your medicines (including vitamins, minerals and over-the-counter drugs). Also, tell your doctor about any allergies you may have. Wear comfortable clothes and leave your valuables at home.  If you're less than 20 weeks drink four 8-ounce glasses of fluid an hour before your exam. If you need to empty your bladder before your exam, try to release only a little urine. Then, drink another glass of fluid.  We do not allow the use of cameras or video during the exam.  The sonographer will be happy to provide take home pictures.  You may have up to two adult family members in the exam room.  Although we encourage family participation, we ask that you consider not bringing young children to these appointments.  Background noise may interfere with the telemedicine connection.  Please call the Imaging Department at your exam site with any questions.            2018  8:00 AM TAHIR DIEGO TELEMEDICINE US COMPREHENSIVE SINGLE with URMFMUSTFRANTZ   [facility] [place] ([facility])    [address]  [facility] [address]   [phone]            2018  8:30 AM TAHIR   Radiology MD with UR JOHNATHON TREVIZO   [facility]  [place]    [address]  [place] [address]   [phone]           Please arrive at the time given for your first appointment. This visit is used internally to schedule the physician's time during your ultrasound.            Nov 30, 2018  9:30 AM CST   (Arrive by 9:15 AM)   ESTABL[facility]ENAT[facility]    [address] [facility] [address]   [phone]            Dec 10, 2018  4:30 PM CST   US OB TRANSVAGINAL ONLY with HIUS2   [facility] ([facility] )    [address] [facility] [address]   [phone]           How do I prepare for my exam? (Food and drink instructions) Drink four 8-ounce glasses of fluid. Finish drinking an hour before your exam, so you have a full bladder. If you need to empty your bladder before your exam, try to release only a little urine. Then, drink another glass of fluid.  How do I prepare for my exam? (Other instructions) You may have up to two family members in the exam room. If you bring a small child, an adult must be there to care for him or her. No video or camera photography during the procedure.  What should I wear: Wear comfortable clothes.  How long does the exam take: Most ultrasounds take 30 to 60 minutes.  What should I bring: Bring a list of your medicines, including vitamins, minerals and over-the-counter drugs. It is safest to leave personal items at home.  Do I need a :  No  is needed.  What do I need to tell my doctor: Tell your doctor about any allergies you may have.  What should I do after the exam: No restrictions, you may resume normal activities.  What is this test: An ultrasound uses sound waves to make pictures of the body. Sound waves do not cause pain. The only discomfort may be the pressure of the wand against your skin or full bladder.  Who should I call with questions: If you have any questions, please call the Imaging Department where you  will have your exam. Directions, parking instructions, and other information are available on our website, [email or web address].              Who to contact     If you have questions or need follow up information about today's clinic visit or your schedule please contact [facility] Chrystal SERAFIN directly at [phone].  Normal or non-critical lab and imaging results will be communicated to you by MyChart, letter or phone within 4 business days after the clinic has received the results. If you do not hear from us within 7 days, please contact the clinic through MyChart or phone. If you have a critical or abnormal lab result, we will notify you by phone as soon as possible.  Submit refill requests through [employer]ipl or call your pharmacy and they will forward the refill request to us. Please allow 3 business days for your refill to be completed.          Additional Information About Your Visit        MyChart Information     [employer]ipl gives you secure access to your electronic health record. If you see a primary care provider, you can also send messages to your care team and make appointments. If you have questions, please call your primary care clinic.  If you do not have a primary care provider, please call [phone] and they will assist you.        Care EveryWhere ID     This is your Care EveryWhere ID. This could be used by other organizations to access your [facility] medical records  [id number]        Your Vitals Were     Last Period                   07/07/2018 (Approximate)            Blood Pressure from Last 3 Encounters:   11/17/18 90/60   11/08/18 102/70   11/05/18 104/60    Weight from Last 3 Encounters:   11/08/18 160 lb (72.6 kg)   11/05/18 160 lb (72.6 kg)   10/25/18 161 lb (73 kg)              We Performed the Following     HC  INJ HYDROXYPROGESTERONE CAPROATE, 1MG     THER/PROPH/DIAG INJ, SC/IM        Primary Care Provider Office Phone # Fax #    Lainey Rose [phone]  [phone]       [address] [place] [address]        Equal Access to Services     JOESPH OAKLEY : Hadii aad ku hadisabelo Sojaymeali, waaxda luqadaha, qaybta kaalmada adecharli, terrell sol charlielaurita tineoizzy walker astrid angel. So [facility] [phone].    ATENCIÓN: Si habla español, tiene a black disposición servicios gratuitos de asistencia lingüística. Llame al [phone].    We comply with applicable federal civil rights laws and [place] laws. We do not discriminate on the basis of race, color, national origin, age, disability, sex, sexual orientation, or gender identity.            Thank you!     Thank you for choosing [facility]  for your care. Our goal is always to provide you with excellent care. Hearing back from our patients is one way we can continue to improve our services. Please take a few minutes to complete the written survey that you may receive in the mail after your visit with us. Thank you!             Your Updated Medication List - Protect others around you: Learn how to safely use, store and throw away your medicines at www.disposemymeds.org.          This list is accurate as of 18  9:05 AM.  Always use your most recent med list.                   Brand Name Dispense Instructions for use Diagnosis    albuterol 108 (90 Base) MCG/ACT inhaler    PROAIR HFA/PROVENTIL HFA/VENTOLIN HFA    1 Inhaler    Inhale 2 puffs into the lungs every 6 hours as needed for shortness of breath / dyspnea or wheezing        HYDROXYprogesterone caproate in oil 250 mg/mL intramuscular injection    STEPHANIE    1 mL    Inject 1 mL (250 mg) into the muscle every 7 days    History of  delivery, currently pregnant in second trimester       ipratropium 0.06 % spray    ATROVENT    1 Box    Spray 2 sprays into both nostrils 3 times daily        prenatal multivitamin plus iron 27-0.8 MG Tabs per tablet      Take 1 tablet by mouth daily

## 2018-11-27 ENCOUNTER — HOSPITAL ENCOUNTER (OUTPATIENT)
Dept: ULTRASOUND IMAGING | Facility: HOSPITAL | Age: 28
Discharge: HOME | End: 2018-11-27
Attending: NURSE PRACTITIONER | Admitting: NURSE PRACTITIONER
Payer: COMMERCIAL

## 2018-11-27 DIAGNOSIS — Z34.81 ENCOUNTER FOR SUPERVISION OF OTHER NORMAL PREGNANCY IN FIRST TRIMESTER: ICD-10-CM

## 2018-11-27 DIAGNOSIS — O09.212 HIGH RISK PREGNANCY DUE TO HISTORY OF PRETERM LABOR IN SECOND TRIMESTER: Primary | ICD-10-CM

## 2018-11-27 PROCEDURE — 76811 OB US DETAILED SNGL FETUS: CPT | Mod: TC

## 2018-11-30 ENCOUNTER — PRENATAL OFFICE VISIT (OUTPATIENT)
Dept: OBGYN | Facility: OTHER | Age: 28
End: 2018-11-30
Attending: OBSTETRICS & GYNECOLOGY
Payer: COMMERCIAL

## 2018-11-30 VITALS
WEIGHT: 165 LBS | DIASTOLIC BLOOD PRESSURE: 63 MMHG | SYSTOLIC BLOOD PRESSURE: 110 MMHG | HEART RATE: 88 BPM | BODY MASS INDEX: 27.49 KG/M2 | OXYGEN SATURATION: 98 % | HEIGHT: 65 IN

## 2018-11-30 DIAGNOSIS — O34.219 PREVIOUS CESAREAN DELIVERY AFFECTING PREGNANCY: ICD-10-CM

## 2018-11-30 DIAGNOSIS — O09.211 HIGH RISK PREGNANCY DUE TO HISTORY OF PRETERM LABOR IN FIRST TRIMESTER: ICD-10-CM

## 2018-11-30 DIAGNOSIS — Z23 NEED FOR PROPHYLACTIC VACCINATION AND INOCULATION AGAINST INFLUENZA: Primary | ICD-10-CM

## 2018-11-30 PROCEDURE — 99207 ZZC PRENATAL VISIT: CPT | Mod: 25 | Performed by: OBSTETRICS & GYNECOLOGY

## 2018-11-30 PROCEDURE — 96372 THER/PROPH/DIAG INJ SC/IM: CPT | Performed by: OBSTETRICS & GYNECOLOGY

## 2018-11-30 ASSESSMENT — PAIN SCALES - GENERAL: PAINLEVEL: NO PAIN (0)

## 2018-11-30 NOTE — PROGRESS NOTES
Doing well.  No concerns today.  Recent dental work complete.  Level 2 US done, nml cvx lenth.  F/u cvx length 2 weeks and F/u level 2 in 4 weeks to complete orx scan.    Negative vaginitis sx.  US for full fetal anatomical survey ordered.  Return to clinic in 4 weeks  Flu shot/[place] done    Damon Fletcher MD  11/30/2018

## 2018-11-30 NOTE — PROGRESS NOTES

## 2018-11-30 NOTE — PROGRESS NOTES
The following medication was given:     MEDICATION: Hydroxyprogesterone Caproate 250mg/mL in oil (Francie)  ROUTE: IM  SITE: LUQ - Gluteus  DOSE: 250 mg/ml  LOT #: [id number]  :  [employer] [place]  EXPIRATION DATE:  06/2020  NDC#: 7887-9144-51  ARIE JUNIOR

## 2018-11-30 NOTE — NURSING NOTE
"Chief Complaint   Patient presents with     Prenatal Care     20 weeks and 6 days       Initial /63 (BP Location: Left arm, Cuff Size: Adult Regular)  Pulse 88  Ht 5' 5\" (1.651 m)  Wt 165 lb (74.8 kg)  LMP 07/07/2018 (Approximate)  SpO2 98%  BMI 27.46 kg/m2 Estimated body mass index is 27.46 kg/(m^2) as calculated from the following:    Height as of this encounter: 5' 5\" (1.651 m).    Weight as of this encounter: 165 lb (74.8 kg).  Medication Reconciliation: complete    Aimee Leahy LPN  "

## 2018-11-30 NOTE — MR AVS SNAPSHOT
After Visit Summary   2018    Kathia Milton    MRN: [id number]           Patient Information     Date Of Birth          1990        Visit Information        Provider Department      2018 9:30 AM Damon Fletcher MD [facility]        Today's Diagnoses     High risk pregnancy due to history of  labor in first trimester        Previous  delivery affecting pregnancy          Care Instructions    F/u 4 weeks          Follow-ups after your visit        Your next 10 appointments already scheduled     Dec 10, 2018  4:30 PM CST   US OB TRANSVAGINAL ONLY with HIUS2   HI ULTRASOUND ([facility] )    [address] [place] [address]   [phone]           How do I prepare for my exam? (Food and drink instructions) Drink four 8-ounce glasses of fluid. Finish drinking an hour before your exam, so you have a full bladder. If you need to empty your bladder before your exam, try to release only a little urine. Then, drink another glass of fluid.  How do I prepare for my exam? (Other instructions) You may have up to two family members in the exam room. If you bring a small child, an adult must be there to care for him or her. No video or camera photography during the procedure.  What should I wear: Wear comfortable clothes.  How long does the exam take: Most ultrasounds take 30 to 60 minutes.  What should I bring: Bring a list of your medicines, including vitamins, minerals and over-the-counter drugs. It is safest to leave personal items at home.  Do I need a :  No  is needed.  What do I need to tell my doctor: Tell your doctor about any allergies you may have.  What should I do after the exam: No restrictions, you may resume normal activities.  What is this test: An ultrasound uses sound waves to make pictures of the body. Sound waves do not cause pain. The only discomfort may be the pressure of the wand against your skin or full  bladder.  Who should I call with questions: If you have any questions, please call the Imaging Department where you will have your exam. Directions, parking instructions, and other information are available on our website, [email or web address].            Dec 17, 2018  4:20 PM CST   (Arrive by 4:05 PM)   US OB >14 WEEKS FOLLOW UP with HIUS2   HI ULTRASOUND ([facility] )    [address] [place] [address]   [phone]           How do I prepare for my exam? (Food and drink instructions) Drink four 8-ounce glasses of fluid. Finish drinking an hour before your exam, so you have a full bladder. If you need to empty your bladder before your exam, try to release only a little urine. Then, drink another glass of fluid.  How do I prepare for my exam? (Other instructions) You may have up to two family members in the exam room. If you bring a small child, an adult must be there to care for him or her. No video or camera photography during the procedure.  What should I wear: Wear comfortable clothes.  How long does the exam take: Most ultrasounds take 30 to 60 minutes.  What should I bring: Bring a list of your medicines, including vitamins, minerals and over-the-counter drugs. It is safest to leave personal items at home.  Do I need a :  No  is needed.  What do I need to tell my doctor: Tell your doctor about any allergies you may have.  What should I do after the exam: No restrictions, you may resume normal activities.  What is this test: An ultrasound uses sound waves to make pictures of the body. Sound waves do not cause pain. The only discomfort may be the pressure of the wand against your skin or full bladder.  Who should I call with questions: If you have any questions, please call the Imaging Department where you will have your exam. Directions, parking instructions, and other information are available on our website, [email or web address].            Dec 28, 2018  4:00 PM CST   (Arrive  "by 3:45 PM)   ESTABLISHED PRENATAL with Consuelo Quiles NP   [facility] )    [place] [address]   [phone]              Who to contact     If you have questions or need follow up information about today's clinic visit or your schedule please contact [facility] directly at [phone].  Normal or non-critical lab and imaging results will be communicated to you by Enthusehart, letter or phone within 4 business days after the clinic has received the results. If you do not hear from us within 7 days, please contact the clinic through ThromboGenicst or phone. If you have a critical or abnormal lab result, we will notify you by phone as soon as possible.  Submit refill requests through Runscope or call your pharmacy and they will forward the refill request to us. Please allow 3 business days for your refill to be completed.          Additional Information About Your Visit        Enthusehart Information     Runscope gives you secure access to your electronic health record. If you see a primary care provider, you can also send messages to your care team and make appointments. If you have questions, please call your primary care clinic.  If you do not have a primary care provider, please call [phone] and they will assist you.        Care EveryWhere ID     This is your Care EveryWhere ID. This could be used by other organizations to access your [place] medical records  [id number]        Your Vitals Were     Pulse Height Last Period Pulse Oximetry BMI (Body Mass Index)       88 5' 5\" (1.651 m) 07/07/2018 (Approximate) 98% 27.46 kg/m2        Blood Pressure from Last 3 Encounters:   11/30/18 110/63   11/17/18 90/60   11/08/18 102/70    Weight from Last 3 Encounters:   11/30/18 165 lb (74.8 kg)   11/08/18 160 lb (72.6 kg)   11/05/18 160 lb (72.6 kg)              Today, you had the following     No orders found for display      Information " about OPIOIDS     PRESCRIPTION OPIOIDS: WHAT YOU NEED TO KNOW   We gave you an opioid (narcotic) pain medicine. It is important to manage your pain, but opioids are not always the best choice. You should first try all the other options your care team gave you. Take this medicine for as short a time (and as few doses) as possible.    Some activities can increase your pain, such as bandage changes or therapy sessions. It may help to take your pain medicine 30 to 60 minutes before these activities. Reduce your stress by getting enough sleep, working on hobbies you enjoy and practicing relaxation or meditation. Talk to your care team about ways to manage your pain beyond prescription opioids.    These medicines have risks:    DO NOT drive when on new or higher doses of pain medicine. These medicines can affect your alertness and reaction times, and you could be arrested for driving under the influence (DUI). If you need to use opioids long-term, talk to your care team about driving.    DO NOT operate heavy machinery    DO NOT do any other dangerous activities while taking these medicines.    DO NOT drink any alcohol while taking these medicines.     If the opioid prescribed includes acetaminophen, DO NOT take with any other medicines that contain acetaminophen. Read all labels carefully. Look for the word  acetaminophen  or  Tylenol.  Ask your pharmacist if you have questions or are unsure.    You can get addicted to pain medicines, especially if you have a history of addiction (chemical, alcohol or substance dependence). Talk to your care team about ways to reduce this risk.    All opioids tend to cause constipation. Drink plenty of water and eat foods that have a lot of fiber, such as fruits, vegetables, prune juice, apple juice and high-fiber cereal. Take a laxative (Miralax, milk of magnesia, Colace, Senna) if you don t move your bowels at least every other day. Other side effects include upset stomach, sleepiness,  dizziness, throwing up, tolerance (needing more of the medicine to have the same effect), physical dependence and slowed breathing.    Store your pills in a secure place, locked if possible. We will not replace any lost or stolen medicine. If you don t finish your medicine, please throw away (dispose) as directed by your pharmacist. The [place] Pollution Control Agency has more information about safe disposal: [email or web address]-medications         Primary Care Provider Office Phone # Fax #    Lainey Rose [phone] [address] [address]        Equal Access to Services     JOESPH OAKLEY : Hadii alessandro abarca hadasho Sorosy, waaxda luqadaha, qaybta kaalmada adeizzyyada, terrell resendiz . So [facility] [phone].    ATENCIÓN: Si habla español, tiene a black disposición servicios gratuitos de asistencia lingüística. Llame al [phone].    We comply with applicable federal civil rights laws and [place] laws. We do not discriminate on the basis of race, color, national origin, age, disability, sex, sexual orientation, or gender identity.            Thank you!     Thank you for choosing [facility]  for your care. Our goal is always to provide you with excellent care. Hearing back from our patients is one way we can continue to improve our services. Please take a few minutes to complete the written survey that you may receive in the mail after your visit with us. Thank you!             Your Updated Medication List - Protect others around you: Learn how to safely use, store and throw away your medicines at www.disposemymeds.org.          This list is accurate as of 11/30/18  9:52 AM.  Always use your most recent med list.                   Brand Name Dispense Instructions for use Diagnosis    acetaminophen-codeine 300-30 MG tablet    TYLENOL #3     Take 1 tablet by mouth every 4 hours as needed         albuterol 108 (90 Base) MCG/ACT inhaler    PROAIR HFA/PROVENTIL HFA/VENTOLIN HFA    1 Inhaler    Inhale 2 puffs into the lungs every 6 hours as needed for shortness of breath / dyspnea or wheezing        HYDROXYprogesterone caproate 250 MG/ML injection    STEPHANIE    1 mL    Inject 1 mL (250 mg) into the muscle every 7 days    History of  delivery, currently pregnant in second trimester       ipratropium 0.06 % nasal spray    ATROVENT    1 Box    Spray 2 sprays into both nostrils 3 times daily        prenatal multivitamin w/iron 27-0.8 MG tablet      Take 1 tablet by mouth daily

## 2018-12-06 ENCOUNTER — ALLIED HEALTH/NURSE VISIT (OUTPATIENT)
Dept: OBGYN | Facility: OTHER | Age: 28
End: 2018-12-06
Attending: OBSTETRICS & GYNECOLOGY
Payer: COMMERCIAL

## 2018-12-06 DIAGNOSIS — O34.219 PREVIOUS CESAREAN DELIVERY AFFECTING PREGNANCY: Primary | ICD-10-CM

## 2018-12-06 PROCEDURE — 96372 THER/PROPH/DIAG INJ SC/IM: CPT

## 2018-12-06 NOTE — PROGRESS NOTES
The following medication was given:     MEDICATION: Hydroxyprogesterone Caproate 250mg/mL in oil (Francie)  ROUTE: IM  SITE: RUQ - Gluteus  DOSE: 1 mL  LOT #: [id number]  :  [employer] [place]  EXPIRATION DATE:  06/30/2020  NDC#: 9671-2814-30  Patient will return next week for another injection.    Giselle Rojas

## 2018-12-06 NOTE — MR AVS SNAPSHOT
After Visit Summary   2018    Kathia Milton    MRN: [id number]           Patient Information     Date Of Birth          1990        Visit Information        Provider Department      2018 4:00 PM HC OB/GYN NURSE [facility]        Today's Diagnoses     Previous  delivery affecting pregnancy    -  1       Follow-ups after your visit        Your next 10 appointments already scheduled     Dec 10, 2018  4:30 PM CST   US OB TRANSVAGINAL ONLY with HIUS2   HI ULTRASOUND ([facility] )    [address] [place] [address]   [phone]           How do I prepare for my exam? (Food and drink instructions) Drink four 8-ounce glasses of fluid. Finish drinking an hour before your exam, so you have a full bladder. If you need to empty your bladder before your exam, try to release only a little urine. Then, drink another glass of fluid.  How do I prepare for my exam? (Other instructions) You may have up to two family members in the exam room. If you bring a small child, an adult must be there to care for him or her. No video or camera photography during the procedure.  What should I wear: Wear comfortable clothes.  How long does the exam take: Most ultrasounds take 30 to 60 minutes.  What should I bring: Bring a list of your medicines, including vitamins, minerals and over-the-counter drugs. It is safest to leave personal items at home.  Do I need a :  No  is needed.  What do I need to tell my doctor: Tell your doctor about any allergies you may have.  What should I do after the exam: No restrictions, you may resume normal activities.  What is this test: An ultrasound uses sound waves to make pictures of the body. Sound waves do not cause pain. The only discomfort may be the pressure of the wand against your skin or full bladder.  Who should I call with questions: If you have any questions, please call the Imaging Department where you  will have your exam. Directions, parking instructions, and other information are available on our website, [email or web address].            Dec 13, 2018  4:30 PM CST   (Arrive by 4:15 PM)   Nurse Only with HC OB/GYN NURSE   [facility]    [address]  [facility] [address]   [phone]            Dec 17, 2018  4:20 PM CST   (Arrive by 4:05 PM)   US OB >14 WEEKS FOLLOW UP with HIUS2   HI ULTRASOUND ([facility] )    [address] [facility] [address]   [phone]           How do I prepare for my exam? (Food and drink instructions) Drink four 8-ounce glasses of fluid. Finish drinking an hour before your exam, so you have a full bladder. If you need to empty your bladder before your exam, try to release only a little urine. Then, drink another glass of fluid.  How do I prepare for my exam? (Other instructions) You may have up to two family members in the exam room. If you bring a small child, an adult must be there to care for him or her. No video or camera photography during the procedure.  What should I wear: Wear comfortable clothes.  How long does the exam take: Most ultrasounds take 30 to 60 minutes.  What should I bring: Bring a list of your medicines, including vitamins, minerals and over-the-counter drugs. It is safest to leave personal items at home.  Do I need a :  No  is needed.  What do I need to tell my doctor: Tell your doctor about any allergies you may have.  What should I do after the exam: No restrictions, you may resume normal activities.  What is this test: An ultrasound uses sound waves to make pictures of the body. Sound waves do not cause pain. The only discomfort may be the pressure of the wand against your skin or full bladder.  Who should I call with questions: If you have any questions, please call the Imaging Department where you will have your exam. Directions, parking instructions, and other  information are available on our website, [email or web address].            Dec 28, 2018  4:00 PM CST   (Arrive by 3:45 PM)   ESTABLISHED PRENATAL with Consuelo Quiles NP   [facility] ([facility] )    [place] [address]   [phone]              Who to contact     If you have questions or need follow up information about today's clinic visit or your schedule please contact [facility] directly at [phone].  Normal or non-critical lab and imaging results will be communicated to you by Qudinihart, letter or phone within 4 business days after the clinic has received the results. If you do not hear from us within 7 days, please contact the clinic through [employer] Gr[employer] or phone. If you have a critical or abnormal lab result, we will notify you by phone as soon as possible.  Submit refill requests through XL Video or call your pharmacy and they will forward the refill request to us. Please allow 3 business days for your refill to be completed.          Additional Information About Your Visit        Qudiniharim3D Information     XL Video gives you secure access to your electronic health record. If you see a primary care provider, you can also send messages to your care team and make appointments. If you have questions, please call your primary care clinic.  If you do not have a primary care provider, please call [phone] and they will assist you.        Care EveryWhere ID     This is your Care EveryWhere ID. This could be used by other organizations to access your [place] medical records  [id number]        Your Vitals Were     Last Period                   07/07/2018 (Approximate)            Blood Pressure from Last 3 Encounters:   11/30/18 110/63   11/17/18 90/60   11/08/18 102/70    Weight from Last 3 Encounters:   11/30/18 165 lb (74.8 kg)   11/08/18 160 lb (72.6 kg)   11/05/18 160 lb (72.6 kg)              We Performed the Following     HC INJ,  HYDROXYPROGESTERONE CAPROATE (STEPHANIE), 10 MG     INJECTION INTRAMUSCULAR OR SUB-Q        Primary Care Provider Office Phone # Fax #    Lainey Rose [phone] [address]  [place] [address]        Equal Access to Services     [facility] ADIN : Hadii alessandro abarca hadasho Soomaali, waaxda luqadaha, qaybta kaalmada adeegyada, waxbassam horton charlielaurita smithmiguelraad resendiz . So [facility] [phone].    ATENCIÓN: Si habla español, tiene a black disposición servicios gratuitos de asistencia lingüística. Llame al [phone].    We comply with applicable federal civil rights laws and [place] laws. We do not discriminate on the basis of race, color, national origin, age, disability, sex, sexual orientation, or gender identity.            Thank you!     Thank you for choosing [facility]  for your care. Our goal is always to provide you with excellent care. Hearing back from our patients is one way we can continue to improve our services. Please take a few minutes to complete the written survey that you may receive in the mail after your visit with us. Thank you!             Your Updated Medication List - Protect others around you: Learn how to safely use, store and throw away your medicines at www.disposemymeds.org.          This list is accurate as of 18  4:12 PM.  Always use your most recent med list.                   Brand Name Dispense Instructions for use Diagnosis    acetaminophen-codeine 300-30 MG tablet    TYLENOL #3     Take 1 tablet by mouth every 4 hours as needed        albuterol 108 (90 Base) MCG/ACT inhaler    PROAIR HFA/PROVENTIL HFA/VENTOLIN HFA    1 Inhaler    Inhale 2 puffs into the lungs every 6 hours as needed for shortness of breath / dyspnea or wheezing        HYDROXYprogesterone caproate 250 MG/ML injection    STEPHANIE    1 mL    Inject 1 mL (250 mg) into the muscle every 7 days    History of  delivery, currently pregnant in second trimester        ipratropium 0.06 % nasal spray    ATROVENT    1 Box    Spray 2 sprays into both nostrils 3 times daily        prenatal multivitamin w/iron 27-0.8 MG tablet      Take 1 tablet by mouth daily

## 2018-12-10 ENCOUNTER — HOSPITAL ENCOUNTER (OUTPATIENT)
Dept: ULTRASOUND IMAGING | Facility: HOSPITAL | Age: 28
Discharge: HOME | End: 2018-12-10
Attending: NURSE PRACTITIONER | Admitting: NURSE PRACTITIONER
Payer: COMMERCIAL

## 2018-12-10 DIAGNOSIS — Z34.81 ENCOUNTER FOR SUPERVISION OF OTHER NORMAL PREGNANCY IN FIRST TRIMESTER: ICD-10-CM

## 2018-12-10 PROCEDURE — 76817 TRANSVAGINAL US OBSTETRIC: CPT | Mod: TC

## 2018-12-13 ENCOUNTER — APPOINTMENT (OUTPATIENT)
Dept: ULTRASOUND IMAGING | Facility: HOSPITAL | Age: 28
End: 2018-12-13
Attending: OBSTETRICS & GYNECOLOGY
Payer: COMMERCIAL

## 2018-12-13 ENCOUNTER — ALLIED HEALTH/NURSE VISIT (OUTPATIENT)
Dept: OBGYN | Facility: OTHER | Age: 28
End: 2018-12-13
Attending: OBSTETRICS & GYNECOLOGY
Payer: COMMERCIAL

## 2018-12-13 ENCOUNTER — HOSPITAL ENCOUNTER (EMERGENCY)
Facility: HOSPITAL | Age: 28
Discharge: STILL ADMITTED OR ADMINISTRATIVE | End: 2018-12-13
Admitting: PHYSICIAN ASSISTANT
Payer: COMMERCIAL

## 2018-12-13 ENCOUNTER — HOSPITAL ENCOUNTER (OUTPATIENT)
Facility: HOSPITAL | Age: 28
Discharge: HOME | End: 2018-12-13
Attending: OBSTETRICS & GYNECOLOGY | Admitting: OBSTETRICS & GYNECOLOGY
Payer: COMMERCIAL

## 2018-12-13 VITALS
RESPIRATION RATE: 20 BRPM | BODY MASS INDEX: 28.16 KG/M2 | WEIGHT: 169 LBS | DIASTOLIC BLOOD PRESSURE: 63 MMHG | OXYGEN SATURATION: 98 % | HEIGHT: 65 IN | SYSTOLIC BLOOD PRESSURE: 112 MMHG | TEMPERATURE: 98.1 F

## 2018-12-13 DIAGNOSIS — O09.211 HIGH RISK PREGNANCY DUE TO HISTORY OF PRETERM LABOR IN FIRST TRIMESTER: Primary | ICD-10-CM

## 2018-12-13 PROCEDURE — 59025 FETAL NON-STRESS TEST: CPT

## 2018-12-13 PROCEDURE — 59025 FETAL NON-STRESS TEST: CPT | Mod: 26 | Performed by: OBSTETRICS & GYNECOLOGY

## 2018-12-13 PROCEDURE — 40000268 ZZH STATISTIC NO CHARGES

## 2018-12-13 PROCEDURE — 76815 OB US LIMITED FETUS(S): CPT | Mod: TC

## 2018-12-13 PROCEDURE — G0463 HOSPITAL OUTPT CLINIC VISIT: HCPCS | Mod: 25

## 2018-12-13 PROCEDURE — 96372 THER/PROPH/DIAG INJ SC/IM: CPT

## 2018-12-13 ASSESSMENT — MIFFLIN-ST. JEOR: SCORE: 1502.46

## 2018-12-13 NOTE — PLAN OF CARE
Kathia Milton        NST:  reactive  Start: 1447  Stop:  1508    Physician: Dr Fletcher  Reason For Test: fall outside  EDC:04/13/2019  Gestational Age: 225/7    Comments:      Columba Schaffer

## 2018-12-13 NOTE — PLAN OF CARE
Called DR Fletcher to update on patient arrival and condition. Patient arrived and reported that she had falled when she went to  her [place] at school on the ice. She said she hit the right side of her belly on the curb when she fell. Reports she is having some pain lower in her mid abdomen and tightening/cramping. Denies any lof or bleeding. States she did have a larger amount of mucous when she just went to the bathroom. Fetal heart tones reassuring and passing nst. Dr Fletcher asked for ultrasound to be done at bedside to check fetal well being and to rule out placental abruption.

## 2018-12-13 NOTE — PROGRESS NOTES
he following medication was given:     MEDICATION: Hydroxyprogesterone Caproate 250mg/mL in oil (Francie)  ROUTE: IM  SITE: LUQ - Gluteus  DOSE: 250 mg/ml  LOT #: [id number]  :  [employer] [place]  EXPIRATION DATE:  06/2020  NDC#: 6321-5805=01  ARIE JUNIOR

## 2018-12-13 NOTE — PLAN OF CARE
Talked to DR Fletcher on phone and he received call from US and US ok. Ok for patient to go down to clinic and have progestrone injection she is due for today then go home.

## 2018-12-14 DIAGNOSIS — O09.212 CURRENT PREGNANCY WITH HISTORY OF PRE-TERM LABOR IN SECOND TRIMESTER: Primary | ICD-10-CM

## 2018-12-14 PROCEDURE — 40000268 ZZH STATISTIC NO CHARGES

## 2018-12-14 RX ORDER — HYDROXYPROGESTERONE CAPROATE 250 MG/ML
250 INJECTION INTRAMUSCULAR
Qty: 12 ML | Refills: 3 | OUTPATIENT
Start: 2018-12-14 | End: 2018-12-28

## 2018-12-14 NOTE — PROGRESS NOTES
Fetal Non-Stress Test Results    NST Ordered By: Dr. Fletcher  NST Medical Indication: fall     NST Start & Stop Times  NST Start Time: 1447  NST Stop Time: 1508                            NST Results  Fetus A   Baseline Rate: Normal  Accelerations: Present  Decelerations: None  Interpretation: reactive

## 2018-12-18 DIAGNOSIS — O09.92 SUPERVISION OF HIGH RISK PREGNANCY IN SECOND TRIMESTER: Primary | ICD-10-CM

## 2018-12-18 RX ORDER — HYDROXYPROGESTERONE CAPROATE 250 MG/ML
250 INJECTION INTRAMUSCULAR
Status: CANCELLED | OUTPATIENT
Start: 2018-12-18

## 2018-12-20 ENCOUNTER — ALLIED HEALTH/NURSE VISIT (OUTPATIENT)
Dept: OBGYN | Facility: OTHER | Age: 28
End: 2018-12-20
Attending: NURSE PRACTITIONER
Payer: COMMERCIAL

## 2018-12-20 DIAGNOSIS — O09.211 HIGH RISK PREGNANCY DUE TO HISTORY OF PRETERM LABOR IN FIRST TRIMESTER: Primary | ICD-10-CM

## 2018-12-20 PROCEDURE — 96372 THER/PROPH/DIAG INJ SC/IM: CPT

## 2018-12-20 NOTE — PROGRESS NOTES
The following medication was given:     MEDICATION: Hydroxyprogesterone Caproate 250mg/mL in oil (Francie)  ROUTE: IM  SITE: LUQ - Gluteus  DOSE: 250 mg/ml  LOT #: [id number]  :  [employer] [place]  EXPIRATION DATE:  08/2020  NDC#: 1707-8979-39  ARIE JUNIOR

## 2018-12-21 RX ORDER — HYDROXYPROGESTERONE CAPROATE 250 MG/ML
250 INJECTION INTRAMUSCULAR
Qty: 1 ML | Refills: 12 | Status: CANCELLED | OUTPATIENT
Start: 2018-12-21 | End: 2019-12-21

## 2018-12-27 ENCOUNTER — HOSPITAL ENCOUNTER (OUTPATIENT)
Facility: HOSPITAL | Age: 28
Discharge: HOME | End: 2018-12-27
Attending: OBSTETRICS & GYNECOLOGY | Admitting: OBSTETRICS & GYNECOLOGY
Payer: COMMERCIAL

## 2018-12-27 ENCOUNTER — TELEPHONE (OUTPATIENT)
Dept: OBGYN | Facility: HOSPITAL | Age: 28
End: 2018-12-27

## 2018-12-27 VITALS
TEMPERATURE: 97.7 F | SYSTOLIC BLOOD PRESSURE: 117 MMHG | BODY MASS INDEX: 28.16 KG/M2 | OXYGEN SATURATION: 97 % | WEIGHT: 169 LBS | HEIGHT: 65 IN | RESPIRATION RATE: 16 BRPM | HEART RATE: 97 BPM | DIASTOLIC BLOOD PRESSURE: 72 MMHG

## 2018-12-27 LAB
ALBUMIN UR-MCNC: NEGATIVE MG/DL
APPEARANCE UR: CLEAR
BACTERIA #/AREA URNS HPF: ABNORMAL /HPF
BILIRUB UR QL STRIP: NEGATIVE
COLOR UR AUTO: ABNORMAL
FIBRONECTIN FETAL VAG QL: NEGATIVE
GLUCOSE UR STRIP-MCNC: NEGATIVE MG/DL
HGB UR QL STRIP: NEGATIVE
KETONES UR STRIP-MCNC: NEGATIVE MG/DL
LEUKOCYTE ESTERASE UR QL STRIP: NEGATIVE
NITRATE UR QL: NEGATIVE
PH UR STRIP: 6.5 PH (ref 4.7–8)
RBC #/AREA URNS AUTO: 0 /HPF (ref 0–2)
SOURCE: ABNORMAL
SP GR UR STRIP: 1 (ref 1–1.03)
UROBILINOGEN UR STRIP-MCNC: NORMAL MG/DL (ref 0–2)
WBC #/AREA URNS AUTO: 0 /HPF (ref 0–5)

## 2018-12-27 PROCEDURE — 25000128 H RX IP 250 OP 636

## 2018-12-27 PROCEDURE — 96365 THER/PROPH/DIAG IV INF INIT: CPT

## 2018-12-27 PROCEDURE — 25000128 H RX IP 250 OP 636: Performed by: OBSTETRICS & GYNECOLOGY

## 2018-12-27 PROCEDURE — G0463 HOSPITAL OUTPT CLINIC VISIT: HCPCS

## 2018-12-27 PROCEDURE — 96372 THER/PROPH/DIAG INJ SC/IM: CPT | Mod: 59

## 2018-12-27 PROCEDURE — 81001 URINALYSIS AUTO W/SCOPE: CPT | Performed by: OBSTETRICS & GYNECOLOGY

## 2018-12-27 PROCEDURE — 82731 ASSAY OF FETAL FIBRONECTIN: CPT | Performed by: OBSTETRICS & GYNECOLOGY

## 2018-12-27 PROCEDURE — G0463 HOSPITAL OUTPT CLINIC VISIT: HCPCS | Mod: 25

## 2018-12-27 RX ORDER — TERBUTALINE SULFATE 1 MG/ML
INJECTION, SOLUTION SUBCUTANEOUS
Status: COMPLETED
Start: 2018-12-27 | End: 2018-12-27

## 2018-12-27 RX ORDER — TERBUTALINE SULFATE 1 MG/ML
0.25 INJECTION, SOLUTION SUBCUTANEOUS ONCE
Status: COMPLETED | OUTPATIENT
Start: 2018-12-27 | End: 2018-12-27

## 2018-12-27 RX ORDER — TERBUTALINE SULFATE 1 MG/ML
0.25 INJECTION, SOLUTION SUBCUTANEOUS ONCE
Status: DISCONTINUED | OUTPATIENT
Start: 2018-12-27 | End: 2018-12-27

## 2018-12-27 RX ADMIN — TERBUTALINE SULFATE 0.25 MG: 1 INJECTION, SOLUTION SUBCUTANEOUS at 15:18

## 2018-12-27 RX ADMIN — TERBUTALINE SULFATE 0.25 MG: 1 INJECTION SUBCUTANEOUS at 15:18

## 2018-12-27 RX ADMIN — SODIUM CHLORIDE, POTASSIUM CHLORIDE, SODIUM LACTATE AND CALCIUM CHLORIDE 1000 ML: 600; 310; 30; 20 INJECTION, SOLUTION INTRAVENOUS at 15:37

## 2018-12-27 RX ADMIN — TERBUTALINE SULFATE 0.25 MG: 1 INJECTION SUBCUTANEOUS at 15:52

## 2018-12-27 ASSESSMENT — MIFFLIN-ST. JEOR: SCORE: 1502.46

## 2018-12-27 NOTE — TELEPHONE ENCOUNTER
Called pt to check on status and she stated that she isn't feeling contractions but is feeling lower abdominal and vaginal pressure.  Pt encouraged to come into [facility] and be evaluated.  Pt verbalized understanding and stated she lives close to the hospital and will be in soon.  Will await pt's arrival.

## 2018-12-27 NOTE — PLAN OF CARE
OB Triage Note  Kathia Milton  MRN: [id number]  Gestational Age: 24w5d      Kathia Milton presents for tightening and pressure (sign/symptom/concern).      States robbie since a couple days ago.  Rates pain at 7/10.  Bleeding: Denies Denies LOF.     Dr. Rascon notified of arrival and condition.  Oriented patient to surroundings. Call light within reach.         Plan:  -Initial NST, then fetal/uterine monitoring per MD/patient plan.

## 2018-12-27 NOTE — DISCHARGE INSTRUCTIONS
Keep appointment with Consuelo Back NP tomorrow.   No work tomorrow on 12/28/18.   Complete pelvic rest, nothing vaginally.   Drink at least 8-10 glasses of water a day.   No lifting or housework.  Start procardia XL this evening and then daily in the am, as prescribed.        Discharge Instructions for Undelivered Patients    Diet:  * Drink 8 to 12 glasses of liquids (milk, juice, water) every day  * You may eat meals and snacks.    Activity:  * Count fetal kicks every day.  * Call your doctor if your baby is moving less than usual.    Call your provider if you notice:  * Swelling in your face or increased swelling in your hands or legs.  * Headaches that are not relieved by Tylenol (acetaminophen).  * Changes in your vision (blurring; seeing spots or stars).  * Nausea (sick to your stomach) and vomiting (throwing up).  * Weight gain of 5 pounds per week.  * Heartburn that doesn't go away.  * Signs of bladder infection: Pain when you urinate (use the toilet), needing to go more often or more urgently.  * The bag of decker (membrane) breaks, or you notice leaking in your underwear.  * Bright red blood in your underwear.  * Abdominal (lower belly) or stomach pain.  * Second (plus) baby: Contractions regular and painful, if your term, 37 weeks, if you are earlier than 37 weeks gestation call if you are having more than 6 contractions in 1 hr.    * Increase or change in vaginal discharge (note the color and amount).    Pick prescription for procardia up at the pharmacy and start taking medication this evening and then daily every morning.       [facility]: [phone]

## 2018-12-28 ENCOUNTER — HOSPITAL ENCOUNTER (OUTPATIENT)
Dept: ULTRASOUND IMAGING | Facility: HOSPITAL | Age: 28
Discharge: HOME | End: 2018-12-28
Attending: NURSE PRACTITIONER | Admitting: NURSE PRACTITIONER
Payer: COMMERCIAL

## 2018-12-28 ENCOUNTER — PRENATAL OFFICE VISIT (OUTPATIENT)
Dept: OBGYN | Facility: OTHER | Age: 28
End: 2018-12-28
Attending: NURSE PRACTITIONER
Payer: COMMERCIAL

## 2018-12-28 VITALS
BODY MASS INDEX: 28.99 KG/M2 | HEIGHT: 65 IN | OXYGEN SATURATION: 98 % | WEIGHT: 174 LBS | DIASTOLIC BLOOD PRESSURE: 72 MMHG | SYSTOLIC BLOOD PRESSURE: 118 MMHG | HEART RATE: 94 BPM

## 2018-12-28 DIAGNOSIS — O09.211 HIGH RISK PREGNANCY DUE TO HISTORY OF PRETERM LABOR IN FIRST TRIMESTER: Primary | ICD-10-CM

## 2018-12-28 DIAGNOSIS — O09.211 HIGH RISK PREGNANCY DUE TO HISTORY OF PRETERM LABOR IN FIRST TRIMESTER: ICD-10-CM

## 2018-12-28 DIAGNOSIS — O34.219 PREVIOUS CESAREAN DELIVERY AFFECTING PREGNANCY: ICD-10-CM

## 2018-12-28 PROCEDURE — 76817 TRANSVAGINAL US OBSTETRIC: CPT | Mod: TC

## 2018-12-28 PROCEDURE — 96372 THER/PROPH/DIAG INJ SC/IM: CPT | Performed by: NURSE PRACTITIONER

## 2018-12-28 PROCEDURE — 99207 ZZC PRENATAL VISIT: CPT | Mod: 25 | Performed by: NURSE PRACTITIONER

## 2018-12-28 RX ORDER — NIFEDIPINE 30 MG
30 TABLET, EXTENDED RELEASE ORAL DAILY
Status: ON HOLD | COMMUNITY
End: 2019-01-14

## 2018-12-28 RX ORDER — HYDROXYPROGESTERONE CAPROATE 250 MG/ML
250 INJECTION INTRAMUSCULAR
Status: DISCONTINUED | OUTPATIENT
Start: 2018-12-28 | End: 2019-03-15

## 2018-12-28 RX ADMIN — HYDROXYPROGESTERONE CAPROATE 250 MG: 250 INJECTION INTRAMUSCULAR at 16:24

## 2018-12-28 ASSESSMENT — MIFFLIN-ST. JEOR: SCORE: 1525.14

## 2018-12-28 ASSESSMENT — PAIN SCALES - GENERAL: PAINLEVEL: MILD PAIN (3)

## 2018-12-28 NOTE — PLAN OF CARE
Pt here with  contractions and feeling pressure.  UA obtained and was negative, FFN negative.  SVE done per MD request and pt's cervix was anterior, soft and a fingertip.  Terbutaline given subcutaneous x 2, and 1 Liter LR bolus given.  Pt stated contractions had improved slightly.   Order to discharge pt home and start Procardia XL, per orders and also we on activity restrictions which were discussed in detail with pt.  AVS reviewed and pt was aware to call and come in if contractions increased in intensity or frequency or if she had any other concerned.  Pt verbalized understanding and was discharged at 1700, accompanied by staff and pt's daughter.  Pt taken out for discharge via wheelchair since she was being discharged with activity restrictions

## 2018-12-28 NOTE — NURSING NOTE
"Chief Complaint   Patient presents with     Prenatal Care     24 weeks 6 days       Initial /72 (BP Location: Left arm, Patient Position: Sitting, Cuff Size: Adult Regular)   Pulse 94   Ht 1.651 m (5' 5\")   Wt 78.9 kg (174 lb)   LMP 07/07/2018 (Approximate)   SpO2 98%   BMI 28.96 kg/m   Estimated body mass index is 28.96 kg/m  as calculated from the following:    Height as of this encounter: 1.651 m (5' 5\").    Weight as of this encounter: 78.9 kg (174 lb).  Medication Reconciliation: complete    Giselle Rojas LPN    "

## 2018-12-30 NOTE — PROGRESS NOTES
Baby active.  No LOF or bleeding.  Continued cramping.  Francie today. mfm 1/15/19.  Warning signs and when to call or come in reviewed.  Discussed and ordered 28 week labs.  RTC in 4 weeks.

## 2018-12-30 NOTE — PATIENT INSTRUCTIONS
Patient Education     Understanding  Labor  Going into labor before your 37th week of pregnancy is called  labor.  labor can cause your baby to be born too soon. This can lead to a number of health problems that may affect your baby.     Before labor, the cervix is thick and closed.       In  labor, the cervix begins to efface (thin) and dilate (open).      Symptoms of  labor  If you believe you re having  labor, get medical help right away. Contractions alone don t mean you re in  labor. What matters more are changes in your cervix (the lower end of the uterus). Symptoms of  labor include:    Four or more contractions per hour    Strong contractions    Constant menstrual-like cramping    Low-back pain    Mucous or bloody vaginal discharge    Bleeding or spotting in the second or third trimester  Evaluating  labor  Your healthcare provider will try to find out whether you re in  labor or whether you re just having contractions. He or she may watch you for a few hours. The following tests may be done:    Pelvic exam to see if your cervix has effaced (thinned) and dilated (opened)    Uterine activity monitoring to detect contractions    Fetal monitoring to check the health of your baby    Ultrasound to check your baby s size and position    Amniocentesis to check how mature your baby s lungs are  Caring for yourself at home  If you have  contractions, but your cervix is still thick and closed, your healthcare provider may ask you to do the following at home:    Drink plenty of water.    Do fewer activities.    Rest in bed on your side.    Avoid intercourse and nipple stimulation.  When to call your healthcare provider  Call your healthcare provider if you notice any of these:    Four or more contractions per hour    Bag of water breaks    Bleeding or spotting   If you need hospital care   labor often requires that you have hospital  care and complete bed rest. You may have an IV (intravenous) line to get fluids. You may be given pills or injections to help prevent contractions. Finally, you may receive medicine (corticosteroids) that helps your baby s lungs mature more rapidly.  Are you at risk?  Any pregnant woman can have  labor. It may start for no reason. But these risk factors can increase your chances:    Past  labor or past early birth    Smoking, drug, or alcohol use during pregnancy    Multiple fetuses (twins or more)    Problems with the shape of the uterus    Bleeding during the pregnancy  The dangers of  birth  A baby born too soon may have health problems. This is because the baby didn t have enough time to mature. Some of the risks for your baby include:    Not breastfeeding or feeding well    Having immature lungs    Bleeding in the brain    Dying  Reaching term  Your goal is to get as close to term as you can before giving birth. The closer you get to term, the higher your chance of having a healthy baby. Work with your healthcare provider. Together, you can take steps that may keep you from giving birth too early.  Date Last Reviewed: 2016-2018 [employer]. [address] [place] [address]. All rights reserved. This information is not intended as a substitute for professional medical care. Always follow your healthcare professional's instructions.           Patient Education     Premature Labor    Premature labor ( labor) is when symptoms of labor occur before 37 weeks of pregnancy. (This is 3 weeks before your due date.) Premature labor can lead to premature delivery. This means giving birth to your baby early. Babies need at least 37 weeks of pregnancy for all the organs to develop normally. The earlier the delivery, the greater the risks to the baby.  In most cases, the cause of premature labor is unknown. But certain factors may make the problem more likely.  These include:    History of premature labor with other pregnancies    Smoking    Alcohol or substance abuse    Low pre-pregnancy weight or weight gain during pregnancy    Short time period between pregnancies    Being pregnant with twins, triplets, or more    History of certain types of surgery on the cervix or uterus    Having a short cervix    Certain infections  There are a number of other risk factors. Ask your healthcare provider to help you understand the risk factors specific to your case. Then find out what you can do to control or reduce them.  Contractions are one of the main signs of premature labor. A contraction is different from cramping. It may feel painful and the belly (abdomen) may get hard. It can last from a few seconds to a few minutes. Some women may feel only a sense of pressure in the belly, thighs, rectum, or vagina. Some may feel only the hardening of the uterus without pain or pressure. Or there may be a constant pain in the lower back, which spreads forward toward the belly.    Premature labor is often treated with medicines. A hospital stay may be needed. If labor doesn't progress and you and your baby are both healthy, you may be discharged to continue care at home.  Home care    Ask your provider any questions you have. Be certain you understand how to care for yourself at home. Also follow all recommendations given by your healthcare providers.    Learn the signs of premature labor. Watch for these signs when you get home.    Limit or restrict activities as advised. This may include stopping certain physical activities and cutting back hours at work.    Avoid doing strenuous work as directed by your provider. Ask family and friends for help with tasks and support at home, if needed.    Don t smoke, drink alcohol, or use other harmful substances.    Take steps to reduce stress.    Report any unusual symptoms to your provider.  Follow-up care  Follow up with your healthcare provider,  or as directed. Weekly visits with your provider may be needed.  When to seek medical advice  Call your healthcare provider right away if any of these occur:    Regular or frequent contractions, whether they are painful or not    Pressure in the pelvis    Pressure in the lower belly or mild cramping in your belly with or without diarrhea    Constant low, dull backache    Gush or slow leaking of water from your vagina    Change in vaginal discharge (watery, mucus, or bloody)    Any vaginal bleeding    Decreased movement of your baby  Date Last Reviewed: 6/1/2018 2000-2018 [employer]. [address] [place] [address]. All rights reserved. This information is not intended as a substitute for professional medical care. Always follow your healthcare professional's instructions.

## 2019-01-02 DIAGNOSIS — O09.211 HIGH RISK PREGNANCY DUE TO HISTORY OF PRETERM LABOR IN FIRST TRIMESTER: Primary | ICD-10-CM

## 2019-01-04 ENCOUNTER — TRANSFERRED RECORDS (OUTPATIENT)
Dept: HEALTH INFORMATION MANAGEMENT | Facility: CLINIC | Age: 29
End: 2019-01-04

## 2019-01-09 ENCOUNTER — MYC MEDICAL ADVICE (OUTPATIENT)
Dept: OBGYN | Facility: OTHER | Age: 29
End: 2019-01-09

## 2019-01-09 NOTE — TELEPHONE ENCOUNTER
It it is severe pain or brisk or heavy bleeding should go to ED./urgent care.    Otherwise we could see you tomorrow if you call in the AM.

## 2019-01-10 ENCOUNTER — PRENATAL OFFICE VISIT (OUTPATIENT)
Dept: OBGYN | Facility: OTHER | Age: 29
End: 2019-01-10
Attending: OBSTETRICS & GYNECOLOGY
Payer: COMMERCIAL

## 2019-01-10 ENCOUNTER — OFFICE VISIT (OUTPATIENT)
Dept: SURGERY | Facility: OTHER | Age: 29
End: 2019-01-10
Attending: SURGERY
Payer: COMMERCIAL

## 2019-01-10 VITALS — WEIGHT: 176 LBS | BODY MASS INDEX: 29.29 KG/M2 | SYSTOLIC BLOOD PRESSURE: 110 MMHG | DIASTOLIC BLOOD PRESSURE: 64 MMHG

## 2019-01-10 VITALS
TEMPERATURE: 98.8 F | SYSTOLIC BLOOD PRESSURE: 110 MMHG | RESPIRATION RATE: 16 BRPM | DIASTOLIC BLOOD PRESSURE: 64 MMHG | HEART RATE: 112 BPM | OXYGEN SATURATION: 98 % | HEIGHT: 65 IN | BODY MASS INDEX: 29.32 KG/M2 | WEIGHT: 176 LBS

## 2019-01-10 DIAGNOSIS — N89.8 VAGINAL DISCHARGE: Primary | ICD-10-CM

## 2019-01-10 DIAGNOSIS — O09.211 HIGH RISK PREGNANCY DUE TO HISTORY OF PRETERM LABOR IN FIRST TRIMESTER: ICD-10-CM

## 2019-01-10 DIAGNOSIS — K64.4 EXTERNAL HEMORRHOIDS: ICD-10-CM

## 2019-01-10 DIAGNOSIS — O47.00 PRETERM CONTRACTIONS: ICD-10-CM

## 2019-01-10 DIAGNOSIS — O34.219 PREVIOUS CESAREAN DELIVERY AFFECTING PREGNANCY: ICD-10-CM

## 2019-01-10 DIAGNOSIS — K64.5 THROMBOSED EXTERNAL HEMORRHOIDS: Primary | ICD-10-CM

## 2019-01-10 LAB
SPECIMEN SOURCE: NORMAL
WET PREP SPEC: NORMAL

## 2019-01-10 PROCEDURE — 99203 OFFICE O/P NEW LOW 30 MIN: CPT | Performed by: SURGERY

## 2019-01-10 PROCEDURE — 87210 SMEAR WET MOUNT SALINE/INK: CPT | Performed by: OBSTETRICS & GYNECOLOGY

## 2019-01-10 PROCEDURE — 99207 ZZC COMPLICATED OB VISIT: CPT | Performed by: OBSTETRICS & GYNECOLOGY

## 2019-01-10 RX ORDER — LIDOCAINE 50 MG/G
OINTMENT TOPICAL PRN
Qty: 30 G | Refills: 1 | Status: ON HOLD | OUTPATIENT
Start: 2019-01-10 | End: 2019-01-17

## 2019-01-10 ASSESSMENT — MIFFLIN-ST. JEOR: SCORE: 1534.21

## 2019-01-10 ASSESSMENT — PAIN SCALES - GENERAL
PAINLEVEL: EXTREME PAIN (8)
PAINLEVEL: EXTREME PAIN (8)

## 2019-01-10 NOTE — NURSING NOTE
"Chief Complaint   Patient presents with     Consult     thrombosed hemorrhoid-[facility]        Initial /64   Pulse 112   Temp 98.8  F (37.1  C) (Tympanic)   Resp 16   Ht 1.651 m (5' 5\")   Wt 79.8 kg (176 lb)   LMP 07/07/2018 (Approximate)   SpO2 98%   BMI 29.29 kg/m   Estimated body mass index is 29.29 kg/m  as calculated from the following:    Height as of this encounter: 1.651 m (5' 5\").    Weight as of this encounter: 79.8 kg (176 lb).  Medication Reconciliation: complete    Lissette Nowak LPN    "

## 2019-01-10 NOTE — PATIENT INSTRUCTIONS
Thank you for allowing Dr. Stephenson  and our surgical team to participate in your care.  If you have a scheduling or an appointment question please contact Maribel, our Health Unit Coordinator at her direct line [phone].   ALL nursing questions or concerns can be directed to your surgical nurse at: [phone]    Use ointments and medications as directed. Follow up with general surgery as needed.

## 2019-01-10 NOTE — PROGRESS NOTES
C/o 5 days severe rectal pain and bleeding and hemorrhoid not responding to OTC measures, baths.  No constipation, vaginits sx.  Was seen in [place] for PTL sx.  Had neg FFN and no cervical change and nml US.  Received BMZ x 2 and was discharged.  Still having periods of occasional painful regular CTX.  Cvx LTC today.  Wet prep done.  Thrombosed external hemorrhoid, bleeding, tender on Exam.  Gen surg to see today.    Melanie carlos, lof  Reminded of upcoming labs including 1 hour GTT  Reviewed recent US-no concerns with anatomical survey.  Return to clinic in 2weeks    Damon Fletcher MD  1/10/2019

## 2019-01-10 NOTE — PROGRESS NOTES
CLINIC NOTE - CONSULT  1/10/2019    Patient:Kathia Milton  Referring Physician: Chance    Reason for Referral: Thrombosed hemorrhoids    This is a 28 year old female with thrombosed hemorrhoids.  She is 27 weeks pregnant and presented to Dr. Fletcher today with a complaint of bleeding thrombosed hemorrhoids.  She states that she has had small hemorrhoids in the past and this bad.  This 1 has been bothering her from about a week.  Is been bleeding for approximately 3 days.  She has tried some over-the-counter remedies and is not helped.  No fevers no chills.    Past Medical History:  Past Medical History:   Diagnosis Date     Benign neoplasm of vagina 2011     Condyloma acuminatum 2011     Seasonal allergies      Supervision of other normal pregnancy     MICHELLE: 2013     Tobacco use disorder 10/18/2012       Past Surgical History:  Past Surgical History:   Procedure Laterality Date     APPENDECTOMY  14    Hatch's      SECTION      C section     COLONOSCOPY  11/10/15    Dr. Singh     DILATION AND CURETTAGE SUCTION, TREAT MISSED , 1ST TRIMESTER       ENDOSCOPY  11/10/15    Dr. Singh      LAPAROSCOPIC CYSTECTOMY OVARIAN (BENIGN) Left 2018    Procedure: LAPAROSCOPIC CYSTECTOMY OVARIAN (BENIGN);  EXPLORATORY  LAPAROSCOPY, LEFT OVARIAN CYSTECTOMY;  Surgeon: Damon Fletcher MD;  Location: [facility]     LAPAROSCOPY  11/30/15    Dr. Sun; lysis of pelvic adhesions, cauterization of minimal endometriosis     LAPAROSCOPY  2018    lysis of adhesions; pelvic pain, endometriosis; Dr. Sun       Family History History:  Family History   Problem Relation Age of Onset     Diabetes Other         Grandmother     Heart Disease Other         Heart Disease - Grandmother     Other - See Comments Other         Renal Disease - Grandmother     Unknown/Adopted Mother      Unknown/Adopted Father      Unknown/Adopted Maternal Grandmother      Unknown/Adopted Maternal Grandfather       "Unknown/Adopted Paternal Grandmother      Unknown/Adopted Paternal Grandfather      Breast Cancer No family hx of        History of Tobacco Use:  History   Smoking Status     Former Smoker     Types: Cigarettes     Quit date: 11/26/2012   Smokeless Tobacco     Never Used     Comment: trying to quit 8/10/18       Current Medications:  Current Outpatient Medications   Medication Sig Dispense Refill     albuterol (PROAIR HFA/PROVENTIL HFA/VENTOLIN HFA) 108 (90 Base) MCG/ACT inhaler Inhale 2 puffs into the lungs every 6 hours as needed for shortness of breath / dyspnea or wheezing 1 Inhaler 0     hydrocortisone (ANUSOL-HC) 2.5 % cream Place rectally 2 times daily as needed for hemorrhoids 30 g 1     ipratropium (ATROVENT) 0.06 % spray Spray 2 sprays into both nostrils 3 times daily 1 Box 0     lidocaine (XYLOCAINE) 5 % external ointment Apply topically as needed for moderate pain 30 g 1     NIFEdipine ER (ADALAT CC) 30 MG 24 hr tablet Take 30 mg by mouth daily       Prenatal Vit-Fe Fumarate-FA (PRENATAL MULTIVITAMIN PLUS IRON) 27-0.8 MG TABS per tablet Take 1 tablet by mouth daily         Allergies:  Allergies   Allergen Reactions     Seafood Anaphylaxis     Shellfish Allergy      Shellfish Derived Products       ROS:  Pertinent items are noted in HPI.  All other systems are negative.  A comprehensive review of systems was negative.    PHYSICAL EXAM:     Vital signs: /64   Pulse 112   Temp 98.8  F (37.1  C) (Tympanic)   Resp 16   Ht 1.651 m (5' 5\")   Wt 79.8 kg (176 lb)   LMP 07/07/2018 (Approximate)   SpO2 98%   BMI 29.29 kg/m     Weight: [unfilled]   BMI: Body mass index is 29.29 kg/m .   General: Normal, healthy, cooperative, in no acute distress, alert, obviously gravid   Skin: no jaundice   HEENT: PERRLA, EOMI and Sclera clear, anicteric   Neck: supple, without adenopathy, full range of motion   Lungs: clear to auscultation   CV: Regular rate and rhythm without murmer   Abdominal: abdomen is soft without " significant tenderness, masses, organomegaly or guarding   Extremities: No cyanosis, clubbing or edema noted bilaterally in Upper and Lower Extremities   Neurological: cranial nerves intact   Rectal: Rectal exam shows a thrombosed hemorrhoid at the left lateral position.  It is nonerythematous.  There is no surrounding erythema.  There is a an area of clot on the hemorrhoid.    ASSESSMENT: 28-year-old gravid female with thrombosed bleeding hemorrhoids.    PLAN: Discussed with her hemorrhoid treatment.  At this point I recommend local treatment with lidocaine ointment and Anusol HC.  These prescriptions were given to her with refills.  I did discuss with her use of baby wipes and sitz baths and basic hemorrhoidal care.  All of her questions were answered.  She will follow-up with us as needed.

## 2019-01-10 NOTE — NURSING NOTE
"Chief Complaint   Patient presents with     Prenatal Care       Initial /64 (BP Location: Left arm, Patient Position: Sitting, Cuff Size: Adult Large)   Wt 79.8 kg (176 lb)   LMP 07/07/2018 (Approximate)   BMI 29.29 kg/m   Estimated body mass index is 29.29 kg/m  as calculated from the following:    Height as of 12/28/18: 1.651 m (5' 5\").    Weight as of this encounter: 79.8 kg (176 lb).  Medication Reconciliation: complete    ARIE JUNIOR LPN  "

## 2019-01-13 ENCOUNTER — HOSPITAL ENCOUNTER (INPATIENT)
Facility: HOSPITAL | Age: 29
LOS: 1 days | Discharge: HOME | End: 2019-01-14
Attending: OBSTETRICS & GYNECOLOGY | Admitting: OBSTETRICS & GYNECOLOGY
Payer: COMMERCIAL

## 2019-01-13 DIAGNOSIS — O47.00 PRETERM CONTRACTIONS: Primary | ICD-10-CM

## 2019-01-13 PROBLEM — Z36.89 ENCOUNTER FOR TRIAGE IN PREGNANT PATIENT: Status: ACTIVE | Noted: 2018-12-13

## 2019-01-13 LAB
ABO + RH BLD: NORMAL
ABO + RH BLD: NORMAL
ALBUMIN UR-MCNC: NEGATIVE MG/DL
APPEARANCE UR: CLEAR
BACTERIA #/AREA URNS HPF: ABNORMAL /HPF
BASOPHILS # BLD AUTO: 0 10E9/L (ref 0–0.2)
BASOPHILS NFR BLD AUTO: 0.2 %
BILIRUB UR QL STRIP: NEGATIVE
BLD GP AB SCN SERPL QL: NORMAL
BLOOD BANK CMNT PATIENT-IMP: NORMAL
COLOR UR AUTO: ABNORMAL
DIFFERENTIAL METHOD BLD: ABNORMAL
EOSINOPHIL # BLD AUTO: 0.3 10E9/L (ref 0–0.7)
EOSINOPHIL NFR BLD AUTO: 2.3 %
ERYTHROCYTE [DISTWIDTH] IN BLOOD BY AUTOMATED COUNT: 12.5 % (ref 10–15)
FIBRONECTIN FETAL VAG QL: NEGATIVE
GLUCOSE UR STRIP-MCNC: NEGATIVE MG/DL
HCT VFR BLD AUTO: 31.7 % (ref 35–47)
HGB BLD-MCNC: 10.8 G/DL (ref 11.7–15.7)
HGB UR QL STRIP: NEGATIVE
IMM GRANULOCYTES # BLD: 0.6 10E9/L (ref 0–0.4)
IMM GRANULOCYTES NFR BLD: 4 %
KETONES UR STRIP-MCNC: NEGATIVE MG/DL
LEUKOCYTE ESTERASE UR QL STRIP: NEGATIVE
LYMPHOCYTES # BLD AUTO: 4 10E9/L (ref 0.8–5.3)
LYMPHOCYTES NFR BLD AUTO: 28.5 %
MCH RBC QN AUTO: 28.4 PG (ref 26.5–33)
MCHC RBC AUTO-ENTMCNC: 34.1 G/DL (ref 31.5–36.5)
MCV RBC AUTO: 83 FL (ref 78–100)
MONOCYTES # BLD AUTO: 1.1 10E9/L (ref 0–1.3)
MONOCYTES NFR BLD AUTO: 8 %
NEUTROPHILS # BLD AUTO: 8 10E9/L (ref 1.6–8.3)
NEUTROPHILS NFR BLD AUTO: 57 %
NITRATE UR QL: NEGATIVE
NRBC # BLD AUTO: 0 10*3/UL
NRBC BLD AUTO-RTO: 0 /100
PH UR STRIP: 6.5 PH (ref 4.7–8)
PLATELET # BLD AUTO: 344 10E9/L (ref 150–450)
RBC # BLD AUTO: 3.8 10E12/L (ref 3.8–5.2)
RBC #/AREA URNS AUTO: 0 /HPF (ref 0–2)
SOURCE: ABNORMAL
SP GR UR STRIP: 1.01 (ref 1–1.03)
SPECIMEN EXP DATE BLD: NORMAL
SPECIMEN SOURCE: NORMAL
UROBILINOGEN UR STRIP-MCNC: NORMAL MG/DL (ref 0–2)
WBC # BLD AUTO: 14 10E9/L (ref 4–11)
WBC #/AREA URNS AUTO: <1 /HPF (ref 0–5)
WET PREP SPEC: NORMAL

## 2019-01-13 PROCEDURE — 86901 BLOOD TYPING SEROLOGIC RH(D): CPT | Performed by: OBSTETRICS & GYNECOLOGY

## 2019-01-13 PROCEDURE — 85025 COMPLETE CBC W/AUTO DIFF WBC: CPT | Performed by: OBSTETRICS & GYNECOLOGY

## 2019-01-13 PROCEDURE — 82731 ASSAY OF FETAL FIBRONECTIN: CPT | Performed by: OBSTETRICS & GYNECOLOGY

## 2019-01-13 PROCEDURE — 25000128 H RX IP 250 OP 636

## 2019-01-13 PROCEDURE — 4A1HXCZ MONITORING OF PRODUCTS OF CONCEPTION, CARDIAC RATE, EXTERNAL APPROACH: ICD-10-PCS | Performed by: OBSTETRICS & GYNECOLOGY

## 2019-01-13 PROCEDURE — 59025 FETAL NON-STRESS TEST: CPT | Mod: 26 | Performed by: OBSTETRICS & GYNECOLOGY

## 2019-01-13 PROCEDURE — 25000132 ZZH RX MED GY IP 250 OP 250 PS 637: Performed by: OBSTETRICS & GYNECOLOGY

## 2019-01-13 PROCEDURE — 86850 RBC ANTIBODY SCREEN: CPT | Performed by: OBSTETRICS & GYNECOLOGY

## 2019-01-13 PROCEDURE — 36415 COLL VENOUS BLD VENIPUNCTURE: CPT | Performed by: OBSTETRICS & GYNECOLOGY

## 2019-01-13 PROCEDURE — 12000000 ZZH R&B MED SURG/OB

## 2019-01-13 PROCEDURE — 86900 BLOOD TYPING SEROLOGIC ABO: CPT | Performed by: OBSTETRICS & GYNECOLOGY

## 2019-01-13 PROCEDURE — 99222 1ST HOSP IP/OBS MODERATE 55: CPT | Mod: 25 | Performed by: OBSTETRICS & GYNECOLOGY

## 2019-01-13 PROCEDURE — 87210 SMEAR WET MOUNT SALINE/INK: CPT | Performed by: OBSTETRICS & GYNECOLOGY

## 2019-01-13 PROCEDURE — 25000128 H RX IP 250 OP 636: Performed by: OBSTETRICS & GYNECOLOGY

## 2019-01-13 PROCEDURE — 81001 URINALYSIS AUTO W/SCOPE: CPT | Performed by: OBSTETRICS & GYNECOLOGY

## 2019-01-13 RX ORDER — VITAMIN A ACETATE, .BETA.-CAROTENE, ASCORBIC ACID, CHOLECALCIFEROL, .ALPHA.-TOCOPHEROL ACETATE, DL-, THIAMINE MONONITRATE, RIBOFLAVIN, NIACINAMIDE, PYRIDOXINE HYDROCHLORIDE, FOLIC ACID, CYANOCOBALAMIN, CALCIUM CARBONATE, FERROUS FUMARATE, ZINC OXIDE, AND CUPRIC OXIDE 2000; 2000; 120; 400; 22; 1.84; 3; 20; 10; 1; 12; 200; 27; 25; 2 [IU]/1; [IU]/1; MG/1; [IU]/1; MG/1; MG/1; MG/1; MG/1; MG/1; MG/1; UG/1; MG/1; MG/1; MG/1; MG/1
1 TABLET ORAL DAILY
Status: DISCONTINUED | OUTPATIENT
Start: 2019-01-14 | End: 2019-01-14 | Stop reason: HOSPADM

## 2019-01-13 RX ORDER — TERBUTALINE SULFATE 1 MG/ML
INJECTION, SOLUTION SUBCUTANEOUS
Status: COMPLETED
Start: 2019-01-13 | End: 2019-01-13

## 2019-01-13 RX ORDER — NIFEDIPINE 10 MG/1
10 CAPSULE ORAL EVERY 6 HOURS SCHEDULED
Status: DISCONTINUED | OUTPATIENT
Start: 2019-01-13 | End: 2019-01-14 | Stop reason: HOSPADM

## 2019-01-13 RX ORDER — HYDROXYZINE PAMOATE 50 MG/1
50 CAPSULE ORAL 2 TIMES DAILY PRN
Status: DISCONTINUED | OUTPATIENT
Start: 2019-01-13 | End: 2019-01-14 | Stop reason: HOSPADM

## 2019-01-13 RX ORDER — TERBUTALINE SULFATE 1 MG/ML
0.25 INJECTION, SOLUTION SUBCUTANEOUS ONCE
Status: COMPLETED | OUTPATIENT
Start: 2019-01-13 | End: 2019-01-13

## 2019-01-13 RX ORDER — SODIUM CHLORIDE, SODIUM LACTATE, POTASSIUM CHLORIDE, CALCIUM CHLORIDE 600; 310; 30; 20 MG/100ML; MG/100ML; MG/100ML; MG/100ML
INJECTION, SOLUTION INTRAVENOUS CONTINUOUS
Status: DISCONTINUED | OUTPATIENT
Start: 2019-01-13 | End: 2019-01-14 | Stop reason: HOSPADM

## 2019-01-13 RX ORDER — LIDOCAINE 40 MG/G
CREAM TOPICAL
Status: DISCONTINUED | OUTPATIENT
Start: 2019-01-13 | End: 2019-01-14 | Stop reason: HOSPADM

## 2019-01-13 RX ORDER — NIFEDIPINE 10 MG/1
10 CAPSULE ORAL EVERY 6 HOURS SCHEDULED
Status: DISCONTINUED | OUTPATIENT
Start: 2019-01-14 | End: 2019-01-13

## 2019-01-13 RX ORDER — ONDANSETRON 2 MG/ML
4 INJECTION INTRAMUSCULAR; INTRAVENOUS EVERY 6 HOURS PRN
Status: DISCONTINUED | OUTPATIENT
Start: 2019-01-13 | End: 2019-01-14 | Stop reason: HOSPADM

## 2019-01-13 RX ADMIN — SODIUM CHLORIDE, POTASSIUM CHLORIDE, SODIUM LACTATE AND CALCIUM CHLORIDE 1000 ML: 600; 310; 30; 20 INJECTION, SOLUTION INTRAVENOUS at 22:04

## 2019-01-13 RX ADMIN — SODIUM CHLORIDE, POTASSIUM CHLORIDE, SODIUM LACTATE AND CALCIUM CHLORIDE: 600; 310; 30; 20 INJECTION, SOLUTION INTRAVENOUS at 23:11

## 2019-01-13 RX ADMIN — HYDROXYZINE PAMOATE 50 MG: 50 CAPSULE ORAL at 22:08

## 2019-01-13 RX ADMIN — TERBUTALINE SULFATE 0.25 MG: 1 INJECTION SUBCUTANEOUS at 20:53

## 2019-01-13 RX ADMIN — TERBUTALINE SULFATE 0.25 MG: 1 INJECTION, SOLUTION SUBCUTANEOUS at 20:53

## 2019-01-13 RX ADMIN — NIFEDIPINE 10 MG: 10 CAPSULE, LIQUID FILLED ORAL at 22:08

## 2019-01-13 ASSESSMENT — MIFFLIN-ST. JEOR: SCORE: 1516.07

## 2019-01-14 VITALS
OXYGEN SATURATION: 97 % | WEIGHT: 172 LBS | HEART RATE: 96 BPM | RESPIRATION RATE: 16 BRPM | HEIGHT: 65 IN | DIASTOLIC BLOOD PRESSURE: 61 MMHG | BODY MASS INDEX: 28.66 KG/M2 | TEMPERATURE: 98.3 F | SYSTOLIC BLOOD PRESSURE: 101 MMHG

## 2019-01-14 PROCEDURE — 25000132 ZZH RX MED GY IP 250 OP 250 PS 637: Performed by: OBSTETRICS & GYNECOLOGY

## 2019-01-14 PROCEDURE — G0463 HOSPITAL OUTPT CLINIC VISIT: HCPCS | Mod: 25

## 2019-01-14 PROCEDURE — 96360 HYDRATION IV INFUSION INIT: CPT

## 2019-01-14 PROCEDURE — 99238 HOSP IP/OBS DSCHRG MGMT 30/<: CPT | Performed by: OBSTETRICS & GYNECOLOGY

## 2019-01-14 PROCEDURE — 59025 FETAL NON-STRESS TEST: CPT

## 2019-01-14 PROCEDURE — 96372 THER/PROPH/DIAG INJ SC/IM: CPT

## 2019-01-14 RX ORDER — HYDROXYZINE PAMOATE 50 MG/1
50 CAPSULE ORAL 3 TIMES DAILY PRN
Qty: 50 CAPSULE | Refills: 3 | Status: SHIPPED | OUTPATIENT
Start: 2019-01-14 | End: 2019-03-07

## 2019-01-14 RX ADMIN — NIFEDIPINE 10 MG: 10 CAPSULE, LIQUID FILLED ORAL at 06:25

## 2019-01-14 NOTE — PLAN OF CARE
Dr Zamudio remained on the Unit and pt continued to have regular contractions.  It had almost been 3 hrs since pt's cervix was last checked so he requested that pt be rechecked.  Cervix recheck per myself.  During exam I felt as though I was able to get into cervix but unable to reach to see cervix was dilated or if just the external os was dilated, Dr Zamudio asked to check pt.  He did SVE and stated cervix is posterior and closed.  Will continue to monitor pt.  LR bolus completed and LR infusion at [facility].  Pt was given nifedipine per orders and also prn vistaril to help her rest.  Pt declined any change in contractions after terbutaline or nifedipine.  Will continue to monitor pt and provide cares as needed.  Pt is aware to call with needs.

## 2019-01-14 NOTE — PLAN OF CARE
Pt was due for nifedipine at 0400, pt resting quietly in bed.   Pt woke, vitals obtained and BP was slightly low.  Rechecked BP and it remained low, refer to flowsheets for details.  Dr Zamudio called to clarify if he had parameters to hold medication.   He stated to hold med x 1 hr and recheck BP if SBP was less than 100 to hold dose.  Into room at 0530 and pt was resting quietly, pt awake and  BP rechecked.  SBP was less than 100, dose held.  Pt resting quietly and able to rest through contractions at this time.  Will continue to monitor pt and provide cares as needed.

## 2019-01-14 NOTE — PLAN OF CARE
Face to face report given with opportunity to observe patient.    Report given to WOODROW Covington   1/14/2019  7:20 AM

## 2019-01-14 NOTE — PROGRESS NOTES
S:   Patient seen for PTCx.  Contractions have settled with IVF, bedrest, and oral Vistaril.     O:   Strip is CAT I,  occ mild with some irritability-markedly decreased.         CX:  Post/30%/med/high/FT    A:     contractions stable at this point with IVF,Terb subQ,and oral Vistaril.   FFN negative and no cervical change.    P:   Will discharge at this time. Patient is intolerant to Nifedipine side effects, but seems to do well with oral Vistaril, so will do  OP script.  PTL precautions given.  All questions answered.  Discussed early delivery here versus [place] in terms of fetal risk and need to transfer.

## 2019-01-14 NOTE — PLAN OF CARE
Into pt room to adjust monitor.  Difficulty picking up contractions while pt is lying on her side.  [place] adjusting.  Pt is resting quietly.   Will continue to monitor pt and provide cares as needed.

## 2019-01-14 NOTE — DISCHARGE SUMMARY
[facility]    Obstetrics Post-Op / Progress Note    Assessment & Plan   Assessment:  -* No surgery found *   PT contractions  25 weeks PG  Previous C/S  Nifedepine intolerance      Doing well.  Contractions are minimal with some irritability    Plan:  Discharge to home with PTL precautions      Colin Rascon     Interval History   Doing well.  Contractions are controlled.  No fevers.  Good appetite.  Denies chest pain, shortness of breath, nausea or vomiting.    Medications     lactated ringers 10 mL/hr at 01/13/19 2311     - MEDICATION INSTRUCTIONS -         NIFEdipine  10 mg Oral Q6H KIESHA     PNV PRENATAL PLUS MULTIVITAMIN  1 tablet Oral Daily     sodium chloride (PF)  3 mL Intracatheter Q8H       Physical Exam   Temp: 98.3  F (36.8  C) Temp src: Oral BP: 101/61 Pulse: 96   Resp: 16 SpO2: 97 %      Vitals:    01/13/19 1947   Weight: 78 kg (172 lb)     Vital Signs with Ranges  Temp:  [98.3  F (36.8  C)-98.5  F (36.9  C)] 98.3  F (36.8  C)  Pulse:  [96] 96  Resp:  [12-16] 16  BP: ()/(54-71) 101/61  SpO2:  [97 %] 97 %  I/O last 3 completed shifts:  In: 3 [I.V.:3]  Out: -     Uterus is quiet and NOT tender  CX:   FT/30%/post/medium/unengaged---NO change    Data   Recent Labs   Lab Test 01/13/19 2155   ABO A   RH Pos   AS Neg     Recent Labs   Lab Test 01/13/19 2155 11/16/18  2333   HGB 10.8* 11.6*     Recent Labs   Lab Test 09/21/18  1250   RUQIGG 19

## 2019-01-14 NOTE — PLAN OF CARE
Medications given to help reduce frequency of contractions, pt still reporting painful contractions.  Will continue to monitor.

## 2019-01-14 NOTE — DISCHARGE INSTRUCTIONS

## 2019-01-14 NOTE — PLAN OF CARE
Dr Zamudio called at 2017 and updated of SVE, anterior cervix, dimple which I was able to get partially through but not able to get all the way through to presenting part.  Medium consistency.   Also updated him of wet prep results, still awaiting FFN results and that some contractions palpate more mild/moderate than mild.  Pt is breathing through some contractions at times, MD aware.  Plan to page Dr Zamudio back with FFN results when available.

## 2019-01-14 NOTE — PLAN OF CARE
OB Triage Note  Kathia Milton  MRN: [id number]  Gestational Age: 27w2d      Kathia Milton presents for rule out  labor (sign/symptom/concern).      States robbie since .  Rates pain at 9/10. Denies LOF.  FFN negative.   Robb notified of arrival and condition.  Oriented patient to surroundings. Call light within reach.      FHT: 130  NST Start Time:  NST Stop Time:  NST: Reactive.  Uterine Assessment:Contractions: frequency q 1-5 minutes of mild quality.    Plan:  -Initial NST, then fetal/uterine monitoring per MD/patient plan.  -Sterile vaginal exam/sterile speculum exam.  -Nursing education provided as charted.

## 2019-01-14 NOTE — PLAN OF CARE
"Kathia Milton is a 28 year old  and 27w2d patient came in complaining of  Labor (HX of and Contractions with pain at previous  spot)    Patient Active Problem List   Diagnosis     Abdominal pain, acute     Anemia in pregnancy     Seasonal allergies     Tobacco use disorder     Endometriosis of pelvis     Condyloma acuminatum due to human papillomavirus      delivery     Previous  delivery affecting pregnancy     High risk pregnancy due to history of  labor in first trimester     Encounter for triage in pregnant patient     External hemorrhoids      contractions-2019--FFN neg, prev steroids, Mag neuroprotection, weekly Francie       Pt discharged to home at 7:53 AM and encouraged to rest and drink plenty of fluids.  Pt told to call/return if bleeding more than spotting, water breaks, contractions 3-5 minutes apart that she has to breath through.     Nursing education on when to return and abdominal binder provided. Self-management instructions reviewed. New medications and therapies reviewed. AVS given and signed. All questions answered and patient verbalizes understanding.    /61   Pulse 96   Temp 98.3  F (36.8  C) (Oral)   Resp 16   Ht 1.651 m (5' 5\")   Wt 78 kg (172 lb)   LMP 2018 (Approximate)   SpO2 97%   BMI 28.62 kg/m      Discharge support:  Independent-Alone    Obstetric History       T0      L1     SAB3   TAB0   Ectopic0   Multiple0   Live Births1       # Outcome Date GA Lbr Ash/2nd Weight Sex Delivery Anes PTL Lv   5 Current            4  13 28w0d  1.077 kg (2 lb 6 oz) F -SEC   RICHMOND      Name: Christian   3 SAB            2 SAB            1 SAB                   Paola Saldaña    "

## 2019-01-15 ENCOUNTER — MYC MEDICAL ADVICE (OUTPATIENT)
Dept: OBGYN | Facility: OTHER | Age: 29
End: 2019-01-15

## 2019-01-15 ENCOUNTER — HOSPITAL ENCOUNTER (OUTPATIENT)
Dept: ULTRASOUND IMAGING | Facility: CLINIC | Age: 29
End: 2019-01-15
Attending: OBSTETRICS & GYNECOLOGY
Payer: COMMERCIAL

## 2019-01-15 ENCOUNTER — APPOINTMENT (OUTPATIENT)
Dept: MATERNAL FETAL MEDICINE | Facility: CLINIC | Age: 29
End: 2019-01-15
Attending: OBSTETRICS & GYNECOLOGY
Payer: COMMERCIAL

## 2019-01-15 ENCOUNTER — HOSPITAL ENCOUNTER (OUTPATIENT)
Dept: ULTRASOUND IMAGING | Facility: HOSPITAL | Age: 29
Discharge: HOME | End: 2019-01-15
Attending: OBSTETRICS & GYNECOLOGY | Admitting: OBSTETRICS & GYNECOLOGY
Payer: COMMERCIAL

## 2019-01-15 DIAGNOSIS — O09.92 SUPERVISION OF HIGH RISK PREGNANCY IN SECOND TRIMESTER: ICD-10-CM

## 2019-01-15 PROCEDURE — 76811 OB US DETAILED SNGL FETUS: CPT | Mod: TC

## 2019-01-15 NOTE — TELEPHONE ENCOUNTER
Pressure by itself not to concerning and common in third trimester.  Hemorrhoids also could contribute to that.  If strong/painful contractions 5 or more per hour lasting a minute or more and not going away with rest/fluids etc return to L and D.  Recent FFN test negative so low chance of  labor in next two weeks based on that test result.

## 2019-01-16 ENCOUNTER — MYC MEDICAL ADVICE (OUTPATIENT)
Dept: OBGYN | Facility: OTHER | Age: 29
End: 2019-01-16

## 2019-01-16 ENCOUNTER — HOSPITAL ENCOUNTER (OUTPATIENT)
Facility: HOSPITAL | Age: 29
Discharge: HOME | End: 2019-01-16
Attending: OBSTETRICS & GYNECOLOGY | Admitting: OBSTETRICS & GYNECOLOGY
Payer: COMMERCIAL

## 2019-01-16 VITALS
SYSTOLIC BLOOD PRESSURE: 122 MMHG | HEIGHT: 65 IN | DIASTOLIC BLOOD PRESSURE: 74 MMHG | BODY MASS INDEX: 28.82 KG/M2 | HEART RATE: 90 BPM | RESPIRATION RATE: 18 BRPM | WEIGHT: 173 LBS | OXYGEN SATURATION: 98 % | TEMPERATURE: 97.8 F

## 2019-01-16 PROCEDURE — 25000132 ZZH RX MED GY IP 250 OP 250 PS 637: Performed by: OBSTETRICS & GYNECOLOGY

## 2019-01-16 PROCEDURE — 59025 FETAL NON-STRESS TEST: CPT | Mod: 26 | Performed by: OBSTETRICS & GYNECOLOGY

## 2019-01-16 PROCEDURE — 96372 THER/PROPH/DIAG INJ SC/IM: CPT

## 2019-01-16 PROCEDURE — 25000128 H RX IP 250 OP 636: Performed by: OBSTETRICS & GYNECOLOGY

## 2019-01-16 PROCEDURE — G0463 HOSPITAL OUTPT CLINIC VISIT: HCPCS

## 2019-01-16 PROCEDURE — 59025 FETAL NON-STRESS TEST: CPT

## 2019-01-16 PROCEDURE — G0463 HOSPITAL OUTPT CLINIC VISIT: HCPCS | Mod: 25

## 2019-01-16 RX ORDER — HYDROXYZINE PAMOATE 50 MG/1
50 CAPSULE ORAL ONCE
Status: COMPLETED | OUTPATIENT
Start: 2019-01-16 | End: 2019-01-16

## 2019-01-16 RX ORDER — TERBUTALINE SULFATE 1 MG/ML
0.25 INJECTION, SOLUTION SUBCUTANEOUS ONCE
Status: COMPLETED | OUTPATIENT
Start: 2019-01-16 | End: 2019-01-16

## 2019-01-16 RX ADMIN — TERBUTALINE SULFATE 0.25 MG: 1 INJECTION SUBCUTANEOUS at 15:06

## 2019-01-16 RX ADMIN — HYDROXYZINE PAMOATE 50 MG: 50 CAPSULE ORAL at 15:06

## 2019-01-16 ASSESSMENT — MIFFLIN-ST. JEOR: SCORE: 1520.6

## 2019-01-16 NOTE — PLAN OF CARE
Dr Fletcher paged and updated of pt's arrival, contractions since 1000 today which pt reports as painful 9-10/10 and SVE.  Orders obtained for terbutaline subcutaneous x 1 , oral hydration, po vistaril x 1 and to recheck cervix in 1 hr and call Dr Fletcher after the exam.  Will update pt of the plan of care.

## 2019-01-17 ENCOUNTER — HOSPITAL ENCOUNTER (OUTPATIENT)
Facility: HOSPITAL | Age: 29
Discharge: HOME | End: 2019-01-17
Attending: OBSTETRICS & GYNECOLOGY | Admitting: OBSTETRICS & GYNECOLOGY
Payer: COMMERCIAL

## 2019-01-17 VITALS
TEMPERATURE: 98.1 F | BODY MASS INDEX: 28.99 KG/M2 | RESPIRATION RATE: 16 BRPM | SYSTOLIC BLOOD PRESSURE: 112 MMHG | WEIGHT: 174 LBS | DIASTOLIC BLOOD PRESSURE: 60 MMHG | HEIGHT: 65 IN

## 2019-01-17 DIAGNOSIS — Z36.89 ENCOUNTER FOR TRIAGE IN PREGNANT PATIENT: Primary | ICD-10-CM

## 2019-01-17 LAB
ALBUMIN UR-MCNC: 10 MG/DL
APPEARANCE UR: CLEAR
BACTERIA #/AREA URNS HPF: ABNORMAL /HPF
BILIRUB UR QL STRIP: NEGATIVE
COLOR UR AUTO: YELLOW
GLUCOSE UR STRIP-MCNC: NEGATIVE MG/DL
HGB UR QL STRIP: NEGATIVE
KETONES UR STRIP-MCNC: 5 MG/DL
LEUKOCYTE ESTERASE UR QL STRIP: NEGATIVE
MUCOUS THREADS #/AREA URNS LPF: PRESENT /LPF
NITRATE UR QL: NEGATIVE
PH UR STRIP: 6.5 PH (ref 4.7–8)
RBC #/AREA URNS AUTO: 1 /HPF (ref 0–2)
SOURCE: ABNORMAL
SP GR UR STRIP: 1.03 (ref 1–1.03)
UROBILINOGEN UR STRIP-MCNC: NORMAL MG/DL (ref 0–2)
WBC #/AREA URNS AUTO: 1 /HPF (ref 0–5)

## 2019-01-17 PROCEDURE — 96361 HYDRATE IV INFUSION ADD-ON: CPT

## 2019-01-17 PROCEDURE — 25000128 H RX IP 250 OP 636: Performed by: OBSTETRICS & GYNECOLOGY

## 2019-01-17 PROCEDURE — G0463 HOSPITAL OUTPT CLINIC VISIT: HCPCS | Mod: 25

## 2019-01-17 PROCEDURE — 99219 ZZC INITIAL OBSERVATION CARE,LEVL II: CPT | Mod: 25 | Performed by: OBSTETRICS & GYNECOLOGY

## 2019-01-17 PROCEDURE — 96374 THER/PROPH/DIAG INJ IV PUSH: CPT

## 2019-01-17 PROCEDURE — 59025 FETAL NON-STRESS TEST: CPT | Mod: 26 | Performed by: OBSTETRICS & GYNECOLOGY

## 2019-01-17 PROCEDURE — 59025 FETAL NON-STRESS TEST: CPT

## 2019-01-17 PROCEDURE — 81001 URINALYSIS AUTO W/SCOPE: CPT | Performed by: OBSTETRICS & GYNECOLOGY

## 2019-01-17 RX ORDER — SODIUM CHLORIDE, SODIUM LACTATE, POTASSIUM CHLORIDE, CALCIUM CHLORIDE 600; 310; 30; 20 MG/100ML; MG/100ML; MG/100ML; MG/100ML
INJECTION, SOLUTION INTRAVENOUS CONTINUOUS
Status: DISCONTINUED | OUTPATIENT
Start: 2019-01-17 | End: 2019-01-17 | Stop reason: HOSPADM

## 2019-01-17 RX ORDER — PROMETHAZINE HYDROCHLORIDE 25 MG/1
25 TABLET ORAL EVERY 6 HOURS PRN
Qty: 20 TABLET | Refills: 0 | Status: SHIPPED | OUTPATIENT
Start: 2019-01-17 | End: 2019-02-19

## 2019-01-17 RX ORDER — ONDANSETRON 2 MG/ML
4 INJECTION INTRAMUSCULAR; INTRAVENOUS EVERY 6 HOURS PRN
Status: DISCONTINUED | OUTPATIENT
Start: 2019-01-17 | End: 2019-01-17 | Stop reason: HOSPADM

## 2019-01-17 RX ADMIN — SODIUM CHLORIDE, POTASSIUM CHLORIDE, SODIUM LACTATE AND CALCIUM CHLORIDE: 600; 310; 30; 20 INJECTION, SOLUTION INTRAVENOUS at 16:08

## 2019-01-17 RX ADMIN — ONDANSETRON 4 MG: 2 INJECTION INTRAMUSCULAR; INTRAVENOUS at 15:41

## 2019-01-17 RX ADMIN — SODIUM CHLORIDE, POTASSIUM CHLORIDE, SODIUM LACTATE AND CALCIUM CHLORIDE 1000 ML: 600; 310; 30; 20 INJECTION, SOLUTION INTRAVENOUS at 15:41

## 2019-01-17 ASSESSMENT — MIFFLIN-ST. JEOR: SCORE: 1525.14

## 2019-01-17 NOTE — DISCHARGE INSTRUCTIONS
Instructed to rest, drink plenty of fluids, take medications for nausea and vistaril as needed.    Discharge Instructions for Undelivered Patients    Diet:  * Drink 8 to 12 glasses of liquids (milk, juice, water) every day  * You may eat meals and snacks.    Activity:  * Count fetal kicks every day.  * Call your doctor if your baby is moving less than usual.    Call your provider if you notice:  * Swelling in your face or increased swelling in your hands or legs.  * Headaches that are not relieved by Tylenol (acetaminophen).  * Changes in your vision (blurring; seeing spots or stars).  * Nausea (sick to your stomach) and vomiting (throwing up).  * Weight gain of 5 pounds per week.  * Heartburn that doesn't go away.  * Signs of bladder infection: Pain when you urinate (use the toilet), needing to go more often or more urgently.  * The bag of decker (membrane) breaks, or you notice leaking in your underwear.  * Bright red blood in your underwear.  * Abdominal (lower belly) or stomach pain.  * Second (plus) baby: Contractions (tightenings) less than 10 minutes apart and getting stronger if 37 weeks.  * Increase or change in vaginal discharge (note the color and amount).      [facility]: [phone]

## 2019-01-17 NOTE — PLAN OF CARE
OB Triage Note  Kathia Milton  MRN: [id number]  Gestational Age: 27w5d      Kathia Milton presents for nausea and vomiting and continued contractions.   States robbie since her discharge yesterday.  Rates pain at 7/10.  Bleeding: none noted on assessment: Melanie LOF.     Dr. Fletcher notified of arrival and condition.  Oriented patient to surroundings. Call light within reach.     FHT: 140's  NST Start Time:1510  NST Stop Time: 1545  NST: Reactive .  Uterine Assessment:Contractions: frequency q 5-6 minutes of mild quality.    Plan:  -Initial NST, then fetal/uterine monitoring per MD/patient plan.  -Sterile vaginal exam/sterile speculum exam.  IV fluid bolus  IV zofran  -Nursing education on hydration and  labor provided.

## 2019-01-17 NOTE — H&P
[facility] Labor and Delivery History and Physical    Kathia Milton MRN# [id number]   Age: 28 year old YOB: 1990     Date of Admission:  2019    Primary care provider: Lainey Rose           Chief Complaint:   Kathia Milton is a 28 year old female who is 27w5d pregnant and being admitted for  ctx, N/V.  Prenatal record/H and P reviewed with patient and unchanged.  Pt has been having PTC for last couple of weeks and were q 7 minutes today and came in for eval.  Tolerating PO fluids.  Denies VB, LOF.   No other new complaints.  See previous evals.  Previously did not tolerate Nifedipine and terbutaline has not helped and recived steroids in [place].  Vistaril mildly helpful.  Neg FFN 19..            Pregnancy history:     OBSTETRIC HISTORY:    Obstetric History       T0      L1     SAB3   TAB0   Ectopic0   Multiple0   Live Births1       # Outcome Date GA Lbr Ash/2nd Weight Sex Delivery Anes PTL Lv   5 Current            4  13 28w0d  1.077 kg (2 lb 6 oz) F -SEC   RICHMOND      Name: Christian   3 SAB            2 SAB            1 SAB                   EDC: Estimated Date of Delivery: 2019    Prenatal Labs:   Lab Results   Component Value Date    ABO A 2019    RH Pos 2019    AS Neg 2019    HEPBANG Nonreactive 2018    CHPCRT  2016     Negative   Negative for C. trachomatis rRNA by transcription mediated amplification.   A negative result by transcription mediated amplification does not preclude the   presence of C. trachomatis infection because results are dependent on proper   and adequate collection, absence of inhibitors, and sufficient rRNA to be   detected.      GCPCRT  2016     Negative   Negative for N. gonorrhoeae rRNA by transcription mediated amplification.   A negative result by transcription mediated amplification does not preclude the   presence of N. gonorrhoeae infection  because results are dependent on proper   and adequate collection, absence of inhibitors, and sufficient rRNA to be   detected.      TREPAB nonreactive 2012    RUBELLAABIGG immune 2012    HGB 10.8 (L) 2019    HIV negative 2012       GBS Status:   No results found for: GBS    Active Problem List  Patient Active Problem List   Diagnosis     Abdominal pain, acute     Anemia in pregnancy     Seasonal allergies     Tobacco use disorder     Endometriosis of pelvis     Condyloma acuminatum due to human papillomavirus      delivery     Previous  delivery affecting pregnancy     High risk pregnancy due to history of  labor in first trimester     Encounter for triage in pregnant patient     External hemorrhoids      contractions-2019--FFN neg, prev steroids, Mag neuroprotection, weekly Stephanie       Medication Prior to Admission  Facility-Administered Medications Prior to Admission   Medication Dose Route Frequency Provider Last Rate Last Dose     HYDROXYprogesterone caproate (STEPHANIE) intramuscular injection 250 mg  250 mg Intramuscular Q7 Days Consuelo Quiles, NP   250 mg at 18 1624     Medications Prior to Admission   Medication Sig Dispense Refill Last Dose     hydrocortisone (ANUSOL-HC) 2.5 % cream Place rectally 2 times daily as needed for hemorrhoids 30 g 1 2019 at Unknown time     hydrOXYzine (VISTARIL) 50 MG capsule Take 1 capsule (50 mg) by mouth 3 times daily as needed for itching Take as necessary for uterine contractions and anxiety 50 capsule 3 2019 at 1200     Prenatal Vit-Fe Fumarate-FA (PRENATAL MULTIVITAMIN PLUS IRON) 27-0.8 MG TABS per tablet Take 1 tablet by mouth daily   2019 at 0630   .        Maternal Past Medical History:     Past Medical History:   Diagnosis Date     Benign neoplasm of vagina 2011     Condyloma acuminatum 2011     Seasonal allergies      Supervision of other normal pregnancy     MICHELLE: 2013      "Tobacco use disorder 10/18/2012                       Family History:   Unchanged from prenatal record            Social History:   Unchanged from prenatal record         Review of Systems:   Per HPI.  Other systems reviewed and negative         Physical Exam:     Vitals:    19 1454   BP: 112/60   BP Location: Right arm   Resp: 16   Temp: 98.1  F (36.7  C)   TempSrc: Oral   Weight: 78.9 kg (174 lb)   Height: 1.651 m (5' 5\")     Chest: CTA  CV:  RRR without murmers  General:  Alert and oriented  Abdomen soft, non-tender, gravid  Ext: neg  Cervix:  1/30%, high/thick per rn, unchanged.      Fetal Heart Rate Tracing: reactive and reassuring  Tocometer: external monitor and frequency q 7 minutes                       Assessment:   Kathia Milton is a 27w5d pregnant female with pretem contractions without  labor.  N/V.          Plan:   Pt to be given IVF bolus and antiemetics and follow clinical status.   No cervical change or evidence PTL.  Possible discharge to home if improves with therapy.      Damon Fletcher MD    "

## 2019-01-17 NOTE — PLAN OF CARE
Kathia Milton    NST:  reactive  Start: 1510  Stop: 1545    Physician: Dr. Fletcher  Reason For Test:  contractions and nausea and vomiting  EDC: 19  Gestational Age: 27 5      Giselle Portillo

## 2019-01-17 NOTE — PLAN OF CARE
Dr. Fletcher in to see patient prior to discharge. Discharge instructions reviewed with patient-see AVS. Instructed to rest, drink plenty of fluids, take nausea medications and vistaril as needed. Instructed to notify us is contractions get stronger and closer together, or if leaking fluids or vaginal bleeding.Pt. Instructed to  new medication phenergan. Pt. Verbalized understanding of discharge instructions. Discharged to home.

## 2019-01-21 ENCOUNTER — HOSPITAL ENCOUNTER (OUTPATIENT)
Facility: HOSPITAL | Age: 29
Discharge: HOME | End: 2019-01-21
Attending: OBSTETRICS & GYNECOLOGY | Admitting: OBSTETRICS & GYNECOLOGY
Payer: COMMERCIAL

## 2019-01-21 ENCOUNTER — TELEPHONE (OUTPATIENT)
Dept: OBGYN | Facility: HOSPITAL | Age: 29
End: 2019-01-21

## 2019-01-21 VITALS
SYSTOLIC BLOOD PRESSURE: 113 MMHG | TEMPERATURE: 98.2 F | OXYGEN SATURATION: 97 % | RESPIRATION RATE: 16 BRPM | DIASTOLIC BLOOD PRESSURE: 74 MMHG

## 2019-01-21 LAB
FIBRONECTIN FETAL VAG QL: NEGATIVE
RUPTURE OF FETAL MEMBRANES BY ROM PLUS: NEGATIVE

## 2019-01-21 PROCEDURE — G0463 HOSPITAL OUTPT CLINIC VISIT: HCPCS | Mod: 25

## 2019-01-21 PROCEDURE — G0463 HOSPITAL OUTPT CLINIC VISIT: HCPCS

## 2019-01-21 PROCEDURE — 82731 ASSAY OF FETAL FIBRONECTIN: CPT | Performed by: OBSTETRICS & GYNECOLOGY

## 2019-01-21 PROCEDURE — 25000128 H RX IP 250 OP 636: Performed by: OBSTETRICS & GYNECOLOGY

## 2019-01-21 PROCEDURE — 96361 HYDRATE IV INFUSION ADD-ON: CPT

## 2019-01-21 PROCEDURE — 96374 THER/PROPH/DIAG INJ IV PUSH: CPT

## 2019-01-21 PROCEDURE — 96372 THER/PROPH/DIAG INJ SC/IM: CPT

## 2019-01-21 PROCEDURE — 96360 HYDRATION IV INFUSION INIT: CPT

## 2019-01-21 PROCEDURE — 84112 EVAL AMNIOTIC FLUID PROTEIN: CPT | Performed by: OBSTETRICS & GYNECOLOGY

## 2019-01-21 RX ORDER — HYDROXYZINE HYDROCHLORIDE 50 MG/ML
50 INJECTION, SOLUTION INTRAMUSCULAR ONCE
Status: COMPLETED | OUTPATIENT
Start: 2019-01-21 | End: 2019-01-21

## 2019-01-21 RX ORDER — FENTANYL CITRATE 50 UG/ML
25 INJECTION, SOLUTION INTRAMUSCULAR; INTRAVENOUS ONCE
Status: COMPLETED | OUTPATIENT
Start: 2019-01-21 | End: 2019-01-21

## 2019-01-21 RX ORDER — TERBUTALINE SULFATE 1 MG/ML
0.25 INJECTION, SOLUTION SUBCUTANEOUS ONCE
Status: COMPLETED | OUTPATIENT
Start: 2019-01-21 | End: 2019-01-21

## 2019-01-21 RX ORDER — SODIUM CHLORIDE, SODIUM LACTATE, POTASSIUM CHLORIDE, CALCIUM CHLORIDE 600; 310; 30; 20 MG/100ML; MG/100ML; MG/100ML; MG/100ML
INJECTION, SOLUTION INTRAVENOUS CONTINUOUS
Status: DISCONTINUED | OUTPATIENT
Start: 2019-01-21 | End: 2019-01-21 | Stop reason: HOSPADM

## 2019-01-21 RX ADMIN — HYDROXYZINE HYDROCHLORIDE 50 MG: 50 INJECTION, SOLUTION INTRAMUSCULAR at 12:31

## 2019-01-21 RX ADMIN — SODIUM CHLORIDE, POTASSIUM CHLORIDE, SODIUM LACTATE AND CALCIUM CHLORIDE: 600; 310; 30; 20 INJECTION, SOLUTION INTRAVENOUS at 13:43

## 2019-01-21 RX ADMIN — TERBUTALINE SULFATE 0.25 MG: 1 INJECTION SUBCUTANEOUS at 12:04

## 2019-01-21 RX ADMIN — SODIUM CHLORIDE, POTASSIUM CHLORIDE, SODIUM LACTATE AND CALCIUM CHLORIDE 1000 ML: 600; 310; 30; 20 INJECTION, SOLUTION INTRAVENOUS at 12:55

## 2019-01-21 RX ADMIN — FENTANYL CITRATE 25 MCG: 50 INJECTION, SOLUTION INTRAMUSCULAR; INTRAVENOUS at 13:49

## 2019-01-21 NOTE — PLAN OF CARE
Kathia Milton is a 28 year old  and 28w2d patient came in complaining of Contractions    Patient Active Problem List   Diagnosis     Abdominal pain, acute     Anemia in pregnancy     Seasonal allergies     Tobacco use disorder     Endometriosis of pelvis     Condyloma acuminatum due to human papillomavirus      delivery     Previous  delivery affecting pregnancy     High risk pregnancy due to history of  labor in first trimester     Encounter for triage in pregnant patient     External hemorrhoids      contractions-2019--FFN neg, prev steroids, Mag neuroprotection, weekly [place]       Pt discharged to home at 1500 and encouraged to rest, drink plenty of fluids and take her vistaril if she starts having regular contractions.  Pt told to call/return if bleeding, water breaks, contractions more than 10 in a 1 hr period.     Nursing education on signs and symptoms to monitor and reasons to call provided. Self-management instructions reviewed. AVS given and signed. All questions answered and patient verbalizes understanding.    /74   Temp 98.2  F (36.8  C) (Oral)   Resp 16   LMP 2018 (Approximate)   SpO2 97%     Cervical status: finger tip  Fetal Assessment: category I    Discharge support:  Independent-Alone    Obstetric History       T0      L1     SAB3   TAB0   Ectopic0   Multiple0   Live Births1       # Outcome Date GA Lbr Ash/2nd Weight Sex Delivery Anes PTL Lv   5 Current            4  13 28w0d  1.077 kg (2 lb 6 oz) F -SEC   RICHMOND      Name: Christian   3 SAB            2 SAB            1 SAB                   Ewa Gagnon

## 2019-01-21 NOTE — TELEPHONE ENCOUNTER
2019 11:31 AM  Kathia Milton 1990 [phone] (home)   Pt of Dr: No data on file.   Estimated Date of Delivery: 2019 28w2d    Patient Active Problem List   Diagnosis     Abdominal pain, acute     Anemia in pregnancy     Seasonal allergies     Tobacco use disorder     Endometriosis of pelvis     Condyloma acuminatum due to human papillomavirus      delivery     Previous  delivery affecting pregnancy     High risk pregnancy due to history of  labor in first trimester     Encounter for triage in pregnant patient     External hemorrhoids      contractions-2019--FFN neg, prev steroids, Mag neuroprotection, weekly Francie         Spoke with Kathia Milton   Pt lives 5 miles away.  Reason for call per pt  She said she started robbie around 10:30 and they have been getting really painful the last 30-45 minutes.  She also said around 10 am she had about a dime size amount of blood and mucous when going to the bathroom.   She stated she has been having more vaginal discharge and a couple times yesterday and once this am she felt like she was wetting her pants because she felt fluid leaking when she would move from a lying position to standing up.    Are you having contractions? Yes.  Started around 10:30 and have become more painful in the last 30-45 minutes  Are you having any bloody show: dime size amount of blood noted when using the bathroom.  Declines any intercourse.    Are you leaking any fluids/ has your water broken? Clear fluid a few times as described above.   Did you have any complications with this or a previous pregnancy? Yes:  delivery with first child, primary .  In OBTU several times this pregnancy with pre-term contractions.    Vaginal discharge? Yes  Cervical Status: 1cm previously when seen in OBTU.    Patient advised: Come to the hospital for further evaluation .  Dr Rascon was paged and updated that pt is coming to be  seen.  Orders obtained and Dr Rascon stated to call him once tests are obtained and he will come and check pt's cervix.      Call taken by Ewa Gagnon

## 2019-01-21 NOTE — PLAN OF CARE
OB Triage Note  Kathia Milton  MRN: [id number]  Gestational Age: 28w2d      Kathia Milton presents for painful contractions at home, dime size vaginal bleeding this am and leaking fluid.   (sign/symptom/concern).      States robbie since 1030.  Rates pain at 7/10.  Bleeding: pt reported a dime size amount of vaginal bleeding with mucous this am.  Reports LOF.  Reports leaking a small amount a couple times yesterday and once this am.  She said she noted it when she would go from a lying down position to standing up.  Stated fluid was clear.    Dr Rascon notified of arrival and condition.  Oriented patient to surroundings. Call light within reach.     FHT: reassuring, category I.    Dr Rascon declined a need for a NST.      Uterine Assessment:Contractions: frequency q 3-6 minutes, lasting 50 seconds and palpate mild.     Plan:  -Initial EFM, then fetal/uterine monitoring per MD/patient plan.  -Sterile vaginal exam/sterile speculum exam.  -Nursing education on plan of care and reasons to call provided.

## 2019-01-21 NOTE — DISCHARGE INSTRUCTIONS
Discharge Instructions for Undelivered Patients    Diet:  * Drink 8 to 12 glasses of liquids (milk, juice, water) every day  * You may eat meals and snacks.    Activity:  * Count fetal kicks every day.  * Call your doctor if your baby is moving less than usual.    Call your provider if you notice:  * Swelling in your face or increased swelling in your hands or legs.  * Headaches that are not relieved by Tylenol (acetaminophen).  * Changes in your vision (blurring; seeing spots or stars).  * Nausea (sick to your stomach) and vomiting (throwing up).  * Weight gain of 5 pounds per week.  * Heartburn that doesn't go away.  * Signs of bladder infection: Pain when you urinate (use the toilet), needing to go more often or more urgently.  * The bag of decker (membrane) breaks, or you notice leaking in your underwear.  * Bright red blood in your underwear.  * Abdominal (lower belly) or stomach pain.  * Increase or change in vaginal discharge (note the color and amount).    Call if greater than 6 contractions in a hour.  Keep hydrated, rest and take vistaril if you start noticing regular contractions.      [facility]: [phone]

## 2019-01-21 NOTE — PROGRESS NOTES
S:   Didn't take her Vistaril as directed.   Works at home using computer.    O:  Strip is reactive and CAT I                Lab:    FFN and Amnisure are both negative.    A:   Stable      P:   IVF, Terbutaline, Vistaril

## 2019-01-21 NOTE — PROGRESS NOTES
[facility] Obstetrics Progress Note         Assessment and Plan:    Assessment:   Threatened PTL  28w 2d  Previous steroids  Previous Mag for neuroprotection  Previous C/S with T incision      Plan:   Evaluate threatened PTL with FFN and Amnisure, check UA, TERB           Interval History:   Had been doing well.  No fevers.  No  symptoms or LOF, bleeding. Begin contraction at 10:30 AM today.  Denies any abdominal pain.        Significant Problems:    chronic threatened PTL with multiple hospital triage here and admission in [facility].          Review of Systems:    CONSTITUTIONAL: NEGATIVE for fever, chills, change in weight  ENT/MOUTH: NEGATIVE for ear, mouth and throat problems  RESP: NEGATIVE for significant cough or SOB  CV: NEGATIVE for chest pain, palpitations or peripheral edema  GI: NEGATIVE for nausea, abdominal pain, heartburn, or change in bowel habits  PSYCHIATRIC: NEGATIVE for changes in mood or affect          Medications:     Current Facility-Administered Medications   Medication     lactated ringers BOLUS 1,000 mL             Physical Exam:                    ABD is soft and NOT tender                   US shows breech presentation  CX  0-1/20%/firm/-4,   Unchanged from previous triage that I took care of.          Data:   Pending  Bedside US shows Vertex presentation    Colin Rascon MD

## 2019-01-21 NOTE — PLAN OF CARE
Dr Rascon was paged and updated that LR bolus was almost complete and that pt is robbie every 3-6 minutes at this time, with some spaces up to 11 minutes.  Also informed him that pt reported contractions as a little better, rating pain a 6/10 down from a 7/10.  Order obtained for LR to run at 150 ml/hr and to give IV fentanyl x 1.  Will update pt on the plan.

## 2019-01-22 ENCOUNTER — PRENATAL OFFICE VISIT (OUTPATIENT)
Dept: OBGYN | Facility: OTHER | Age: 29
End: 2019-01-22
Attending: OBSTETRICS & GYNECOLOGY
Payer: COMMERCIAL

## 2019-01-22 VITALS
WEIGHT: 186 LBS | BODY MASS INDEX: 30.99 KG/M2 | HEIGHT: 65 IN | DIASTOLIC BLOOD PRESSURE: 62 MMHG | SYSTOLIC BLOOD PRESSURE: 112 MMHG

## 2019-01-22 DIAGNOSIS — O47.00 PRETERM CONTRACTIONS: ICD-10-CM

## 2019-01-22 DIAGNOSIS — O09.211 HIGH RISK PREGNANCY DUE TO HISTORY OF PRETERM LABOR IN FIRST TRIMESTER: ICD-10-CM

## 2019-01-22 DIAGNOSIS — Z23 NEED FOR VACCINATION: Primary | ICD-10-CM

## 2019-01-22 DIAGNOSIS — K64.4 EXTERNAL HEMORRHOIDS: ICD-10-CM

## 2019-01-22 DIAGNOSIS — O34.219 PREVIOUS CESAREAN DELIVERY AFFECTING PREGNANCY: ICD-10-CM

## 2019-01-22 LAB
BLD GP AB SCN SERPL QL: NORMAL
ERYTHROCYTE [DISTWIDTH] IN BLOOD BY AUTOMATED COUNT: 12.7 % (ref 10–15)
GLUCOSE 1H P 50 G GLC PO SERPL-MCNC: 121 MG/DL (ref 60–129)
HCT VFR BLD AUTO: 31.3 % (ref 35–47)
HGB BLD-MCNC: 10.6 G/DL (ref 11.7–15.7)
MCH RBC QN AUTO: 28.5 PG (ref 26.5–33)
MCHC RBC AUTO-ENTMCNC: 33.9 G/DL (ref 31.5–36.5)
MCV RBC AUTO: 84 FL (ref 78–100)
PLATELET # BLD AUTO: 309 10E9/L (ref 150–450)
RBC # BLD AUTO: 3.72 10E12/L (ref 3.8–5.2)
WBC # BLD AUTO: 10.6 10E9/L (ref 4–11)

## 2019-01-22 PROCEDURE — 86780 TREPONEMA PALLIDUM: CPT | Mod: 90 | Performed by: NURSE PRACTITIONER

## 2019-01-22 PROCEDURE — 96372 THER/PROPH/DIAG INJ SC/IM: CPT | Performed by: OBSTETRICS & GYNECOLOGY

## 2019-01-22 PROCEDURE — 85027 COMPLETE CBC AUTOMATED: CPT | Performed by: NURSE PRACTITIONER

## 2019-01-22 PROCEDURE — 90715 TDAP VACCINE 7 YRS/> IM: CPT | Performed by: OBSTETRICS & GYNECOLOGY

## 2019-01-22 PROCEDURE — 99207 ZZC PRENATAL VISIT: CPT | Mod: 25 | Performed by: OBSTETRICS & GYNECOLOGY

## 2019-01-22 PROCEDURE — 99000 SPECIMEN HANDLING OFFICE-LAB: CPT | Performed by: NURSE PRACTITIONER

## 2019-01-22 PROCEDURE — 82950 GLUCOSE TEST: CPT | Performed by: NURSE PRACTITIONER

## 2019-01-22 PROCEDURE — 36415 COLL VENOUS BLD VENIPUNCTURE: CPT | Performed by: NURSE PRACTITIONER

## 2019-01-22 PROCEDURE — 90471 IMMUNIZATION ADMIN: CPT | Performed by: OBSTETRICS & GYNECOLOGY

## 2019-01-22 PROCEDURE — 86850 RBC ANTIBODY SCREEN: CPT | Performed by: NURSE PRACTITIONER

## 2019-01-22 RX ADMIN — HYDROXYPROGESTERONE CAPROATE 250 MG: 250 INJECTION INTRAMUSCULAR at 14:21

## 2019-01-22 ASSESSMENT — PAIN SCALES - GENERAL: PAINLEVEL: NO PAIN (0)

## 2019-01-22 ASSESSMENT — MIFFLIN-ST. JEOR: SCORE: 1579.57

## 2019-01-22 NOTE — NURSING NOTE
"Chief Complaint   Patient presents with     Prenatal Care       Initial /62 (BP Location: Left arm, Patient Position: Sitting, Cuff Size: Adult Regular)   Ht 1.651 m (5' 5\")   Wt 84.4 kg (186 lb)   LMP 07/07/2018 (Approximate)   BMI 30.95 kg/m   Estimated body mass index is 30.95 kg/m  as calculated from the following:    Height as of this encounter: 1.651 m (5' 5\").    Weight as of this encounter: 84.4 kg (186 lb).  Medication Reconciliation: complete    ARIE JUNIOR LPN  "

## 2019-01-22 NOTE — PROGRESS NOTES
Fetal Non-Stress Test Results    NST Ordered By: Dr. Fletcher  NST Medical Indication:  Labor    NST Start & Stop Times  NST Start Time: 1434  NST Stop Time: 1500                            NST Results  Fetus A   Baseline Rate: Normal  Accelerations: Present  Decelerations: None  Interpretation: reactive

## 2019-01-22 NOTE — PROGRESS NOTES
PTC unchanged/improved.  Neg FFN and eval yesterday.    TDAP done  1 hour GTT done today along with other necessary labs.  Prenatal flowsheet information is reviewed.  Discussed kick counts and fetal movement.  RTC in 2 weeks    Denies PTL sam, terrance, shannan Fletcher MD  1/22/2019

## 2019-01-23 ENCOUNTER — MYC MEDICAL ADVICE (OUTPATIENT)
Dept: OBGYN | Facility: OTHER | Age: 29
End: 2019-01-23

## 2019-01-23 ENCOUNTER — ALLIED HEALTH/NURSE VISIT (OUTPATIENT)
Dept: OBGYN | Facility: OTHER | Age: 29
End: 2019-01-23
Attending: FAMILY MEDICINE
Payer: COMMERCIAL

## 2019-01-23 VITALS — SYSTOLIC BLOOD PRESSURE: 118 MMHG | DIASTOLIC BLOOD PRESSURE: 72 MMHG

## 2019-01-23 DIAGNOSIS — O99.013 ANEMIA DURING PREGNANCY IN THIRD TRIMESTER: Primary | ICD-10-CM

## 2019-01-23 DIAGNOSIS — O12.02 LEG SWELLING IN PREGNANCY IN SECOND TRIMESTER: Primary | ICD-10-CM

## 2019-01-23 PROBLEM — O99.019 ANEMIA DURING PREGNANCY: Status: ACTIVE | Noted: 2019-01-23

## 2019-01-23 LAB
ALBUMIN UR-MCNC: NEGATIVE MG/DL
APPEARANCE UR: NORMAL
BILIRUB UR QL STRIP: NEGATIVE
COLOR UR AUTO: NORMAL
GLUCOSE UR STRIP-MCNC: NEGATIVE MG/DL
HGB UR QL STRIP: NEGATIVE
KETONES UR STRIP-MCNC: NEGATIVE MG/DL
LEUKOCYTE ESTERASE UR QL STRIP: NEGATIVE
NITRATE UR QL: NEGATIVE
PH UR STRIP: 7 PH (ref 4.7–8)
SOURCE: NORMAL
SP GR UR STRIP: 1.01 (ref 1–1.03)
UROBILINOGEN UR STRIP-MCNC: NORMAL MG/DL (ref 0–2)

## 2019-01-23 PROCEDURE — 81003 URINALYSIS AUTO W/O SCOPE: CPT | Performed by: FAMILY MEDICINE

## 2019-01-23 PROCEDURE — 99207 ZZC NO CHARGE NURSE ONLY: CPT

## 2019-01-23 RX ORDER — FERROUS SULFATE 325(65) MG
325 TABLET ORAL 2 TIMES DAILY
Qty: 90 TABLET | Refills: 0 | Status: ON HOLD | OUTPATIENT
Start: 2019-01-23 | End: 2019-03-22

## 2019-01-23 NOTE — TELEPHONE ENCOUNTER
Swelling can be normal.  It can be a cause for concern if severe, specially hand/face with associated headaches that don't go away with tylenol can sometimes be signs of preeclampsia.  Your BP was normal yesterday which goes against that.  The other lab of importance is urinary protein.  You could come in today or tomorrow for repeat BP check and urinalysis to be sure.  Would encourage plenty of water and rest and avoiding salt.

## 2019-01-24 LAB — T PALLIDUM AB SER QL: NONREACTIVE

## 2019-01-28 ENCOUNTER — HOSPITAL ENCOUNTER (OUTPATIENT)
Facility: HOSPITAL | Age: 29
Discharge: HOME | End: 2019-01-28
Attending: OBSTETRICS & GYNECOLOGY | Admitting: OBSTETRICS & GYNECOLOGY
Payer: COMMERCIAL

## 2019-01-28 VITALS
RESPIRATION RATE: 16 BRPM | SYSTOLIC BLOOD PRESSURE: 112 MMHG | HEART RATE: 112 BPM | TEMPERATURE: 98.2 F | DIASTOLIC BLOOD PRESSURE: 78 MMHG

## 2019-01-28 DIAGNOSIS — N39.0 URINARY TRACT INFECTION WITHOUT HEMATURIA, SITE UNSPECIFIED: Primary | ICD-10-CM

## 2019-01-28 LAB
ALBUMIN UR-MCNC: 30 MG/DL
APPEARANCE UR: ABNORMAL
BACTERIA #/AREA URNS HPF: ABNORMAL /HPF
BILIRUB UR QL STRIP: NEGATIVE
COLOR UR AUTO: YELLOW
FIBRONECTIN FETAL VAG QL: NEGATIVE
GLUCOSE UR STRIP-MCNC: NEGATIVE MG/DL
HGB UR QL STRIP: NEGATIVE
KETONES UR STRIP-MCNC: 5 MG/DL
LEUKOCYTE ESTERASE UR QL STRIP: ABNORMAL
MUCOUS THREADS #/AREA URNS LPF: PRESENT /LPF
NITRATE UR QL: NEGATIVE
PH UR STRIP: 7 PH (ref 4.7–8)
RBC #/AREA URNS AUTO: 1 /HPF (ref 0–2)
RUPTURE OF FETAL MEMBRANES BY ROM PLUS: NEGATIVE
SOURCE: ABNORMAL
SP GR UR STRIP: 1.02 (ref 1–1.03)
SQUAMOUS #/AREA URNS AUTO: 6 /HPF (ref 0–1)
UROBILINOGEN UR STRIP-MCNC: NORMAL MG/DL (ref 0–2)
WBC #/AREA URNS AUTO: 4 /HPF (ref 0–5)
WBC CLUMPS #/AREA URNS HPF: PRESENT /HPF

## 2019-01-28 PROCEDURE — 96365 THER/PROPH/DIAG IV INF INIT: CPT

## 2019-01-28 PROCEDURE — 96372 THER/PROPH/DIAG INJ SC/IM: CPT

## 2019-01-28 PROCEDURE — 96361 HYDRATE IV INFUSION ADD-ON: CPT

## 2019-01-28 PROCEDURE — G0463 HOSPITAL OUTPT CLINIC VISIT: HCPCS | Mod: 25

## 2019-01-28 PROCEDURE — 96360 HYDRATION IV INFUSION INIT: CPT

## 2019-01-28 PROCEDURE — 84112 EVAL AMNIOTIC FLUID PROTEIN: CPT | Performed by: OBSTETRICS & GYNECOLOGY

## 2019-01-28 PROCEDURE — 82731 ASSAY OF FETAL FIBRONECTIN: CPT | Performed by: OBSTETRICS & GYNECOLOGY

## 2019-01-28 PROCEDURE — G0463 HOSPITAL OUTPT CLINIC VISIT: HCPCS

## 2019-01-28 PROCEDURE — 81001 URINALYSIS AUTO W/SCOPE: CPT | Performed by: OBSTETRICS & GYNECOLOGY

## 2019-01-28 PROCEDURE — 25000128 H RX IP 250 OP 636: Performed by: OBSTETRICS & GYNECOLOGY

## 2019-01-28 RX ORDER — TERBUTALINE SULFATE 1 MG/ML
0.25 INJECTION, SOLUTION SUBCUTANEOUS ONCE
Status: COMPLETED | OUTPATIENT
Start: 2019-01-28 | End: 2019-01-28

## 2019-01-28 RX ORDER — CEFAZOLIN SODIUM 2 G/100ML
2 INJECTION, SOLUTION INTRAVENOUS EVERY 8 HOURS
Status: DISCONTINUED | OUTPATIENT
Start: 2019-01-28 | End: 2019-01-28 | Stop reason: HOSPADM

## 2019-01-28 RX ORDER — HYDROXYZINE HYDROCHLORIDE 50 MG/ML
50 INJECTION, SOLUTION INTRAMUSCULAR EVERY 6 HOURS PRN
Status: DISCONTINUED | OUTPATIENT
Start: 2019-01-28 | End: 2019-01-28 | Stop reason: HOSPADM

## 2019-01-28 RX ORDER — ONDANSETRON 2 MG/ML
4 INJECTION INTRAMUSCULAR; INTRAVENOUS EVERY 6 HOURS PRN
Status: DISCONTINUED | OUTPATIENT
Start: 2019-01-28 | End: 2019-01-28 | Stop reason: HOSPADM

## 2019-01-28 RX ORDER — CEPHALEXIN 500 MG/1
500 CAPSULE ORAL
Qty: 30 CAPSULE | Refills: 0 | Status: SHIPPED | OUTPATIENT
Start: 2019-01-28 | End: 2019-02-05

## 2019-01-28 RX ADMIN — TERBUTALINE SULFATE 0.25 MG: 1 INJECTION SUBCUTANEOUS at 08:38

## 2019-01-28 RX ADMIN — SODIUM CHLORIDE, POTASSIUM CHLORIDE, SODIUM LACTATE AND CALCIUM CHLORIDE 1000 ML: 600; 310; 30; 20 INJECTION, SOLUTION INTRAVENOUS at 09:13

## 2019-01-28 RX ADMIN — TERBUTALINE SULFATE 0.25 MG: 1 INJECTION SUBCUTANEOUS at 10:18

## 2019-01-28 RX ADMIN — HYDROXYZINE HYDROCHLORIDE 50 MG: 50 INJECTION, SOLUTION INTRAMUSCULAR at 09:18

## 2019-01-28 RX ADMIN — CEFAZOLIN SODIUM 2 G: 2 INJECTION, SOLUTION INTRAVENOUS at 09:27

## 2019-01-28 RX ADMIN — SODIUM CHLORIDE, POTASSIUM CHLORIDE, SODIUM LACTATE AND CALCIUM CHLORIDE 1000 ML: 600; 310; 30; 20 INJECTION, SOLUTION INTRAVENOUS at 08:26

## 2019-01-28 NOTE — DISCHARGE INSTRUCTIONS

## 2019-01-28 NOTE — PLAN OF CARE
Kathia Milton is a 28 year old  and 29w2d patient came in complaining of  Labor (rule out)  Dr. Rascon notified. Pt given terbutaline x2 and 2 liters of lactated ringers. FFN and amnisure negative. UA positive. Dr. Rascon called in a antibiotic to [facility]. No change in cervical exam after 2 hours. Pt says she feels comfortable going home.     Patient Active Problem List   Diagnosis     Abdominal pain, acute     Anemia in pregnancy     Seasonal allergies     Tobacco use disorder     Endometriosis of pelvis     Condyloma acuminatum due to human papillomavirus      delivery     Previous  delivery affecting pregnancy     High risk pregnancy due to history of  labor in first trimester     Encounter for triage in pregnant patient     External hemorrhoids      contractions-2019--FFN neg, prev steroids, Mag neuroprotection, weekly [place]     Anemia during pregnancy     Urinary tract infection without hematuria, site unspecified-KEFLEX--2019       Pt discharged to home at 1100 and encouraged to rest and drink plenty of fluids.  Pt told to call/return if bleeding more than spotting, water breaks, contractions 3-5 minutes apart that she has to breath through.      AVS given and signed. All questions answered and patient verbalizes understanding.    /78 (BP Location: Right arm)   Pulse 112   Temp 98.2  F (36.8  C) (Oral)   Resp 16   LMP 2018 (Approximate)     Cervical status: fingertip dilated  Fetal Assessment: Appropriate for Gestational Age  Occasional variable decels. MD aware.  Discharge support:  Independent-Alone    Obstetric History       T0      L1     SAB3   TAB0   Ectopic0   Multiple0   Live Births1       # Outcome Date GA Lbr Ash/2nd Weight Sex Delivery Anes PTL Lv   5 Current            4  13 28w0d  1.077 kg (2 lb 6 oz) F -SEC   RICHMOND      Name: Christian   3 SAB            2 SAB            1 SAB                    Coleen Ortega

## 2019-01-31 ENCOUNTER — MYC MEDICAL ADVICE (OUTPATIENT)
Dept: OBGYN | Facility: OTHER | Age: 29
End: 2019-01-31

## 2019-01-31 NOTE — TELEPHONE ENCOUNTER
Some swelling that is not symmetrical can be normal.  The uterus puts more pressure on one side than the other leading to that.  When you get your feet up make sure you are getting them up above the level of the heart to best get swelling to go down.  If you are on your feet al lot sometimes compression stockings can help.  If one leg is swollen, hot, re, painful then need to come in to get evaluated to rule out blood clot.

## 2019-02-04 ENCOUNTER — HOSPITAL ENCOUNTER (OUTPATIENT)
Facility: HOSPITAL | Age: 29
Discharge: HOME | End: 2019-02-04
Attending: OBSTETRICS & GYNECOLOGY | Admitting: OBSTETRICS & GYNECOLOGY
Payer: COMMERCIAL

## 2019-02-04 VITALS
BODY MASS INDEX: 30.32 KG/M2 | HEIGHT: 65 IN | SYSTOLIC BLOOD PRESSURE: 118 MMHG | WEIGHT: 182 LBS | HEART RATE: 111 BPM | RESPIRATION RATE: 18 BRPM | TEMPERATURE: 97.8 F | OXYGEN SATURATION: 96 % | DIASTOLIC BLOOD PRESSURE: 74 MMHG

## 2019-02-04 LAB
ALBUMIN UR-MCNC: 10 MG/DL
APPEARANCE UR: ABNORMAL
BACTERIA #/AREA URNS HPF: ABNORMAL /HPF
BILIRUB UR QL STRIP: NEGATIVE
COLOR UR AUTO: YELLOW
GLUCOSE UR STRIP-MCNC: NEGATIVE MG/DL
HGB UR QL STRIP: NEGATIVE
KETONES UR STRIP-MCNC: NEGATIVE MG/DL
LEUKOCYTE ESTERASE UR QL STRIP: ABNORMAL
MUCOUS THREADS #/AREA URNS LPF: PRESENT /LPF
NITRATE UR QL: NEGATIVE
PH UR STRIP: 7 PH (ref 4.7–8)
RBC #/AREA URNS AUTO: 1 /HPF (ref 0–2)
SOURCE: ABNORMAL
SP GR UR STRIP: 1.01 (ref 1–1.03)
SQUAMOUS #/AREA URNS AUTO: 10 /HPF (ref 0–1)
UROBILINOGEN UR STRIP-MCNC: NORMAL MG/DL (ref 0–2)
WBC #/AREA URNS AUTO: 7 /HPF (ref 0–5)

## 2019-02-04 PROCEDURE — 25000128 H RX IP 250 OP 636: Performed by: OBSTETRICS & GYNECOLOGY

## 2019-02-04 PROCEDURE — 96361 HYDRATE IV INFUSION ADD-ON: CPT

## 2019-02-04 PROCEDURE — 59025 FETAL NON-STRESS TEST: CPT | Mod: 26 | Performed by: OBSTETRICS & GYNECOLOGY

## 2019-02-04 PROCEDURE — 96360 HYDRATION IV INFUSION INIT: CPT

## 2019-02-04 PROCEDURE — G0463 HOSPITAL OUTPT CLINIC VISIT: HCPCS | Mod: 25

## 2019-02-04 PROCEDURE — 96372 THER/PROPH/DIAG INJ SC/IM: CPT

## 2019-02-04 PROCEDURE — 81001 URINALYSIS AUTO W/SCOPE: CPT | Performed by: OBSTETRICS & GYNECOLOGY

## 2019-02-04 PROCEDURE — 59025 FETAL NON-STRESS TEST: CPT

## 2019-02-04 RX ORDER — LIDOCAINE 40 MG/G
CREAM TOPICAL
Status: DISCONTINUED | OUTPATIENT
Start: 2019-02-04 | End: 2019-02-04 | Stop reason: HOSPADM

## 2019-02-04 RX ORDER — SODIUM CHLORIDE, SODIUM LACTATE, POTASSIUM CHLORIDE, CALCIUM CHLORIDE 600; 310; 30; 20 MG/100ML; MG/100ML; MG/100ML; MG/100ML
INJECTION, SOLUTION INTRAVENOUS CONTINUOUS
Status: DISCONTINUED | OUTPATIENT
Start: 2019-02-04 | End: 2019-02-04 | Stop reason: HOSPADM

## 2019-02-04 RX ORDER — TERBUTALINE SULFATE 1 MG/ML
0.25 INJECTION, SOLUTION SUBCUTANEOUS ONCE
Status: COMPLETED | OUTPATIENT
Start: 2019-02-04 | End: 2019-02-04

## 2019-02-04 RX ADMIN — SODIUM CHLORIDE, POTASSIUM CHLORIDE, SODIUM LACTATE AND CALCIUM CHLORIDE: 600; 310; 30; 20 INJECTION, SOLUTION INTRAVENOUS at 10:04

## 2019-02-04 RX ADMIN — TERBUTALINE SULFATE 0.25 MG: 1 INJECTION SUBCUTANEOUS at 12:53

## 2019-02-04 ASSESSMENT — MIFFLIN-ST. JEOR: SCORE: 1561.43

## 2019-02-04 NOTE — DISCHARGE INSTRUCTIONS

## 2019-02-04 NOTE — PLAN OF CARE
"Kathia Milton is a 28 year old  and 30w2d patient came in complaining of Contractions (started at 0200)    Patient Active Problem List   Diagnosis     Abdominal pain, acute     Anemia in pregnancy     Seasonal allergies     Tobacco use disorder     Endometriosis of pelvis     Condyloma acuminatum due to human papillomavirus      delivery     Previous  delivery affecting pregnancy     High risk pregnancy due to history of  labor in first trimester     Encounter for triage in pregnant patient     External hemorrhoids      contractions-2019--FFN neg, prev steroids, Mag neuroprotection, weekly Francie     Anemia during pregnancy     Urinary tract infection without hematuria, site unspecified-KEFLEX--2019       Pt discharged to home at 1305 and encouraged to rest and drink plenty of fluids.  Pt told to call/return if bleeding more than spotting, water breaks, contractions 3-5 minutes apart that she has to breath through.     Nursing education on terb side effects and s/s of  labor provided. Self-management instructions reviewed. AVS given and signed. All questions answered and patient verbalizes understanding.    /74 (BP Location: Right arm)   Pulse 111   Temp 97.8  F (36.6  C) (Oral)   Resp 18   Ht 1.651 m (5' 5\")   Wt 82.6 kg (182 lb)   LMP 2018 (Approximate)   SpO2 96%   BMI 30.29 kg/m      Cervical status: very posterior and closed  Fetal Assessment: Reactive    Discharge support:  Independent-Alone    Obstetric History       T0      L1     SAB3   TAB0   Ectopic0   Multiple0   Live Births1       # Outcome Date GA Lbr Ash/2nd Weight Sex Delivery Anes PTL Lv   5 Current            4  13 28w0d  1.077 kg (2 lb 6 oz) F -SEC   RICHMOND      Name: Christian   3 SAB            2 SAB            1 SAB                   Sandra Gruber    "

## 2019-02-04 NOTE — H&P
"OB Triage, H&P    Pt reports regular painful contractions and possibly some blood noted on toilet tissue this morning.    HPI -  at 30 2/7 weeks with a h/o   at 28 weeks with a \"T\" incision. Plan is for repeat  at time of delivery. Pt has been seen several times this pregnancy for threatened  labor. She has received betamethasone and magnesium during a previous admission. Other loses were first trimester miscarriages.    Past Medical History:   Diagnosis Date     Benign neoplasm of vagina 2011     Condyloma acuminatum 2011     Seasonal allergies      Supervision of other normal pregnancy     MICHELLE: 2013     Tobacco use disorder 10/18/2012     Past Surgical History:   Procedure Laterality Date     APPENDECTOMY  14    Gage's      SECTION      C section     COLONOSCOPY  11/10/15    Dr. Singh     DILATION AND CURETTAGE SUCTION, TREAT MISSED , 1ST TRIMESTER       ENDOSCOPY  11/10/15    Dr. Singh      LAPAROSCOPIC CYSTECTOMY OVARIAN (BENIGN) Left 2018    Procedure: LAPAROSCOPIC CYSTECTOMY OVARIAN (BENIGN);  EXPLORATORY  LAPAROSCOPY, LEFT OVARIAN CYSTECTOMY;  Surgeon: Damon Fletcher MD;  Location: [facility]     LAPAROSCOPY  11/30/15    Dr. Sun; lysis of pelvic adhesions, cauterization of minimal endometriosis     LAPAROSCOPY  2018    lysis of adhesions; pelvic pain, endometriosis; Dr. Sun        Allergies   Allergen Reactions     Seafood Anaphylaxis     Shellfish Allergy      Shellfish Derived Products     Family History   Problem Relation Age of Onset     Diabetes Other         Grandmother     Heart Disease Other         Heart Disease - Grandmother     Other - See Comments Other         Renal Disease - Grandmother     Unknown/Adopted Mother      Unknown/Adopted Father      Unknown/Adopted Maternal Grandmother      Unknown/Adopted Maternal Grandfather      Unknown/Adopted Paternal Grandmother      Unknown/Adopted Paternal Grandfather " "     Breast Cancer No family hx of      EXAM  /74 (BP Location: Right arm)   Pulse 111   Temp 97.8  F (36.6  C) (Oral)   Resp 18   Ht 1.651 m (5' 5\")   Wt 82.6 kg (182 lb)   LMP 2018 (Approximate)   SpO2 96%   BMI 30.29 kg/m    Gen - NAD  Abdomen - gravid, non-tender  VE - cervix closed, No trace of recent bleeding in vaginal vault (UA also negative for blood)  FHT - 130's good LTV,   [place] - Irreg ctx's roughly q 5  Extremities - no calf tenderness, no edema    Bedside ultrasound - vertex    A/P  contractions   1. No hint of recent vaginal bleeding on UA or exam.    2. Will monitor for cervical change   3. Consider transfer to [place] if needed - would give repeat B-meth and magnesium if transferred   4. Consider Terbutaline as a comfort measure   5. Pt has not tolerated Nifedipine in the past    ALICIA KLINE MD      Addendum: Cervix rechecked at 2.5 hours - no change and no evidence of bleeding. Will give terbutaline x 1 now to diminish current contractions. Pt understands to return with any change or concerns    ALICIA KLINE MD         "

## 2019-02-05 ENCOUNTER — PRENATAL OFFICE VISIT (OUTPATIENT)
Dept: OBGYN | Facility: OTHER | Age: 29
End: 2019-02-05
Attending: OBSTETRICS & GYNECOLOGY
Payer: COMMERCIAL

## 2019-02-05 VITALS — SYSTOLIC BLOOD PRESSURE: 112 MMHG | BODY MASS INDEX: 31.62 KG/M2 | DIASTOLIC BLOOD PRESSURE: 68 MMHG | WEIGHT: 190 LBS

## 2019-02-05 DIAGNOSIS — O34.219 PREVIOUS CESAREAN DELIVERY AFFECTING PREGNANCY: ICD-10-CM

## 2019-02-05 DIAGNOSIS — O09.213 HIGH RISK PREGNANCY DUE TO HISTORY OF PRETERM LABOR IN THIRD TRIMESTER: ICD-10-CM

## 2019-02-05 DIAGNOSIS — O47.00 PRETERM CONTRACTIONS: ICD-10-CM

## 2019-02-05 PROCEDURE — 99207 ZZC PRENATAL VISIT: CPT | Mod: 25 | Performed by: OBSTETRICS & GYNECOLOGY

## 2019-02-05 PROCEDURE — 96372 THER/PROPH/DIAG INJ SC/IM: CPT | Performed by: OBSTETRICS & GYNECOLOGY

## 2019-02-05 RX ADMIN — HYDROXYPROGESTERONE CAPROATE 250 MG: 250 INJECTION INTRAMUSCULAR at 16:13

## 2019-02-05 ASSESSMENT — PAIN SCALES - GENERAL: PAINLEVEL: NO PAIN (0)

## 2019-02-06 PROBLEM — O09.211 HIGH RISK PREGNANCY DUE TO HISTORY OF PRETERM LABOR IN FIRST TRIMESTER: Status: ACTIVE | Noted: 2018-09-21

## 2019-02-06 NOTE — PROGRESS NOTES
Doing well.  No concerns today.  Saw seen over weekend for PTC but no change.  Wt gain, diet discussed.   Discussed kick counts and fetal movement.  [place] today  RTC in 2 weeks  Ruiies  shannan carlos MD  2/6/2019

## 2019-02-15 ENCOUNTER — HOSPITAL ENCOUNTER (OUTPATIENT)
Facility: HOSPITAL | Age: 29
Discharge: HOME | End: 2019-02-15
Attending: OBSTETRICS & GYNECOLOGY | Admitting: OBSTETRICS & GYNECOLOGY
Payer: COMMERCIAL

## 2019-02-15 VITALS
OXYGEN SATURATION: 98 % | HEIGHT: 65 IN | RESPIRATION RATE: 18 BRPM | BODY MASS INDEX: 31.65 KG/M2 | TEMPERATURE: 97.3 F | SYSTOLIC BLOOD PRESSURE: 117 MMHG | WEIGHT: 190 LBS | HEART RATE: 120 BPM | DIASTOLIC BLOOD PRESSURE: 82 MMHG

## 2019-02-15 LAB
ALBUMIN UR-MCNC: 10 MG/DL
APPEARANCE UR: CLEAR
BACTERIA #/AREA URNS HPF: ABNORMAL /HPF
BILIRUB UR QL STRIP: NEGATIVE
COLOR UR AUTO: ABNORMAL
GLUCOSE UR STRIP-MCNC: 70 MG/DL
HGB UR QL STRIP: NEGATIVE
KETONES UR STRIP-MCNC: 10 MG/DL
LEUKOCYTE ESTERASE UR QL STRIP: NEGATIVE
MUCOUS THREADS #/AREA URNS LPF: PRESENT /LPF
NITRATE UR QL: NEGATIVE
PH UR STRIP: 6 PH (ref 4.7–8)
RBC #/AREA URNS AUTO: 0 /HPF (ref 0–2)
SOURCE: ABNORMAL
SP GR UR STRIP: 1.02 (ref 1–1.03)
SQUAMOUS #/AREA URNS AUTO: 1 /HPF (ref 0–1)
UROBILINOGEN UR STRIP-MCNC: NORMAL MG/DL (ref 0–2)
WBC #/AREA URNS AUTO: 1 /HPF (ref 0–5)

## 2019-02-15 PROCEDURE — 81001 URINALYSIS AUTO W/SCOPE: CPT | Performed by: OBSTETRICS & GYNECOLOGY

## 2019-02-15 PROCEDURE — 96360 HYDRATION IV INFUSION INIT: CPT

## 2019-02-15 PROCEDURE — 25000128 H RX IP 250 OP 636: Performed by: OBSTETRICS & GYNECOLOGY

## 2019-02-15 PROCEDURE — 87086 URINE CULTURE/COLONY COUNT: CPT | Performed by: OBSTETRICS & GYNECOLOGY

## 2019-02-15 PROCEDURE — 96361 HYDRATE IV INFUSION ADD-ON: CPT | Mod: 59

## 2019-02-15 PROCEDURE — 25800030 ZZH RX IP 258 OP 636: Performed by: OBSTETRICS & GYNECOLOGY

## 2019-02-15 PROCEDURE — 96372 THER/PROPH/DIAG INJ SC/IM: CPT

## 2019-02-15 PROCEDURE — G0463 HOSPITAL OUTPT CLINIC VISIT: HCPCS | Mod: 25

## 2019-02-15 PROCEDURE — 59025 FETAL NON-STRESS TEST: CPT

## 2019-02-15 PROCEDURE — 59025 FETAL NON-STRESS TEST: CPT | Mod: 26 | Performed by: OBSTETRICS & GYNECOLOGY

## 2019-02-15 RX ORDER — HYDROXYZINE HYDROCHLORIDE 50 MG/ML
50 INJECTION, SOLUTION INTRAMUSCULAR EVERY 6 HOURS PRN
Status: DISCONTINUED | OUTPATIENT
Start: 2019-02-15 | End: 2019-02-15 | Stop reason: HOSPADM

## 2019-02-15 RX ORDER — TERBUTALINE SULFATE 1 MG/ML
0.25 INJECTION, SOLUTION SUBCUTANEOUS ONCE
Status: COMPLETED | OUTPATIENT
Start: 2019-02-15 | End: 2019-02-15

## 2019-02-15 RX ADMIN — SODIUM CHLORIDE, POTASSIUM CHLORIDE, SODIUM LACTATE AND CALCIUM CHLORIDE 1000 ML: 600; 310; 30; 20 INJECTION, SOLUTION INTRAVENOUS at 16:47

## 2019-02-15 RX ADMIN — SODIUM CHLORIDE, POTASSIUM CHLORIDE, SODIUM LACTATE AND CALCIUM CHLORIDE 1000 ML: 600; 310; 30; 20 INJECTION, SOLUTION INTRAVENOUS at 17:14

## 2019-02-15 RX ADMIN — HYDROXYZINE HYDROCHLORIDE 50 MG: 50 INJECTION, SOLUTION INTRAMUSCULAR at 16:51

## 2019-02-15 RX ADMIN — TERBUTALINE SULFATE 0.25 MG: 1 INJECTION SUBCUTANEOUS at 16:53

## 2019-02-15 ASSESSMENT — MIFFLIN-ST. JEOR: SCORE: 1597.71

## 2019-02-16 NOTE — PLAN OF CARE
OB Triage Note  Kathia Milton  MRN: [id number]  Gestational Age: 31w6d      Kathia Milton presents for contractions(sign/symptom/concern).      States robbie since 1200. Denies leaking of fluid or vaginal bleeding. Reports mio is moving normally. States she took Vistaril 50 mg PO at 1430 for contractions with no relief noted. Hx of  delivery at 28w.    Dr. Rascon notified of arrival and condition.  Oriented patient to surroundings. Call light within reach.       Plan:  -Initial NST, then fetal/uterine monitoring per MD/patient plan.  -Sterile vaginal exam/sterile speculum exam.  -UA with micro  -IV placement  -LR bolus 2,000 mL  -IM Vistaril 50mg  -Subcutaneous Terb 0.25 mg

## 2019-02-16 NOTE — PLAN OF CARE
"Kathia Milton is a 28 year old  and 31w6d patient came in complaining of Contractions (1200)    Patient Active Problem List   Diagnosis     Abdominal pain, acute     Anemia in pregnancy     Seasonal allergies     Tobacco use disorder     Endometriosis of pelvis     Condyloma acuminatum due to human papillomavirus      delivery     Previous  delivery affecting pregnancy     High risk pregnancy due to history of  labor in first trimester     Encounter for triage in pregnant patient     External hemorrhoids      contractions-2019--FFN neg, prev steroids, Mag neuroprotection, weekly [place]     Anemia during pregnancy     Urinary tract infection without hematuria, site unspecified-KEFLEX--2019       Pt discharged to home at 1835 and encouraged to rest and drink plenty of fluids.  Pt told to call/return if bleeding more than spotting, water breaks, contractions 3-5 minutes apart that she has to breath through.     Nursing education on  labor provided. Self-management instructions reviewed. AVS given and signed. All questions answered and patient verbalizes understanding.    /82 (BP Location: Right arm)   Pulse 120   Temp 97.3  F (36.3  C) (Oral)   Resp 18   Ht 1.651 m (5' 5\")   Wt 86.2 kg (190 lb)   LMP 2018 (Approximate)   SpO2 98%   BMI 31.62 kg/m      Cervical status: mid position, dilated to 1, effaced 50-70% and soft  Fetal Assessment: Reactive    Discharge support:  Independent-Alone    Obstetric History       T0      L1     SAB3   TAB0   Ectopic0   Multiple0   Live Births1       # Outcome Date GA Lbr Ash/2nd Weight Sex Delivery Anes PTL Lv   5 Current            4  13 28w0d  1.077 kg (2 lb 6 oz) F -SEC   RICHMOND      Name: Christian   3 SAB            2 SAB            1 SAB                   Sandra Gruber    "

## 2019-02-16 NOTE — PROGRESS NOTES
Fetal Non-Stress Test Results    NST Ordered By: Tarah  NST Medical Indication:  labor    NST Start & Stop Times  NST Start Time: 1608  NST Stop Time: 1630                            NST Results  Fetus A   Baseline Rate: 140's  Accelerations: Present  Decelerations: None  Interpretation: reactive    Reactive NST        Reactive NST with CAT I   FHT's

## 2019-02-16 NOTE — DISCHARGE INSTRUCTIONS

## 2019-02-16 NOTE — PLAN OF CARE
Kathia Milton        NST:reactive  Start:1608  Stop:1710    Physician: Dr. Rascon for Dr. Fletcher  Reason For Test:  Labor  MICHELLE: 2019  Gestational Age:31w 6d    Comments:        Sandra Gruber

## 2019-02-17 LAB
BACTERIA SPEC CULT: NORMAL
SPECIMEN SOURCE: NORMAL

## 2019-02-19 ENCOUNTER — PRENATAL OFFICE VISIT (OUTPATIENT)
Dept: OBGYN | Facility: OTHER | Age: 29
End: 2019-02-19
Attending: NURSE PRACTITIONER
Payer: COMMERCIAL

## 2019-02-19 VITALS — BODY MASS INDEX: 32.45 KG/M2 | DIASTOLIC BLOOD PRESSURE: 66 MMHG | WEIGHT: 195 LBS | SYSTOLIC BLOOD PRESSURE: 96 MMHG

## 2019-02-19 DIAGNOSIS — O09.213 HIGH RISK PREGNANCY DUE TO HISTORY OF PRETERM LABOR IN THIRD TRIMESTER: ICD-10-CM

## 2019-02-19 PROCEDURE — 99207 ZZC PRENATAL VISIT: CPT | Mod: 25 | Performed by: NURSE PRACTITIONER

## 2019-02-19 PROCEDURE — 96372 THER/PROPH/DIAG INJ SC/IM: CPT | Performed by: NURSE PRACTITIONER

## 2019-02-19 RX ADMIN — HYDROXYPROGESTERONE CAPROATE 250 MG: 250 INJECTION INTRAMUSCULAR at 15:53

## 2019-02-19 ASSESSMENT — PAIN SCALES - GENERAL: PAINLEVEL: NO PAIN (0)

## 2019-02-20 NOTE — PATIENT INSTRUCTIONS
"BREASTFEEDING TIPS  1. Breastfeed every 2-4 hours. If your baby is sleepy - use breast compression, push on chin to \"start up\" baby, switch breasts, undress to diaper and wake before relatching.   Some babies \"cluster\" feed every 1 hour for a while- this is normal. Feed your baby whenever he/she is awake-  even if every hour for a while. This frequent feeding will help you make more milk and encourage your baby to sleep for longer stretches later in the evening or night.    - Position your baby close to you with pillows so he/she is facing you -belly to belly laying horizontally across your lap at the level of your breast and looking a bit \"upwards\" to your breast   -One hand holds the baby's neck behind the ears and the other hand holds your breast  -Baby's nose should start out pointing to your nipple before latching  - Hold your breast in a \"sandwich\" position by gently squeezing your breast in an oval shape and make sure your hands are not covering the areola  This \"nipple sandwich\" will make it easier for your breast to fit inside the baby's mouth-making latching more comfortable for you and baby and preventing sore nipples. Your baby should take a \"mouthful\" of breast!  - You may want to use hand expression to \"prime the pump\" and get a drip of milk out on your nipple to wake baby   (see website: newborns.[employer].edu/Breastfeeding/HandExpression.html)  - Swipe your nipple on baby's upper lip and wait for a BIG open mouth  - YOU bring baby to the breast (hold baby's neck with your fingers just below the ears) and bring baby's head to the breast--leading with the chin.  Try to avoid pushing your breast into baby's mouth- bring baby to you instead!  - Aim to get your baby's bottom lip LOW DOWN ON AREOLA (baby's upper lip just needs to \"clear\" the nipple) .   Your baby should latch onto the areola and NOT just the nipple. That way your baby gets more milk and you don't get sore nipples!      Useful web " sites:  Www.infantrisk.com  Www.aap.org  Www.ibreastfeeding.com  Www.health.[facility].mn.us

## 2019-02-20 NOTE — PROGRESS NOTES
Occasional cxt but no changes.  Baby active.  No LOF or bleeding.  Reviewed when to call or come in.  Has not had RCS scheduled for 37 weeks; plan next visit with Dr. Fletcher. RTC in 2 week.

## 2019-02-25 ENCOUNTER — HOSPITAL ENCOUNTER (OUTPATIENT)
Facility: HOSPITAL | Age: 29
Discharge: HOME | End: 2019-02-25
Attending: OBSTETRICS & GYNECOLOGY | Admitting: OBSTETRICS & GYNECOLOGY
Payer: COMMERCIAL

## 2019-02-25 VITALS
BODY MASS INDEX: 32.49 KG/M2 | OXYGEN SATURATION: 98 % | WEIGHT: 195 LBS | TEMPERATURE: 97.8 F | DIASTOLIC BLOOD PRESSURE: 65 MMHG | SYSTOLIC BLOOD PRESSURE: 118 MMHG | HEIGHT: 65 IN | HEART RATE: 109 BPM

## 2019-02-25 DIAGNOSIS — R11.2 NAUSEA AND VOMITING, INTRACTABILITY OF VOMITING NOT SPECIFIED, UNSPECIFIED VOMITING TYPE: ICD-10-CM

## 2019-02-25 DIAGNOSIS — N30.00 ACUTE CYSTITIS WITHOUT HEMATURIA: Primary | ICD-10-CM

## 2019-02-25 LAB
ALBUMIN UR-MCNC: 10 MG/DL
APPEARANCE UR: ABNORMAL
BACTERIA #/AREA URNS HPF: ABNORMAL /HPF
BILIRUB UR QL STRIP: NEGATIVE
COLOR UR AUTO: YELLOW
GLUCOSE UR STRIP-MCNC: NEGATIVE MG/DL
HGB UR QL STRIP: NEGATIVE
KETONES UR STRIP-MCNC: 10 MG/DL
LEUKOCYTE ESTERASE UR QL STRIP: ABNORMAL
MUCOUS THREADS #/AREA URNS LPF: PRESENT /LPF
NITRATE UR QL: NEGATIVE
PH UR STRIP: 7 PH (ref 4.7–8)
RBC #/AREA URNS AUTO: 1 /HPF (ref 0–2)
SOURCE: ABNORMAL
SP GR UR STRIP: 1.01 (ref 1–1.03)
SQUAMOUS #/AREA URNS AUTO: 7 /HPF (ref 0–1)
UROBILINOGEN UR STRIP-MCNC: NORMAL MG/DL (ref 0–2)
WBC #/AREA URNS AUTO: 4 /HPF (ref 0–5)

## 2019-02-25 PROCEDURE — 96374 THER/PROPH/DIAG INJ IV PUSH: CPT

## 2019-02-25 PROCEDURE — 96360 HYDRATION IV INFUSION INIT: CPT

## 2019-02-25 PROCEDURE — 96361 HYDRATE IV INFUSION ADD-ON: CPT

## 2019-02-25 PROCEDURE — 96376 TX/PRO/DX INJ SAME DRUG ADON: CPT

## 2019-02-25 PROCEDURE — 25800030 ZZH RX IP 258 OP 636: Performed by: OBSTETRICS & GYNECOLOGY

## 2019-02-25 PROCEDURE — 96372 THER/PROPH/DIAG INJ SC/IM: CPT

## 2019-02-25 PROCEDURE — 59025 FETAL NON-STRESS TEST: CPT

## 2019-02-25 PROCEDURE — G0463 HOSPITAL OUTPT CLINIC VISIT: HCPCS | Mod: 25

## 2019-02-25 PROCEDURE — 25000128 H RX IP 250 OP 636

## 2019-02-25 PROCEDURE — 96365 THER/PROPH/DIAG IV INF INIT: CPT

## 2019-02-25 PROCEDURE — 81001 URINALYSIS AUTO W/SCOPE: CPT | Performed by: OBSTETRICS & GYNECOLOGY

## 2019-02-25 PROCEDURE — 25000128 H RX IP 250 OP 636: Performed by: OBSTETRICS & GYNECOLOGY

## 2019-02-25 RX ORDER — PROMETHAZINE HYDROCHLORIDE 25 MG/ML
25 INJECTION, SOLUTION INTRAMUSCULAR; INTRAVENOUS EVERY 6 HOURS PRN
Status: DISCONTINUED | OUTPATIENT
Start: 2019-02-25 | End: 2019-02-25 | Stop reason: HOSPADM

## 2019-02-25 RX ORDER — NALOXONE HYDROCHLORIDE 0.4 MG/ML
.1-.4 INJECTION, SOLUTION INTRAMUSCULAR; INTRAVENOUS; SUBCUTANEOUS
Status: DISCONTINUED | OUTPATIENT
Start: 2019-02-25 | End: 2019-02-25 | Stop reason: HOSPADM

## 2019-02-25 RX ORDER — CEFAZOLIN SODIUM 2 G/100ML
2 INJECTION, SOLUTION INTRAVENOUS ONCE
Status: COMPLETED | OUTPATIENT
Start: 2019-02-25 | End: 2019-02-25

## 2019-02-25 RX ORDER — TERBUTALINE SULFATE 1 MG/ML
INJECTION, SOLUTION SUBCUTANEOUS
Status: COMPLETED
Start: 2019-02-25 | End: 2019-02-25

## 2019-02-25 RX ORDER — FENTANYL CITRATE 50 UG/ML
25 INJECTION, SOLUTION INTRAMUSCULAR; INTRAVENOUS
Status: DISCONTINUED | OUTPATIENT
Start: 2019-02-25 | End: 2019-02-25 | Stop reason: HOSPADM

## 2019-02-25 RX ORDER — TERBUTALINE SULFATE 1 MG/ML
0.25 INJECTION, SOLUTION SUBCUTANEOUS ONCE
Status: COMPLETED | OUTPATIENT
Start: 2019-02-25 | End: 2019-02-25

## 2019-02-25 RX ORDER — PROMETHAZINE HYDROCHLORIDE 25 MG/1
25 TABLET ORAL EVERY 6 HOURS PRN
Qty: 30 TABLET | Refills: 0 | Status: SHIPPED | OUTPATIENT
Start: 2019-02-25 | End: 2019-03-28

## 2019-02-25 RX ORDER — NITROFURANTOIN 25; 75 MG/1; MG/1
100 CAPSULE ORAL 2 TIMES DAILY WITH MEALS
Qty: 20 CAPSULE | Refills: 0 | Status: SHIPPED | OUTPATIENT
Start: 2019-02-25 | End: 2019-03-07

## 2019-02-25 RX ADMIN — CEFAZOLIN SODIUM 2 G: 2 INJECTION, SOLUTION INTRAVENOUS at 14:30

## 2019-02-25 RX ADMIN — SODIUM CHLORIDE, POTASSIUM CHLORIDE, SODIUM LACTATE AND CALCIUM CHLORIDE 2000 ML: 600; 310; 30; 20 INJECTION, SOLUTION INTRAVENOUS at 13:36

## 2019-02-25 RX ADMIN — FENTANYL CITRATE 25 MCG: 50 INJECTION, SOLUTION INTRAMUSCULAR; INTRAVENOUS at 13:34

## 2019-02-25 RX ADMIN — TERBUTALINE SULFATE 0.25 MG: 1 INJECTION, SOLUTION SUBCUTANEOUS at 13:03

## 2019-02-25 RX ADMIN — TERBUTALINE SULFATE 0.25 MG: 1 INJECTION SUBCUTANEOUS at 13:43

## 2019-02-25 RX ADMIN — PROMETHAZINE HYDROCHLORIDE 25 MG: 25 INJECTION INTRAMUSCULAR; INTRAVENOUS at 13:48

## 2019-02-25 RX ADMIN — TERBUTALINE SULFATE 0.25 MG: 1 INJECTION SUBCUTANEOUS at 13:03

## 2019-02-25 ASSESSMENT — MIFFLIN-ST. JEOR: SCORE: 1615.39

## 2019-02-25 NOTE — PLAN OF CARE
OB Triage Note  Kathia Milton  MRN: [id number]  Gestational Age: 33w2d      Kathia Milton presents for contractions (sign/symptom/concern).      States robbie since 6am.  Rates pain at 8/10.  Bleeding: none.  Denies LOF.      Dr. Rascon notified of arrival and condition.  Oriented patient to surroundings. Call light within reach.     FHT: 135  NST Start Time: 1234  NST Stop Time: 1300  NST: Reactive .  Uterine Assessment:Contractions: frequency q 2-3 minutes    Plan:  -Initial NST, then fetal/uterine monitoring per MD/patient plan.  -Sterile vaginal exam/sterile speculum exam.  -IV fluids, terbutaline, IV antibiotics  -Nursing education on early labor symptoms provided.

## 2019-02-25 NOTE — DISCHARGE INSTRUCTIONS

## 2019-02-25 NOTE — PLAN OF CARE
"Kathia Milton is a 29 year old  and 33w2d patient came in complaining of Contractions    Patient Active Problem List   Diagnosis     Abdominal pain, acute     Anemia in pregnancy     Seasonal allergies     Tobacco use disorder     Endometriosis of pelvis     Condyloma acuminatum due to human papillomavirus      delivery     Previous  delivery affecting pregnancy     High risk pregnancy due to history of  labor in first trimester     Encounter for triage in pregnant patient     External hemorrhoids      contractions-2019--FFN neg, prev steroids, Mag neuroprotection, weekly [place]     Anemia during pregnancy     Urinary tract infection without hematuria--Macrobid--2019       Pt discharged to home at 3:45 PM and encouraged to rest and drink plenty of fluids.  Pt told to call/return if bleeding more than spotting, water breaks, contractions 3-5 minutes apart that she has to breath through.     Nursing education on early labor symptoms, UTI provided. Self-management instructions reviewed. New medications and therapies reviewed. AVS given and signed. All questions answered and patient verbalizes understanding.    /65   Pulse 109   Temp 97.8  F (36.6  C)   Ht 1.651 m (5' 5\")   Wt 88.5 kg (195 lb)   LMP 2018 (Approximate)   SpO2 98%   BMI 32.45 kg/m      Cervical status: not re-checked  Fetal Assessment: Reactive    Discharge support:  Independent-Alone    Obstetric History       T0      L1     SAB3   TAB0   Ectopic0   Multiple0   Live Births1       # Outcome Date GA Lbr Ash/2nd Weight Sex Delivery Anes PTL Lv   5 Current            4  13 28w0d  1.077 kg (2 lb 6 oz) F -SEC   RICHMOND      Name: Christian   3 SAB            2 SAB            1 SAB                   Paola Saldaña    "

## 2019-02-26 ENCOUNTER — MEDICAL CORRESPONDENCE (OUTPATIENT)
Dept: HEALTH INFORMATION MANAGEMENT | Facility: CLINIC | Age: 29
End: 2019-02-26

## 2019-02-27 NOTE — PROGRESS NOTES
Fetal Non-Stress Test Results    NST Ordered By: Tarah FIGUEROA Medical Indication:  labor    NST Start & Stop Times  NST Start Time: 1234  NST Stop Time: 1300                            NST Results  Fetus A       NST:   Reactive    CAT I

## 2019-03-05 ENCOUNTER — HOSPITAL ENCOUNTER (OUTPATIENT)
Facility: HOSPITAL | Age: 29
Discharge: HOME | End: 2019-03-05
Attending: OBSTETRICS & GYNECOLOGY | Admitting: OBSTETRICS & GYNECOLOGY
Payer: COMMERCIAL

## 2019-03-05 LAB
ALBUMIN UR-MCNC: 10 MG/DL
AMORPH CRY #/AREA URNS HPF: ABNORMAL /HPF
APPEARANCE UR: ABNORMAL
BACTERIA #/AREA URNS HPF: ABNORMAL /HPF
BILIRUB UR QL STRIP: NEGATIVE
COLOR UR AUTO: ABNORMAL
GLUCOSE UR STRIP-MCNC: NEGATIVE MG/DL
HGB UR QL STRIP: NEGATIVE
KETONES UR STRIP-MCNC: NEGATIVE MG/DL
LEUKOCYTE ESTERASE UR QL STRIP: ABNORMAL
MUCOUS THREADS #/AREA URNS LPF: PRESENT /LPF
NITRATE UR QL: NEGATIVE
PH UR STRIP: 7 PH (ref 4.7–8)
RBC #/AREA URNS AUTO: 1 /HPF (ref 0–2)
RUPTURE OF FETAL MEMBRANES BY ROM PLUS: NEGATIVE
SOURCE: ABNORMAL
SP GR UR STRIP: 1.01 (ref 1–1.03)
SPERM #/AREA URNS HPF: PRESENT /HPF
SQUAMOUS #/AREA URNS AUTO: 5 /HPF (ref 0–1)
UROBILINOGEN UR STRIP-MCNC: NORMAL MG/DL (ref 0–2)
WBC #/AREA URNS AUTO: 3 /HPF (ref 0–5)

## 2019-03-05 PROCEDURE — 96372 THER/PROPH/DIAG INJ SC/IM: CPT | Mod: 59

## 2019-03-05 PROCEDURE — 84112 EVAL AMNIOTIC FLUID PROTEIN: CPT | Performed by: OBSTETRICS & GYNECOLOGY

## 2019-03-05 PROCEDURE — G0463 HOSPITAL OUTPT CLINIC VISIT: HCPCS | Mod: 59

## 2019-03-05 PROCEDURE — 81001 URINALYSIS AUTO W/SCOPE: CPT | Performed by: OBSTETRICS & GYNECOLOGY

## 2019-03-05 PROCEDURE — G0463 HOSPITAL OUTPT CLINIC VISIT: HCPCS | Mod: 25

## 2019-03-05 PROCEDURE — 25000128 H RX IP 250 OP 636: Performed by: OBSTETRICS & GYNECOLOGY

## 2019-03-05 RX ORDER — TERBUTALINE SULFATE 1 MG/ML
0.25 INJECTION, SOLUTION SUBCUTANEOUS ONCE
Status: COMPLETED | OUTPATIENT
Start: 2019-03-05 | End: 2019-03-05

## 2019-03-05 RX ADMIN — TERBUTALINE SULFATE 0.25 MG: 1 INJECTION SUBCUTANEOUS at 10:24

## 2019-03-05 NOTE — DISCHARGE INSTRUCTIONS

## 2019-03-05 NOTE — PLAN OF CARE
OB Triage Note  Kathia Milton  MRN: [id number]  Gestational Age: 34w3d      Kathia Milton presents for rule out rupture of membranes and robbie every 10 minutes all night long that are mild.  Some cervical change noted as cervix is 2-3cm, middle position and 50% effaced.    States robbie since last night. Reports clear fluid that is leaking and denies bleeding.    Dr. Zamudio notified of arrival and condition.  Oriented patient to surroundings. Call light within reach.     FHT: 150  NST Start Time:  NST Stop Time:  NST: Reactive .  Uterine Assessment:  Mild CTX every 5-11 minutes that are 30 seconds long.    Plan:  -Initial NST, then fetal/uterine monitoring per MD/patient plan.  -Sterile vaginal exam/sterile speculum exam.  -Amnisure sent to lab

## 2019-03-05 NOTE — PROCEDURES
OB/GYN    Bedside ultrasound - Cephalic  Amnisure negative  Monitor for cervical change and if none plan for discharge    ALICIA KLINE MD

## 2019-03-05 NOTE — PLAN OF CARE
Kathia Milton is a 29 year old  and 34w3d patient came in complaining of rupture of membranes and contractions.  Patient Active Problem List   Diagnosis     Abdominal pain, acute     Anemia in pregnancy     Seasonal allergies     Tobacco use disorder     Endometriosis of pelvis     Condyloma acuminatum due to human papillomavirus      delivery     Previous  delivery affecting pregnancy     High risk pregnancy due to history of  labor in first trimester     Encounter for triage in pregnant patient     External hemorrhoids      contractions-2019--FFN neg, prev steroids, Mag neuroprotection, weekly [place]     Anemia during pregnancy     Urinary tract infection without hematuria--Macrobid--2019       Pt discharged to home at 12:47 PM and encouraged to rest and drink plenty of fluids.  Pt told to call/return if bleeding more than spotting, water breaks, contractions 3-5 minutes apart that she has to breath through.     Nursing education on when to return to labor & delivery provided. Self-management instructions reviewed. AVS given and signed. All questions answered and patient verbalizes understanding.    LMP 2018 (Approximate)     Cervical status: mid position, dilated to 2, effaced 30-50% and medium consistency, -2 to -3 station, cephalic.  Fetal Assessment: Reactive    Discharge support:  Independent-Alone    Obstetric History       T0      L1     SAB3   TAB0   Ectopic0   Multiple0   Live Births1       # Outcome Date GA Lbr Ash/2nd Weight Sex Delivery Anes PTL Lv   5 Current            4  13 28w0d  1.077 kg (2 lb 6 oz) F -SEC   RICHMOND      Name: Christian   3 SAB            2 SAB            1 SAB                   Miriam Etienne

## 2019-03-06 ENCOUNTER — MYC MEDICAL ADVICE (OUTPATIENT)
Dept: OBGYN | Facility: OTHER | Age: 29
End: 2019-03-06

## 2019-03-06 ENCOUNTER — HOSPITAL ENCOUNTER (OUTPATIENT)
Facility: HOSPITAL | Age: 29
Discharge: HOME | End: 2019-03-06
Attending: OBSTETRICS & GYNECOLOGY | Admitting: OBSTETRICS & GYNECOLOGY
Payer: COMMERCIAL

## 2019-03-06 VITALS
WEIGHT: 195 LBS | RESPIRATION RATE: 20 BRPM | TEMPERATURE: 96.7 F | HEIGHT: 65 IN | BODY MASS INDEX: 32.49 KG/M2 | SYSTOLIC BLOOD PRESSURE: 118 MMHG | HEART RATE: 94 BPM | DIASTOLIC BLOOD PRESSURE: 75 MMHG

## 2019-03-06 LAB — FIBRONECTIN FETAL VAG QL: NEGATIVE

## 2019-03-06 PROCEDURE — G0463 HOSPITAL OUTPT CLINIC VISIT: HCPCS | Mod: 25

## 2019-03-06 PROCEDURE — 59025 FETAL NON-STRESS TEST: CPT

## 2019-03-06 PROCEDURE — 82731 ASSAY OF FETAL FIBRONECTIN: CPT | Performed by: OBSTETRICS & GYNECOLOGY

## 2019-03-06 PROCEDURE — 59025 FETAL NON-STRESS TEST: CPT | Mod: 26 | Performed by: OBSTETRICS & GYNECOLOGY

## 2019-03-06 ASSESSMENT — MIFFLIN-ST. JEOR: SCORE: 1615.39

## 2019-03-06 NOTE — PLAN OF CARE
"Kathia Milton is a 29 year old  and 34w4d patient came in complaining of Contractions        Pt discharged to home and encouraged to rest and drink plenty of fluids.  Pt told to call/return if bleeding more than spotting, water breaks, contractions 3-5 minutes apart that she has to breath through. AVS given and signed. All questions answered and patient verbalizes understanding.    /75 (BP Location: Right arm)   Pulse 94   Temp 96.7  F (35.9  C) (Oral)   Resp 20   Ht 1.651 m (5' 5\")   Wt 88.5 kg (195 lb)   LMP 2018 (Approximate)   BMI 32.45 kg/m      Cervical status: dilated to 2  Fetal Assessment: Reactive    Discharge support:  Independent-Alone    Kathia Milton        NST:  reactive  Start: 1352  Stop:1420    Physician: pelon  Reason For Test:  Labor  EDC:19  Gestational Age: 34 4/7    Comments:  Dr Fletcher updated and aware of pt status, contractions, cervical status and lab results. Pt has appt tomorrow and will follow up      Michelle Rose  "

## 2019-03-06 NOTE — DISCHARGE INSTRUCTIONS

## 2019-03-07 ENCOUNTER — PRENATAL OFFICE VISIT (OUTPATIENT)
Dept: OBGYN | Facility: OTHER | Age: 29
End: 2019-03-07
Attending: OBSTETRICS & GYNECOLOGY
Payer: COMMERCIAL

## 2019-03-07 VITALS
SYSTOLIC BLOOD PRESSURE: 110 MMHG | WEIGHT: 195 LBS | BODY MASS INDEX: 32.49 KG/M2 | OXYGEN SATURATION: 98 % | HEART RATE: 88 BPM | HEIGHT: 65 IN | DIASTOLIC BLOOD PRESSURE: 70 MMHG

## 2019-03-07 DIAGNOSIS — O09.213 HIGH RISK PREGNANCY DUE TO HISTORY OF PRETERM LABOR IN THIRD TRIMESTER: ICD-10-CM

## 2019-03-07 DIAGNOSIS — O34.219 PREVIOUS CESAREAN DELIVERY AFFECTING PREGNANCY: ICD-10-CM

## 2019-03-07 DIAGNOSIS — O47.00 PRETERM CONTRACTIONS: ICD-10-CM

## 2019-03-07 PROCEDURE — 99207 ZZC PRENATAL VISIT: CPT | Performed by: OBSTETRICS & GYNECOLOGY

## 2019-03-07 RX ADMIN — HYDROXYPROGESTERONE CAPROATE 250 MG: 250 INJECTION INTRAMUSCULAR at 13:37

## 2019-03-07 ASSESSMENT — MIFFLIN-ST. JEOR: SCORE: 1615.39

## 2019-03-07 ASSESSMENT — PAIN SCALES - GENERAL: PAINLEVEL: NO PAIN (0)

## 2019-03-07 NOTE — NURSING NOTE
"Chief Complaint   Patient presents with     Prenatal Care     34w 5d       Initial /70 (BP Location: Left arm, Cuff Size: Adult Regular)   Pulse 88   Ht 1.651 m (5' 5\")   Wt 88.5 kg (195 lb)   LMP 07/07/2018 (Approximate)   SpO2 98%   BMI 32.45 kg/m   Estimated body mass index is 32.45 kg/m  as calculated from the following:    Height as of this encounter: 1.651 m (5' 5\").    Weight as of this encounter: 88.5 kg (195 lb).  Medication Reconciliation: complete    Aimee Leahy LPN  "

## 2019-03-08 PROBLEM — O34.219 PREVIOUS CESAREAN DELIVERY AFFECTING PREGNANCY: Status: ACTIVE | Noted: 2018-08-21

## 2019-03-08 NOTE — PROGRESS NOTES
Ctx decreased today.  No other complaints.  See L and D evaluations.  Neg FFN yesterday.  Discussed PTL, PROM, and when to call or come in.  Discussed kick counts and fetal movement.  RTC in 1 weeks.  Preop  Ruiies  terrance vargas lof Lane Meyer, MD  3/8/2019

## 2019-03-12 NOTE — PROGRESS NOTES
Fetal Non-Stress Test Results    NST Ordered By: Dr. Fletcher       NST Start & Stop Times  NST Start Time: 1352  NST Stop Time: 1420                            NST Results  Fetus A   Baseline Rate: Normal  Accelerations: Present  Decelerations: None  Interpretation: reactive

## 2019-03-13 DIAGNOSIS — Z98.891 H/O CESAREAN SECTION: Primary | ICD-10-CM

## 2019-03-13 DIAGNOSIS — Z30.2 ENCOUNTER FOR STERILIZATION: ICD-10-CM

## 2019-03-15 ENCOUNTER — PRENATAL OFFICE VISIT (OUTPATIENT)
Dept: OBGYN | Facility: OTHER | Age: 29
End: 2019-03-15
Attending: OBSTETRICS & GYNECOLOGY
Payer: COMMERCIAL

## 2019-03-15 VITALS
HEIGHT: 65 IN | OXYGEN SATURATION: 98 % | DIASTOLIC BLOOD PRESSURE: 62 MMHG | BODY MASS INDEX: 32.99 KG/M2 | WEIGHT: 198 LBS | SYSTOLIC BLOOD PRESSURE: 112 MMHG | HEART RATE: 96 BPM

## 2019-03-15 DIAGNOSIS — O47.00 PRETERM CONTRACTIONS: ICD-10-CM

## 2019-03-15 DIAGNOSIS — Z01.818 PREOP GENERAL PHYSICAL EXAM: Primary | ICD-10-CM

## 2019-03-15 DIAGNOSIS — O34.219 PREVIOUS CESAREAN DELIVERY AFFECTING PREGNANCY: ICD-10-CM

## 2019-03-15 PROCEDURE — 96372 THER/PROPH/DIAG INJ SC/IM: CPT | Performed by: OBSTETRICS & GYNECOLOGY

## 2019-03-15 PROCEDURE — 99207 ZZC COMPLICATED OB VISIT: CPT | Performed by: OBSTETRICS & GYNECOLOGY

## 2019-03-15 RX ORDER — CEFAZOLIN SODIUM 1 G/50ML
1 INJECTION, SOLUTION INTRAVENOUS SEE ADMIN INSTRUCTIONS
Status: CANCELLED | OUTPATIENT
Start: 2019-03-15

## 2019-03-15 RX ORDER — CEFAZOLIN SODIUM 2 G/100ML
2 INJECTION, SOLUTION INTRAVENOUS
Status: CANCELLED | OUTPATIENT
Start: 2019-03-15

## 2019-03-15 RX ORDER — CITRIC ACID/SODIUM CITRATE 334-500MG
30 SOLUTION, ORAL ORAL
Status: CANCELLED | OUTPATIENT
Start: 2019-03-15

## 2019-03-15 RX ORDER — LIDOCAINE 40 MG/G
CREAM TOPICAL
Status: CANCELLED | OUTPATIENT
Start: 2019-03-15

## 2019-03-15 RX ORDER — SODIUM CHLORIDE, SODIUM LACTATE, POTASSIUM CHLORIDE, CALCIUM CHLORIDE 600; 310; 30; 20 MG/100ML; MG/100ML; MG/100ML; MG/100ML
INJECTION, SOLUTION INTRAVENOUS CONTINUOUS
Status: CANCELLED | OUTPATIENT
Start: 2019-03-15

## 2019-03-15 RX ADMIN — HYDROXYPROGESTERONE CAPROATE 250 MG: 250 INJECTION INTRAMUSCULAR at 14:07

## 2019-03-15 ASSESSMENT — PAIN SCALES - GENERAL: PAINLEVEL: NO PAIN (0)

## 2019-03-15 ASSESSMENT — MIFFLIN-ST. JEOR: SCORE: 1629

## 2019-03-15 NOTE — NURSING NOTE
"Chief Complaint   Patient presents with     Prenatal Care     35w 6d       Initial /62 (BP Location: Left arm, Cuff Size: Adult Regular)   Pulse 96   Ht 1.651 m (5' 5\")   Wt 89.8 kg (198 lb)   LMP 07/07/2018 (Approximate)   SpO2 98%   BMI 32.95 kg/m   Estimated body mass index is 32.95 kg/m  as calculated from the following:    Height as of this encounter: 1.651 m (5' 5\").    Weight as of this encounter: 89.8 kg (198 lb).  Medication Reconciliation: complete    Aimee Leahy LPN  "

## 2019-03-15 NOTE — PROGRESS NOTES
Preop done.   Plan repeat CD and BTO next week.  Scheduled 36-37 weeks due to h/o prior t-shaped uterine incision.   H&P reviewed and unchanged from prenatal record.  Chest clear to auscultation.  CV regular rate and rhythm without murmurs.  Abdomen soft, nontender, gravid.  Extremities negative.  Reveiwed goals, risks, alternatives of a  section including bleeding, infection, and potential damage to other organs including bowel, bladder, baby, blood vessels and nerves.  Potential need for  in future.  Hosptial stay and recovery period discussed.  Possibility of fetal immaturity or need for transfer to NICU discussed.   All questions were answered.  We reviewed the risks and benefits of tubal ligation including risks of bleeding, infection, damage to other organs. Risks of tubal failure, and possibility of ectopic pregnancy were also explained. If she felt she was pregnant in the future or had a positive pregnancy test,  she understands the need to contact a physician immediately. We discussed alternative forms of birth control.  Nothing to eat or drink after midnight prior to procedure.          Damon Fletcher MD

## 2019-03-18 ENCOUNTER — ANESTHESIA EVENT (OUTPATIENT)
Dept: SURGERY | Facility: HOSPITAL | Age: 29
End: 2019-03-18
Payer: COMMERCIAL

## 2019-03-18 ASSESSMENT — LIFESTYLE VARIABLES: TOBACCO_USE: 1

## 2019-03-18 NOTE — ANESTHESIA PREPROCEDURE EVALUATION
Anesthesia Pre-Procedure Evaluation    Patient: Kathia Milton   MRN: [id number] : 1990          Preoperative Diagnosis: REPEAT  SECTION, DESIRES STERILIZATION    Procedure(s):  COMBINED  SECTION, BILATERAL  TUBAL OCCLUSION    Past Medical History:   Diagnosis Date     Benign neoplasm of vagina 2011     Condyloma acuminatum 2011     Seasonal allergies      Supervision of other normal pregnancy     MICHELLE: 2013     Tobacco use disorder 10/18/2012     Past Surgical History:   Procedure Laterality Date     APPENDECTOMY  14    [facility]      SECTION      C section     COLONOSCOPY  11/10/15    Dr. Singh     DILATION AND CURETTAGE SUCTION, TREAT MISSED , 1ST TRIMESTER       ENDOSCOPY  11/10/15    Dr. Singh      LAPAROSCOPIC CYSTECTOMY OVARIAN (BENIGN) Left 2018    Procedure: LAPAROSCOPIC CYSTECTOMY OVARIAN (BENIGN);  EXPLORATORY  LAPAROSCOPY, LEFT OVARIAN CYSTECTOMY;  Surgeon: Damon Fletcher MD;  Location: [facility]     LAPAROSCOPY  11/30/15    Dr. Sun; lysis of pelvic adhesions, cauterization of minimal endometriosis     LAPAROSCOPY  2018    lysis of adhesions; pelvic pain, endometriosis; Dr. Sun       Anesthesia Evaluation     . Pt has had prior anesthetic.     No history of anesthetic complications          ROS/MED HX    ENT/Pulmonary:     (+)allergic rhinitis, tobacco use, Past use quit 9 months ago packs/day  , . .    Neurologic:  - neg neurologic ROS     Cardiovascular:  - neg cardiovascular ROS       METS/Exercise Tolerance:     Hematologic:     (+) Anemia, -      Musculoskeletal:  - neg musculoskeletal ROS       GI/Hepatic: Comment: Hx EGD with dilation - neg GI/hepatic ROS       Renal/Genitourinary:  - ROS Renal section negative       Endo:  - neg endo ROS       Psychiatric:  - neg psychiatric ROS       Infectious Disease:  - neg infectious disease ROS       Malignancy:      - no malignancy   Other: Comment: h/o prior t-shaped uterine  "incision.    (+) Possibly pregnant   - neg other ROS                      Physical Exam  Normal systems: cardiovascular and pulmonary    Airway   Mallampati: II  TM distance: >3 FB  Neck ROM: full    Dental   (+) upper dentures and missing    Cardiovascular   Rhythm and rate: regular and normal      Pulmonary    breath sounds clear to auscultation            Lab Results   Component Value Date    WBC 10.6 01/22/2019    HGB 10.6 (L) 01/22/2019    HCT 31.3 (L) 01/22/2019     01/22/2019    CRP <2.9 08/06/2018    SED 7 11/08/2015     08/19/2018    POTASSIUM 3.4 08/19/2018    CHLORIDE 104 08/19/2018    CO2 27 08/19/2018    BUN 7 08/19/2018    CR 0.64 08/19/2018     (H) 08/19/2018    CLARENCE 8.3 (L) 08/19/2018    ALBUMIN 3.7 08/15/2018    PROTTOTAL 7.0 08/15/2018    ALT 17 08/15/2018    AST 15 08/15/2018    ALKPHOS 55 08/15/2018    BILITOTAL 0.1 (L) 08/15/2018    LIPASE 108 11/08/2015    AMYLASE 28 (L) 11/03/2015    PTT 29 10/21/2014    INR 1.05 05/22/2015    HCG Positive (A) 08/06/2018    HCGS Negative 03/18/2015       Preop Vitals  BP Readings from Last 3 Encounters:   03/15/19 112/62   03/07/19 110/70   03/06/19 118/75    Pulse Readings from Last 3 Encounters:   03/15/19 96   03/07/19 88   03/06/19 94      Resp Readings from Last 3 Encounters:   03/06/19 20   02/15/19 18   02/04/19 18    SpO2 Readings from Last 3 Encounters:   03/15/19 98%   03/07/19 98%   02/25/19 98%      Temp Readings from Last 1 Encounters:   03/06/19 96.7  F (35.9  C) (Oral)    Ht Readings from Last 1 Encounters:   03/15/19 1.651 m (5' 5\")      Wt Readings from Last 1 Encounters:   03/15/19 89.8 kg (198 lb)    Estimated body mass index is 32.95 kg/m  as calculated from the following:    Height as of 3/15/19: 1.651 m (5' 5\").    Weight as of 3/15/19: 89.8 kg (198 lb).       Anesthesia Plan      History & Physical Review  History and physical reviewed and following examination; no interval change.    ASA Status:  2 .    NPO Status:  > " 8 hours    Plan for Spinal and Periph. Nerve Block for postop pain   PONV prophylaxis:  Ondansetron (or other 5HT-3) and Dexamethasone or Solumedrol   3-15-19  Plt: 402  Will use Hydromorphone as SAB supplement       Postoperative Care  Postoperative pain management:  IV analgesics, Oral pain medications, Neuraxial analgesia and Peripheral nerve block (Single Shot).  Plan for postoperative opioid use.    Consents  Anesthetic plan, risks, benefits and alternatives discussed with:  Patient..                 CARLENE Bernal CRNA

## 2019-03-19 ENCOUNTER — TELEPHONE (OUTPATIENT)
Dept: OBGYN | Facility: HOSPITAL | Age: 29
End: 2019-03-19

## 2019-03-19 NOTE — TELEPHONE ENCOUNTER
Called patient and verified time of arrival of 0530 in ED to check in for  scheduled for 19. Patient given tentative schedule of day.  Patient plans to bring boyfriend into operating room with her.  Patient plans on doing skin to skin in the OR.  Patient also plans to breastfeed.  All questions answered.  Reconfirmed arrival time of 0530 on 19.

## 2019-03-20 ENCOUNTER — ANESTHESIA (OUTPATIENT)
Dept: SURGERY | Facility: HOSPITAL | Age: 29
End: 2019-03-20
Payer: COMMERCIAL

## 2019-03-20 ENCOUNTER — HOSPITAL ENCOUNTER (INPATIENT)
Facility: HOSPITAL | Age: 29
LOS: 2 days | Discharge: HOME | End: 2019-03-22
Attending: OBSTETRICS & GYNECOLOGY | Admitting: OBSTETRICS & GYNECOLOGY
Payer: COMMERCIAL

## 2019-03-20 DIAGNOSIS — N39.0 URINARY TRACT INFECTION WITHOUT HEMATURIA, SITE UNSPECIFIED: ICD-10-CM

## 2019-03-20 LAB
ABO + RH BLD: NORMAL
ABO + RH BLD: NORMAL
BASOPHILS # BLD AUTO: 0 10E9/L (ref 0–0.2)
BASOPHILS NFR BLD AUTO: 0.3 %
BLD GP AB SCN SERPL QL: NORMAL
BLOOD BANK CMNT PATIENT-IMP: NORMAL
DIFFERENTIAL METHOD BLD: ABNORMAL
EOSINOPHIL # BLD AUTO: 0.7 10E9/L (ref 0–0.7)
EOSINOPHIL NFR BLD AUTO: 5.6 %
ERYTHROCYTE [DISTWIDTH] IN BLOOD BY AUTOMATED COUNT: 13.5 % (ref 10–15)
GLUCOSE BLDC GLUCOMTR-MCNC: 69 MG/DL (ref 70–99)
HCT VFR BLD AUTO: 31.6 % (ref 35–47)
HGB BLD-MCNC: 10.5 G/DL (ref 11.7–15.7)
IMM GRANULOCYTES # BLD: 0.3 10E9/L (ref 0–0.4)
IMM GRANULOCYTES NFR BLD: 2.1 %
LYMPHOCYTES # BLD AUTO: 2.9 10E9/L (ref 0.8–5.3)
LYMPHOCYTES NFR BLD AUTO: 23.4 %
MCH RBC QN AUTO: 25.8 PG (ref 26.5–33)
MCHC RBC AUTO-ENTMCNC: 33.2 G/DL (ref 31.5–36.5)
MCV RBC AUTO: 78 FL (ref 78–100)
MONOCYTES # BLD AUTO: 1.1 10E9/L (ref 0–1.3)
MONOCYTES NFR BLD AUTO: 8.6 %
NEUTROPHILS # BLD AUTO: 7.5 10E9/L (ref 1.6–8.3)
NEUTROPHILS NFR BLD AUTO: 60 %
NRBC # BLD AUTO: 0 10*3/UL
NRBC BLD AUTO-RTO: 0 /100
PLATELET # BLD AUTO: 402 10E9/L (ref 150–450)
RBC # BLD AUTO: 4.07 10E12/L (ref 3.8–5.2)
SPECIMEN EXP DATE BLD: NORMAL
WBC # BLD AUTO: 12.6 10E9/L (ref 4–11)

## 2019-03-20 PROCEDURE — 0064U ANTB TP TOTAL&RPR IA QUAL: CPT | Performed by: OBSTETRICS & GYNECOLOGY

## 2019-03-20 PROCEDURE — 27210794 ZZH OR GENERAL SUPPLY STERILE: Performed by: OBSTETRICS & GYNECOLOGY

## 2019-03-20 PROCEDURE — 12000000 ZZH R&B MED SURG/OB

## 2019-03-20 PROCEDURE — 25000128 H RX IP 250 OP 636: Performed by: OBSTETRICS & GYNECOLOGY

## 2019-03-20 PROCEDURE — 59510 CESAREAN DELIVERY: CPT | Performed by: OBSTETRICS & GYNECOLOGY

## 2019-03-20 PROCEDURE — 25000125 ZZHC RX 250: Performed by: NURSE ANESTHETIST, CERTIFIED REGISTERED

## 2019-03-20 PROCEDURE — 59514 CESAREAN DELIVERY ONLY: CPT | Mod: AS | Performed by: NURSE PRACTITIONER

## 2019-03-20 PROCEDURE — 88302 TISSUE EXAM BY PATHOLOGIST: CPT | Mod: TC | Performed by: OBSTETRICS & GYNECOLOGY

## 2019-03-20 PROCEDURE — 40000305 ZZH STATISTIC PRE PROC ASSESS I: Performed by: OBSTETRICS & GYNECOLOGY

## 2019-03-20 PROCEDURE — 36000058 ZZH SURGERY LEVEL 3 EA 15 ADDTL MIN: Performed by: OBSTETRICS & GYNECOLOGY

## 2019-03-20 PROCEDURE — 58611 LIGATE OVIDUCT(S) ADD-ON: CPT | Performed by: OBSTETRICS & GYNECOLOGY

## 2019-03-20 PROCEDURE — 85025 COMPLETE CBC W/AUTO DIFF WBC: CPT | Performed by: OBSTETRICS & GYNECOLOGY

## 2019-03-20 PROCEDURE — 86901 BLOOD TYPING SEROLOGIC RH(D): CPT | Performed by: OBSTETRICS & GYNECOLOGY

## 2019-03-20 PROCEDURE — 0UB70ZZ EXCISION OF BILATERAL FALLOPIAN TUBES, OPEN APPROACH: ICD-10-PCS | Performed by: OBSTETRICS & GYNECOLOGY

## 2019-03-20 PROCEDURE — 86850 RBC ANTIBODY SCREEN: CPT | Performed by: OBSTETRICS & GYNECOLOGY

## 2019-03-20 PROCEDURE — 25000128 H RX IP 250 OP 636: Performed by: ANESTHESIOLOGY

## 2019-03-20 PROCEDURE — 25000125 ZZHC RX 250: Performed by: OBSTETRICS & GYNECOLOGY

## 2019-03-20 PROCEDURE — 25800030 ZZH RX IP 258 OP 636: Performed by: OBSTETRICS & GYNECOLOGY

## 2019-03-20 PROCEDURE — 40000275 ZZH STATISTIC RCP TIME EA 10 MIN

## 2019-03-20 PROCEDURE — 36415 COLL VENOUS BLD VENIPUNCTURE: CPT | Performed by: OBSTETRICS & GYNECOLOGY

## 2019-03-20 PROCEDURE — 86900 BLOOD TYPING SEROLOGIC ABO: CPT | Performed by: OBSTETRICS & GYNECOLOGY

## 2019-03-20 PROCEDURE — 3E0T3BZ INTRODUCTION OF ANESTHETIC AGENT INTO PERIPHERAL NERVES AND PLEXI, PERCUTANEOUS APPROACH: ICD-10-PCS | Performed by: ANESTHESIOLOGY

## 2019-03-20 PROCEDURE — 25800030 ZZH RX IP 258 OP 636: Performed by: NURSE ANESTHETIST, CERTIFIED REGISTERED

## 2019-03-20 PROCEDURE — 00000146 ZZHCL STATISTIC GLUCOSE BY METER IP

## 2019-03-20 PROCEDURE — 36000056 ZZH SURGERY LEVEL 3 1ST 30 MIN: Performed by: OBSTETRICS & GYNECOLOGY

## 2019-03-20 PROCEDURE — 37000011 ZZH ANESTHESIA WARD SERVICE: Performed by: NURSE ANESTHETIST, CERTIFIED REGISTERED

## 2019-03-20 PROCEDURE — 01999 UNLISTED ANES PROCEDURE: CPT | Performed by: NURSE ANESTHETIST, CERTIFIED REGISTERED

## 2019-03-20 PROCEDURE — 71000014 ZZH RECOVERY PHASE 1 LEVEL 2 FIRST HR: Performed by: OBSTETRICS & GYNECOLOGY

## 2019-03-20 PROCEDURE — 37000008 ZZH ANESTHESIA TECHNICAL FEE, 1ST 30 MIN: Performed by: OBSTETRICS & GYNECOLOGY

## 2019-03-20 PROCEDURE — 25000132 ZZH RX MED GY IP 250 OP 250 PS 637: Performed by: OBSTETRICS & GYNECOLOGY

## 2019-03-20 PROCEDURE — 59514 CESAREAN DELIVERY ONLY: CPT | Performed by: ANESTHESIOLOGY

## 2019-03-20 PROCEDURE — 25000128 H RX IP 250 OP 636: Performed by: NURSE ANESTHETIST, CERTIFIED REGISTERED

## 2019-03-20 PROCEDURE — 37000009 ZZH ANESTHESIA TECHNICAL FEE, EACH ADDTL 15 MIN: Performed by: OBSTETRICS & GYNECOLOGY

## 2019-03-20 PROCEDURE — 27110028 ZZH OR GENERAL SUPPLY NON-STERILE: Performed by: OBSTETRICS & GYNECOLOGY

## 2019-03-20 RX ORDER — NALOXONE HYDROCHLORIDE 0.4 MG/ML
.1-.4 INJECTION, SOLUTION INTRAMUSCULAR; INTRAVENOUS; SUBCUTANEOUS
Status: DISCONTINUED | OUTPATIENT
Start: 2019-03-20 | End: 2019-03-22 | Stop reason: HOSPADM

## 2019-03-20 RX ORDER — CEFAZOLIN SODIUM 2 G/100ML
2 INJECTION, SOLUTION INTRAVENOUS
Status: COMPLETED | OUTPATIENT
Start: 2019-03-20 | End: 2019-03-20

## 2019-03-20 RX ORDER — CITRIC ACID/SODIUM CITRATE 334-500MG
30 SOLUTION, ORAL ORAL
Status: COMPLETED | OUTPATIENT
Start: 2019-03-20 | End: 2019-03-20

## 2019-03-20 RX ORDER — HYDROMORPHONE HYDROCHLORIDE 1 MG/ML
0.1 INJECTION, SOLUTION INTRAMUSCULAR; INTRAVENOUS; SUBCUTANEOUS ONCE
Status: DISCONTINUED | OUTPATIENT
Start: 2019-03-20 | End: 2019-03-22 | Stop reason: HOSPADM

## 2019-03-20 RX ORDER — FERROUS SULFATE 325(65) MG
325 TABLET ORAL 2 TIMES DAILY
Status: DISCONTINUED | OUTPATIENT
Start: 2019-03-20 | End: 2019-03-22 | Stop reason: HOSPADM

## 2019-03-20 RX ORDER — ONDANSETRON 2 MG/ML
INJECTION INTRAMUSCULAR; INTRAVENOUS PRN
Status: DISCONTINUED | OUTPATIENT
Start: 2019-03-20 | End: 2019-03-20

## 2019-03-20 RX ORDER — METHYLERGONOVINE MALEATE 0.2 MG/ML
INJECTION INTRAVENOUS PRN
Status: DISCONTINUED | OUTPATIENT
Start: 2019-03-20 | End: 2019-03-20

## 2019-03-20 RX ORDER — DOCUSATE SODIUM 100 MG/1
100 CAPSULE, LIQUID FILLED ORAL 2 TIMES DAILY
Status: DISCONTINUED | OUTPATIENT
Start: 2019-03-20 | End: 2019-03-22 | Stop reason: HOSPADM

## 2019-03-20 RX ORDER — NALBUPHINE HYDROCHLORIDE 20 MG/ML
2 INJECTION, SOLUTION INTRAMUSCULAR; INTRAVENOUS; SUBCUTANEOUS EVERY 30 MIN PRN
Status: DISCONTINUED | OUTPATIENT
Start: 2019-03-20 | End: 2019-03-22 | Stop reason: HOSPADM

## 2019-03-20 RX ORDER — SIMETHICONE 80 MG
80 TABLET,CHEWABLE ORAL 4 TIMES DAILY PRN
Status: DISCONTINUED | OUTPATIENT
Start: 2019-03-20 | End: 2019-03-22 | Stop reason: HOSPADM

## 2019-03-20 RX ORDER — ONDANSETRON 2 MG/ML
4 INJECTION INTRAMUSCULAR; INTRAVENOUS EVERY 6 HOURS PRN
Status: DISCONTINUED | OUTPATIENT
Start: 2019-03-20 | End: 2019-03-22 | Stop reason: HOSPADM

## 2019-03-20 RX ORDER — MISOPROSTOL 200 UG/1
400 TABLET ORAL
Status: DISCONTINUED | OUTPATIENT
Start: 2019-03-20 | End: 2019-03-22 | Stop reason: HOSPADM

## 2019-03-20 RX ORDER — SODIUM CHLORIDE, SODIUM LACTATE, POTASSIUM CHLORIDE, CALCIUM CHLORIDE 600; 310; 30; 20 MG/100ML; MG/100ML; MG/100ML; MG/100ML
INJECTION, SOLUTION INTRAVENOUS CONTINUOUS
Status: DISCONTINUED | OUTPATIENT
Start: 2019-03-20 | End: 2019-03-20 | Stop reason: HOSPADM

## 2019-03-20 RX ORDER — MEPERIDINE HYDROCHLORIDE 50 MG/ML
12.5 INJECTION INTRAMUSCULAR; INTRAVENOUS; SUBCUTANEOUS
Status: DISCONTINUED | OUTPATIENT
Start: 2019-03-20 | End: 2019-03-20 | Stop reason: HOSPADM

## 2019-03-20 RX ORDER — MORPHINE SULFATE 4 MG/ML
1 INJECTION, SOLUTION INTRAMUSCULAR; INTRAVENOUS
Status: DISCONTINUED | OUTPATIENT
Start: 2019-03-20 | End: 2019-03-22 | Stop reason: HOSPADM

## 2019-03-20 RX ORDER — BACITRACIN ZINC 500 [USP'U]/G
OINTMENT TOPICAL PRN
Status: DISCONTINUED | OUTPATIENT
Start: 2019-03-20 | End: 2019-03-20 | Stop reason: HOSPADM

## 2019-03-20 RX ORDER — ONDANSETRON 2 MG/ML
4 INJECTION INTRAMUSCULAR; INTRAVENOUS EVERY 30 MIN PRN
Status: DISCONTINUED | OUTPATIENT
Start: 2019-03-20 | End: 2019-03-20 | Stop reason: HOSPADM

## 2019-03-20 RX ORDER — OXYTOCIN/0.9 % SODIUM CHLORIDE 30/500 ML
100 PLASTIC BAG, INJECTION (ML) INTRAVENOUS CONTINUOUS
Status: DISCONTINUED | OUTPATIENT
Start: 2019-03-20 | End: 2019-03-22 | Stop reason: HOSPADM

## 2019-03-20 RX ORDER — ALBUTEROL SULFATE 0.83 MG/ML
2.5 SOLUTION RESPIRATORY (INHALATION) EVERY 4 HOURS PRN
Status: DISCONTINUED | OUTPATIENT
Start: 2019-03-20 | End: 2019-03-20 | Stop reason: HOSPADM

## 2019-03-20 RX ORDER — OXYCODONE HYDROCHLORIDE 5 MG/1
5-10 TABLET ORAL
Status: DISCONTINUED | OUTPATIENT
Start: 2019-03-21 | End: 2019-03-22 | Stop reason: HOSPADM

## 2019-03-20 RX ORDER — ACETAMINOPHEN 325 MG/1
650 TABLET ORAL EVERY 4 HOURS PRN
Status: DISCONTINUED | OUTPATIENT
Start: 2019-03-23 | End: 2019-03-22 | Stop reason: HOSPADM

## 2019-03-20 RX ORDER — FENTANYL CITRATE 50 UG/ML
25-50 INJECTION, SOLUTION INTRAMUSCULAR; INTRAVENOUS
Status: DISCONTINUED | OUTPATIENT
Start: 2019-03-20 | End: 2019-03-20 | Stop reason: HOSPADM

## 2019-03-20 RX ORDER — CEFAZOLIN SODIUM 1 G/3ML
1 INJECTION, POWDER, FOR SOLUTION INTRAMUSCULAR; INTRAVENOUS SEE ADMIN INSTRUCTIONS
Status: DISCONTINUED | OUTPATIENT
Start: 2019-03-20 | End: 2019-03-20 | Stop reason: HOSPADM

## 2019-03-20 RX ORDER — OXYTOCIN/0.9 % SODIUM CHLORIDE 30/500 ML
340 PLASTIC BAG, INJECTION (ML) INTRAVENOUS CONTINUOUS PRN
Status: DISCONTINUED | OUTPATIENT
Start: 2019-03-20 | End: 2019-03-22 | Stop reason: HOSPADM

## 2019-03-20 RX ORDER — ROPIVACAINE HYDROCHLORIDE 5 MG/ML
INJECTION, SOLUTION EPIDURAL; INFILTRATION; PERINEURAL PRN
Status: DISCONTINUED | OUTPATIENT
Start: 2019-03-20 | End: 2019-03-20

## 2019-03-20 RX ORDER — ACETAMINOPHEN 325 MG/1
975 TABLET ORAL EVERY 8 HOURS
Status: DISCONTINUED | OUTPATIENT
Start: 2019-03-20 | End: 2019-03-22 | Stop reason: HOSPADM

## 2019-03-20 RX ORDER — HYDROCORTISONE 2.5 %
CREAM (GRAM) TOPICAL 3 TIMES DAILY PRN
Status: DISCONTINUED | OUTPATIENT
Start: 2019-03-20 | End: 2019-03-22 | Stop reason: HOSPADM

## 2019-03-20 RX ORDER — DEXAMETHASONE SODIUM PHOSPHATE 10 MG/ML
INJECTION, SOLUTION INTRAMUSCULAR; INTRAVENOUS PRN
Status: DISCONTINUED | OUTPATIENT
Start: 2019-03-20 | End: 2019-03-20

## 2019-03-20 RX ORDER — LIDOCAINE 40 MG/G
CREAM TOPICAL
Status: DISCONTINUED | OUTPATIENT
Start: 2019-03-20 | End: 2019-03-20 | Stop reason: HOSPADM

## 2019-03-20 RX ORDER — ONDANSETRON 4 MG/1
4 TABLET, ORALLY DISINTEGRATING ORAL EVERY 30 MIN PRN
Status: DISCONTINUED | OUTPATIENT
Start: 2019-03-20 | End: 2019-03-20 | Stop reason: HOSPADM

## 2019-03-20 RX ORDER — DIPHENHYDRAMINE HCL 25 MG
25 CAPSULE ORAL EVERY 6 HOURS PRN
Status: DISCONTINUED | OUTPATIENT
Start: 2019-03-20 | End: 2019-03-22 | Stop reason: HOSPADM

## 2019-03-20 RX ORDER — HYDROMORPHONE HYDROCHLORIDE 1 MG/ML
.3-.5 INJECTION, SOLUTION INTRAMUSCULAR; INTRAVENOUS; SUBCUTANEOUS EVERY 10 MIN PRN
Status: DISCONTINUED | OUTPATIENT
Start: 2019-03-20 | End: 2019-03-20 | Stop reason: HOSPADM

## 2019-03-20 RX ORDER — KETOROLAC TROMETHAMINE 30 MG/ML
30 INJECTION, SOLUTION INTRAMUSCULAR; INTRAVENOUS EVERY 6 HOURS
Status: COMPLETED | OUTPATIENT
Start: 2019-03-20 | End: 2019-03-21

## 2019-03-20 RX ORDER — OXYTOCIN 10 [USP'U]/ML
10 INJECTION, SOLUTION INTRAMUSCULAR; INTRAVENOUS
Status: DISCONTINUED | OUTPATIENT
Start: 2019-03-20 | End: 2019-03-22 | Stop reason: HOSPADM

## 2019-03-20 RX ORDER — PROCHLORPERAZINE 25 MG
25 SUPPOSITORY, RECTAL RECTAL EVERY 12 HOURS PRN
Status: DISCONTINUED | OUTPATIENT
Start: 2019-03-20 | End: 2019-03-22 | Stop reason: HOSPADM

## 2019-03-20 RX ORDER — LANOLIN 100 %
OINTMENT (GRAM) TOPICAL
Status: DISCONTINUED | OUTPATIENT
Start: 2019-03-20 | End: 2019-03-22 | Stop reason: HOSPADM

## 2019-03-20 RX ORDER — HYDROXYZINE HYDROCHLORIDE 50 MG/ML
100 INJECTION, SOLUTION INTRAMUSCULAR EVERY 6 HOURS PRN
Status: DISCONTINUED | OUTPATIENT
Start: 2019-03-20 | End: 2019-03-22 | Stop reason: HOSPADM

## 2019-03-20 RX ORDER — NALOXONE HYDROCHLORIDE 0.4 MG/ML
0.1-0.4 INJECTION, SOLUTION INTRAMUSCULAR; INTRAVENOUS; SUBCUTANEOUS
Status: DISCONTINUED | OUTPATIENT
Start: 2019-03-20 | End: 2019-03-22 | Stop reason: HOSPADM

## 2019-03-20 RX ORDER — VITAMIN A ACETATE, .BETA.-CAROTENE, ASCORBIC ACID, CHOLECALCIFEROL, .ALPHA.-TOCOPHEROL ACETATE, DL-, THIAMINE MONONITRATE, RIBOFLAVIN, NIACINAMIDE, PYRIDOXINE HYDROCHLORIDE, FOLIC ACID, CYANOCOBALAMIN, CALCIUM CARBONATE, FERROUS FUMARATE, ZINC OXIDE, AND CUPRIC OXIDE 2000; 2000; 120; 400; 22; 1.84; 3; 20; 10; 1; 12; 200; 27; 25; 2 [IU]/1; [IU]/1; MG/1; [IU]/1; MG/1; MG/1; MG/1; MG/1; MG/1; MG/1; UG/1; MG/1; MG/1; MG/1; MG/1
1 TABLET ORAL DAILY
Status: DISCONTINUED | OUTPATIENT
Start: 2019-03-20 | End: 2019-03-22 | Stop reason: HOSPADM

## 2019-03-20 RX ORDER — HYDRALAZINE HYDROCHLORIDE 20 MG/ML
2.5-5 INJECTION INTRAMUSCULAR; INTRAVENOUS EVERY 10 MIN PRN
Status: DISCONTINUED | OUTPATIENT
Start: 2019-03-20 | End: 2019-03-20 | Stop reason: HOSPADM

## 2019-03-20 RX ORDER — CEFOXITIN SODIUM 2 G/50ML
2 INJECTION, SOLUTION INTRAVENOUS EVERY 6 HOURS
Status: COMPLETED | OUTPATIENT
Start: 2019-03-20 | End: 2019-03-20

## 2019-03-20 RX ORDER — BUPIVACAINE HYDROCHLORIDE 7.5 MG/ML
INJECTION, SOLUTION INTRASPINAL PRN
Status: DISCONTINUED | OUTPATIENT
Start: 2019-03-20 | End: 2019-03-20

## 2019-03-20 RX ORDER — DEXTROSE, SODIUM CHLORIDE, SODIUM LACTATE, POTASSIUM CHLORIDE, AND CALCIUM CHLORIDE 5; .6; .31; .03; .02 G/100ML; G/100ML; G/100ML; G/100ML; G/100ML
INJECTION, SOLUTION INTRAVENOUS CONTINUOUS
Status: DISCONTINUED | OUTPATIENT
Start: 2019-03-20 | End: 2019-03-22 | Stop reason: HOSPADM

## 2019-03-20 RX ORDER — IBUPROFEN 800 MG/1
800 TABLET, FILM COATED ORAL EVERY 6 HOURS PRN
Status: DISCONTINUED | OUTPATIENT
Start: 2019-03-20 | End: 2019-03-22

## 2019-03-20 RX ORDER — LIDOCAINE 40 MG/G
CREAM TOPICAL
Status: DISCONTINUED | OUTPATIENT
Start: 2019-03-20 | End: 2019-03-22 | Stop reason: HOSPADM

## 2019-03-20 RX ORDER — NALOXONE HYDROCHLORIDE 0.4 MG/ML
.1-.4 INJECTION, SOLUTION INTRAMUSCULAR; INTRAVENOUS; SUBCUTANEOUS
Status: DISCONTINUED | OUTPATIENT
Start: 2019-03-20 | End: 2019-03-20 | Stop reason: HOSPADM

## 2019-03-20 RX ORDER — DIPHENHYDRAMINE HYDROCHLORIDE 50 MG/ML
25 INJECTION INTRAMUSCULAR; INTRAVENOUS EVERY 6 HOURS PRN
Status: DISCONTINUED | OUTPATIENT
Start: 2019-03-20 | End: 2019-03-22 | Stop reason: HOSPADM

## 2019-03-20 RX ORDER — BISACODYL 10 MG
10 SUPPOSITORY, RECTAL RECTAL DAILY PRN
Status: DISCONTINUED | OUTPATIENT
Start: 2019-03-22 | End: 2019-03-22 | Stop reason: HOSPADM

## 2019-03-20 RX ADMIN — PROCHLORPERAZINE EDISYLATE 10 MG: 5 INJECTION INTRAMUSCULAR; INTRAVENOUS at 12:25

## 2019-03-20 RX ADMIN — ACETAMINOPHEN 975 MG: 325 TABLET, FILM COATED ORAL at 17:12

## 2019-03-20 RX ADMIN — CEFOXITIN SODIUM 2 G: 2 INJECTION, SOLUTION INTRAVENOUS at 20:45

## 2019-03-20 RX ADMIN — OXYTOCIN-SODIUM CHLORIDE 0.9% IV SOLN 30 UNIT/500ML 100 ML/HR: 30-0.9/5 SOLUTION at 11:00

## 2019-03-20 RX ADMIN — KETOROLAC TROMETHAMINE 30 MG: 30 INJECTION, SOLUTION INTRAMUSCULAR at 21:14

## 2019-03-20 RX ADMIN — TRANEXAMIC ACID 1 G: 100 INJECTION, SOLUTION INTRAVENOUS at 07:34

## 2019-03-20 RX ADMIN — PHENYLEPHRINE HYDROCHLORIDE 100 MCG: 10 INJECTION, SOLUTION INTRAMUSCULAR; INTRAVENOUS; SUBCUTANEOUS at 08:23

## 2019-03-20 RX ADMIN — PHENYLEPHRINE HYDROCHLORIDE 100 MCG: 10 INJECTION, SOLUTION INTRAMUSCULAR; INTRAVENOUS; SUBCUTANEOUS at 07:52

## 2019-03-20 RX ADMIN — HYDROMORPHONE HYDROCHLORIDE 0.05 MG: 1 INJECTION, SOLUTION INTRAMUSCULAR; INTRAVENOUS; SUBCUTANEOUS at 07:43

## 2019-03-20 RX ADMIN — SODIUM CHLORIDE, SODIUM LACTATE, POTASSIUM CHLORIDE, CALCIUM CHLORIDE AND DEXTROSE MONOHYDRATE: 5; 600; 310; 30; 20 INJECTION, SOLUTION INTRAVENOUS at 15:44

## 2019-03-20 RX ADMIN — DOCUSATE SODIUM 100 MG: 100 CAPSULE, LIQUID FILLED ORAL at 21:13

## 2019-03-20 RX ADMIN — CEFOXITIN SODIUM 2 G: 2 INJECTION, SOLUTION INTRAVENOUS at 14:38

## 2019-03-20 RX ADMIN — SODIUM CHLORIDE, POTASSIUM CHLORIDE, SODIUM LACTATE AND CALCIUM CHLORIDE: 600; 310; 30; 20 INJECTION, SOLUTION INTRAVENOUS at 07:10

## 2019-03-20 RX ADMIN — MORPHINE SULFATE 1 MG: 4 INJECTION INTRAVENOUS at 21:13

## 2019-03-20 RX ADMIN — FERROUS SULFATE TAB 325 MG (65 MG ELEMENTAL FE) 325 MG: 325 (65 FE) TAB at 21:13

## 2019-03-20 RX ADMIN — FENTANYL CITRATE 50 MCG: 50 INJECTION INTRAMUSCULAR; INTRAVENOUS at 08:59

## 2019-03-20 RX ADMIN — BUPIVACAINE HYDROCHLORIDE IN DEXTROSE 1.4 ML: 7.5 INJECTION, SOLUTION SUBARACHNOID at 07:43

## 2019-03-20 RX ADMIN — ONDANSETRON 4 MG: 2 INJECTION INTRAMUSCULAR; INTRAVENOUS at 08:46

## 2019-03-20 RX ADMIN — PHENYLEPHRINE HYDROCHLORIDE 100 MCG: 10 INJECTION, SOLUTION INTRAMUSCULAR; INTRAVENOUS; SUBCUTANEOUS at 08:01

## 2019-03-20 RX ADMIN — ROPIVACAINE HYDROCHLORIDE 15 ML: 5 INJECTION, SOLUTION EPIDURAL; INFILTRATION; PERINEURAL at 08:40

## 2019-03-20 RX ADMIN — MEPERIDINE HYDROCHLORIDE 12.5 MG: 50 INJECTION, SOLUTION INTRAMUSCULAR; INTRAVENOUS; SUBCUTANEOUS at 09:24

## 2019-03-20 RX ADMIN — KETOROLAC TROMETHAMINE 30 MG: 30 INJECTION, SOLUTION INTRAMUSCULAR at 17:11

## 2019-03-20 RX ADMIN — NALBUPHINE HYDROCHLORIDE 2 MG: 20 INJECTION, SOLUTION INTRAMUSCULAR; INTRAVENOUS; SUBCUTANEOUS at 09:25

## 2019-03-20 RX ADMIN — PHENYLEPHRINE HYDROCHLORIDE 100 MCG: 10 INJECTION, SOLUTION INTRAMUSCULAR; INTRAVENOUS; SUBCUTANEOUS at 08:03

## 2019-03-20 RX ADMIN — PHENYLEPHRINE HYDROCHLORIDE 100 MCG: 10 INJECTION, SOLUTION INTRAMUSCULAR; INTRAVENOUS; SUBCUTANEOUS at 08:16

## 2019-03-20 RX ADMIN — DEXAMETHASONE SODIUM PHOSPHATE 10 MG: 10 INJECTION, SOLUTION INTRAMUSCULAR; INTRAVENOUS at 08:40

## 2019-03-20 RX ADMIN — FENTANYL CITRATE 50 MCG: 50 INJECTION INTRAMUSCULAR; INTRAVENOUS at 08:53

## 2019-03-20 RX ADMIN — METHYLERGONOVINE MALEATE 200 MCG: 0.2 INJECTION INTRAMUSCULAR; INTRAVENOUS at 08:14

## 2019-03-20 RX ADMIN — ROPIVACAINE HYDROCHLORIDE 15 ML: 5 INJECTION, SOLUTION EPIDURAL; INFILTRATION; PERINEURAL at 08:42

## 2019-03-20 RX ADMIN — SODIUM CHLORIDE, POTASSIUM CHLORIDE, SODIUM LACTATE AND CALCIUM CHLORIDE 1000 ML: 600; 310; 30; 20 INJECTION, SOLUTION INTRAVENOUS at 07:14

## 2019-03-20 RX ADMIN — ONDANSETRON 4 MG: 2 INJECTION INTRAMUSCULAR; INTRAVENOUS at 08:56

## 2019-03-20 RX ADMIN — CEFAZOLIN SODIUM 2 G: 2 INJECTION, SOLUTION INTRAVENOUS at 07:34

## 2019-03-20 RX ADMIN — SODIUM CITRATE AND CITRIC ACID MONOHYDRATE 30 ML: 500; 334 SOLUTION ORAL at 07:24

## 2019-03-20 RX ADMIN — FENTANYL CITRATE 50 MCG: 50 INJECTION INTRAMUSCULAR; INTRAVENOUS at 09:20

## 2019-03-20 RX ADMIN — KETOROLAC TROMETHAMINE 30 MG: 30 INJECTION, SOLUTION INTRAMUSCULAR at 09:27

## 2019-03-20 ASSESSMENT — MIFFLIN-ST. JEOR: SCORE: 1615.39

## 2019-03-20 NOTE — PLAN OF CARE
Assessments as charted. B/P: 110/64, T: 98.4, P: 92, R: 16. Rates pain: 3/10 . Incision: Dressing CDI. Voiding without difficulty. Fundus firm. Lochia: Moderate. Activity: moving with difficulty due to post op surgical pain. Infant feeding: Both breast and formula.     LATCH Score:   Latch: 0 - No Latch  Audible Swallowin - None  Type of Nipple: (Breast/Nipple) 1 - Flat  Comfort: 2 - Soft, Nontender  Hold: 0 - Full Assist   Total LATCH Score:     Postpartum breastfeeding assessment completed and education provided, see Patient Education Activity.  Items included in the education are:     proper positioning and latch    effectiveness of feeding    manual expression    handling and storing breastmilk    maintenance of breastfeeding for the first 6 months    sign/symptoms of infant feeding issues requiring referral to qualified health care provider  Postpartum care education provided, see Patient Education activity. Patient denies needs. Will monitor.  Mae Ugarte

## 2019-03-20 NOTE — OP NOTE
Section Operative Note  [facility]    Pre-operative diagnosis: Intrauterine pregnancy at 36 4/7 weeks gestation  Repeat  section.  Prior uterine T-Shaped incision.  Request for permanent sterilization   Post-operative diagnosis: Same   Procedure: Repeat low transverse  section  Bilateral tubal ligation   Surgeon: Damon Fletcher MD   Assistant(s): Consuelo Quiles NP  (assistance required for retraction, exposure, instrument handling, and wound closure)     Anesthesia: Spinal anesthesia with Dilaudid and Tap block   Estimated blood loss: 500ml   Total IV fluids: (See anesthesia record)   Blood transfusion: No transfusion was given during surgery   Total urine output: (See anesthesia record)   Drains: Adams catheter   Specimens: placenta    Findings: Vigorous  male.  Apgar's 8/9, intact placenta with 3VC, nml pelvic anatomy   Complications: None   Condition: Mother stable, transfered to post-anesthesia recovery     Procedure Details:  The risks, benefits, complications, treatment options, and expected outcomes were discussed with the patient.  The patient concurred with the proposed plan, giving informed consent.  The site of surgery properly noted/marked. The patient was taken to Operating Room,  identity confirmed and the procedure verified as  Delivery. A Time Out was held and the above information confirmed.    After uneventful anesthesia placement the patient was prepped and draped in the left lateral position and a adams catheter placed.   A low-transverse skin incision was made with a scalpel and carried down through the subcutaneous tissue to the fascia. Fascial incision was made and extended transversely. The fascia was  from the underlying rectus tissue superiorly and inferiorly. The peritoneum was identified and entered without difficulty. The utero-vesical peritoneal reflection was incised transversely and the bladder flap was bluntly freed from  the lower uterine segment. A low transverse uterine incision was made. Amniotomy yielded clear fluid.  The infant was delivered from the vtx presentation.  After the umbilical cord was clamped and cut the infant was suctioned and handed to the awaiting resuscitation team.  Cord blood was obtained for evaluation. The placenta was removed intact and the uterus swept free of membranes and debris. The uterine incision was closed with running locked sutures of 1.0 Chromic in a 2 layer fashion.  Hemostasis was excellent. A tubal segment was excised from the proximal avascular portion of each fallopian tube.  The tubes were ligated via the Levasy method with 0 chromic.  Irrigation with warm normal saline was carried out until clear. The fascia was then reapproximated with running sutures of 0 PDS. The subcutaneous space was irrigated and checked for hemostasis.  The skin was reapproximated with surgical staples.  Dressings were applied.  The patient was transferred to the recovery room in excellent and stable condition.        Instrument, sponge, and needle counts were correct prior the abdominal closure and at the conclusion of the case.         Damon Fletcher MD

## 2019-03-20 NOTE — INTERVAL H&P NOTE
History and physical reviewed on 3/20/2019.  Patient examined. No interval change in condition.   Consent form reviewed with patient and signed.  All questions answered.  Pt desires to proceed.      Damon Fletcher MD  7:34 AM

## 2019-03-20 NOTE — PLAN OF CARE
Scheduled AM C/S Admit Note  Kathia Milton  MRN: [id number]  Gestational Age: 36w4d      Kathia Milton presents for scheduled  section for repeat t-incision.  Patient denies contractions, bleeding or LOF.  NPO [id number].    Past Medical History:   Diagnosis Date     Benign neoplasm of vagina 2011     Condyloma acuminatum 2011     Seasonal allergies      Supervision of other normal pregnancy     MICHELLE: 2013     Tobacco use disorder 10/18/2012     Past Surgical History:   Procedure Laterality Date     APPENDECTOMY  14    [facility]      SECTION      C section     COLONOSCOPY  11/10/15    Dr. Singh     DILATION AND CURETTAGE SUCTION, TREAT MISSED , 1ST TRIMESTER       ENDOSCOPY  11/10/15    Dr. Singh      LAPAROSCOPIC CYSTECTOMY OVARIAN (BENIGN) Left 2018    Procedure: LAPAROSCOPIC CYSTECTOMY OVARIAN (BENIGN);  EXPLORATORY  LAPAROSCOPY, LEFT OVARIAN CYSTECTOMY;  Surgeon: Damon Fletcher MD;  Location: [facility] OR     LAPAROSCOPY  11/30/15    Dr. Sun; lysis of pelvic adhesions, cauterization of minimal endometriosis     LAPAROSCOPY  2018    lysis of adhesions; pelvic pain, endometriosis; Dr. Sun           FHT: 150  NST: Reactive.    Plan:  - section at 0715.    Patient ambulated to bed via self.. Patient is alert and oriented X 4, denies any pain. pain. Patient oriented to room, unit, hourly rounding, and plan of care. Call light within reach.  Explained admission packet with patient bill of rights brochure. Will continue to monitor and document as needed.     Inpatient nursing criteria listed below was met:    Health care directives status obtained and documented: No  Patient identifies a surrogate decision maker: No   If yes, who: Contact Information:  Core Measure diagnosis present:: No  Vaccine assessment done and vaccines ordered if appropriate. Yes  Clergy visit ordered if patient requests: No  Skin issues/needs  documented:N/A  Isolation needs addressed, if appropriate: N/A  Fall Prevention (Med and High risk): Care plan updated, Education given and documented and signage used: N/A  Care Plan initiated: Yes  Education Documented (Reminder to educate patient if MRSA is present on admission): Yes  Education Assessment documented:Yes  Patient has discharge needs (If yes, please explain): No

## 2019-03-20 NOTE — PLAN OF CARE
Pt. Transferred from PACU to room 4244. Transferred to bed via slide board, and assist of 3. Skin care and chantal cares provided. Report received from Xiomy TROY. Continuous pulse ox on. SCD's on. Call light within reach.

## 2019-03-20 NOTE — PLAN OF CARE
Pt has been resting in bed and held baby and tried to nurse.  Did skin to skin.  Uses IS, has scd boots on, IV with pitocin infuses at 100ml/hr.  Can move feet and legs now. C/o nausea will medicate with compazine, too soon for zofran.

## 2019-03-20 NOTE — ANESTHESIA PROCEDURE NOTES
Peripheral nerve/Neuraxial procedure note : TAP  Pre-Procedure  Performed by   Referred by DR JACOBS  Location: OR    Procedure Times:3/20/2019 8:36 AM and 3/20/2019 8:44 AM  Pre-Anesthestic Checklist: patient identified, IV checked, site marked, risks and benefits discussed, informed consent, monitors and equipment checked, pre-op evaluation, at physician/surgeon's request and post-op pain management    Timeout  Correct Patient: Yes   Correct Procedure: Yes   Correct Site: Yes   Correct Laterality: N/A   Correct Position: Yes   Site Marked: N/A   .   Procedure Documentation    .    Procedure:  bilateral  TAP.     Ultrasound used to identify targeted nerve, plexus, or vascular marker and placed a needle adjacent to it., Ultrasound was used to visualize the spread of the anesthetic in close proximity to the above stated nerve. A permanent image is entered into the patient's record.  Patient Prep;chlorhexidine gluconate and isopropyl alcohol.  .  Needle: insulated Insertion Method: Single Shot.       Assessment/Narrative  Paresthesias: No.  Injection made incrementally with aspirations every 5 mL..  The placement was negative for: blood aspirated, painful injection and site bleeding.  Bolus given via needle. No blood aspirated via catheter.   Secured via.   Complications: none.

## 2019-03-20 NOTE — OR NURSING
Pt brought to PACU at 0847. Pt already wiggling toes on right foot. Pt C/O nausea and pain 8/10. Zofran 4mg IV given for nausea with good relief. Fentanyl IV given for pain. Also gave Toradol for pain at 0927. Pt developed significant itching and post anesthesia shaking. Demerol 12.5mg IV given for shaking, and order obtained for Nubain and given for itching. One dose of 2mg Nubain IV given with good results. Multi dose vial brought to OB, and given to Pt's RN Mae. Denies pain at transfer to OB floor, unless doing fundal checks. Then pain 7-8/10. FF U/2. Scant amount of rubra lochia. SO at bedside throughout, and updated throughout. Pt transferred to OB at 0955. Transferred to bed via slideboard, and assist of 3. Assisted in cleaning Pt up, and getting her situated in bed. Report given to Mae TROY. Haris 20.

## 2019-03-20 NOTE — PLAN OF CARE
Face to face report given with opportunity to observe patient.    Report given to oneil Ugarte   3/20/2019  6:53 PM

## 2019-03-21 LAB
COPATH REPORT: NORMAL
HGB BLD-MCNC: 8.4 G/DL (ref 11.7–15.7)
T PALLIDUM AB SER QL: NONREACTIVE

## 2019-03-21 PROCEDURE — 25000128 H RX IP 250 OP 636: Performed by: OBSTETRICS & GYNECOLOGY

## 2019-03-21 PROCEDURE — 12000000 ZZH R&B MED SURG/OB

## 2019-03-21 PROCEDURE — 25000132 ZZH RX MED GY IP 250 OP 250 PS 637: Performed by: OBSTETRICS & GYNECOLOGY

## 2019-03-21 PROCEDURE — 85018 HEMOGLOBIN: CPT | Performed by: OBSTETRICS & GYNECOLOGY

## 2019-03-21 PROCEDURE — 25000125 ZZHC RX 250

## 2019-03-21 PROCEDURE — 36415 COLL VENOUS BLD VENIPUNCTURE: CPT | Performed by: OBSTETRICS & GYNECOLOGY

## 2019-03-21 RX ADMIN — DOCUSATE SODIUM 100 MG: 100 CAPSULE, LIQUID FILLED ORAL at 19:06

## 2019-03-21 RX ADMIN — FERROUS SULFATE TAB 325 MG (65 MG ELEMENTAL FE) 325 MG: 325 (65 FE) TAB at 19:06

## 2019-03-21 RX ADMIN — HYDROXYZINE HYDROCHLORIDE 100 MG: 50 INJECTION, SOLUTION INTRAMUSCULAR at 05:40

## 2019-03-21 RX ADMIN — IBUPROFEN 800 MG: 800 TABLET ORAL at 21:05

## 2019-03-21 RX ADMIN — FERROUS SULFATE TAB 325 MG (65 MG ELEMENTAL FE) 325 MG: 325 (65 FE) TAB at 08:56

## 2019-03-21 RX ADMIN — OXYCODONE HYDROCHLORIDE 10 MG: 5 TABLET ORAL at 19:06

## 2019-03-21 RX ADMIN — Medication 1 TABLET: at 08:56

## 2019-03-21 RX ADMIN — OXYCODONE HYDROCHLORIDE 10 MG: 5 TABLET ORAL at 22:45

## 2019-03-21 RX ADMIN — ACETAMINOPHEN 975 MG: 325 TABLET, FILM COATED ORAL at 19:06

## 2019-03-21 RX ADMIN — SIMETHICONE 80 MG: 80 TABLET, CHEWABLE ORAL at 10:36

## 2019-03-21 RX ADMIN — OXYCODONE HYDROCHLORIDE 10 MG: 5 TABLET ORAL at 11:57

## 2019-03-21 RX ADMIN — IBUPROFEN 800 MG: 800 TABLET ORAL at 08:57

## 2019-03-21 RX ADMIN — DOCUSATE SODIUM 100 MG: 100 CAPSULE, LIQUID FILLED ORAL at 08:56

## 2019-03-21 RX ADMIN — KETOROLAC TROMETHAMINE 30 MG: 30 INJECTION, SOLUTION INTRAMUSCULAR at 02:54

## 2019-03-21 RX ADMIN — ACETAMINOPHEN 975 MG: 325 TABLET, FILM COATED ORAL at 10:32

## 2019-03-21 RX ADMIN — OXYCODONE HYDROCHLORIDE 10 MG: 5 TABLET ORAL at 07:22

## 2019-03-21 RX ADMIN — ACETAMINOPHEN 975 MG: 325 TABLET, FILM COATED ORAL at 02:03

## 2019-03-21 RX ADMIN — IBUPROFEN 800 MG: 800 TABLET ORAL at 15:07

## 2019-03-21 NOTE — PLAN OF CARE
Rating pain 5-8/10 given Maria De Jesus & Ibuprofen w/minimal effect. Ambulating independently in rm and SBA in wolf. Voiding urine spontaneously.     Face to face report given with opportunity to observe patient.    Report given to Fabiola NAVAS RN.     Vilma Zamudio   3/21/2019  3:18 PM

## 2019-03-21 NOTE — PROGRESS NOTES
Assessment:    RCS and tubal ligation at 36 4/7 weeks  Anemia of pregnancy third trimester extending into postpartum period.   Operative blood loss of 500 ml resulting in further anemia - asymptomatic    Plan:   Monitor Hgb with recheck in AM and 6 weeks postpartum  Continue on oral iron  Plan iron sucrose infusion if continues to fall or symptomatic

## 2019-03-21 NOTE — ANESTHESIA POSTPROCEDURE EVALUATION
Patient: Kathia Milton    Procedure(s):  COMBINED  SECTION, BILATERAL  TUBAL OCCLUSION    Diagnosis:REPEAT  SECTION, DESIRES STERILIZATION  Diagnosis Additional Information: No value filed.    Anesthesia Type:  Spinal, Periph. Nerve Block for postop pain    Note:  Anesthesia Post Evaluation    Patient location during evaluation: PACU, Bedside and Floor  Patient participation: Able to fully participate in evaluation  Level of consciousness: awake and alert  Pain management: adequate  Airway patency: patent  Cardiovascular status: acceptable  Respiratory status: acceptable  Hydration status: stable  PONV: none     Anesthetic complications: None          Last vitals:  Vitals:    19 0025 19 0727 19 0728   BP: 94/54 103/67    Pulse: 80 84    Resp: 18 18    Temp: 99.1  F (37.3  C) 98.7  F (37.1  C)    SpO2:   96%         Electronically Signed By: Prieto Poe MD  2019  8:36 AM

## 2019-03-21 NOTE — PLAN OF CARE
Assessments as charted. B/P: 103/67, T: 98.7, P: 84, R: 18. Rates pain: 9/10 at incision. Incision: Healing well, well approximated and without signs of infection. Voiding without difficulty. Fundus firm and at the u. Lochia: Light. Activity: Pain with movement. Infant feeding: Both breast and formula.  Pt has had more success with nipple shield.     LATCH Score:   Latch: 2 - Good Latch  Audible Swallowin - Spontaneous & frequent  Type of Nipple: (Breast/Nipple) 1 - Flat  Comfort: 2 - Soft, Nontender  Hold: 2 - No Assist   Total LATCH Score: 9      Postpartum care education provided, see Patient Education activity. Patient denies needs. Will monitor.  Giselle Raza

## 2019-03-21 NOTE — PROGRESS NOTES
"[facility]  Daily Post-Op Note         Assessment and Plan:    Assessment:   Post-operative day #1  Procedure(s):  COMBINED  SECTION, BILATERAL  TUBAL OCCLUSION     Doing well.  Clean wound without signs of infection.  Normal healing wound.  No immediate surgical complications identified.  No excessive bleeding  Pain well-controlled.  Breastfeeding with support and using shield.   anemia of pregnancy      Plan:   Ambulate  Advance activity as tolerated  Continue supportive and symptomatic treatment  Pain control measures  Breastfeeding support            Interval History:   Stable.  Doing well.  Improving slowly.  Pain is reasonably controlled.  No fevers. Breastfeeding with nipple shield.           Review of Systems:    CONSTITUTIONAL:NEGATIVE  for fever   R: NEGATIVE for significant cough or SOB  CV: NEGATIVE for chest pain, palpitations or peripheral edema  GI: NEGATIVE for nausea and vomiting  : negative for heavy bleeding or clots             Medications:   I have reviewed this patient's current medications          Physical Exam:   Vitals were reviewed  All vitals stable /67   Pulse 84   Temp 98.7  F (37.1  C) (Oral)   Resp 18   Ht 1.651 m (5' 5\")   Wt 88.5 kg (195 lb)   LMP 2018 (Approximate)   SpO2 96%   BMI 32.45 kg/m    Lungs clear  C-RRR  Wound clean and dry with minimal or no drainage.  Surrounding skin with minimal erythema. Staples intact.            Data:   All laboratory data related to this surgery reviewed    "
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
## 2019-03-21 NOTE — LACTATION NOTE
"Initial Lactation Consultation    Kathia Milton                                                                                                    [id number]    Consultation Date: 3/21/2019    Reason for Lactation Referral:routine lactation assessment.    MATERNAL HISTORY   Maternal History: scheduled , 36 5/7, d/t prior T-shaped incision  History of Breast Surgery: No  Breast Changes During Pregnancy: Yes  Breast Feeding History: Yes,  unsuccessful, Length of Time: 5-6 weeks, pumped and fed. Baby was born early-28 weeks  Maternal Meds: see eMar    MATERNAL ASSESSMENT    Breast Size: average  Nipple Appearance - Left: intact  Nipple Appearance - Right: intact  Nipple Erectility - Left: erect with stimulation  Nipple Erectility - Right: erect with stimulation  Areolas Compressibility: soft  Nipple Size: average  Milk Supply: colostrum    INFANT ASSESSMENT    Oral Anatomy  Mouth: normal  Palate: normal  Jaw: normal    FEEDING   Feeding Time:did not witness a feeding, baby sleepy  Position: NA  Effort to Latch: did not nurse  Results: attempted breast feed    FEEDING PLAN    Inpatient Feeding Plan: Nurse on demand, responding to infant's feeding cues, every 1-3 hours. Do lots of skin to skin, use nipple shield as needed. If baby sleepy/not interested, express milk and feed via spoon. Snuggle in skin-to-skin to learn positioning and infant cues. Rooming-in encouraged.    LACTATION COMMENTS   Anticipatory guidance provided in regard to \"baby's second night.\"    Deep latch explained for proper positioning of breast in infant's mouth, maximizing milk transfer and comfort.  Hand expression taught and return demonstration observed with colostrum present.   signs of satiety reviewed.  \"Ways to know that baby is getting enough\" discussed thoroughly.  Follow-up support information provided.    __________________________________________________________________________________  JENNIE CARBAJAL RN, " IBCLC  3/21/2019

## 2019-03-21 NOTE — PLAN OF CARE
Assessments completeAssessments as charted. B/P: 94/54, T: 99.1, P: 80, R: 18. Rates pain: 4/10  medication education given, prn give as requested. . Incision: no drainage. Voiding without difficulty. Fundus firm U/1. Lochia: Light. Activity: has declined offers to get out of bed at this time  . Infant feeding: Both breast and formula.     LATCH Score:   Latch: 1 - Repeated Attempts  Audible Swallowin - Few  Type of Nipple: (Breast/Nipple) 1 - Flat  Comfort: 2 - Soft, Nontender  Hold: 1 - Min. Assist   Total LATCH Score: 6    Postpartum breastfeeding assessment completed and education provided, see Patient Education Activity.  Items included in the education are:   proper positioning and latch  effectiveness of feeding  manual expression  handling and storing breastmilk  maintenance of breastfeeding for the first 6 months  sign/symptoms of infant feeding issues requiring referral to qualified health care provider  Postpartum care education provided, see Patient Education activity. Patient denies needs. Will monitor.  Fabiola Vivas

## 2019-03-22 VITALS
TEMPERATURE: 98.6 F | HEART RATE: 84 BPM | RESPIRATION RATE: 16 BRPM | SYSTOLIC BLOOD PRESSURE: 108 MMHG | BODY MASS INDEX: 32.49 KG/M2 | WEIGHT: 195 LBS | HEIGHT: 65 IN | DIASTOLIC BLOOD PRESSURE: 58 MMHG | OXYGEN SATURATION: 97 %

## 2019-03-22 PROBLEM — N39.0 URINARY TRACT INFECTION WITHOUT HEMATURIA, SITE UNSPECIFIED: Status: ACTIVE | Noted: 2019-03-22

## 2019-03-22 LAB
ALBUMIN UR-MCNC: 30 MG/DL
APPEARANCE UR: ABNORMAL
BACTERIA #/AREA URNS HPF: ABNORMAL /HPF
BILIRUB UR QL STRIP: NEGATIVE
COLOR UR AUTO: YELLOW
GLUCOSE UR STRIP-MCNC: NEGATIVE MG/DL
HGB BLD-MCNC: 8.6 G/DL (ref 11.7–15.7)
HGB UR QL STRIP: ABNORMAL
KETONES UR STRIP-MCNC: NEGATIVE MG/DL
LEUKOCYTE ESTERASE UR QL STRIP: ABNORMAL
MUCOUS THREADS #/AREA URNS LPF: PRESENT /LPF
NITRATE UR QL: NEGATIVE
PH UR STRIP: 6 PH (ref 4.7–8)
RBC #/AREA URNS AUTO: >182 /HPF (ref 0–2)
SOURCE: ABNORMAL
SP GR UR STRIP: 1.02 (ref 1–1.03)
SQUAMOUS #/AREA URNS AUTO: 18 /HPF (ref 0–1)
UROBILINOGEN UR STRIP-MCNC: NORMAL MG/DL (ref 0–2)
WBC #/AREA URNS AUTO: 82 /HPF (ref 0–5)

## 2019-03-22 PROCEDURE — 25000132 ZZH RX MED GY IP 250 OP 250 PS 637: Performed by: OBSTETRICS & GYNECOLOGY

## 2019-03-22 PROCEDURE — 81001 URINALYSIS AUTO W/SCOPE: CPT | Performed by: OBSTETRICS & GYNECOLOGY

## 2019-03-22 PROCEDURE — 36415 COLL VENOUS BLD VENIPUNCTURE: CPT | Performed by: NURSE PRACTITIONER

## 2019-03-22 PROCEDURE — 85018 HEMOGLOBIN: CPT | Performed by: NURSE PRACTITIONER

## 2019-03-22 RX ORDER — OXYCODONE AND ACETAMINOPHEN 5; 325 MG/1; MG/1
2 TABLET ORAL EVERY 6 HOURS PRN
Qty: 50 TABLET | Refills: 0 | Status: SHIPPED | OUTPATIENT
Start: 2019-03-22 | End: 2019-04-04

## 2019-03-22 RX ORDER — IBUPROFEN 800 MG/1
800 TABLET, FILM COATED ORAL EVERY 8 HOURS
Status: DISCONTINUED | OUTPATIENT
Start: 2019-03-22 | End: 2019-03-22 | Stop reason: HOSPADM

## 2019-03-22 RX ORDER — CEPHALEXIN 500 MG/1
500 CAPSULE ORAL
Qty: 30 CAPSULE | Refills: 0 | Status: SHIPPED | OUTPATIENT
Start: 2019-03-22 | End: 2019-04-04

## 2019-03-22 RX ORDER — FERROUS SULFATE 325(65) MG
325 TABLET ORAL 2 TIMES DAILY WITH MEALS
Qty: 60 TABLET | Refills: 1 | Status: SHIPPED | OUTPATIENT
Start: 2019-03-22 | End: 2019-05-02

## 2019-03-22 RX ORDER — HYDROXYZINE HYDROCHLORIDE 25 MG/1
50 TABLET, FILM COATED ORAL EVERY 6 HOURS PRN
Status: DISCONTINUED | OUTPATIENT
Start: 2019-03-22 | End: 2019-03-22 | Stop reason: HOSPADM

## 2019-03-22 RX ORDER — HYDROXYZINE HYDROCHLORIDE 25 MG/1
25 TABLET, FILM COATED ORAL EVERY 6 HOURS PRN
Status: DISCONTINUED | OUTPATIENT
Start: 2019-03-22 | End: 2019-03-22 | Stop reason: HOSPADM

## 2019-03-22 RX ORDER — CEPHALEXIN 500 MG/1
500 CAPSULE ORAL
Status: DISCONTINUED | OUTPATIENT
Start: 2019-03-22 | End: 2019-03-22 | Stop reason: HOSPADM

## 2019-03-22 RX ORDER — IBUPROFEN 800 MG/1
800 TABLET, FILM COATED ORAL
Qty: 60 TABLET | Refills: 1 | Status: SHIPPED | OUTPATIENT
Start: 2019-03-22 | End: 2019-05-02

## 2019-03-22 RX ADMIN — DOCUSATE SODIUM 100 MG: 100 CAPSULE, LIQUID FILLED ORAL at 08:39

## 2019-03-22 RX ADMIN — Medication: at 08:39

## 2019-03-22 RX ADMIN — ACETAMINOPHEN 975 MG: 325 TABLET, FILM COATED ORAL at 01:37

## 2019-03-22 RX ADMIN — ACETAMINOPHEN 975 MG: 325 TABLET, FILM COATED ORAL at 10:33

## 2019-03-22 RX ADMIN — OXYCODONE HYDROCHLORIDE 10 MG: 5 TABLET ORAL at 13:16

## 2019-03-22 RX ADMIN — HYDROXYZINE HYDROCHLORIDE 50 MG: 25 TABLET ORAL at 16:16

## 2019-03-22 RX ADMIN — CEPHALEXIN 500 MG: 500 CAPSULE ORAL at 10:34

## 2019-03-22 RX ADMIN — FERROUS SULFATE TAB 325 MG (65 MG ELEMENTAL FE) 325 MG: 325 (65 FE) TAB at 08:38

## 2019-03-22 RX ADMIN — OXYCODONE HYDROCHLORIDE 10 MG: 5 TABLET ORAL at 06:18

## 2019-03-22 RX ADMIN — IBUPROFEN 800 MG: 800 TABLET ORAL at 16:13

## 2019-03-22 RX ADMIN — OXYCODONE HYDROCHLORIDE 10 MG: 5 TABLET ORAL at 16:12

## 2019-03-22 RX ADMIN — SIMETHICONE 80 MG: 80 TABLET, CHEWABLE ORAL at 06:22

## 2019-03-22 RX ADMIN — OXYCODONE HYDROCHLORIDE 10 MG: 5 TABLET ORAL at 01:37

## 2019-03-22 RX ADMIN — OXYCODONE HYDROCHLORIDE 10 MG: 5 TABLET ORAL at 08:39

## 2019-03-22 RX ADMIN — HYDROXYZINE HYDROCHLORIDE 50 MG: 25 TABLET ORAL at 10:33

## 2019-03-22 RX ADMIN — CEPHALEXIN 500 MG: 500 CAPSULE ORAL at 16:13

## 2019-03-22 RX ADMIN — IBUPROFEN 800 MG: 800 TABLET ORAL at 06:18

## 2019-03-22 NOTE — PLAN OF CARE
Assessments as charted. B/P: 108/58, T: 98.6, P: 84, R: 16. Rates pain: 7/10 but states good relief of pain with prescribed meds. Incision: Healing well, well approximated, without signs of infection and no drainage. Voiding without difficulty. Fundus firm. Lochia: Light. Activity: moving with difficulty due to post op surgical pain. Infant feeding: Patient changed to formula feeding.    sign/symptoms of infant feeding issues requiring referral to qualified health care provider  Postpartum care education provided, see Patient Education activity. Patient denies needs. Will monitor.  Maddie Stephenson

## 2019-03-22 NOTE — PLAN OF CARE
Patient discharged at 4:42 PM via wheel chair accompanied by son and mother and staff. Prescriptions sent with patient to fill . All belongings sent with patient.     Discharge instructions reviewed with patient. Listed belongings gathered and returned to patient.     Patient discharged to home.        Core Measures and Vaccines  Pneumonia and Influenza given prior to discharge, if indicated: N/A    Surgical Patient   Surgical Procedures during stay: C/section  Did patient receive discharge instruction on wound care and recognition of infection symptoms? Yes    MISC  Follow up appointment made:  Yes  Home and hospital aquired medications returned to patient: N/A  Patient reports pain was well managed at discharge: Yes

## 2019-03-22 NOTE — PROGRESS NOTES
[facility]    Obstetrics Post-Op / Progress Note    Assessment & Plan   Assessment:  -2 Days Post-Op  Procedure(s):  COMBINED  SECTION, BILATERAL  TUBAL OCCLUSION---3/20/2019    Doing well.  Clean wound without signs of infection.  Normal healing wound.  No immediate surgical complications identified.  No excessive bleeding  Tolerating physical therapy and rehabilitation well.  Breast feeding  Wants to go home--she has help lined up today.  Complaints of dysuria and thinks she has a bladder infection    Plan:  Ambulation encouraged  Breast feeding strategies discussed  Monitor wound for signs of infection  Pain control measures as needed  Reportable signs and symptoms dicussed with the patient  Discharge later today    Colin Rascon     Interval History   Doing well.  Pain control is fair.  Complaints of dysuria and thinks she has a UTINo fevers.  No history of wound drainage, warmth or significant erythema.  Good appetite.  Denies chest pain, shortness of breath, nausea or vomiting.  Ambulatory.  Breastfeeding well.  She wants to go home as she has help lined up.    Medications     dextrose 5% lactated ringers Stopped (19 9385)     - MEDICATION INSTRUCTIONS -       NO Rho (D) immune globulin (RhoGam) needed - mother Rh POSITIVE       oxytocin in 0.9% NaCl 100 mL/hr (19 1100)     oxytocin in 0.9% NaCl         acetaminophen  975 mg Oral Q8H     cephALEXin  250 mg Oral TID     docusate sodium  100 mg Oral BID     ferrous sulfate  325 mg Oral BID     HYDROmorphone  0.1 mg EPIDURAL Once     ibuprofen  800 mg Oral Q8H     PNV PRENATAL PLUS MULTIVITAMIN  1 tablet Oral Daily       Physical Exam   Temp: 98.6  F (37  C) Temp src: Oral BP: 108/58   Heart Rate: 84 Resp: 16 SpO2: 97 %      Vitals:    19 0540   Weight: 88.5 kg (195 lb)     Vital Signs with Ranges  Temp:  [98  F (36.7  C)-98.6  F (37  C)] 98.6  F (37  C)  Heart Rate:  [84-86] 84  Resp:  [16-18] 16  BP: (108-124)/(58-68)  108/58  SpO2:  [97 %] 97 %  No intake/output data recorded.    Uterine fundus is firm, non-tender and at the level of the umbilicus  Incision C/D/I  Extremities Non-tender  Heart is regular rate and rhythm and lungs clear to auscultation  Reflexes are 1+, equal, symmetric, and no clonus  Urine output is good    Data   Recent Labs   Lab Test 03/20/19  0550   ABO A   RH Pos   AS Neg     Recent Labs   Lab Test 03/22/19  0557 03/21/19  0537   HGB 8.6* 8.4*     Recent Labs   Lab Test 09/21/18  1250   RUQIGG 19

## 2019-03-22 NOTE — DISCHARGE SUMMARY
[facility]    Discharge Summary  Obstetrics    Date of Admission:  3/20/2019  Date of Discharge:  3/22/2019  Discharging Provider: Colin Rascon    Discharge Diagnoses   Repeat   3/20/2019  Anemia of pregnancy  UTI without hematuria  Permanent Sterilization via Bilateral Tubal Ligation  3/20/2019    Procedure/Surgery Information   Procedure: Procedure(s):  COMBINED  SECTION, BILATERAL  TUBAL OCCLUSION   3/20/2019   Surgeon(s): Surgeon(s) and Role:     * Damon Fletcher MD - Primary     * Consuelo Quiles NP - Assisting     History of Present Illness   Kathia Milton is a 29 year old female who presented for Repeat  and Tubal Ligation    Hospital Course   The patient's hospital course was remarkable for symptoms of UTI.  She recovered as anticipated and experienced no post-operative complications.  On discharge, her pain was well controlled. Vaginal bleeding is similar to peak menstrual flow.  Voiding without difficulty.  Ambulating well and tolerating a normal diet.  No fever or significant wound drainage.  Breastfeeding well.  Infant is stable.  She was discharged on post-partum day # 2.    Post-partum hemoglobin:   Hemoglobin   Date Value Ref Range Status   2019 8.6 (L) 11.7 - 15.7 g/dL Final       Colin Rascon    Discharge Disposition   Discharged to home   Condition at discharge: Good    Pending Results   Final pathology results: Pending at time of discharge  These results will be followed up by Chance  Unresulted Labs Ordered in the Past 30 Days of this Admission     No orders found from 2019 to 3/21/2019.          Primary Care Physician   Lainey Jones    Consultations This Hospital Stay   HOME CARE POST PARTUM/ IP CONSULT  LACTATION IP CONSULT    Discharge Orders      Activity    Review discharge instructions     Reason for your hospital stay    Maternity care     Discharge Instructions - Postpartum visit    Schedule clip removal 1 week.     Diet     Resume previous diet     Discharge Medications   Current Discharge Medication List      START taking these medications    Details   cephALEXin (KEFLEX) 500 MG capsule Take 1 capsule (500 mg) by mouth 3 times daily (with meals)  Qty: 30 capsule, Refills: 0    Associated Diagnoses: Urinary tract infection without hematuria, site unspecified      ibuprofen (ADVIL/MOTRIN) 800 MG tablet Take 1 tablet (800 mg) by mouth 3 times daily (with meals) Take with a full meal for primary pain control  Qty: 60 tablet, Refills: 1    Associated Diagnoses:  delivery delivered      oxyCODONE-acetaminophen (PERCOCET) 5-325 MG tablet Take 2 tablets by mouth every 6 hours as needed for pain Take with food as a supplement to Ibuprofen for secondary pain control  Qty: 50 tablet, Refills: 0    Associated Diagnoses:  delivery delivered         CONTINUE these medications which have CHANGED    Details   ferrous sulfate (FEROSUL) 325 (65 Fe) MG tablet Take 1 tablet (325 mg) by mouth 2 times daily (with meals)  Qty: 60 tablet, Refills: 1    Associated Diagnoses: Anemia of mother during pregnancy, delivered         CONTINUE these medications which have NOT CHANGED    Details   Prenatal Vit-Fe Fumarate-FA (PRENATAL MULTIVITAMIN PLUS IRON) 27-0.8 MG TABS per tablet Take 1 tablet by mouth daily      promethazine (PHENERGAN) 25 MG tablet Take 1 tablet (25 mg) by mouth every 6 hours as needed for nausea  Qty: 30 tablet, Refills: 0    Associated Diagnoses: Nausea and vomiting, intractability of vomiting not specified, unspecified vomiting type           Allergies   Allergies   Allergen Reactions     Seafood Anaphylaxis     Shellfish Allergy      Shellfish Derived Products

## 2019-03-28 ENCOUNTER — OFFICE VISIT (OUTPATIENT)
Dept: OBGYN | Facility: OTHER | Age: 29
End: 2019-03-28
Attending: NURSE PRACTITIONER
Payer: COMMERCIAL

## 2019-03-28 VITALS
HEIGHT: 65 IN | HEART RATE: 60 BPM | BODY MASS INDEX: 30.99 KG/M2 | SYSTOLIC BLOOD PRESSURE: 112 MMHG | TEMPERATURE: 98.3 F | WEIGHT: 186 LBS | DIASTOLIC BLOOD PRESSURE: 82 MMHG

## 2019-03-28 DIAGNOSIS — Z48.89 ENCOUNTER FOR POSTOPERATIVE WOUND CHECK: Primary | ICD-10-CM

## 2019-03-28 PROCEDURE — 99024 POSTOP FOLLOW-UP VISIT: CPT | Performed by: OBSTETRICS & GYNECOLOGY

## 2019-03-28 ASSESSMENT — PAIN SCALES - GENERAL: PAINLEVEL: NO PAIN (0)

## 2019-03-28 ASSESSMENT — MIFFLIN-ST. JEOR: SCORE: 1574.57

## 2019-03-28 NOTE — PROGRESS NOTES
S:   For clip removal  Has no complaints    O:   Clips removed, Mastisol and strips applied    A:   Normal postop C/S check    P:   Postop directions reviewed         PP check 4 weeks

## 2019-03-28 NOTE — NURSING NOTE
"Chief Complaint   Patient presents with     Post-op Visit       Initial /82   Pulse 60   Temp 98.3  F (36.8  C)   Ht 1.651 m (5' 5\")   Wt 84.4 kg (186 lb)   LMP 07/07/2018 (Approximate)   BMI 30.95 kg/m   Estimated body mass index is 30.95 kg/m  as calculated from the following:    Height as of this encounter: 1.651 m (5' 5\").    Weight as of this encounter: 84.4 kg (186 lb).  Medication Reconciliation: complete    Fabiola Ordonez LPN    "

## 2019-04-04 ENCOUNTER — PRENATAL OFFICE VISIT (OUTPATIENT)
Dept: OBGYN | Facility: OTHER | Age: 29
End: 2019-04-04
Attending: NURSE PRACTITIONER
Payer: COMMERCIAL

## 2019-04-04 VITALS
WEIGHT: 175 LBS | HEART RATE: 100 BPM | OXYGEN SATURATION: 98 % | DIASTOLIC BLOOD PRESSURE: 80 MMHG | BODY MASS INDEX: 29.16 KG/M2 | SYSTOLIC BLOOD PRESSURE: 130 MMHG | TEMPERATURE: 99.4 F | HEIGHT: 65 IN

## 2019-04-04 DIAGNOSIS — Z48.89 ENCOUNTER FOR POSTOPERATIVE WOUND CHECK: ICD-10-CM

## 2019-04-04 PROCEDURE — 99207 ZZC NO CHARGE LOS: CPT | Performed by: NURSE PRACTITIONER

## 2019-04-04 ASSESSMENT — PAIN SCALES - GENERAL: PAINLEVEL: NO PAIN (0)

## 2019-04-04 ASSESSMENT — MIFFLIN-ST. JEOR: SCORE: 1524.67

## 2019-04-04 NOTE — PROGRESS NOTES
"SUBJECTIVE:  Kathia Milton is a 29 year old female P2 here for a 2 week postpartum visit.  She had a  Section  on 3/20/19 delivering a healthy baby boy at 36 4/7 weeks.      Today's Depression Rating was No Value exists for the : [id number]    delivery complications:  No  breast feeding:  No  bladder problems:  No  bowel problems/hemorrhoids:  No  episiotomy/laceration/incision healed? Yes:   vaginal flow:  scant  [place]:  No  contraception:  sterilization  emotional adjustment:  doing well, happy and tired      OBJECTIVE:  Blood pressure 130/80, pulse 100, temperature 99.4  F (37.4  C), temperature source Tympanic, height 1.651 m (5' 5\"), weight 79.4 kg (175 lb), last menstrual period 2018, SpO2 98 %, unknown if currently breastfeeding.   General - pleasant female in no acute distress.    ASSESSMENT:  normal 2 week postpartum exam    PLAN:  Continue with postpartum restrictions  Follow up at 6 weeks as scheduled.  "

## 2019-04-04 NOTE — NURSING NOTE
"Chief Complaint   Patient presents with     Post Partum Exam     2 week post partum.       Initial /80 (BP Location: Left arm, Patient Position: Chair, Cuff Size: Adult Regular)   Pulse 100   Temp 99.4  F (37.4  C) (Tympanic)   Ht 1.651 m (5' 5\")   Wt 79.4 kg (175 lb)   LMP 07/07/2018 (Approximate)   SpO2 98%   BMI 29.12 kg/m   Estimated body mass index is 29.12 kg/m  as calculated from the following:    Height as of this encounter: 1.651 m (5' 5\").    Weight as of this encounter: 79.4 kg (175 lb).  Medication Reconciliation: complete    PELON MCKEON LPN    "

## 2019-05-02 ENCOUNTER — PRENATAL OFFICE VISIT (OUTPATIENT)
Dept: OBGYN | Facility: OTHER | Age: 29
End: 2019-05-02
Attending: OBSTETRICS & GYNECOLOGY
Payer: COMMERCIAL

## 2019-05-02 VITALS
WEIGHT: 169 LBS | HEIGHT: 65 IN | SYSTOLIC BLOOD PRESSURE: 109 MMHG | DIASTOLIC BLOOD PRESSURE: 63 MMHG | BODY MASS INDEX: 28.16 KG/M2

## 2019-05-02 PROBLEM — N39.0 URINARY TRACT INFECTION WITHOUT HEMATURIA: Status: RESOLVED | Noted: 2019-01-28 | Resolved: 2019-05-02

## 2019-05-02 PROBLEM — K64.4 EXTERNAL HEMORRHOIDS: Status: RESOLVED | Noted: 2019-01-10 | Resolved: 2019-05-02

## 2019-05-02 PROBLEM — O09.211 HIGH RISK PREGNANCY DUE TO HISTORY OF PRETERM LABOR IN FIRST TRIMESTER: Status: RESOLVED | Noted: 2018-09-21 | Resolved: 2019-05-02

## 2019-05-02 PROBLEM — Z48.89 ENCOUNTER FOR POSTOPERATIVE WOUND CHECK: Status: RESOLVED | Noted: 2019-03-28 | Resolved: 2019-05-02

## 2019-05-02 PROBLEM — O99.019 ANEMIA DURING PREGNANCY: Status: RESOLVED | Noted: 2019-01-23 | Resolved: 2019-05-02

## 2019-05-02 PROBLEM — O47.00 PRETERM CONTRACTIONS: Status: RESOLVED | Noted: 2019-01-10 | Resolved: 2019-05-02

## 2019-05-02 PROBLEM — O34.219 PREVIOUS CESAREAN DELIVERY AFFECTING PREGNANCY: Status: RESOLVED | Noted: 2018-08-21 | Resolved: 2019-05-02

## 2019-05-02 PROBLEM — N39.0 URINARY TRACT INFECTION WITHOUT HEMATURIA, SITE UNSPECIFIED: Status: RESOLVED | Noted: 2019-03-22 | Resolved: 2019-05-02

## 2019-05-02 PROCEDURE — 99207 ZZC POST PARTUM EXAM: CPT | Performed by: OBSTETRICS & GYNECOLOGY

## 2019-05-02 ASSESSMENT — PAIN SCALES - GENERAL: PAINLEVEL: NO PAIN (0)

## 2019-05-02 ASSESSMENT — MIFFLIN-ST. JEOR: SCORE: 1497.46

## 2019-05-02 NOTE — NURSING NOTE
"Chief Complaint   Patient presents with     Postpartum Care     6wk pp/ c/s on 3/20/19 with Fletcher/ Aura Henderson       Initial /63 (BP Location: Left arm, Cuff Size: Adult Regular)   Ht 1.651 m (5' 5\")   Wt 76.7 kg (169 lb)   LMP 07/07/2018 (Approximate)   BMI 28.12 kg/m   Estimated body mass index is 28.12 kg/m  as calculated from the following:    Height as of this encounter: 1.651 m (5' 5\").    Weight as of this encounter: 76.7 kg (169 lb).  Medication Reconciliation: complete    Aimee Leahy LPN  "

## 2019-05-02 NOTE — PROGRESS NOTES
"SUBJECTIVE:  Kathia Milton is a 29 year old female P2 here for a postpartum visit.  She had a  Section   delivering a healthy baby boy  Currently no complaints and doing well.    Today's Depression Rating was 7    delivery complications:  No  breast feeding:  No  bladder problems:  No  bowel problems/hemorrhoids:  No  episiotomy/laceration/incision healed? Yes  vaginal flow:  None  [place]:  No  contraception:  tubal ligation  emotional adjustment:  doing well and happy      OBJECTIVE:  Blood pressure 109/63, height 1.651 m (5' 5\"), weight 76.7 kg (169 lb), last menstrual period 2018, unknown if currently breastfeeding.   General - pleasant female in no acute distress.  Abdomen - soft, nontender, nondistended, no hepatosplenomegaly.  Rectovaginal - deferred.    ASSESSMENT:  normal postpartum exam.  Released from pregnancy related restrictions    PLAN:  May resume normal activities without restrictions  Pap smear Not Done today    The patient will use tubal ligation for birth control.   Return to clinic in one year for an annual, when due for a pap smear or PRN.    Damon Fletcher  "

## 2019-09-02 ENCOUNTER — HOSPITAL ENCOUNTER (EMERGENCY)
Facility: HOSPITAL | Age: 29
Discharge: HOME | End: 2019-09-02
Attending: PHYSICIAN ASSISTANT | Admitting: PHYSICIAN ASSISTANT
Payer: COMMERCIAL

## 2019-09-02 ENCOUNTER — APPOINTMENT (OUTPATIENT)
Dept: GENERAL RADIOLOGY | Facility: HOSPITAL | Age: 29
End: 2019-09-02
Attending: PHYSICIAN ASSISTANT
Payer: COMMERCIAL

## 2019-09-02 VITALS
BODY MASS INDEX: 27.46 KG/M2 | SYSTOLIC BLOOD PRESSURE: 121 MMHG | OXYGEN SATURATION: 100 % | TEMPERATURE: 97.4 F | RESPIRATION RATE: 16 BRPM | WEIGHT: 165 LBS | DIASTOLIC BLOOD PRESSURE: 85 MMHG

## 2019-09-02 DIAGNOSIS — R10.32 LLQ ABDOMINAL PAIN: ICD-10-CM

## 2019-09-02 LAB
ALBUMIN SERPL-MCNC: 4.3 G/DL (ref 3.4–5)
ALBUMIN UR-MCNC: 10 MG/DL
ALP SERPL-CCNC: 112 U/L (ref 40–150)
ALT SERPL W P-5'-P-CCNC: 23 U/L (ref 0–50)
ANION GAP SERPL CALCULATED.3IONS-SCNC: 8 MMOL/L (ref 3–14)
APPEARANCE UR: CLEAR
AST SERPL W P-5'-P-CCNC: 16 U/L (ref 0–45)
BACTERIA #/AREA URNS HPF: ABNORMAL /HPF
BASOPHILS # BLD AUTO: 0 10E9/L (ref 0–0.2)
BASOPHILS NFR BLD AUTO: 0.6 %
BILIRUB SERPL-MCNC: 0.4 MG/DL (ref 0.2–1.3)
BILIRUB UR QL STRIP: NEGATIVE
BUN SERPL-MCNC: 9 MG/DL (ref 7–30)
CALCIUM SERPL-MCNC: 9.1 MG/DL (ref 8.5–10.1)
CHLORIDE SERPL-SCNC: 105 MMOL/L (ref 94–109)
CO2 SERPL-SCNC: 26 MMOL/L (ref 20–32)
COLOR UR AUTO: YELLOW
CREAT SERPL-MCNC: 0.69 MG/DL (ref 0.52–1.04)
DIFFERENTIAL METHOD BLD: NORMAL
EOSINOPHIL # BLD AUTO: 0.3 10E9/L (ref 0–0.7)
EOSINOPHIL NFR BLD AUTO: 3.8 %
ERYTHROCYTE [DISTWIDTH] IN BLOOD BY AUTOMATED COUNT: 13.5 % (ref 10–15)
GFR SERPL CREATININE-BSD FRML MDRD: >90 ML/MIN/{1.73_M2}
GLUCOSE SERPL-MCNC: 98 MG/DL (ref 70–99)
GLUCOSE UR STRIP-MCNC: NEGATIVE MG/DL
HCG UR QL: NEGATIVE
HCT VFR BLD AUTO: 39.5 % (ref 35–47)
HGB BLD-MCNC: 13.3 G/DL (ref 11.7–15.7)
HGB UR QL STRIP: NEGATIVE
IMM GRANULOCYTES # BLD: 0 10E9/L (ref 0–0.4)
IMM GRANULOCYTES NFR BLD: 0.1 %
KETONES UR STRIP-MCNC: NEGATIVE MG/DL
LEUKOCYTE ESTERASE UR QL STRIP: NEGATIVE
LYMPHOCYTES # BLD AUTO: 2.9 10E9/L (ref 0.8–5.3)
LYMPHOCYTES NFR BLD AUTO: 39.6 %
MCH RBC QN AUTO: 27.3 PG (ref 26.5–33)
MCHC RBC AUTO-ENTMCNC: 33.7 G/DL (ref 31.5–36.5)
MCV RBC AUTO: 81 FL (ref 78–100)
MONOCYTES # BLD AUTO: 0.6 10E9/L (ref 0–1.3)
MONOCYTES NFR BLD AUTO: 7.6 %
MUCOUS THREADS #/AREA URNS LPF: PRESENT /LPF
NEUTROPHILS # BLD AUTO: 3.5 10E9/L (ref 1.6–8.3)
NEUTROPHILS NFR BLD AUTO: 48.3 %
NITRATE UR QL: NEGATIVE
NRBC # BLD AUTO: 0 10*3/UL
NRBC BLD AUTO-RTO: 0 /100
PH UR STRIP: 8 PH (ref 4.7–8)
PLATELET # BLD AUTO: 338 10E9/L (ref 150–450)
POTASSIUM SERPL-SCNC: 3.6 MMOL/L (ref 3.4–5.3)
PROT SERPL-MCNC: 8 G/DL (ref 6.8–8.8)
RBC # BLD AUTO: 4.87 10E12/L (ref 3.8–5.2)
RBC #/AREA URNS AUTO: 1 /HPF (ref 0–2)
SODIUM SERPL-SCNC: 139 MMOL/L (ref 133–144)
SOURCE: ABNORMAL
SP GR UR STRIP: 1.02 (ref 1–1.03)
SQUAMOUS #/AREA URNS AUTO: 4 /HPF (ref 0–1)
UROBILINOGEN UR STRIP-MCNC: NORMAL MG/DL (ref 0–2)
WBC # BLD AUTO: 7.2 10E9/L (ref 4–11)
WBC #/AREA URNS AUTO: <1 /HPF (ref 0–5)

## 2019-09-02 PROCEDURE — 25000132 ZZH RX MED GY IP 250 OP 250 PS 637

## 2019-09-02 PROCEDURE — 74019 RADEX ABDOMEN 2 VIEWS: CPT | Mod: TC

## 2019-09-02 PROCEDURE — 36415 COLL VENOUS BLD VENIPUNCTURE: CPT | Performed by: FAMILY MEDICINE

## 2019-09-02 PROCEDURE — 85025 COMPLETE CBC W/AUTO DIFF WBC: CPT | Performed by: FAMILY MEDICINE

## 2019-09-02 PROCEDURE — 80053 COMPREHEN METABOLIC PANEL: CPT | Performed by: FAMILY MEDICINE

## 2019-09-02 PROCEDURE — 99284 EMERGENCY DEPT VISIT MOD MDM: CPT

## 2019-09-02 PROCEDURE — 81025 URINE PREGNANCY TEST: CPT | Performed by: PHYSICIAN ASSISTANT

## 2019-09-02 PROCEDURE — 81001 URINALYSIS AUTO W/SCOPE: CPT | Performed by: FAMILY MEDICINE

## 2019-09-02 PROCEDURE — 99284 EMERGENCY DEPT VISIT MOD MDM: CPT | Mod: Z6 | Performed by: PHYSICIAN ASSISTANT

## 2019-09-02 RX ORDER — POLYETHYLENE GLYCOL 3350 17 G/17G
POWDER, FOR SOLUTION ORAL
Status: COMPLETED
Start: 2019-09-02 | End: 2019-09-02

## 2019-09-02 RX ORDER — POLYETHYLENE GLYCOL 3350 17 G/17G
68 POWDER, FOR SOLUTION ORAL DAILY
Status: DISCONTINUED | OUTPATIENT
Start: 2019-09-03 | End: 2019-09-03 | Stop reason: HOSPADM

## 2019-09-02 RX ADMIN — POLYETHYLENE GLYCOL 3350 68 G: 17 POWDER, FOR SOLUTION ORAL at 22:42

## 2019-09-02 NOTE — ED AVS SNAPSHOT
[facility]  [address] [address]  Phone:  [phone]                                    Kathia Milton   MRN: [id number]    Department:  [facility]   Date of Visit:  9/2/2019           After Visit Summary Signature Page    I have received my discharge instructions, and my questions have been answered. I have discussed any challenges I see with this plan with the nurse or doctor.    ..........................................................................................................................................  Patient/Patient Representative Signature      ..........................................................................................................................................  Patient Representative Print Name and Relationship to Patient    ..................................................               ................................................  Date                                   Time    ..........................................................................................................................................  Reviewed by Signature/Title    ...................................................              ..............................................  Date                                               Time          22EPIC Rev 08/18

## 2019-09-03 NOTE — ED PROVIDER NOTES
History     Chief Complaint   Patient presents with     Abdominal Pain     Dysuria     HPI  Kathia Milton is a 29 year old female who presents emergency department with complaints of left lower quadrant abdominal pain that has been going on for several days.  Patient also reports having nausea, mucus in the stools, occasional bloody stool and hemorrhoids.  Patient has had problems with constipation and has been treating recently with magnesium citrate, enema, stool softener with no significant improvement.  She denies fever, vomiting.  Patient did have a tubal ligation several months ago.  She also reports pain with urination but no urinary frequency, urgency.  She denies unusual vaginal discharge.    Allergies:  Allergies   Allergen Reactions     Seafood Anaphylaxis     Shellfish Allergy      Shellfish Derived Products       Problem List:    Patient Active Problem List    Diagnosis Date Noted     Encounter for triage in pregnant patient 2018     Priority: Medium     Endometriosis of pelvis 2016     Priority: Medium     Seasonal allergies      Priority: Medium      delivery 05/15/2013     Priority: Medium     Begin hydroxy progesterone at 16-18 weeks  Cervical length 16 - 22 weeks   ctx.  Received BMZ x two in [place].   TDAP done       Anemia in pregnancy 2013     Priority: Medium     Abdominal pain, acute 2013     Priority: Medium     Tobacco use disorder 10/18/2012     Priority: Medium     quit       Condyloma acuminatum due to human papillomavirus 2011     Priority: Medium     Overview:   IMO Update 10/11          Past Medical History:    Past Medical History:   Diagnosis Date     Benign neoplasm of vagina 2011     Condyloma acuminatum 2011     Seasonal allergies      Supervision of other normal pregnancy      Tobacco use disorder 10/18/2012       Past Surgical History:    Past Surgical History:   Procedure Laterality Date     APPENDECTOMY  14     [place]      SECTION      C section     COLONOSCOPY  11/10/15    Dr. Singh     COMBINED  SECTION, TUBAL OCCLUSION Bilateral 3/20/2019    Procedure: COMBINED  SECTION, BILATERAL  TUBAL OCCLUSION;  Surgeon: Damon Fletcher MD;  Location: [facility]     DILATION AND CURETTAGE SUCTION, TREAT MISSED , 1ST TRIMESTER       ENDOSCOPY  11/10/15    Dr. Singh      LAPAROSCOPIC CYSTECTOMY OVARIAN (BENIGN) Left 2018    Procedure: LAPAROSCOPIC CYSTECTOMY OVARIAN (BENIGN);  EXPLORATORY  LAPAROSCOPY, LEFT OVARIAN CYSTECTOMY;  Surgeon: Damon Fletcher MD;  Location: [facility]     LAPAROSCOPY  11/30/15    Dr. Sun; lysis of pelvic adhesions, cauterization of minimal endometriosis     LAPAROSCOPY  2018    lysis of adhesions; pelvic pain, endometriosis; Dr. Sun       Family History:    Family History   Problem Relation Age of Onset     Diabetes Other         Grandmother     Heart Disease Other         Heart Disease - Grandmother     Other - See Comments Other         Renal Disease - Grandmother     Unknown/Adopted Mother      Unknown/Adopted Father      Unknown/Adopted Maternal Grandmother      Unknown/Adopted Maternal Grandfather      Unknown/Adopted Paternal Grandmother      Unknown/Adopted Paternal Grandfather      Breast Cancer No family hx of        Social History:  Marital Status:  Single [1]  Social History     Tobacco Use     Smoking status: Former Smoker     Types: Cigarettes     Last attempt to quit: 2012     Years since quittin.7     Smokeless tobacco: Never Used     Tobacco comment: trying to quit 8/10/18   Substance Use Topics     Alcohol use: No     Alcohol/week: 0.0 oz     Drug use: No        Medications:      No current outpatient medications on file.      Review of Systems   Constitutional: Negative for fever.   Gastrointestinal: Positive for abdominal pain, blood in stool, constipation, diarrhea and nausea. Negative for vomiting.   Genitourinary: Positive for  dysuria. Negative for frequency, urgency and vaginal discharge.       Physical Exam   BP: 121/85  Heart Rate: 96  Temp: 97.4  F (36.3  C)  Resp: 16  Weight: 74.8 kg (165 lb)  SpO2: 100 %      Physical Exam   Constitutional: No distress.   HENT:   Head: Atraumatic.   Mouth/Throat: Oropharynx is clear and moist. No oropharyngeal exudate.   Eyes: Pupils are equal, round, and reactive to light. No scleral icterus.   Cardiovascular: Normal heart sounds and intact distal pulses.   Pulmonary/Chest: Breath sounds normal. No respiratory distress.   Abdominal: Soft. Bowel sounds are normal. There is tenderness (LLQ severe tenderness to palpation.). There is guarding. There is no rebound.   Musculoskeletal: She exhibits no edema or tenderness.   Skin: Skin is warm. No rash noted. She is not diaphoretic.       ED Course        Procedures  Symptoms concerning for possible ectopic pregnancy.  Pregnancy test was negative.  We discussed other possible causes such as ovarian cyst, constipation, diverticulitis.  Diverticulitis seems unlikely given her age and lack of a fever.  X-ray significant for moderate amount of stool.  Recommended trial of repeat doses of MiraLAX at home and follow-up with primary care provider in 1 to 2 days if no significant improvement or return to the emergency department if severely worsening pain, new onset fever, other concerning symptoms.             Results for orders placed or performed during the hospital encounter of 09/02/19 (from the past 24 hour(s))   UA reflex to Microscopic   Result Value Ref Range    Color Urine Yellow     Appearance Urine Clear     Glucose Urine Negative NEG^Negative mg/dL    Bilirubin Urine Negative NEG^Negative    Ketones Urine Negative NEG^Negative mg/dL    Specific Gravity Urine 1.019 1.003 - 1.035    Blood Urine Negative NEG^Negative    pH Urine 8.0 4.7 - 8.0 pH    Protein Albumin Urine 10 (A) NEG^Negative mg/dL    Urobilinogen mg/dL Normal 0.0 - 2.0 mg/dL    Nitrite  Urine Negative NEG^Negative    Leukocyte Esterase Urine Negative NEG^Negative    Source Midstream Urine     RBC Urine 1 0 - 2 /HPF    WBC Urine <1 0 - 5 /HPF    Bacteria Urine Few (A) NEG^Negative /HPF    Squamous Epithelial /HPF Urine 4 (H) 0 - 1 /HPF    Mucous Urine Present (A) NEG^Negative /LPF   CBC with platelets differential   Result Value Ref Range    WBC 7.2 4.0 - 11.0 10e9/L    RBC Count 4.87 3.8 - 5.2 10e12/L    Hemoglobin 13.3 11.7 - 15.7 g/dL    Hematocrit 39.5 35.0 - 47.0 %    MCV 81 78 - 100 fl    MCH 27.3 26.5 - 33.0 pg    MCHC 33.7 31.5 - 36.5 g/dL    RDW 13.5 10.0 - 15.0 %    Platelet Count 338 150 - 450 10e9/L    Diff Method Automated Method     % Neutrophils 48.3 %    % Lymphocytes 39.6 %    % Monocytes 7.6 %    % Eosinophils 3.8 %    % Basophils 0.6 %    % Immature Granulocytes 0.1 %    Nucleated RBCs 0 0 /100    Absolute Neutrophil 3.5 1.6 - 8.3 10e9/L    Absolute Lymphocytes 2.9 0.8 - 5.3 10e9/L    Absolute Monocytes 0.6 0.0 - 1.3 10e9/L    Absolute Eosinophils 0.3 0.0 - 0.7 10e9/L    Absolute Basophils 0.0 0.0 - 0.2 10e9/L    Abs Immature Granulocytes 0.0 0 - 0.4 10e9/L    Absolute Nucleated RBC 0.0    Comprehensive metabolic panel   Result Value Ref Range    Sodium 139 133 - 144 mmol/L    Potassium 3.6 3.4 - 5.3 mmol/L    Chloride 105 94 - 109 mmol/L    Carbon Dioxide 26 20 - 32 mmol/L    Anion Gap 8 3 - 14 mmol/L    Glucose 98 70 - 99 mg/dL    Urea Nitrogen 9 7 - 30 mg/dL    Creatinine 0.69 0.52 - 1.04 mg/dL    GFR Estimate >90 >60 mL/min/[1.73_m2]    GFR Estimate If Black >90 >60 mL/min/[1.73_m2]    Calcium 9.1 8.5 - 10.1 mg/dL    Bilirubin Total 0.4 0.2 - 1.3 mg/dL    Albumin 4.3 3.4 - 5.0 g/dL    Protein Total 8.0 6.8 - 8.8 g/dL    Alkaline Phosphatase 112 40 - 150 U/L    ALT 23 0 - 50 U/L    AST 16 0 - 45 U/L   HCG qualitative urine   Result Value Ref Range    HCG Qual Urine Negative NEG^Negative   XR Abdomen 2 Views    Narrative    XR ABDOMEN 2 VW    HISTORY: 29 years Female llq abd  pain    COMPARISON: None    TECHNIQUE: 2 views abdomen    FINDINGS: No abnormally distended loops of bowel are present. There is  a moderate volume of stool in the colon. There is no evidence of  obstruction or free air.      Impression    IMPRESSION: No evidence of bowel obstruction or free air.    LEBRON ANDINO MD       Medications - No data to display    Assessments & Plan (with Medical Decision Making)     I have reviewed the nursing notes.    I have reviewed the findings, diagnosis, plan and need for follow up with the patient.    There are no discharge medications for this patient.      Final diagnoses:   LLQ abdominal pain     STACI Burris on 9/4/2019 at 8:52 AM   9/2/2019   [facility]     Oc Cassidy PA  09/04/19 [id number]

## 2019-09-03 NOTE — ED NOTES
68 grams of miralax mixed in with1 liter of Pedialyte  And given to pt to take at home per PA verbal order. Pt understands that she will drink the liter when she gets home.

## 2019-09-03 NOTE — ED TRIAGE NOTES
"Pt states she has been feeling constipated. States has tried multiple OTC stool softeners including mag citrate and 2 enema's with no relief. States had BM today \"all water\". C/O LLQ pain constant for 2 days. History of tubal in March. C/O burning with urination.  "

## 2019-09-03 NOTE — DISCHARGE INSTRUCTIONS
Mix 1 capful Miralax per 1 cup Pedialyte and repeat every 30-60 minutes as necessary until good results. May take up to 8 doses in 24 hours.

## 2019-09-03 NOTE — ED NOTES
"Pt ambulatory to room 2 of the ED by self. Pt reports for the last 2 days she has had LLQ pain with episodes of watery diarrhea. Pt has history of constipation and has been using medications to help in the aid of having a BM to no avail. Pt has pain 8/10 at this time and reports passing \"maybe some gas.\" call light in reach.   "

## 2019-09-04 ASSESSMENT — ENCOUNTER SYMPTOMS
FEVER: 0
FREQUENCY: 0
NAUSEA: 1
DYSURIA: 1
CONSTIPATION: 1
VOMITING: 0
BLOOD IN STOOL: 1
DIARRHEA: 1
ABDOMINAL PAIN: 1

## 2019-09-23 ENCOUNTER — MEDICAL CORRESPONDENCE (OUTPATIENT)
Dept: HEALTH INFORMATION MANAGEMENT | Facility: CLINIC | Age: 29
End: 2019-09-23

## 2019-10-23 NOTE — PROGRESS NOTES
Subjective     Kathia Milton is a 29 year old female who presents to clinic today for the following health issues:    HPI   Derm problem    Duration: ongoing whole life; years    Description  Location: top of head left side   Itching: severe    Intensity:  severe    Accompanying signs and symptoms: is painful and keeps growing; pruritic; no bleeding    History (similar episodes/previous evaluation): None    Precipitating or alleviating factors:  New exposures:  None  Recent travel: no      Therapies tried and outcome: none    Prior moles removed from back; benign    family history of skin cancer - unsure - adopted    1 mole on right side of neck - getting darker - asymptomatic    Constipation/diarrhea     Duration: ongoing off and off     Description:       Frequency of bowel movements: 2-3 days       Consistency of stool: grey mucous, occassionally has blood, will either have constipation or diarrhea     Intensity:  severe    Accompanying signs and symptoms:        Abdominal pain: YES when constipated        Rectal pain: no        Blood in stool: YES has hemorrhoids        Nausea/vomitting: YES    History:        Similar problems in past: YES    Precipitating or alleviating factors:        Medications worsening symptoms: no     Therapies tried and outcome: every OTC and the ER gave her the medication you use for a colonoscopy and that it took about 12 hours to work        Chronic laxative use: no     Colonoscopy 11/2015 - Dr. Singh.  Mild inflammatory changes noted.  Biopsies negative.  Bad symptoms over labor day weekend.  Treated with Miralax.  Will have constipation, not go for 3-5 days, then a few days of loose stool.  Miralax prn.  Mag Citrate prn.  Prior enemas.  Daily stool softeners.  Daily probiotics.             Current Outpatient Medications   Medication     linaclotide (LINZESS) 72 MCG capsule     No current facility-administered medications for this visit.        Patient Active Problem List    Diagnosis     Abdominal pain, acute     Anemia in pregnancy     Seasonal allergies     Tobacco use disorder     Endometriosis of pelvis     Condyloma acuminatum due to human papillomavirus      delivery     Encounter for triage in pregnant patient     Past Surgical History:   Procedure Laterality Date     APPENDECTOMY  14    [place]      SECTION      C section     COLONOSCOPY  11/10/15    Dr. Singh     COMBINED  SECTION, TUBAL OCCLUSION Bilateral 3/20/2019    Procedure: COMBINED  SECTION, BILATERAL  TUBAL OCCLUSION;  Surgeon: Damon Fletcher MD;  Location: [facility]     DILATION AND CURETTAGE SUCTION, TREAT MISSED , 1ST TRIMESTER       ENDOSCOPY  11/10/15    Dr. Singh      LAPAROSCOPIC CYSTECTOMY OVARIAN (BENIGN) Left 2018    Procedure: LAPAROSCOPIC CYSTECTOMY OVARIAN (BENIGN);  EXPLORATORY  LAPAROSCOPY, LEFT OVARIAN CYSTECTOMY;  Surgeon: Damon Fletcher MD;  Location: [facility]     LAPAROSCOPY  11/30/15    Dr. Sun; lysis of pelvic adhesions, cauterization of minimal endometriosis     LAPAROSCOPY  2018    lysis of adhesions; pelvic pain, endometriosis; Dr. Sun       Social History     Tobacco Use     Smoking status: Former Smoker     Types: Cigarettes     Last attempt to quit: 2012     Years since quittin.9     Smokeless tobacco: Never Used     Tobacco comment: trying to quit 8/10/18   Substance Use Topics     Alcohol use: No     Alcohol/week: 0.0 standard drinks     Family History   Problem Relation Age of Onset     Diabetes Other         Grandmother     Heart Disease Other         Heart Disease - Grandmother     Other - See Comments Other         Renal Disease - Grandmother     Unknown/Adopted Mother      Unknown/Adopted Father      Unknown/Adopted Maternal Grandmother      Unknown/Adopted Maternal Grandfather      Unknown/Adopted Paternal Grandmother      Unknown/Adopted Paternal Grandfather      Breast Cancer No family hx of            Reviewed  "and updated as needed this visit by Provider         Review of Systems   ROS COMP: Constitutional, HEENT, cardiovascular, pulmonary, gi and gu systems are negative, except as otherwise noted.      Objective    /78 (BP Location: Left arm, Patient Position: Chair, Cuff Size: Adult Large)   Pulse 96   Temp 97.8  F (36.6  C) (Tympanic)   Wt 73.5 kg (162 lb)   SpO2 98%   BMI 26.96 kg/m    Body mass index is 26.96 kg/m .  Physical Exam   GENERAL: healthy, alert and no distress  NECK: no adenopathy, no asymmetry, masses, or scars and thyroid normal to palpation  RESP: lungs clear to auscultation - no rales, rhonchi or wheezes  CV: regular rate and rhythm, normal S1 S2, no S3 or S4, no murmur, click or rub, no peripheral edema and peripheral pulses strong  ABDOMEN: no organomegaly or masses, bowel sounds normal and soft, mild tenderness epigastric and right lower regions  MS: no gross musculoskeletal defects noted, no edema  SKIN: multiple benign moles; 1 round, even pigmented, brown, 0.5-1 cm on right side of neck; left upper scalp with flesh colored raised smooth mole, 0.5cm  PSYCH: mentation appears normal, affect normal/bright    Diagnostic Test Results:  Labs reviewed in Epic  Labs pending        Assessment & Plan       ICD-10-CM    1. Change in mole D22.9 GENERAL SURG ADULT REFERRAL   2. Irritable bowel syndrome with both constipation and diarrhea K58.2 CRP inflammation     ESR: Erythrocyte sedimentation rate     Tissue transglutaminase alvaro IgA and IgG     linaclotide (LINZESS) 72 MCG capsule        BMI:   Estimated body mass index is 26.96 kg/m  as calculated from the following:    Height as of 5/2/19: 1.651 m (5' 5\").    Weight as of this encounter: 73.5 kg (162 lb).     Patient would like scalp cyst excised.  Does not feel 1 on neck has really changed - comfortable monitoring this one.    Chronic IBS.  Prior upper and lower endoscopy, lab, CT, etc.  Limited bowel regimen.  Encouraged fiber daily.  Trial " of linzess if covered - as symptoms predominately constipation.    Flu shot declined.    Patient Instructions   Referral for mole excision - general surgery appointment.    Will call with results.  Add fiber supplement daily - Metamucil or Citrucel.  Add trial of Linzess for constipation.          No follow-ups on file.    Lainey Jones MD  [facility]

## 2019-10-25 ENCOUNTER — OFFICE VISIT (OUTPATIENT)
Dept: FAMILY MEDICINE | Facility: OTHER | Age: 29
End: 2019-10-25
Attending: FAMILY MEDICINE
Payer: COMMERCIAL

## 2019-10-25 VITALS
OXYGEN SATURATION: 98 % | HEART RATE: 96 BPM | DIASTOLIC BLOOD PRESSURE: 78 MMHG | SYSTOLIC BLOOD PRESSURE: 112 MMHG | WEIGHT: 162 LBS | BODY MASS INDEX: 26.96 KG/M2 | TEMPERATURE: 97.8 F

## 2019-10-25 DIAGNOSIS — K58.2 IRRITABLE BOWEL SYNDROME WITH BOTH CONSTIPATION AND DIARRHEA: ICD-10-CM

## 2019-10-25 DIAGNOSIS — D22.9 CHANGE IN MOLE: Primary | ICD-10-CM

## 2019-10-25 LAB
CRP SERPL-MCNC: <2.9 MG/L (ref 0–8)
ERYTHROCYTE [SEDIMENTATION RATE] IN BLOOD BY WESTERGREN METHOD: 10 MM/H (ref 0–20)

## 2019-10-25 PROCEDURE — 85652 RBC SED RATE AUTOMATED: CPT | Performed by: FAMILY MEDICINE

## 2019-10-25 PROCEDURE — 86140 C-REACTIVE PROTEIN: CPT | Performed by: FAMILY MEDICINE

## 2019-10-25 PROCEDURE — 36415 COLL VENOUS BLD VENIPUNCTURE: CPT | Performed by: FAMILY MEDICINE

## 2019-10-25 PROCEDURE — 99214 OFFICE O/P EST MOD 30 MIN: CPT | Performed by: FAMILY MEDICINE

## 2019-10-25 PROCEDURE — 83516 IMMUNOASSAY NONANTIBODY: CPT | Performed by: FAMILY MEDICINE

## 2019-10-25 PROCEDURE — 83516 IMMUNOASSAY NONANTIBODY: CPT | Mod: 59 | Performed by: FAMILY MEDICINE

## 2019-10-25 ASSESSMENT — PAIN SCALES - GENERAL: PAINLEVEL: NO PAIN (0)

## 2019-10-25 NOTE — NURSING NOTE
"Chief Complaint   Patient presents with     Derm Problem       Initial /78 (BP Location: Left arm, Patient Position: Chair, Cuff Size: Adult Large)   Pulse 96   Temp 97.8  F (36.6  C) (Tympanic)   Wt 73.5 kg (162 lb)   SpO2 98%   BMI 26.96 kg/m   Estimated body mass index is 26.96 kg/m  as calculated from the following:    Height as of 5/2/19: 1.651 m (5' 5\").    Weight as of this encounter: 73.5 kg (162 lb).  Medication Reconciliation: complete  Milan Kelly LPN  "

## 2019-10-25 NOTE — PATIENT INSTRUCTIONS
Referral for mole excision - general surgery appointment.    Will call with results.  Add fiber supplement daily - Metamucil or Citrucel.  Add trial of Linzess for constipation.

## 2019-10-29 ENCOUNTER — PREP FOR PROCEDURE (OUTPATIENT)
Dept: SURGERY | Facility: OTHER | Age: 29
End: 2019-10-29

## 2019-10-29 ENCOUNTER — OFFICE VISIT (OUTPATIENT)
Dept: SURGERY | Facility: OTHER | Age: 29
End: 2019-10-29
Attending: FAMILY MEDICINE
Payer: COMMERCIAL

## 2019-10-29 VITALS
SYSTOLIC BLOOD PRESSURE: 98 MMHG | HEART RATE: 118 BPM | TEMPERATURE: 99.1 F | DIASTOLIC BLOOD PRESSURE: 68 MMHG | RESPIRATION RATE: 16 BRPM | OXYGEN SATURATION: 98 %

## 2019-10-29 DIAGNOSIS — R22.0 MASS OF SCALP: Primary | ICD-10-CM

## 2019-10-29 DIAGNOSIS — L98.9 SCALP LESION: Primary | ICD-10-CM

## 2019-10-29 LAB
TTG IGA SER-ACNC: 1 U/ML
TTG IGG SER-ACNC: <1 U/ML

## 2019-10-29 PROCEDURE — 99243 OFF/OP CNSLTJ NEW/EST LOW 30: CPT | Performed by: SURGERY

## 2019-10-29 ASSESSMENT — PAIN SCALES - GENERAL: PAINLEVEL: SEVERE PAIN (7)

## 2019-10-29 NOTE — PATIENT INSTRUCTIONS
Thank you for allowing our surgical team to participate in your care. Please review the instructions below.   If you have questions, you may contact us at the any of the following numbers:     [facility] Health Unit Coordinator: [phone]  Clinic Surgery Nurse: [phone]  [facility] Surgery Education Nurse: [phone]    You are scheduled for: November 6, 2019 with Dr. Stephenson   Admit Time: Hospital Surgery will call you the day before your procedure by 5pm with your arrival time.   If your surgery is on Monday, expect a call on Friday.   If you are not contacted before 5PM, please call admitting at [phone].   After hours or on weekends, please call [phone] to postpone.     Call the clinic surgery nurse if you become ill within 1-2 weeks of your procedure to reschedule.   This includes fever, chills, sore throat, cough, chest congestion, runny nose, or any other symptom of any other illness.       Please call the Hospital Surgery Education Nurse at [phone] 1-2 weeks prior to your procedure and have a medication list ready.     Do not take Aspirin or other NSAIDS (Ibuprofen, Motrin, Aleve, Celebrex, Naproxen, etc), vitamins, and supplements 7 days before your surgery unless you have been otherwise directed. If you are prescribed a daily 81mg Aspirin, you may continue this.   If you are prescribed blood thinners or insulin, please contact your primary care provider for instructions.    Shower the night before and the morning of your procedure with Hibiclens soap.      Do not have anything to eat or drink after midnight the night before your surgery (or 8 hours prior to surgery), except clear liquids (water, clear juice, clear broth, plain coffee or tea without cream or milk) up until 2 hours prior to arrival time.   Nothing by mouth after the 2 hours prior to arrival.  Do not chew gum, suck on hard candy, or smoke. You can brush your teeth.   If you are directed to take any medications, take them with a  tiny sip of water.   If you use an inhaler, bring it with you.    Arrive in clean, comfortable clothing.   Do not wear any jewelry, make-up, nail polish, lotions, hair products, or perfumes.   [place] in hospital admitting through the [place] entrance.    A responsible adult must be available to drive you home and stay with you for 24 hours at home. If you need to take a taxi or the bus, you must have another responsible adult to ride with you. Your procedure will be cancelled if you do not have a responsible adult .     Return to clinic for a postop appointment 2 week(s) from your surgery date.

## 2019-10-29 NOTE — PROGRESS NOTES
CLINIC NOTE - CONSULT  10/29/2019    Patient:Kathia Milton  Referring Physician: Lainey Rose    Reason for Referral: Mass on left posterior scalp    This is a 29 year old female with a mass on the left posterior scalp.  The mass is getting lartger.  The mass has not been infected in the past.  The mass has not drained.  The patient does desire excision.     Past Medical History:  Past Medical History:   Diagnosis Date     Benign neoplasm of vagina 2011     Condyloma acuminatum 2011     Seasonal allergies      Tobacco use disorder 10/18/2012       Past Surgical History:  Past Surgical History:   Procedure Laterality Date     APPENDECTOMY  14    [place]      SECTION      C section     COLONOSCOPY  11/10/15    Dr. Singh     COMBINED  SECTION, TUBAL OCCLUSION Bilateral 3/20/2019    Procedure: COMBINED  SECTION, BILATERAL  TUBAL OCCLUSION;  Surgeon: Damon Fletcher MD;  Location: [facility]     DILATION AND CURETTAGE SUCTION, TREAT MISSED , 1ST TRIMESTER       ENDOSCOPY  11/10/15    Dr. Singh      LAPAROSCOPIC CYSTECTOMY OVARIAN (BENIGN) Left 2018    Procedure: LAPAROSCOPIC CYSTECTOMY OVARIAN (BENIGN);  EXPLORATORY  LAPAROSCOPY, LEFT OVARIAN CYSTECTOMY;  Surgeon: Damon Fletcher MD;  Location: [facility]     LAPAROSCOPY  11/30/15    Dr. Sun; lysis of pelvic adhesions, cauterization of minimal endometriosis     LAPAROSCOPY  2018    lysis of adhesions; pelvic pain, endometriosis; Dr. Sun       Family History History:  Family History   Problem Relation Age of Onset     Diabetes Other         Grandmother     Heart Disease Other         Heart Disease - Grandmother     Other - See Comments Other         Renal Disease - Grandmother     Unknown/Adopted Mother      Unknown/Adopted Father      Unknown/Adopted Maternal Grandmother      Unknown/Adopted Maternal Grandfather      Unknown/Adopted Paternal Grandmother      Unknown/Adopted Paternal Grandfather       Breast Cancer No family hx of        History of Tobacco Use:  History   Smoking Status     Former Smoker     Types: Cigarettes     Quit date: 11/26/2012   Smokeless Tobacco     Never Used     Comment: trying to quit 8/10/18       Current Medications:  Current Outpatient Medications   Medication Sig Dispense Refill     linaclotide (LINZESS) 72 MCG capsule Take 1 capsule (72 mcg) by mouth every morning (before breakfast) (Patient not taking: Reported on 10/29/2019) 30 capsule 1       Allergies:  Allergies   Allergen Reactions     Seafood Anaphylaxis     Shellfish Allergy      Shellfish Derived Products       ROS:  Pertinent items are noted in HPI.  All other systems are negative.    PHYSICAL EXAM:     Vital signs: BP 98/68   Pulse 118   Temp 99.1  F (37.3  C) (Tympanic)   Resp 16   SpO2 98%    Weight: [unfilled]   BMI: There is no height or weight on file to calculate BMI.   General: Normal, healthy, cooperative, in no acute distress   Skin: no jaundice   HEENT: PERRLA and EOMI   Neck: supple   Lungs: clear to auscultation   CV: Regular rate and rhythm without murmer   Abdominal: abdomen is soft without significant tenderness, masses, organomegaly or guarding   Extremities: No cyanosis, clubbing or edema noted bilaterally in Upper and Lower Extremities   Neurological: without deficit     On the left posterior scalp there is a small 0.4 cm x 0.4 cm lesion.  No surrounding erythema or fluctuance.    ASSESSMENT: 29 year old female with a mass on the left posterior scalp.    PLAN: Discussed options with the patient.  Will plan on taking the patient to the OR for surgical excision.      The risks, benefits, and alternatives to the planned procedure were fully discussed with the patient and/or the patient's representative(s). The risks of bleeding, infection, death, missing pathology, the need for additional procedures intra-operatively, the possible need for intra-operative consults, the possible need for transfusion  therapy, cardiopulmonary compromise, the possible need for additional surgery for a complication were discussed with the patient and/or the patient's representative(s). The patient's and/or patient's representative(s) questions were addressed and answered. Informed consent was obtained from the patient and/or the patient's representative(s). The patient and/or the patient's representative(s) consent to proceed.

## 2019-10-29 NOTE — NURSING NOTE
"Chief Complaint   Patient presents with     Consult     Skin lesion per Milton.        Initial BP 98/68   Pulse 118   Temp 99.1  F (37.3  C) (Tympanic)   Resp 16   SpO2 98%  Estimated body mass index is 26.96 kg/m  as calculated from the following:    Height as of 5/2/19: 1.651 m (5' 5\").    Weight as of 10/25/19: 73.5 kg (162 lb).  Medication Reconciliation: complete  Lissette Nowak LPN    "

## 2019-10-29 NOTE — H&P (VIEW-ONLY)
CLINIC NOTE - CONSULT  10/29/2019    Patient:Kathia Milton  Referring Physician: Lainey Rose    Reason for Referral: Mass on left posterior scalp    This is a 29 year old female with a mass on the left posterior scalp.  The mass is getting lartger.  The mass has not been infected in the past.  The mass has not drained.  The patient does desire excision.     Past Medical History:  Past Medical History:   Diagnosis Date     Benign neoplasm of vagina 2011     Condyloma acuminatum 2011     Seasonal allergies      Tobacco use disorder 10/18/2012       Past Surgical History:  Past Surgical History:   Procedure Laterality Date     APPENDECTOMY  14    [place]      SECTION      C section     COLONOSCOPY  11/10/15    Dr. Singh     COMBINED  SECTION, TUBAL OCCLUSION Bilateral 3/20/2019    Procedure: COMBINED  SECTION, BILATERAL  TUBAL OCCLUSION;  Surgeon: Damon Fletcher MD;  [facility]: [facility]     DILATION AND CURETTAGE SUCTION, TREAT MISSED , 1ST TRIMESTER       ENDOSCOPY  11/10/15    Dr. Singh      LAPAROSCOPIC CYSTECTOMY OVARIAN (BENIGN) Left 2018    Procedure: LAPAROSCOPIC CYSTECTOMY OVARIAN (BENIGN);  EXPLORATORY  LAPAROSCOPY, LEFT OVARIAN CYSTECTOMY;  Surgeon: Damon Fletcher MD;  [facility]: [facility]     LAPAROSCOPY  11/30/15    Dr. Sun; lysis of pelvic adhesions, cauterization of minimal endometriosis     LAPAROSCOPY  2018    lysis of adhesions; pelvic pain, endometriosis; Dr. Sun       Family History History:  Family History   Problem Relation Age of Onset     Diabetes Other         Grandmother     Heart Disease Other         Heart Disease - Grandmother     Other - See Comments Other         Renal Disease - Grandmother     Unknown/Adopted Mother      Unknown/Adopted Father      Unknown/Adopted Maternal Grandmother      Unknown/Adopted Maternal Grandfather      Unknown/Adopted Paternal Grandmother      Unknown/Adopted Paternal Grandfather       Breast Cancer No family hx of        History of Tobacco Use:  History   Smoking Status     Former Smoker     Types: Cigarettes     Quit date: 11/26/2012   Smokeless Tobacco     Never Used     Comment: trying to quit 8/10/18       Current Medications:  Current Outpatient Medications   Medication Sig Dispense Refill     linaclotide (LINZESS) 72 MCG capsule Take 1 capsule (72 mcg) by mouth every morning (before breakfast) (Patient not taking: Reported on 10/29/2019) 30 capsule 1       Allergies:  Allergies   Allergen Reactions     Seafood Anaphylaxis     Shellfish Allergy      Shellfish Derived Products       ROS:  Pertinent items are noted in HPI.  All other systems are negative.    PHYSICAL EXAM:     Vital signs: BP 98/68   Pulse 118   Temp 99.1  F (37.3  C) (Tympanic)   Resp 16   SpO2 98%    Weight: [unfilled]   BMI: There is no height or weight on file to calculate BMI.   General: Normal, healthy, cooperative, in no acute distress   Skin: no jaundice   HEENT: PERRLA and EOMI   Neck: supple   Lungs: clear to auscultation   CV: Regular rate and rhythm without murmer   Abdominal: abdomen is soft without significant tenderness, masses, organomegaly or guarding   Extremities: No cyanosis, clubbing or edema noted bilaterally in Upper and Lower Extremities   Neurological: without deficit     On the left posterior scalp there is a small 0.4 cm x 0.4 cm lesion.  No surrounding erythema or fluctuance.    ASSESSMENT: 29 year old female with a mass on the left posterior scalp.    PLAN: Discussed options with the patient.  Will plan on taking the patient to the OR for surgical excision.      The risks, benefits, and alternatives to the planned procedure were fully discussed with the patient and/or the patient's representative(s). The risks of bleeding, infection, death, missing pathology, the need for additional procedures intra-operatively, the possible need for intra-operative consults, the possible need for transfusion  therapy, cardiopulmonary compromise, the possible need for additional surgery for a complication were discussed with the patient and/or the patient's representative(s). The patient's and/or patient's representative(s) questions were addressed and answered. Informed consent was obtained from the patient and/or the patient's representative(s). The patient and/or the patient's representative(s) consent to proceed.

## 2019-10-31 ENCOUNTER — ANESTHESIA EVENT (OUTPATIENT)
Dept: SURGERY | Facility: HOSPITAL | Age: 29
End: 2019-10-31
Payer: COMMERCIAL

## 2019-10-31 RX ORDER — HYDRALAZINE HYDROCHLORIDE 20 MG/ML
2.5-5 INJECTION INTRAMUSCULAR; INTRAVENOUS EVERY 10 MIN PRN
Status: CANCELLED | OUTPATIENT
Start: 2019-10-31

## 2019-10-31 RX ORDER — SODIUM CHLORIDE, SODIUM LACTATE, POTASSIUM CHLORIDE, CALCIUM CHLORIDE 600; 310; 30; 20 MG/100ML; MG/100ML; MG/100ML; MG/100ML
INJECTION, SOLUTION INTRAVENOUS CONTINUOUS
Status: CANCELLED | OUTPATIENT
Start: 2019-10-31

## 2019-10-31 RX ORDER — FENTANYL CITRATE 50 UG/ML
25-50 INJECTION, SOLUTION INTRAMUSCULAR; INTRAVENOUS
Status: CANCELLED | OUTPATIENT
Start: 2019-10-31

## 2019-10-31 RX ORDER — MEPERIDINE HYDROCHLORIDE 25 MG/ML
12.5 INJECTION INTRAMUSCULAR; INTRAVENOUS; SUBCUTANEOUS
Status: CANCELLED | OUTPATIENT
Start: 2019-10-31

## 2019-10-31 RX ORDER — ONDANSETRON 2 MG/ML
4 INJECTION INTRAMUSCULAR; INTRAVENOUS EVERY 30 MIN PRN
Status: CANCELLED | OUTPATIENT
Start: 2019-10-31

## 2019-10-31 RX ORDER — ONDANSETRON 4 MG/1
4 TABLET, ORALLY DISINTEGRATING ORAL EVERY 30 MIN PRN
Status: CANCELLED | OUTPATIENT
Start: 2019-10-31

## 2019-10-31 RX ORDER — ALBUTEROL SULFATE 0.83 MG/ML
2.5 SOLUTION RESPIRATORY (INHALATION) EVERY 4 HOURS PRN
Status: CANCELLED | OUTPATIENT
Start: 2019-10-31

## 2019-10-31 RX ORDER — HYDROMORPHONE HYDROCHLORIDE 1 MG/ML
.3-.5 INJECTION, SOLUTION INTRAMUSCULAR; INTRAVENOUS; SUBCUTANEOUS EVERY 10 MIN PRN
Status: CANCELLED | OUTPATIENT
Start: 2019-10-31

## 2019-10-31 RX ORDER — NALOXONE HYDROCHLORIDE 0.4 MG/ML
.1-.4 INJECTION, SOLUTION INTRAMUSCULAR; INTRAVENOUS; SUBCUTANEOUS
Status: CANCELLED | OUTPATIENT
Start: 2019-10-31 | End: 2019-11-01

## 2019-10-31 ASSESSMENT — LIFESTYLE VARIABLES: TOBACCO_USE: 1

## 2019-10-31 NOTE — ANESTHESIA PREPROCEDURE EVALUATION
Anesthesia Pre-Procedure Evaluation    Patient: Kathia Milton   MRN: [id number] : 1990          Preoperative Diagnosis: Mass of scalp [R22.0]    Procedure(s):  EXCISION POSTERIOR SCALP MASS    Past Medical History:   Diagnosis Date     Benign neoplasm of vagina 2011     Condyloma acuminatum 2011     Seasonal allergies      Tobacco use disorder 10/18/2012     Past Surgical History:   Procedure Laterality Date     APPENDECTOMY  14    [place]      SECTION      C section     COLONOSCOPY  11/10/15    Dr. Singh     COMBINED  SECTION, TUBAL OCCLUSION Bilateral 3/20/2019    Procedure: COMBINED  SECTION, BILATERAL  TUBAL OCCLUSION;  Surgeon: Damon Fletcher MD;  [facility]: [facility]     DILATION AND CURETTAGE SUCTION, TREAT MISSED , 1ST TRIMESTER       ENDOSCOPY  11/10/15    Dr. Singh      LAPAROSCOPIC CYSTECTOMY OVARIAN (BENIGN) Left 2018    Procedure: LAPAROSCOPIC CYSTECTOMY OVARIAN (BENIGN);  EXPLORATORY  LAPAROSCOPY, LEFT OVARIAN CYSTECTOMY;  Surgeon: Damon Fletcher MD;  Location: [facility]     LAPAROSCOPY  11/30/15    Dr. Sun; lysis of pelvic adhesions, cauterization of minimal endometriosis     LAPAROSCOPY  2018    lysis of adhesions; pelvic pain, endometriosis; Dr. Sun       Anesthesia Evaluation     . Pt has had prior anesthetic. Type: Regional, MAC and General    No history of anesthetic complications          ROS/MED HX    ENT/Pulmonary:     (+)tobacco use, Past use , . .    Neurologic:  - neg neurologic ROS     Cardiovascular:  - neg cardiovascular ROS   (+) ----. : . . . :. . No previous cardiac testing       METS/Exercise Tolerance:  >4 METS   Hematologic:  - neg hematologic  ROS       Musculoskeletal:  - neg musculoskeletal ROS       GI/Hepatic:  - neg GI/hepatic ROS       Renal/Genitourinary:  - ROS Renal section negative       Endo:  - neg endo ROS       Psychiatric:  - neg psychiatric ROS       Infectious Disease:  - neg infectious  "disease ROS       Malignancy:      - no malignancy   Other:    - neg other ROS                      Physical Exam  Normal systems: cardiovascular and pulmonary    Airway   Mallampati: III  TM distance: >3 FB  Neck ROM: full    Dental   (+) upper dentures    Cardiovascular   Rhythm and rate: regular and normal      Pulmonary    breath sounds clear to auscultation            Lab Results   Component Value Date    WBC 7.2 09/02/2019    HGB 13.3 09/02/2019    HCT 39.5 09/02/2019     09/02/2019    CRP <2.9 10/25/2019    SED 10 10/25/2019     09/02/2019    POTASSIUM 3.6 09/02/2019    CHLORIDE 105 09/02/2019    CO2 26 09/02/2019    BUN 9 09/02/2019    CR 0.69 09/02/2019    GLC 98 09/02/2019    CLARENCE 9.1 09/02/2019    ALBUMIN 4.3 09/02/2019    PROTTOTAL 8.0 09/02/2019    ALT 23 09/02/2019    AST 16 09/02/2019    ALKPHOS 112 09/02/2019    BILITOTAL 0.4 09/02/2019    LIPASE 108 11/08/2015    AMYLASE 28 (L) 11/03/2015    PTT 29 10/21/2014    INR 1.05 05/22/2015    HCG Negative 09/02/2019    HCGS Negative 03/18/2015       Preop Vitals  BP Readings from Last 3 Encounters:   10/29/19 98/68   10/25/19 112/78   09/02/19 121/85    Pulse Readings from Last 3 Encounters:   10/29/19 118   10/25/19 96   04/04/19 100      Resp Readings from Last 3 Encounters:   10/29/19 16   09/02/19 16   03/22/19 16    SpO2 Readings from Last 3 Encounters:   10/29/19 98%   10/25/19 98%   09/02/19 100%      Temp Readings from Last 1 Encounters:   10/29/19 99.1  F (37.3  C) (Tympanic)    Ht Readings from Last 1 Encounters:   05/02/19 1.651 m (5' 5\")      Wt Readings from Last 1 Encounters:   10/25/19 73.5 kg (162 lb)    Estimated body mass index is 26.96 kg/m  as calculated from the following:    Height as of 5/2/19: 1.651 m (5' 5\").    Weight as of 10/25/19: 73.5 kg (162 lb).       Anesthesia Plan      History & Physical Review  History and physical reviewed and following examination; no interval change.    ASA Status:  1 .    NPO Status:  > 8 " hours    Plan for MAC with Intravenous and Propofol induction. Maintenance will be TIVA.  Reason for MAC:  Procedure to face, neck, head or breast    Risks and benefits of MAC anesthetic discussed including dental damage, aspiration, loss of airway, conversion to general anesthetic, CV complications, MI, stroke, death. Pt wishes to proceed.    10-29-19        Postoperative Care  Postoperative pain management:  IV analgesics.      Consents  Anesthetic plan, risks, benefits and alternatives discussed with:  Patient..                 CARLENE Bernal CRNA

## 2019-11-06 ENCOUNTER — HOSPITAL ENCOUNTER (OUTPATIENT)
Facility: HOSPITAL | Age: 29
Discharge: HOME | End: 2019-11-06
Attending: SURGERY | Admitting: SURGERY
Payer: COMMERCIAL

## 2019-11-06 ENCOUNTER — APPOINTMENT (OUTPATIENT)
Dept: LAB | Facility: HOSPITAL | Age: 29
End: 2019-11-06
Attending: SURGERY
Payer: COMMERCIAL

## 2019-11-06 ENCOUNTER — ANESTHESIA (OUTPATIENT)
Dept: SURGERY | Facility: HOSPITAL | Age: 29
End: 2019-11-06
Payer: COMMERCIAL

## 2019-11-06 VITALS
DIASTOLIC BLOOD PRESSURE: 68 MMHG | TEMPERATURE: 98.8 F | WEIGHT: 162 LBS | OXYGEN SATURATION: 100 % | BODY MASS INDEX: 26.99 KG/M2 | HEIGHT: 65 IN | RESPIRATION RATE: 18 BRPM | SYSTOLIC BLOOD PRESSURE: 108 MMHG

## 2019-11-06 DIAGNOSIS — R22.0 MASS OF SCALP: ICD-10-CM

## 2019-11-06 LAB — HCG UR QL: NEGATIVE

## 2019-11-06 PROCEDURE — 11421 EXC H-F-NK-SP B9+MARG 0.6-1: CPT | Performed by: NURSE ANESTHETIST, CERTIFIED REGISTERED

## 2019-11-06 PROCEDURE — 81025 URINE PREGNANCY TEST: CPT | Performed by: NURSE ANESTHETIST, CERTIFIED REGISTERED

## 2019-11-06 PROCEDURE — 25000125 ZZHC RX 250: Performed by: NURSE ANESTHETIST, CERTIFIED REGISTERED

## 2019-11-06 PROCEDURE — 25000128 H RX IP 250 OP 636: Performed by: SURGERY

## 2019-11-06 PROCEDURE — 25000128 H RX IP 250 OP 636: Performed by: NURSE ANESTHETIST, CERTIFIED REGISTERED

## 2019-11-06 PROCEDURE — 88304 TISSUE EXAM BY PATHOLOGIST: CPT | Mod: TC | Performed by: SURGERY

## 2019-11-06 PROCEDURE — 36000050 ZZH SURGERY LEVEL 2 1ST 30 MIN: Performed by: SURGERY

## 2019-11-06 PROCEDURE — 27210794 ZZH OR GENERAL SUPPLY STERILE: Performed by: SURGERY

## 2019-11-06 PROCEDURE — 25000125 ZZHC RX 250: Performed by: SURGERY

## 2019-11-06 PROCEDURE — 37000008 ZZH ANESTHESIA TECHNICAL FEE, 1ST 30 MIN: Performed by: SURGERY

## 2019-11-06 PROCEDURE — 25800030 ZZH RX IP 258 OP 636: Performed by: NURSE ANESTHETIST, CERTIFIED REGISTERED

## 2019-11-06 PROCEDURE — 71000027 ZZH RECOVERY PHASE 2 EACH 15 MINS: Performed by: SURGERY

## 2019-11-06 PROCEDURE — 21011 EXC FACE LES SC <2 CM: CPT | Performed by: SURGERY

## 2019-11-06 PROCEDURE — 27110028 ZZH OR GENERAL SUPPLY NON-STERILE: Performed by: SURGERY

## 2019-11-06 PROCEDURE — 40000305 ZZH STATISTIC PRE PROC ASSESS I: Performed by: SURGERY

## 2019-11-06 RX ORDER — PROPOFOL 10 MG/ML
INJECTION, EMULSION INTRAVENOUS PRN
Status: DISCONTINUED | OUTPATIENT
Start: 2019-11-06 | End: 2019-11-06

## 2019-11-06 RX ORDER — FENTANYL CITRATE 50 UG/ML
INJECTION, SOLUTION INTRAMUSCULAR; INTRAVENOUS PRN
Status: DISCONTINUED | OUTPATIENT
Start: 2019-11-06 | End: 2019-11-06

## 2019-11-06 RX ORDER — CEFAZOLIN SODIUM 1 G/3ML
1 INJECTION, POWDER, FOR SOLUTION INTRAMUSCULAR; INTRAVENOUS SEE ADMIN INSTRUCTIONS
Status: DISCONTINUED | OUTPATIENT
Start: 2019-11-06 | End: 2019-11-06 | Stop reason: HOSPADM

## 2019-11-06 RX ORDER — CEFAZOLIN SODIUM 2 G/100ML
2 INJECTION, SOLUTION INTRAVENOUS
Status: COMPLETED | OUTPATIENT
Start: 2019-11-06 | End: 2019-11-06

## 2019-11-06 RX ORDER — LIDOCAINE HYDROCHLORIDE 20 MG/ML
INJECTION, SOLUTION INFILTRATION; PERINEURAL PRN
Status: DISCONTINUED | OUTPATIENT
Start: 2019-11-06 | End: 2019-11-06

## 2019-11-06 RX ORDER — BUPIVACAINE HYDROCHLORIDE 5 MG/ML
INJECTION, SOLUTION PERINEURAL PRN
Status: DISCONTINUED | OUTPATIENT
Start: 2019-11-06 | End: 2019-11-06 | Stop reason: HOSPADM

## 2019-11-06 RX ORDER — SODIUM CHLORIDE, SODIUM LACTATE, POTASSIUM CHLORIDE, CALCIUM CHLORIDE 600; 310; 30; 20 MG/100ML; MG/100ML; MG/100ML; MG/100ML
INJECTION, SOLUTION INTRAVENOUS CONTINUOUS
Status: DISCONTINUED | OUTPATIENT
Start: 2019-11-06 | End: 2019-11-06 | Stop reason: HOSPADM

## 2019-11-06 RX ADMIN — MIDAZOLAM 2 MG: 1 INJECTION INTRAMUSCULAR; INTRAVENOUS at 07:54

## 2019-11-06 RX ADMIN — CEFAZOLIN SODIUM 2 G: 2 INJECTION, SOLUTION INTRAVENOUS at 07:57

## 2019-11-06 RX ADMIN — SODIUM CHLORIDE, POTASSIUM CHLORIDE, SODIUM LACTATE AND CALCIUM CHLORIDE: 600; 310; 30; 20 INJECTION, SOLUTION INTRAVENOUS at 07:23

## 2019-11-06 RX ADMIN — LIDOCAINE HYDROCHLORIDE 40 MG: 20 INJECTION, SOLUTION INFILTRATION; PERINEURAL at 07:57

## 2019-11-06 RX ADMIN — FENTANYL CITRATE 100 MCG: 50 INJECTION, SOLUTION INTRAMUSCULAR; INTRAVENOUS at 07:54

## 2019-11-06 RX ADMIN — PROPOFOL 50 MG: 10 INJECTION, EMULSION INTRAVENOUS at 07:57

## 2019-11-06 ASSESSMENT — MIFFLIN-ST. JEOR: SCORE: 1465.71

## 2019-11-06 NOTE — ANESTHESIA POSTPROCEDURE EVALUATION
Patient: Kathia Milton    Procedure(s):  EXCISION POSTERIOR SCALP MASS    Diagnosis:Mass of scalp [R22.0]  Diagnosis Additional Information: No value filed.    Anesthesia Type:  MAC    Note:  Anesthesia Post Evaluation    Patient participation: Able to fully participate in evaluation  Level of consciousness: awake  Pain management: adequate  Airway patency: patent  Cardiovascular status: acceptable  Respiratory status: acceptable  Hydration status: acceptable  PONV: none     Anesthetic complications: None          Last vitals:  Vitals:    11/06/19 0850 11/06/19 0855 11/06/19 0900   BP: 97/71 108/68    Resp: 18 18    Temp:  98.8  F (37.1  C)    SpO2: 100% 98% 100%         Electronically Signed By: CARLENE Prince CRNA  November 6, 2019  10:19 AM

## 2019-11-06 NOTE — DISCHARGE INSTRUCTIONS
Post-Anesthesia Patient Instructions    IMMEDIATELY FOLLOWING SURGERY:  Do not drive or operate machinery for the first twenty four hours after surgery.  Do not make any important decisions for twenty four hours after surgery or while taking narcotic pain medications or sedatives.  If you develop intractable nausea and vomiting or a severe headache please notify your doctor immediately.    FOLLOW-UP:  Please make an appointment with your surgeon as instructed. You do not need to follow up with anesthesia unless specifically instructed to do so.    WOUND CARE INSTRUCTIONS (if applicable):  Keep a dry clean dressing on the anesthesia/puncture wound site if there is drainage.  Once the wound has quit draining you may leave it open to air.  Generally you should leave the bandage intact for twenty four hours unless there is drainage.  If the epidural site drains for more than 36-48 hours please call the anesthesia department.    QUESTIONS?:  Please feel free to call your physician or the hospital  if you have any questions, and they will be happy to assist you.      Use Bacitracin to site today only.

## 2019-11-06 NOTE — OR NURSING
Took juice and cookies w/o nausea. in to speak with pt.Patient and responsible adult given discharge instructions with no questions regarding instructions. Haris score 20 Pain level 0/10.  Discharged from unit via walking. Patient discharged to home.

## 2019-11-06 NOTE — OP NOTE
REPORT OF OPERATION  DATE OF PROCEDURE: 11/6/2019    PATIENT: Kathia Milton    SURGERY PREFORMED: Excision of mass of the left scalp    PREOPERATIVE DIAGNOSIS: left scalp mass.    POSTOPERATIVE DIAGNOSIS: Same    SURGEON: Babak Stephenson MD    ASSISTANTS: None    ANESTHESIA: Monitored Anesthesia Care    COMPLICATIONS: None apparent    TRANSFUSIONS: None    TISSUE TO PATHOLOGY: scalp mass to pathology for pathological diagnosises    FINDINGS: 0.5x0.5cm mass of the left scalp.    INDICATIONS: This is a 29 year old female with a mass on the scalp.  The patient will be taken to the operating room for excisional biopsy.    DESCRIPTIONS OF PROCEDURE IN DETAIL: After consent was obtained the patient was taken to the operative suite and emely in the right lateral decubitus position.  The patient was identified and the correct patient was confirmed.  Monitored Anesthesia Care was administered by anesthesia.  The patient was sterilely prepped and draped in the usual fashion.  A time out was preformed verifying the correct patient and the correct procedure.  The entire operative team was in agreement.  All necessary equipment and supplies were in the room.    After the lesion was identified and the area was anesthestized with local infiltrative anesthesia.  The skin was then sharply entered and the dissection was carried down until isolation of the lesion.  The lesion was dissected away from the surrounding tissues and removed in it entirety.  The lesion was sent to pathology for pathological diagnosises.  Hemostatis was assured.  The skin was closed with stainless steel staples.  Sterile dressings were applied.  All needle, sponge and instrument counts were correct x 2. The patient was awakened wand takened to the recovery room in stable condition tolerating th procedure well.

## 2019-11-06 NOTE — ANESTHESIA CARE TRANSFER NOTE
Patient: Kathia Milton    Procedure(s):  EXCISION POSTERIOR SCALP MASS    Diagnosis: Mass of scalp [R22.0]  Diagnosis Additional Information: No value filed.    Anesthesia Type:   MAC     Note:  Airway :Room Air  Patient transferred to:Phase II  Handoff Report: Identifed the Patient, Identified the Reponsible Provider, Reviewed the pertinent medical history, Discussed the surgical course, Reviewed Intra-OP anesthesia mangement and issues during anesthesia, Set expectations for post-procedure period and Allowed opportunity for questions and acknowledgement of understanding      Vitals: (Last set prior to Anesthesia Care Transfer)    CRNA VITALS  11/6/2019 0742 - 11/6/2019 0814      11/6/2019             Pulse:  79    SpO2:  99 %                Electronically Signed By: CARLENE London CRNA  November 6, 2019  8:14 AM

## 2019-11-07 LAB — COPATH REPORT: NORMAL

## 2019-11-20 ENCOUNTER — OFFICE VISIT (OUTPATIENT)
Dept: SURGERY | Facility: OTHER | Age: 29
End: 2019-11-20
Attending: SURGERY
Payer: COMMERCIAL

## 2019-11-20 VITALS
BODY MASS INDEX: 26.99 KG/M2 | TEMPERATURE: 98.3 F | RESPIRATION RATE: 18 BRPM | HEIGHT: 65 IN | WEIGHT: 162 LBS | SYSTOLIC BLOOD PRESSURE: 98 MMHG | OXYGEN SATURATION: 98 % | HEART RATE: 92 BPM | DIASTOLIC BLOOD PRESSURE: 72 MMHG

## 2019-11-20 DIAGNOSIS — D22.4 MELANOCYTIC NEVUS OF SCALP: Primary | ICD-10-CM

## 2019-11-20 PROCEDURE — 99024 POSTOP FOLLOW-UP VISIT: CPT | Performed by: SURGERY

## 2019-11-20 ASSESSMENT — PAIN SCALES - GENERAL: PAINLEVEL: NO PAIN (0)

## 2019-11-20 ASSESSMENT — MIFFLIN-ST. JEOR: SCORE: 1465.71

## 2019-11-20 NOTE — PROGRESS NOTES
"CLINIC NOTE - POST-OP SURGERY  11/20/2019    Patient:James Milton    Procedure: Excision of a Mass on the scalp    This is a 29 year old female who is 2 weeks s/p Excision of a Mass on the scalp.  The patient has no complaints today.     Current Medications:  Current Outpatient Medications   Medication Sig Dispense Refill     linaclotide (LINZESS) 72 MCG capsule Take 1 capsule (72 mcg) by mouth every morning (before breakfast) (Patient not taking: Reported on 10/29/2019) 30 capsule 1       Allergies:  Allergies   Allergen Reactions     Seafood Anaphylaxis     Shellfish Allergy      Shellfish Derived Products       PHYSICAL EXAM:   Vital signs: BP 98/72 (BP Location: Right arm, Patient Position: Sitting, Cuff Size: Adult Large)   Pulse 92   Temp 98.3  F (36.8  C) (Tympanic)   Resp 18   Ht 1.651 m (5' 5\")   Wt 73.5 kg (162 lb)   SpO2 98%   BMI 26.96 kg/m     Weight: [unfilled]   BMI: Body mass index is 26.96 kg/m .   General: Normal, healthy, cooperative   Lungs: clear to auscultation   CV: Regular rate and rhythm without murmer   Abdominal: abdomen is soft without significant tenderness, masses, organomegaly or guarding   Wounds:  Well healed surgical scars consistent with her operation.       PATHOLOGY:  Copath Report   Date Value Ref Range Status   11/06/2019   Final    Patient Name: JAMES MILTON  MR#: [id number]  Specimen #: [id number]  Collected: 11/6/2019  Received: 11/6/2019  Reported: 11/7/2019 14:56  Ordering Phy(s): ESTER LAN  Additional Phy(s): ALBERTINA LARIOS    For improved result formatting, select 'View Enhanced Report Format' under   Linked Documents section.    SPECIMEN(S):  Mass, posterior scalp    FINAL DIAGNOSIS:    Skin, posterior scalp, excision  - Intradermal nevus, excised    I have personally reviewed all specimens and/or slides, including the   listed special stains, and used them  with my medical judgement to determine or confirm the final " diagnosis.    Electronically signed out by:    Alex Perez M.D.    CLINICAL HISTORY:  Mass of scalp [R22.0]; excision posterior scalp mass.    GROSS:  There is a cylinder of tan skin with a smooth raised tan lesion.  It is 6   mm long and 5 mm in diameter.  After  the margin is inked it is divided. (2 TE in 1 block). (Dictated by: Alex Perez MD 11/6/2019 04:11 PM)    MICROSCOPIC:  There is skin with an intradermal nevus.  The lesion is excised.    CPT Codes  A: 90570-GY1    COLLECTION SITE:  Client: [facility]  Location: [facility] (B)    The technical component of this testing was completed at the [facility], with the  professional component performed at the [facility], [address] [place] [address]  ([phone])           ASSESSMENT:    29 year old female who is 2 weeks s/p Excision of a Mass on the scalp.  Doing well.     PLAN:   Will remove the staples/sutures today.  This was done uneventfully. The wound was not steri-stripped.    Follow-up PRN      Restrictions : None

## 2019-11-20 NOTE — NURSING NOTE
"Chief Complaint   Patient presents with     Surgical Followup     Mass on Scalp        Initial BP 98/72 (BP Location: Right arm, Patient Position: Sitting, Cuff Size: Adult Large)   Pulse 92   Temp 98.3  F (36.8  C) (Tympanic)   Resp 18   Ht 1.651 m (5' 5\")   Wt 73.5 kg (162 lb)   SpO2 98%   BMI 26.96 kg/m   Estimated body mass index is 26.96 kg/m  as calculated from the following:    Height as of this encounter: 1.651 m (5' 5\").    Weight as of this encounter: 73.5 kg (162 lb).  Medication Reconciliation: complete  Marisa Lee LPN    "

## 2020-03-02 ENCOUNTER — HEALTH MAINTENANCE LETTER (OUTPATIENT)
Age: 30
End: 2020-03-02

## 2020-05-01 ENCOUNTER — MYC MEDICAL ADVICE (OUTPATIENT)
Dept: FAMILY MEDICINE | Facility: OTHER | Age: 30
End: 2020-05-01

## 2020-05-01 NOTE — TELEPHONE ENCOUNTER
Pictures reviewed.  May be contact dermatitis from unknown agent.  Doesn't sound like she has tried topical steroid - would advised topical cortisone 1% twice daily and thick cream such as eucerin or aquaphor.  Can use oral zyrtec or claritin antihistamine as well.  If progressing, please follow up.

## 2020-05-28 ENCOUNTER — MYC MEDICAL ADVICE (OUTPATIENT)
Dept: FAMILY MEDICINE | Facility: OTHER | Age: 30
End: 2020-05-28

## 2020-05-28 DIAGNOSIS — H10.13 ALLERGIC CONJUNCTIVITIS, BILATERAL: Primary | ICD-10-CM

## 2020-05-28 RX ORDER — OLOPATADINE HYDROCHLORIDE 2 MG/ML
1 SOLUTION/ DROPS OPHTHALMIC DAILY
Qty: 1 BOTTLE | Refills: 1 | Status: SHIPPED | OUTPATIENT
Start: 2020-05-28 | End: 2020-08-06

## 2020-06-02 NOTE — TELEPHONE ENCOUNTER
ibuprofen      Last Written Prescription Date:    Last Fill Quantity: ,   # refills:   Last Office Visit: 10/25/19  Future Office visit:       Routing refill request to provider for review/approval because:   Recent (12 mo) or future (30 days) visit within the authorizing provider's specialty Protocol Details    Medication is active on med list

## 2020-06-04 RX ORDER — IBUPROFEN 800 MG/1
TABLET, FILM COATED ORAL
Qty: 60 TABLET | Refills: 1 | Status: SHIPPED | OUTPATIENT
Start: 2020-06-04

## 2020-06-15 ENCOUNTER — MYC MEDICAL ADVICE (OUTPATIENT)
Dept: FAMILY MEDICINE | Facility: OTHER | Age: 30
End: 2020-06-15

## 2020-06-18 ENCOUNTER — VIRTUAL VISIT (OUTPATIENT)
Dept: FAMILY MEDICINE | Facility: OTHER | Age: 30
End: 2020-06-18
Attending: FAMILY MEDICINE
Payer: COMMERCIAL

## 2020-06-18 VITALS — WEIGHT: 139 LBS | BODY MASS INDEX: 23.13 KG/M2

## 2020-06-18 DIAGNOSIS — K59.09 CONSTIPATION, CHRONIC: ICD-10-CM
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
## 2020-06-18 DIAGNOSIS — R63.4 WEIGHT LOSS, UNINTENTIONAL: ICD-10-CM

## 2020-06-18 DIAGNOSIS — K58.2 IRRITABLE BOWEL SYNDROME WITH BOTH CONSTIPATION AND DIARRHEA: ICD-10-CM

## 2020-06-18 LAB
ALBUMIN SERPL-MCNC: 4.2 G/DL (ref 3.4–5)
ALP SERPL-CCNC: 80 U/L (ref 40–150)
ALT SERPL W P-5'-P-CCNC: 14 U/L (ref 0–50)
ANION GAP SERPL CALCULATED.3IONS-SCNC: 7 MMOL/L (ref 3–14)
AST SERPL W P-5'-P-CCNC: 11 U/L (ref 0–45)
BASOPHILS # BLD AUTO: 0 10E9/L (ref 0–0.2)
BASOPHILS NFR BLD AUTO: 0.6 %
BILIRUB SERPL-MCNC: 0.4 MG/DL (ref 0.2–1.3)
BUN SERPL-MCNC: 7 MG/DL (ref 7–30)
CALCIUM SERPL-MCNC: 8.8 MG/DL (ref 8.5–10.1)
CHLORIDE SERPL-SCNC: 103 MMOL/L (ref 94–109)
CO2 SERPL-SCNC: 26 MMOL/L (ref 20–32)
CREAT SERPL-MCNC: 0.71 MG/DL (ref 0.52–1.04)
CRP SERPL-MCNC: <2.9 MG/L (ref 0–8)
DIFFERENTIAL METHOD BLD: NORMAL
EOSINOPHIL # BLD AUTO: 0.2 10E9/L (ref 0–0.7)
EOSINOPHIL NFR BLD AUTO: 2.5 %
ERYTHROCYTE [DISTWIDTH] IN BLOOD BY AUTOMATED COUNT: 12.3 % (ref 10–15)
GFR SERPL CREATININE-BSD FRML MDRD: >90 ML/MIN/{1.73_M2}
GLUCOSE SERPL-MCNC: 107 MG/DL (ref 70–99)
HCT VFR BLD AUTO: 41.9 % (ref 35–47)
HGB BLD-MCNC: 14 G/DL (ref 11.7–15.7)
IMM GRANULOCYTES # BLD: 0 10E9/L (ref 0–0.4)
IMM GRANULOCYTES NFR BLD: 0.1 %
LYMPHOCYTES # BLD AUTO: 2.8 10E9/L (ref 0.8–5.3)
LYMPHOCYTES NFR BLD AUTO: 39.1 %
MCH RBC QN AUTO: 27.7 PG (ref 26.5–33)
MCHC RBC AUTO-ENTMCNC: 33.4 G/DL (ref 31.5–36.5)
MCV RBC AUTO: 83 FL (ref 78–100)
MONOCYTES # BLD AUTO: 0.4 10E9/L (ref 0–1.3)
MONOCYTES NFR BLD AUTO: 6.1 %
NEUTROPHILS # BLD AUTO: 3.7 10E9/L (ref 1.6–8.3)
NEUTROPHILS NFR BLD AUTO: 51.6 %
NRBC # BLD AUTO: 0 10*3/UL
NRBC BLD AUTO-RTO: 0 /100
PLATELET # BLD AUTO: 376 10E9/L (ref 150–450)
POTASSIUM SERPL-SCNC: 3.3 MMOL/L (ref 3.4–5.3)
PROT SERPL-MCNC: 7.7 G/DL (ref 6.8–8.8)
RBC # BLD AUTO: 5.05 10E12/L (ref 3.8–5.2)
SODIUM SERPL-SCNC: 136 MMOL/L (ref 133–144)
TSH SERPL DL<=0.005 MIU/L-ACNC: 1 MU/L (ref 0.4–4)
WBC # BLD AUTO: 7.2 10E9/L (ref 4–11)

## 2020-06-18 PROCEDURE — 84443 ASSAY THYROID STIM HORMONE: CPT | Mod: ZL | Performed by: FAMILY MEDICINE

## 2020-06-18 PROCEDURE — 99214 OFFICE O/P EST MOD 30 MIN: CPT | Mod: 95 | Performed by: FAMILY MEDICINE

## 2020-06-18 PROCEDURE — 80053 COMPREHEN METABOLIC PANEL: CPT | Mod: ZL | Performed by: FAMILY MEDICINE

## 2020-06-18 PROCEDURE — 36415 COLL VENOUS BLD VENIPUNCTURE: CPT | Mod: ZL | Performed by: FAMILY MEDICINE

## 2020-06-18 PROCEDURE — 85025 COMPLETE CBC W/AUTO DIFF WBC: CPT | Mod: ZL | Performed by: FAMILY MEDICINE

## 2020-06-18 PROCEDURE — 86140 C-REACTIVE PROTEIN: CPT | Mod: ZL | Performed by: FAMILY MEDICINE

## 2020-06-18 ASSESSMENT — PAIN SCALES - GENERAL: PAINLEVEL: NO PAIN (0)

## 2020-06-18 NOTE — PROGRESS NOTES
"Kathia Milton is a 30 year old female who is being evaluated via a billable telephone visit.      The patient has been notified of following:     \"This telephone visit will be conducted via a call between you and your physician/provider. We have found that certain health care needs can be provided without the need for a physical exam.  This service lets us provide the care you need with a short phone conversation.  If a prescription is necessary we can send it directly to your pharmacy.  If lab work is needed we can place an order for that and you can then stop by our lab to have the test done at a later time.    Telephone visits are billed at different rates depending on your insurance coverage. During this emergency period, for some insurers they may be billed the same as an in-person visit.  Please reach out to your insurance provider with any questions.    If during the course of the call the physician/provider feels a telephone visit is not appropriate, you will not be charged for this service.\"    Patient has given verbal consent for Telephone visit?  Yes    What phone number would you like to be contacted at? [phone]    How would you like to obtain your AVS? Susie Everett     Kathia Milton is a 30 year old female who presents via phone visit today for the following health issues:    HPI  Constipation     Onset: quite some time    Description:   Frequency of bowel movements: 1 week.  Stool consistency: soft formed, hard, small caliber, large caliber    Progression of Symptoms:  constant    Accompanying Signs & Symptoms:  Abdominal pain (cramping?): YES  Blood in stool: no   Rectal pain: YES  Nausea/vomiting: YES  Weight loss or gain: YES   History:   History of abdominal surgery: YES-  and 3-5 laparoscopic surgeries    Precipitating factors:   Recent use of narcotics, anticholinergics, calcium channel blockers, antacids, or iron supplements: no   Chronic Laxative Use: YES     "     Therapies Tried and outcome: increase in fiber, increase in fluids, laxatives and stool softeners    Did have colonoscopy 2015 with DR Singh.  Mild inflammatory changes noted.  Biopsies negative.    Prior miralax, mag citrate, enemas, stool softeners, probiotics.    linzess prescribed 10/2019; xray moderate stool at that time; fiber advised    Insurance wouldn't cover linzess; prior blue cross; now [place]    miralax once per day    Lost weight 162 pounds in 2020, now 139 pounds; unintentional    No fevers    Good appetite; eating normally    No night sweats    Last labs 10/2019    No tsh on file    Patient adopted    Concern - tick bite  Onset: last week - had been 4 wheeling; back of leg - small tick; unsure duration of attachment    Description:   Some redness; was swollen/hard    Intensity: mild    Progression of Symptoms:  improving    Accompanying Signs & Symptoms:  itching    Previous history of similar problem:   no    Precipitating factors:   Worsened by: none    Alleviating factors:  Improved by: none  Therapies Tried and outcome: none  Photos reviewed in mychart.    Current Outpatient Medications   Medication     ibuprofen (ADVIL/MOTRIN) 800 MG tablet     olopatadine (PATADAY) 0.2 % ophthalmic solution     linaclotide (LINZESS) 72 MCG capsule     No current facility-administered medications for this visit.        Patient Active Problem List   Diagnosis     Abdominal pain, acute     Anemia in pregnancy     Seasonal allergies     Tobacco use disorder     Endometriosis of pelvis     Condyloma acuminatum due to human papillomavirus      delivery     Encounter for triage in pregnant patient     Mass of scalp     Past Surgical History:   Procedure Laterality Date     APPENDECTOMY  14    [place]      SECTION      C section     COLONOSCOPY  11/10/15    Dr. Singh     COMBINED  SECTION, TUBAL OCCLUSION Bilateral 3/20/2019    Procedure: COMBINED  SECTION, BILATERAL  TUBAL  OCCLUSION;  Surgeon: Damon Fletcher MD;  Location: [facility]     DILATION AND CURETTAGE SUCTION, TREAT MISSED , 1ST TRIMESTER       ENDOSCOPY  11/10/15    Dr. Singh      EXCISE MASS HEAD N/A 2019    Procedure: EXCISION POSTERIOR SCALP MASS;  Surgeon: Babak Stephenson MD;  Location: [facility]     LAPAROSCOPIC CYSTECTOMY OVARIAN (BENIGN) Left 2018    Procedure: LAPAROSCOPIC CYSTECTOMY OVARIAN (BENIGN);  EXPLORATORY  LAPAROSCOPY, LEFT OVARIAN CYSTECTOMY;  Surgeon: Damon Fletcher MD;  Location: [facility]     LAPAROSCOPY  11/30/15    Dr. Sun; lysis of pelvic adhesions, cauterization of minimal endometriosis     LAPAROSCOPY  2018    lysis of adhesions; pelvic pain, endometriosis; Dr. Sun       Social History     Tobacco Use     Smoking status: Former Smoker     Types: Cigarettes     Last attempt to quit: 2012     Years since quittin.5     Smokeless tobacco: Never Used     Tobacco comment: trying to quit 8/10/18   Substance Use Topics     Alcohol use: No     Alcohol/week: 0.0 standard drinks     Family History   Problem Relation Age of Onset     Diabetes Other         Grandmother     Heart Disease Other         Heart Disease - Grandmother     Other - See Comments Other         Renal Disease - Grandmother     Unknown/Adopted Mother      Unknown/Adopted Father      Unknown/Adopted Maternal Grandmother      Unknown/Adopted Maternal Grandfather      Unknown/Adopted Paternal Grandmother      Unknown/Adopted Paternal Grandfather      Breast Cancer No family hx of            Reviewed and updated as needed this visit by Provider         Review of Systems   Constitutional, HEENT, cardiovascular, pulmonary, gi and gu systems are negative, except as otherwise noted.       Objective   Reported vitals:  There were no vitals taken for this visit.   alert and no distress  PSYCH: Alert and oriented times 3; coherent speech, normal   rate and volume, able to articulate logical thoughts, able   to  abstract reason, no tangential thoughts, no hallucinations   or delusions  Her affect is normal  RESP: No cough, no audible wheezing, able to talk in full sentences  Remainder of exam unable to be completed due to telephone visits    Diagnostic Test Results:  Labs reviewed in Epic        Assessment/Plan:  1. Insect bite, unspecified site, initial encounter  Photos reviewed.  Small area of erythema/open sore.  No bullseye.  Per patient improving.  Instructed to monitor for evolving rash/rings, fever, joint pains, etc.  Reminded of deet, skin checks.  Discussed duration of attachment to transmit.     2. Constipation, chronic  Failed multiple OTC bowel regimens.  Retry Linzess coverage since insurance has changed.  Update labs.  GI referral.  - TSH with free T4 reflex; Future  - CBC with platelets and differential; Future  - CRP, inflammation; Future  - Comprehensive metabolic panel (BMP + Alb, Alk Phos, ALT, AST, Total. Bili, TP); Future  - GASTROENTEROLOGY ADULT REF CONSULT ONLY; Future    3. Irritable bowel syndrome with both constipation and diarrhea  As above.  - CBC with platelets and differential; Future  - CRP, inflammation; Future  - Comprehensive metabolic panel (BMP + Alb, Alk Phos, ALT, AST, Total. Bili, TP); Future  - GASTROENTEROLOGY ADULT REF CONSULT ONLY; Future  - linaclotide (LINZESS) 72 MCG capsule; Take 1 capsule (72 mcg) by mouth every morning (before breakfast)  Dispense: 30 capsule; Refill: 1    4. Weight loss, unintentional  As above.  No red flags - no night sweats, abnormal bleeding, fevers.    Hyperthyroid could cause weight loss, but would cause diarrhea, not constipation.  Update labs.  Gi consult.    - TSH with free T4 reflex; Future  - CBC with platelets and differential; Future  - CRP, inflammation; Future  - Comprehensive metabolic panel (BMP + Alb, Alk Phos, ALT, AST, Total. Bili, TP); Future  - GASTROENTEROLOGY ADULT REF CONSULT ONLY; Future    Patient Instructions   Linzess script  redone - may be covered by new insurance.  Continue stool softeners, miralax twice daily if needed, adequate hydration, exercise.  Labs to be scheduled.  Referral to Dr. Alejandro, GI.    Monitor tick bite.  If ring develops, redness spreading, other areas of rash, fever, joint pain/swelling - reassess.  Use Deet, daily skin checks.              Phone call duration:  11:26 minutes    Lainey Jones MD

## 2020-06-18 NOTE — PATIENT INSTRUCTIONS
Linzess script redone - may be covered by new insurance.  Continue stool softeners, miralax twice daily if needed, adequate hydration, exercise.  Labs to be scheduled.  Referral to Dr. Alejandro, GI.    Monitor tick bite.  If ring develops, redness spreading, other areas of rash, fever, joint pain/swelling - reassess.  Use Deet, daily skin checks.

## 2020-06-18 NOTE — NURSING NOTE
"Chief Complaint   Patient presents with     Insect Bites     Constipation     Weight Loss       Initial Wt 63 kg (139 lb)   BMI 23.13 kg/m   Estimated body mass index is 23.13 kg/m  as calculated from the following:    Height as of 11/20/19: 1.651 m (5' 5\").    Weight as of this encounter: 63 kg (139 lb).  Medication Reconciliation: complete  Kenia Jacobson LPN    "

## 2020-06-30 ENCOUNTER — TRANSFERRED RECORDS (OUTPATIENT)
Dept: HEALTH INFORMATION MANAGEMENT | Facility: CLINIC | Age: 30
End: 2020-06-30

## 2020-07-07 ENCOUNTER — ANESTHESIA EVENT (OUTPATIENT)
Dept: SURGERY | Facility: HOSPITAL | Age: 30
End: 2020-07-07
Payer: COMMERCIAL

## 2020-07-07 ENCOUNTER — IMMUNIZATION (OUTPATIENT)
Dept: FAMILY MEDICINE | Facility: OTHER | Age: 30
End: 2020-07-07
Attending: INTERNAL MEDICINE
Payer: COMMERCIAL

## 2020-07-07 DIAGNOSIS — Z01.818 PRE-OP TESTING: Primary | ICD-10-CM

## 2020-07-07 PROCEDURE — U0003 INFECTIOUS AGENT DETECTION BY NUCLEIC ACID (DNA OR RNA); SEVERE ACUTE RESPIRATORY SYNDROME CORONAVIRUS 2 (SARS-COV-2) (CORONAVIRUS DISEASE [COVID-19]), AMPLIFIED PROBE TECHNIQUE, MAKING USE OF HIGH THROUGHPUT TECHNOLOGIES AS DESCRIBED BY CMS-2020-01-R: HCPCS | Mod: ZL | Performed by: INTERNAL MEDICINE

## 2020-07-07 ASSESSMENT — LIFESTYLE VARIABLES: TOBACCO_USE: 1

## 2020-07-07 NOTE — ANESTHESIA PREPROCEDURE EVALUATION
Anesthesia Pre-Procedure Evaluation    Patient: Kathia Milton   MRN: [id number] : 1990          Preoperative Diagnosis: Weight loss [R63.4]  Rectal bleeding [K62.5]    Procedure(s):  UPPER ENDOSCOPY AND COLONOSCOPY    Past Medical History:   Diagnosis Date     Benign neoplasm of vagina 2011     Condyloma acuminatum 2011     Seasonal allergies      Tobacco use disorder 10/18/2012     Past Surgical History:   Procedure Laterality Date     APPENDECTOMY  14    [place]      SECTION      C section     COLONOSCOPY  11/10/15    Dr. Singh     COMBINED  SECTION, TUBAL OCCLUSION Bilateral 3/20/2019    Procedure: COMBINED  SECTION, BILATERAL  TUBAL OCCLUSION;  Surgeon: Damon Fletcher MD;  Location: [facility]     DILATION AND CURETTAGE SUCTION, TREAT MISSED , 1ST TRIMESTER       ENDOSCOPY  11/10/15    Dr. Singh      EXCISE MASS HEAD N/A 2019    Procedure: EXCISION POSTERIOR SCALP MASS;  Surgeon: Babak Stephenson MD;  Location: [facility]     LAPAROSCOPIC CYSTECTOMY OVARIAN (BENIGN) Left 2018    Procedure: LAPAROSCOPIC CYSTECTOMY OVARIAN (BENIGN);  EXPLORATORY  LAPAROSCOPY, LEFT OVARIAN CYSTECTOMY;  Surgeon: Damon Fletcher MD;  Location: [facility]     LAPAROSCOPY  11/30/15    Dr. Sun; lysis of pelvic adhesions, cauterization of minimal endometriosis     LAPAROSCOPY  2018    lysis of adhesions; pelvic pain, endometriosis; Dr. Sun       Anesthesia Evaluation     . Pt has had prior anesthetic. Type: Regional, MAC and General    No history of anesthetic complications          ROS/MED HX    ENT/Pulmonary:     (+)allergic rhinitis, tobacco use, Past use , . .    Neurologic:  - neg neurologic ROS     Cardiovascular:     (+) ----. : . . . :. . No previous cardiac testing       METS/Exercise Tolerance:  >4 METS   Hematologic:     (+) Anemia, -      Musculoskeletal:  - neg musculoskeletal ROS       GI/Hepatic:     (+) bowel prep,       Renal/Genitourinary:  -  "ROS Renal section negative       Endo:  - neg endo ROS       Psychiatric:  - neg psychiatric ROS       Infectious Disease:  - neg infectious disease ROS       Malignancy:      - no malignancy   Other:    (+) No chance of pregnancy C-spine cleared: N/A, no H/O Chronic Pain,no other significant disability                         Physical Exam  Normal systems: cardiovascular and pulmonary    Airway   Mallampati: II  TM distance: >3 FB  Neck ROM: full    Dental   (+) upper dentures and missing    Cardiovascular   Rhythm and rate: regular and normal      Pulmonary    breath sounds clear to auscultation            Lab Results   Component Value Date    WBC 7.2 06/18/2020    HGB 14.0 06/18/2020    HCT 41.9 06/18/2020     06/18/2020    CRP <2.9 06/18/2020    SED 10 10/25/2019     06/18/2020    POTASSIUM 3.3 (L) 06/18/2020    CHLORIDE 103 06/18/2020    CO2 26 06/18/2020    BUN 7 06/18/2020    CR 0.71 06/18/2020     (H) 06/18/2020    CLARENEC 8.8 06/18/2020    ALBUMIN 4.2 06/18/2020    PROTTOTAL 7.7 06/18/2020    ALT 14 06/18/2020    AST 11 06/18/2020    ALKPHOS 80 06/18/2020    BILITOTAL 0.4 06/18/2020    LIPASE 108 11/08/2015    AMYLASE 28 (L) 11/03/2015    PTT 29 10/21/2014    INR 1.05 05/22/2015    TSH 1.00 06/18/2020    HCG Negative 11/06/2019    HCGS Negative 03/18/2015       Preop Vitals  BP Readings from Last 3 Encounters:   11/20/19 98/72   11/06/19 108/68   10/29/19 98/68    Pulse Readings from Last 3 Encounters:   11/20/19 92   10/29/19 118   10/25/19 96      Resp Readings from Last 3 Encounters:   11/20/19 18   11/06/19 18   10/29/19 16    SpO2 Readings from Last 3 Encounters:   11/20/19 98%   11/06/19 100%   10/29/19 98%      Temp Readings from Last 1 Encounters:   11/20/19 98.3  F (36.8  C) (Tympanic)    Ht Readings from Last 1 Encounters:   11/20/19 1.651 m (5' 5\")      Wt Readings from Last 1 Encounters:   06/18/20 63 kg (139 lb)    Estimated body mass index is 23.13 kg/m  as calculated from the " "following:    Height as of 11/20/19: 1.651 m (5' 5\").    Weight as of 6/18/20: 63 kg (139 lb).       Anesthesia Plan      History & Physical Review  History and physical reviewed and following examination; no interval change.    ASA Status:  2 .    NPO Status:  > 8 hours    Plan for MAC with Intravenous and Propofol induction. Maintenance will be TIVA.  Reason for MAC:  Deep or markedly invasive procedure (G8) and Procedure to face, neck, head or breast           Postoperative Care      Consents  Anesthetic plan, risks, benefits and alternatives discussed with:  Patient..                 CARLENE London CRNA  "

## 2020-07-08 LAB
SARS-COV-2 RNA SPEC QL NAA+PROBE: NOT DETECTED
SPECIMEN SOURCE: NORMAL

## 2020-07-10 ENCOUNTER — HOSPITAL ENCOUNTER (EMERGENCY)
Facility: HOSPITAL | Age: 30
Discharge: HOME | End: 2020-07-10
Attending: PHYSICIAN ASSISTANT | Admitting: PHYSICIAN ASSISTANT
Payer: COMMERCIAL

## 2020-07-10 ENCOUNTER — APPOINTMENT (OUTPATIENT)
Dept: CT IMAGING | Facility: HOSPITAL | Age: 30
End: 2020-07-10
Attending: PHYSICIAN ASSISTANT
Payer: COMMERCIAL

## 2020-07-10 ENCOUNTER — APPOINTMENT (OUTPATIENT)
Dept: LAB | Facility: HOSPITAL | Age: 30
End: 2020-07-10
Attending: FAMILY MEDICINE
Payer: COMMERCIAL

## 2020-07-10 ENCOUNTER — ANESTHESIA (OUTPATIENT)
Dept: SURGERY | Facility: HOSPITAL | Age: 30
End: 2020-07-10
Payer: COMMERCIAL

## 2020-07-10 ENCOUNTER — NURSE TRIAGE (OUTPATIENT)
Dept: NURSING | Facility: CLINIC | Age: 30
End: 2020-07-10

## 2020-07-10 ENCOUNTER — HOSPITAL ENCOUNTER (OUTPATIENT)
Facility: HOSPITAL | Age: 30
Discharge: HOME | End: 2020-07-10
Attending: INTERNAL MEDICINE | Admitting: INTERNAL MEDICINE
Payer: COMMERCIAL

## 2020-07-10 VITALS
TEMPERATURE: 97.9 F | BODY MASS INDEX: 23.3 KG/M2 | DIASTOLIC BLOOD PRESSURE: 73 MMHG | WEIGHT: 140 LBS | SYSTOLIC BLOOD PRESSURE: 104 MMHG | HEART RATE: 86 BPM | OXYGEN SATURATION: 99 % | RESPIRATION RATE: 18 BRPM

## 2020-07-10 VITALS
HEART RATE: 85 BPM | SYSTOLIC BLOOD PRESSURE: 104 MMHG | BODY MASS INDEX: 23.32 KG/M2 | OXYGEN SATURATION: 98 % | DIASTOLIC BLOOD PRESSURE: 67 MMHG | TEMPERATURE: 97 F | HEIGHT: 65 IN | WEIGHT: 140 LBS | RESPIRATION RATE: 18 BRPM

## 2020-07-10 DIAGNOSIS — R10.84 ABDOMINAL PAIN, GENERALIZED: ICD-10-CM

## 2020-07-10 DIAGNOSIS — K21.00 GERD WITH ESOPHAGITIS: Primary | ICD-10-CM

## 2020-07-10 LAB
ALBUMIN SERPL-MCNC: 3.9 G/DL (ref 3.4–5)
ALP SERPL-CCNC: 75 U/L (ref 40–150)
ALT SERPL W P-5'-P-CCNC: 16 U/L (ref 0–50)
ANION GAP SERPL CALCULATED.3IONS-SCNC: 2 MMOL/L (ref 3–14)
AST SERPL W P-5'-P-CCNC: 13 U/L (ref 0–45)
BASOPHILS # BLD AUTO: 0 10E9/L (ref 0–0.2)
BASOPHILS NFR BLD AUTO: 0.3 %
BILIRUB SERPL-MCNC: 0.2 MG/DL (ref 0.2–1.3)
BUN SERPL-MCNC: 10 MG/DL (ref 7–30)
C DIFF TOX B STL QL: NEGATIVE
CALCIUM SERPL-MCNC: 8.7 MG/DL (ref 8.5–10.1)
CHLORIDE SERPL-SCNC: 107 MMOL/L (ref 94–109)
CO2 SERPL-SCNC: 29 MMOL/L (ref 20–32)
CREAT SERPL-MCNC: 0.74 MG/DL (ref 0.52–1.04)
DIFFERENTIAL METHOD BLD: NORMAL
EOSINOPHIL # BLD AUTO: 0.2 10E9/L (ref 0–0.7)
EOSINOPHIL NFR BLD AUTO: 2.1 %
ERYTHROCYTE [DISTWIDTH] IN BLOOD BY AUTOMATED COUNT: 12.4 % (ref 10–15)
GFR SERPL CREATININE-BSD FRML MDRD: >90 ML/MIN/{1.73_M2}
GLUCOSE SERPL-MCNC: 88 MG/DL (ref 70–99)
HCG SERPL QL: NEGATIVE
HCT VFR BLD AUTO: 39.6 % (ref 35–47)
HGB BLD-MCNC: 13.4 G/DL (ref 11.7–15.7)
IMM GRANULOCYTES # BLD: 0 10E9/L (ref 0–0.4)
IMM GRANULOCYTES NFR BLD: 0.3 %
LYMPHOCYTES # BLD AUTO: 2.3 10E9/L (ref 0.8–5.3)
LYMPHOCYTES NFR BLD AUTO: 24.8 %
MCH RBC QN AUTO: 27.7 PG (ref 26.5–33)
MCHC RBC AUTO-ENTMCNC: 33.8 G/DL (ref 31.5–36.5)
MCV RBC AUTO: 82 FL (ref 78–100)
MONOCYTES # BLD AUTO: 0.6 10E9/L (ref 0–1.3)
MONOCYTES NFR BLD AUTO: 6.5 %
NEUTROPHILS # BLD AUTO: 6.1 10E9/L (ref 1.6–8.3)
NEUTROPHILS NFR BLD AUTO: 66 %
NRBC # BLD AUTO: 0 10*3/UL
NRBC BLD AUTO-RTO: 0 /100
PLATELET # BLD AUTO: 336 10E9/L (ref 150–450)
POTASSIUM SERPL-SCNC: 3.6 MMOL/L (ref 3.4–5.3)
PROT SERPL-MCNC: 7.4 G/DL (ref 6.8–8.8)
RBC # BLD AUTO: 4.83 10E12/L (ref 3.8–5.2)
SODIUM SERPL-SCNC: 138 MMOL/L (ref 133–144)
SPECIMEN SOURCE: NORMAL
WBC # BLD AUTO: 9.2 10E9/L (ref 4–11)

## 2020-07-10 PROCEDURE — 87899 AGENT NOS ASSAY W/OPTIC: CPT | Performed by: INTERNAL MEDICINE

## 2020-07-10 PROCEDURE — 80053 COMPREHEN METABOLIC PANEL: CPT | Performed by: PHYSICIAN ASSISTANT

## 2020-07-10 PROCEDURE — 88305 TISSUE EXAM BY PATHOLOGIST: CPT | Mod: TC | Performed by: INTERNAL MEDICINE

## 2020-07-10 PROCEDURE — 37000008 ZZH ANESTHESIA TECHNICAL FEE, 1ST 30 MIN: Performed by: INTERNAL MEDICINE

## 2020-07-10 PROCEDURE — 71000027 ZZH RECOVERY PHASE 2 EACH 15 MINS: Performed by: INTERNAL MEDICINE

## 2020-07-10 PROCEDURE — 40000306 ZZH STATISTIC PRE PROC ASSESS II: Performed by: INTERNAL MEDICINE

## 2020-07-10 PROCEDURE — 25000125 ZZHC RX 250: Performed by: NURSE ANESTHETIST, CERTIFIED REGISTERED

## 2020-07-10 PROCEDURE — 25000128 H RX IP 250 OP 636: Performed by: NURSE ANESTHETIST, CERTIFIED REGISTERED

## 2020-07-10 PROCEDURE — 43239 EGD BIOPSY SINGLE/MULTIPLE: CPT | Performed by: NURSE ANESTHETIST, CERTIFIED REGISTERED

## 2020-07-10 PROCEDURE — 74177 CT ABD & PELVIS W/CONTRAST: CPT | Mod: TC

## 2020-07-10 PROCEDURE — 27210794 ZZH OR GENERAL SUPPLY STERILE: Performed by: INTERNAL MEDICINE

## 2020-07-10 PROCEDURE — 25000128 H RX IP 250 OP 636: Performed by: PHYSICIAN ASSISTANT

## 2020-07-10 PROCEDURE — 87046 STOOL CULTR AEROBIC BACT EA: CPT | Performed by: INTERNAL MEDICINE

## 2020-07-10 PROCEDURE — 85025 COMPLETE CBC W/AUTO DIFF WBC: CPT | Performed by: PHYSICIAN ASSISTANT

## 2020-07-10 PROCEDURE — 25000125 ZZHC RX 250: Performed by: INTERNAL MEDICINE

## 2020-07-10 PROCEDURE — 87045 FECES CULTURE AEROBIC BACT: CPT | Performed by: INTERNAL MEDICINE

## 2020-07-10 PROCEDURE — 36000050 ZZH SURGERY LEVEL 2 1ST 30 MIN: Performed by: INTERNAL MEDICINE

## 2020-07-10 PROCEDURE — 84703 CHORIONIC GONADOTROPIN ASSAY: CPT | Performed by: NURSE ANESTHETIST, CERTIFIED REGISTERED

## 2020-07-10 PROCEDURE — 99284 EMERGENCY DEPT VISIT MOD MDM: CPT | Mod: Z6 | Performed by: PHYSICIAN ASSISTANT

## 2020-07-10 PROCEDURE — 36415 COLL VENOUS BLD VENIPUNCTURE: CPT | Performed by: NURSE ANESTHETIST, CERTIFIED REGISTERED

## 2020-07-10 PROCEDURE — 25800030 ZZH RX IP 258 OP 636: Performed by: NURSE ANESTHETIST, CERTIFIED REGISTERED

## 2020-07-10 PROCEDURE — 96374 THER/PROPH/DIAG INJ IV PUSH: CPT | Mod: XU

## 2020-07-10 PROCEDURE — 87493 C DIFF AMPLIFIED PROBE: CPT | Performed by: INTERNAL MEDICINE

## 2020-07-10 PROCEDURE — 99285 EMERGENCY DEPT VISIT HI MDM: CPT | Mod: 25

## 2020-07-10 PROCEDURE — 25500064 ZZH RX 255 OP 636: Performed by: PHYSICIAN ASSISTANT

## 2020-07-10 PROCEDURE — 36000052 ZZH SURGERY LEVEL 2 EA 15 ADDTL MIN: Performed by: INTERNAL MEDICINE

## 2020-07-10 PROCEDURE — 87015 SPECIMEN INFECT AGNT CONCNTJ: CPT | Performed by: INTERNAL MEDICINE

## 2020-07-10 PROCEDURE — 37000009 ZZH ANESTHESIA TECHNICAL FEE, EACH ADDTL 15 MIN: Performed by: INTERNAL MEDICINE

## 2020-07-10 PROCEDURE — 36415 COLL VENOUS BLD VENIPUNCTURE: CPT | Performed by: PHYSICIAN ASSISTANT

## 2020-07-10 RX ORDER — SODIUM CHLORIDE, SODIUM LACTATE, POTASSIUM CHLORIDE, CALCIUM CHLORIDE 600; 310; 30; 20 MG/100ML; MG/100ML; MG/100ML; MG/100ML
INJECTION, SOLUTION INTRAVENOUS CONTINUOUS
Status: DISCONTINUED | OUTPATIENT
Start: 2020-07-10 | End: 2020-07-10 | Stop reason: HOSPADM

## 2020-07-10 RX ORDER — LIDOCAINE 40 MG/G
CREAM TOPICAL
Status: DISCONTINUED | OUTPATIENT
Start: 2020-07-10 | End: 2020-07-10 | Stop reason: HOSPADM

## 2020-07-10 RX ORDER — NALOXONE HYDROCHLORIDE 0.4 MG/ML
.1-.4 INJECTION, SOLUTION INTRAMUSCULAR; INTRAVENOUS; SUBCUTANEOUS
Status: DISCONTINUED | OUTPATIENT
Start: 2020-07-10 | End: 2020-07-10

## 2020-07-10 RX ORDER — KETOROLAC TROMETHAMINE 30 MG/ML
INJECTION, SOLUTION INTRAMUSCULAR; INTRAVENOUS PRN
Status: DISCONTINUED | OUTPATIENT
Start: 2020-07-10 | End: 2020-07-10

## 2020-07-10 RX ORDER — LIDOCAINE HYDROCHLORIDE 20 MG/ML
SOLUTION OROPHARYNGEAL PRN
Status: DISCONTINUED | OUTPATIENT
Start: 2020-07-10 | End: 2020-07-10 | Stop reason: HOSPADM

## 2020-07-10 RX ORDER — IOPAMIDOL 612 MG/ML
100 INJECTION, SOLUTION INTRAVASCULAR ONCE
Status: COMPLETED | OUTPATIENT
Start: 2020-07-10 | End: 2020-07-10

## 2020-07-10 RX ORDER — PROPOFOL 10 MG/ML
INJECTION, EMULSION INTRAVENOUS PRN
Status: DISCONTINUED | OUTPATIENT
Start: 2020-07-10 | End: 2020-07-10

## 2020-07-10 RX ORDER — PANTOPRAZOLE SODIUM 40 MG/1
40 TABLET, DELAYED RELEASE ORAL DAILY
Qty: 90 TABLET | Refills: 4 | Status: SHIPPED | OUTPATIENT
Start: 2020-07-10 | End: 2020-10-08

## 2020-07-10 RX ORDER — FENTANYL CITRATE 50 UG/ML
25-50 INJECTION, SOLUTION INTRAMUSCULAR; INTRAVENOUS
Status: DISCONTINUED | OUTPATIENT
Start: 2020-07-10 | End: 2020-07-10 | Stop reason: HOSPADM

## 2020-07-10 RX ORDER — ONDANSETRON 2 MG/ML
4 INJECTION INTRAMUSCULAR; INTRAVENOUS EVERY 30 MIN PRN
Status: DISCONTINUED | OUTPATIENT
Start: 2020-07-10 | End: 2020-07-10

## 2020-07-10 RX ORDER — MEPERIDINE HYDROCHLORIDE 25 MG/ML
12.5 INJECTION INTRAMUSCULAR; INTRAVENOUS; SUBCUTANEOUS
Status: DISCONTINUED | OUTPATIENT
Start: 2020-07-10 | End: 2020-07-10 | Stop reason: HOSPADM

## 2020-07-10 RX ORDER — LIDOCAINE HYDROCHLORIDE 20 MG/ML
INJECTION, SOLUTION INFILTRATION; PERINEURAL PRN
Status: DISCONTINUED | OUTPATIENT
Start: 2020-07-10 | End: 2020-07-10

## 2020-07-10 RX ORDER — ONDANSETRON 2 MG/ML
4 INJECTION INTRAMUSCULAR; INTRAVENOUS EVERY 30 MIN PRN
Status: DISCONTINUED | OUTPATIENT
Start: 2020-07-10 | End: 2020-07-10 | Stop reason: HOSPADM

## 2020-07-10 RX ORDER — NALOXONE HYDROCHLORIDE 0.4 MG/ML
.1-.4 INJECTION, SOLUTION INTRAMUSCULAR; INTRAVENOUS; SUBCUTANEOUS
Status: DISCONTINUED | OUTPATIENT
Start: 2020-07-10 | End: 2020-07-10 | Stop reason: HOSPADM

## 2020-07-10 RX ORDER — MEPERIDINE HYDROCHLORIDE 25 MG/ML
12.5 INJECTION INTRAMUSCULAR; INTRAVENOUS; SUBCUTANEOUS
Status: DISCONTINUED | OUTPATIENT
Start: 2020-07-10 | End: 2020-07-10

## 2020-07-10 RX ORDER — ONDANSETRON 4 MG/1
4 TABLET, ORALLY DISINTEGRATING ORAL EVERY 30 MIN PRN
Status: DISCONTINUED | OUTPATIENT
Start: 2020-07-10 | End: 2020-07-10

## 2020-07-10 RX ORDER — ONDANSETRON 4 MG/1
4 TABLET, ORALLY DISINTEGRATING ORAL EVERY 30 MIN PRN
Status: DISCONTINUED | OUTPATIENT
Start: 2020-07-10 | End: 2020-07-10 | Stop reason: HOSPADM

## 2020-07-10 RX ORDER — MORPHINE SULFATE 4 MG/ML
4 INJECTION, SOLUTION INTRAMUSCULAR; INTRAVENOUS
Status: DISCONTINUED | OUTPATIENT
Start: 2020-07-10 | End: 2020-07-11 | Stop reason: HOSPADM

## 2020-07-10 RX ADMIN — FENTANYL CITRATE 50 MCG: 50 INJECTION INTRAMUSCULAR; INTRAVENOUS at 12:14

## 2020-07-10 RX ADMIN — PROPOFOL 50 MG: 10 INJECTION, EMULSION INTRAVENOUS at 11:08

## 2020-07-10 RX ADMIN — PROPOFOL 50 MG: 10 INJECTION, EMULSION INTRAVENOUS at 11:05

## 2020-07-10 RX ADMIN — SODIUM CHLORIDE, POTASSIUM CHLORIDE, SODIUM LACTATE AND CALCIUM CHLORIDE: 600; 310; 30; 20 INJECTION, SOLUTION INTRAVENOUS at 09:53

## 2020-07-10 RX ADMIN — KETOROLAC TROMETHAMINE 30 MG: 30 INJECTION, SOLUTION INTRAMUSCULAR at 12:11

## 2020-07-10 RX ADMIN — PROPOFOL 50 MG: 10 INJECTION, EMULSION INTRAVENOUS at 11:04

## 2020-07-10 RX ADMIN — PROPOFOL 50 MG: 10 INJECTION, EMULSION INTRAVENOUS at 11:10

## 2020-07-10 RX ADMIN — PROPOFOL 50 MG: 10 INJECTION, EMULSION INTRAVENOUS at 11:06

## 2020-07-10 RX ADMIN — IOPAMIDOL 100 ML: 612 INJECTION, SOLUTION INTRAVENOUS at 20:02

## 2020-07-10 RX ADMIN — PROPOFOL 50 MG: 10 INJECTION, EMULSION INTRAVENOUS at 11:14

## 2020-07-10 RX ADMIN — DIATRIZOATE MEGLUMINE AND DIATRIZOATE SODIUM 20 ML: 660; 100 SOLUTION ORAL; RECTAL at 20:01

## 2020-07-10 RX ADMIN — PROPOFOL 50 MG: 10 INJECTION, EMULSION INTRAVENOUS at 11:16

## 2020-07-10 RX ADMIN — PROPOFOL 50 MG: 10 INJECTION, EMULSION INTRAVENOUS at 11:22

## 2020-07-10 RX ADMIN — PROPOFOL 50 MG: 10 INJECTION, EMULSION INTRAVENOUS at 11:12

## 2020-07-10 RX ADMIN — MORPHINE SULFATE 4 MG: 4 INJECTION, SOLUTION INTRAMUSCULAR; INTRAVENOUS at 19:03

## 2020-07-10 RX ADMIN — PROPOFOL 50 MG: 10 INJECTION, EMULSION INTRAVENOUS at 11:18

## 2020-07-10 RX ADMIN — MORPHINE SULFATE 4 MG: 4 INJECTION, SOLUTION INTRAMUSCULAR; INTRAVENOUS at 19:31

## 2020-07-10 RX ADMIN — PROPOFOL 50 MG: 10 INJECTION, EMULSION INTRAVENOUS at 11:24

## 2020-07-10 RX ADMIN — LIDOCAINE HYDROCHLORIDE 40 MG: 20 INJECTION, SOLUTION INFILTRATION; PERINEURAL at 11:04

## 2020-07-10 RX ADMIN — PROPOFOL 50 MG: 10 INJECTION, EMULSION INTRAVENOUS at 11:19

## 2020-07-10 ASSESSMENT — ENCOUNTER SYMPTOMS
CARDIOVASCULAR NEGATIVE: 1
RESPIRATORY NEGATIVE: 1
VOMITING: 0
NAUSEA: 1
CONSTITUTIONAL NEGATIVE: 1
BLOOD IN STOOL: 0
NEUROLOGICAL NEGATIVE: 1
ABDOMINAL PAIN: 1

## 2020-07-10 ASSESSMENT — MIFFLIN-ST. JEOR: SCORE: 1360.92

## 2020-07-10 NOTE — OP NOTE
Procedure Date: 07/10/2020      PRIMARY CARE PHYSICIAN:  Lainey Rose MD      PROCEDURES:   1.  EGD with multiple biopsies.   2.  Colonoscopy with multiple biopsies.      PREPROCEDURE DIAGNOSES:   1.  Weight loss.   2.  Rectal bleeding.      HISTORY OF PRESENT ILLNESS:  Please refer to H and P for details.      PHYSICAL EXAMINATION:  Cardiopulmonary examination unchanged from previous exam.  Please refer to H and P for further details.      MEDICATIONS:  MAC per Anesthesia Service.  Monitored parameters were normal limits throughout.      DESCRIPTION OF PROCEDURE:  After informed consent was obtained, the patient was placed in the left lateral decubitus position.  An adult video gastroscope was advanced all the way to the second portion of duodenum.  The duodenal mucosa was slightly atrophic, such as those seen with peptic duodenopathy.  Multiple biopsies were obtained.  Turning our attention to the stomach, I noted minimal erosive gastropathy and multiple biopsies were obtained.  A retroflexion view revealed a diminutive hiatal hernia.  The distal esophagus was within normal limits.  In the mid esophagus was some bridging and linear erosions such as those found with eosinophilic esophagitis and multiple mid esophageal biopsies were obtained.  The air was then desufflated and the scope was withdrawn fully and completely without any complications.      We then started the colonoscopy.  An adult video colonoscope was advanced all the way to the terminal ileum.  The terminal ileal mucosa had multiple aphthous-appearing ulcers.  Multiple biopsies were obtained.  The ulcers were such as those found with NSAID or medication-induced enteropathy.  Turning attention to the colon, multiple random segmental biopsies were obtained to rule out inflammatory bowel disease.  There was some mild loss of vascularity in the distal sigmoid as well as the proximal rectum at the rectosigmoid junction.  A retroflexion view revealed  small internal hemorrhoids.  Examination of the anus revealed large external hemorrhoids.  None of these are amenable to banding at this point.  The air was then desufflated and the scope was withdrawn fully and completely without any complications.  A posterior anal fissure was found during the exam.      POSTPROCEDURE DIAGNOSES:   1.  Gastroesophageal reflux disease.   2.  Diminutive hiatal hernia.   3.  Rule out eosinophilic esophagitis.   4.  Terminal ileitis.   5.  Mild proximal proctosigmoiditis.   6.  External hemorrhoids.   7.  Anal fissure.      RECOMMENDATIONS:   1.  One tablespoon of Metamucil at night.   2.  Bacitracin over-the-counter 1 inch [place] applied to affected area b.i.d.   3.  GI Clinic up in 3 months.   4.  Repeat colonoscopy next year.   5.  Pantoprazole 40 mg p.o. every day.   6.  Limit NSAIDs.   7.  Follow the biopsies.   8.  Discharge home.      Thank you for allowing us to participate in her management.         CLAUDIA GIBSON MD             D: 07/10/2020   T: 07/10/2020   MT: MITCH      Name:     JAMES WANG   MRN:      [id number]        Account:        [id number]   :      1990           Procedure Date: 07/10/2020      Document: [id number]       cc: Lainey Rose MD

## 2020-07-10 NOTE — ANESTHESIA CARE TRANSFER NOTE
Patient: Kahtia Milton    Procedure(s):  UPPER ENDOSCOPY with biopsy   AND COLONOSCOPY with cultuer and biopsy    Diagnosis: Weight loss [R63.4]  Rectal bleeding [K62.5]  Diagnosis Additional Information: No value filed.    Anesthesia Type:   MAC     Note:  Airway :Nasal Cannula  Patient transferred to:Phase II  Handoff Report: Identifed the Patient, Identified the Reponsible Provider, Reviewed the pertinent medical history, Discussed the surgical course, Reviewed Intra-OP anesthesia mangement and issues during anesthesia, Set expectations for post-procedure period and Allowed opportunity for questions and acknowledgement of understanding      Vitals: (Last set prior to Anesthesia Care Transfer)    CRNA VITALS  7/10/2020 1101 - 7/10/2020 1138      7/10/2020             Pulse:  84    Ht Rate:  83    SpO2:  100 %    Resp Rate (set):  8                Electronically Signed By: CARLENE Prince CRNA  July 10, 2020  11:38 AM

## 2020-07-10 NOTE — H&P
Pre-Endoscopy History and Physical     Kathia Milton MRN# [id number]   YOB: 1990 Age: 30 year old     Primary care provider: Lainey Rose  Type of Endoscopy: Colonoscopy with possible biopsy, possible polypectomy and Esophagogastroduodenoscopy with possible biopsy, possible dilation, possible foreign body removal  Reason for Procedure: Weight loss and Rectal Bleeding  Type of Anesthesia Anticipated: MAC    HPI:    Kathia is a 30 year old female who will be undergoing the above procedure.      A history and physical has been performed. The patient's medications and allergies have been reviewed. The risks and benefits of the procedure and the sedation options and risks were discussed with the patient.  All questions were answered and informed consent was obtained.      She denies a personal or family history of anesthesia complications or bleeding disorders.     Patient Active Problem List   Diagnosis     Abdominal pain, acute     Anemia in pregnancy     Seasonal allergies     Tobacco use disorder     Endometriosis of pelvis     Condyloma acuminatum due to human papillomavirus      delivery     Encounter for triage in pregnant patient     Mass of scalp        Past Medical History:   Diagnosis Date     Benign neoplasm of vagina 2011     Condyloma acuminatum 2011     Constipation     egd, colonoscopy ; [facility]     Seasonal allergies      Tobacco use disorder 10/18/2012        Past Surgical History:   Procedure Laterality Date     APPENDECTOMY  14    [facility]      SECTION      C section     COLONOSCOPY  11/10/15    Dr. Singh     COMBINED  SECTION, TUBAL OCCLUSION Bilateral 3/20/2019    Procedure: COMBINED  SECTION, BILATERAL  TUBAL OCCLUSION;  Surgeon: Damon Fletcher MD;  Location: [facility]     DILATION AND CURETTAGE SUCTION, TREAT MISSED , 1ST TRIMESTER       ENDOSCOPY  11/10/15    Dr. Singh      EXCISE MASS HEAD N/A 2019     Procedure: EXCISION POSTERIOR SCALP MASS;  Surgeon: Babak Stephenson MD;  Location: [facility]     LAPAROSCOPIC CYSTECTOMY OVARIAN (BENIGN) Left 2018    Procedure: LAPAROSCOPIC CYSTECTOMY OVARIAN (BENIGN);  EXPLORATORY  LAPAROSCOPY, LEFT OVARIAN CYSTECTOMY;  Surgeon: Damon Fletcher MD;  Location: [facility]     LAPAROSCOPY  11/30/15    Dr. Sun; lysis of pelvic adhesions, cauterization of minimal endometriosis     LAPAROSCOPY  2018    lysis of adhesions; pelvic pain, endometriosis; Dr. Sun       Social History     Tobacco Use     Smoking status: Former Smoker     Types: Cigarettes     Last attempt to quit: 2012     Years since quittin.6     Smokeless tobacco: Never Used     Tobacco comment: trying to quit 8/10/18   Substance Use Topics     Alcohol use: No     Alcohol/week: 0.0 standard drinks       Family History   Problem Relation Age of Onset     Diabetes Other         Grandmother     Heart Disease Other         Heart Disease - Grandmother     Other - See Comments Other         Renal Disease - Grandmother     Unknown/Adopted Mother      Unknown/Adopted Father      Unknown/Adopted Maternal Grandmother      Unknown/Adopted Maternal Grandfather      Unknown/Adopted Paternal Grandmother      Unknown/Adopted Paternal Grandfather      Breast Cancer No family hx of        Prior to Admission medications    Medication Sig Start Date End Date Taking? Authorizing Provider   linaclotide (LINZESS) 72 MCG capsule Take 1 capsule (72 mcg) by mouth every morning (before breakfast) 20  Yes Lainey Rose MD   ibuprofen (ADVIL/MOTRIN) 800 MG tablet TAKE 1 TABLET BY MOUTH THREE TIMES DAILY WITH FOOD FOR PRIMARY PAIN CONTROL 20   Damon Fletcher MD   olopatadine (PATADAY) 0.2 % ophthalmic solution Place 1 drop into both eyes daily 20   Lainey Rose MD       Allergies   Allergen Reactions     Seafood Anaphylaxis     Shellfish Allergy      Shellfish Derived Products        REVIEW OF  "SYSTEMS:   5 point ROS negative except as noted above in HPI, including Gen., Resp., CV, GI &  system review.    PHYSICAL EXAM:   /72   Temp 98.6  F (37  C) (Oral)   Resp 18   Ht 1.651 m (5' 5\")   Wt 63.5 kg (140 lb)   LMP 07/03/2020   BMI 23.30 kg/m   Estimated body mass index is 23.3 kg/m  as calculated from the following:    Height as of this encounter: 1.651 m (5' 5\").    Weight as of this encounter: 63.5 kg (140 lb).   GENERAL APPEARANCE: alert, and oriented  MENTAL STATUS: alert  AIRWAY EXAM: Mallampatti Class II (visualization of the soft palate, fauces, and uvula)  RESP: lungs clear to auscultation - no rales, rhonchi or wheezes  CV: regular rates and rhythm  DIAGNOSTICS:    Not indicated    IMPRESSION   ASA Class 2 - Mild systemic disease    PLAN:   Plan for Colonoscopy with possible biopsy, possible polypectomy and Esophagogastroduodenoscopy with possible biopsy, possible dilation, possible foreign body removal. We discussed the risks, benefits and alternatives and the patient wished to proceed.    The above has been forwarded to the consulting provider.      Signed Electronically by: Bridger Alejandro MD  July 10, 2020          "

## 2020-07-10 NOTE — ED AVS SNAPSHOT
[facility]  [address] [address]  Phone:  [phone]                                    Kathia Milton   MRN: [id number]    Department:  [facility]   Date of Visit:  7/10/2020           After Visit Summary Signature Page    I have received my discharge instructions, and my questions have been answered. I have discussed any challenges I see with this plan with the nurse or doctor.    ..........................................................................................................................................  Patient/Patient Representative Signature      ..........................................................................................................................................  Patient Representative Print Name and Relationship to Patient    ..................................................               ................................................  Date                                   Time    ..........................................................................................................................................  Reviewed by Signature/Title    ...................................................              ..............................................  Date                                               Time          22EPIC Rev 08/18

## 2020-07-10 NOTE — OR NURSING
Kanchan SAINZ gave Toradol 30mg  IV  For pain level of 9-10 from Kanchan Medicated with Fentanyl 50 mcg for pain level of 9-10.

## 2020-07-10 NOTE — DISCHARGE INSTRUCTIONS
UPPER ENDOSCOPY    AFTER THE PROCEDURE    You will return to Same Day Surgery to rest for about an hour before you go home.    The doctor will talk with you and your family.    A family member/friend may visit with you.    You may burp up any air remaining in your stomach.    You may feel dizzy or light-headed from the medicine.    Your nurse will go over the discharge instructions with you and your caregiver and answer any of your questions.      You will be contacted the next day to check on how you are doing.    If biopsies were taken, you will be contacted with the results usually within 3 days.  BACK AT HOME    Rest for an hour or two after you get home.    When your throat is no longer numb and you have a gag reflex, take a few sips of cool water.  If you can swallow comfortably, you may start eating again.    You may have a mild sore throat for the rest of the day.    You will be contacted with results by the surgeons office within a week. If not contacted in one week, call the surgeons office.  WHAT TO WATCH FOR:  Problems rarely occur after the exam, but it is important to be aware of the early signs of a complication.  Call your doctor immediately if you have:    Difficulty swallowing or breathing    Unusual pain in your stomach or chest    Vomiting blood or dark material that looks like coffee grounds    Black or tarry stools    Temperature over 101.5 degrees    MORE QUESTIONS?  Please ask your doctor or nurse before the exam begins  or call your doctor at the clinic.    IF YOU MUST CANCEL YOUR PROCEDURE THE EVENING/NIGHT BEFORE, PLEASE CALL HOSPITAL ADMITTING -[phone] OR TOLL FREE [phone], [phone].    Phone Numbers:  [facility] - [phone] [phone]  [facility] - [phone]  Surgery Patient Education - [phone] or toll free [phone]    INSTRUCTIONS AFTER COLONOSCOPY    WHEN YOU ARE BACK HOME:    Plan to rest for an hour or two after you get home.    You may have  some cramping or pressure until you pass gas.    You may resume your regular medications.    Eat a small, light meal at first, and then gradually return to normal meal sizes.    You will be contacted the next day to see how you are doing.  If you had a polyp removed:    Slight bleeding may occur.  You may have a slight blood stain on the toilet paper after a bowel movement.    To lessen the chance of bleeding, avoid heavy exercise for ONE WEEK.  This includes heavy lifting, vigorous sport activities, and heavy physical labor.  You may resume your normal sexual activity.      Avoid aspirin or aspirin products if instructed by your doctor.    If there is a polyp or biopsy, you will be contacted with results within one week.     WHAT TO WATCH FOR:  Problems rarely occur after the exam; however, it is important for you to watch for early signs of possible problems.  If you have     Unusual pain in your abdomen    Nausea and vomiting that persists    Excessive bleeding    Black or bloody bowel movements    Fever or temperature above 100.6 F  Please call your doctor ([facility] [phone]) or go to the nearest hospital emergency room.    Post-Anesthesia Patient Instructions    IMMEDIATELY FOLLOWING SURGERY:  Do not drive or operate machinery for the first twenty four hours after surgery.  Do not make any important decisions for twenty four hours after surgery or while taking narcotic pain medications or sedatives.  If you develop intractable nausea and vomiting or a severe headache please notify your doctor immediately.    FOLLOW-UP:  Please make an appointment with your surgeon as instructed. You do not need to follow up with anesthesia unless specifically instructed to do so.    WOUND CARE INSTRUCTIONS (if applicable):  Keep a dry clean dressing on the anesthesia/puncture wound site if there is drainage.  Once the wound has quit draining you may leave it open to air.  Generally you should leave the bandage intact  for twenty four hours unless there is drainage.  If the epidural site drains for more than 36-48 hours please call the anesthesia department.    QUESTIONS?:  Please feel free to call your physician or the hospital  if you have any questions, and they will be happy to assist you.

## 2020-07-10 NOTE — ANESTHESIA CARE TRANSFER NOTE
Patient: Kathia Milton    Procedure(s):  UPPER ENDOSCOPY with biopsy   AND COLONOSCOPY with cultuer and biopsy    Diagnosis: Weight loss [R63.4]  Rectal bleeding [K62.5]  Diagnosis Additional Information: No value filed.    Anesthesia Type:   MAC     Note:  Airway :Nasal Cannula  Patient transferred to:Phase II  Handoff Report: Identifed the Patient, Identified the Reponsible Provider, Reviewed the pertinent medical history, Discussed the surgical course, Reviewed Intra-OP anesthesia mangement and issues during anesthesia, Set expectations for post-procedure period and Allowed opportunity for questions and acknowledgement of understanding      Vitals: (Last set prior to Anesthesia Care Transfer)    CRNA VITALS  7/10/2020 1101 - 7/10/2020 1138      7/10/2020             Pulse:  84    Ht Rate:  83    SpO2:  100 %    Resp Rate (set):  8                Electronically Signed By: CARLENE Prince CRNA  July 10, 2020  11:38 AM

## 2020-07-10 NOTE — TELEPHONE ENCOUNTER
She was in earlier for a colonoscopy. She is now having pain on left side under her rib cage. She rates the pain as moderate to severe and it hurts more if she moves around. This pain is different from the gas pains she had earlier, the pain is constant. She has had some rectal bleeding. Bright red at first, now darker and less. She is going to  to be evaluated.     Jennifer Mello RN/ [facility]        Additional Information    Negative: Severe difficulty breathing (e.g., struggling for each breath, speaks in single words)    Negative: Shock suspected (e.g., cold/pale/clammy skin, too weak to stand, low BP, rapid pulse)    Negative: Difficult to awaken or acting confused (e.g., disoriented, slurred speech)    Negative: Passed out (i.e., lost consciousness, collapsed and was not responding)    Negative: Visible sweat on face or sweat dripping down face    Negative: Sounds like a life-threatening emergency to the triager    Negative: Followed an abdomen (stomach) injury    Negative: Chest pain    [1] MILD-MODERATE pain AND [2] constant AND [3] present > 2 hours    Negative: Patient sounds very sick or weak to the triager    Negative: [1] Pregnant > 24 weeks AND [2] hand or face swelling    Protocols used: ABDOMINAL PAIN - UPPER-A-AH

## 2020-07-10 NOTE — BRIEF OP NOTE
"   [facility]    Brief Operative Note    Pre-operative diagnosis: Weight loss [R63.4]  Rectal bleeding [K62.5]  Post-operative diagnosis External Hemorrhoids, Terminal Ileitis, GERD, Small Hiatal Hernia    Procedure: Procedure(s):  UPPER ENDOSCOPY with biopsy   AND COLONOSCOPY with cultuer and biopsy  Surgeon: Surgeon(s) and Role:     * Bridger Alejandro MD - Primary  Anesthesia: Combined MAC with Local   Estimated blood loss: None  Drains: None  Specimens:   ID Type Source Tests Collected by Time Destination   1 : check for c-diff Stool Colon CLOSTRIDIUM DIFFICILE TOXIN B PCR Bridger Alejandro MD 7/10/2020 11:19 AM    2 :  Stool Colon STOOL CULTURE SSCE Bridger Alejandro MD 7/10/2020 11:22 AM    A : small bowel Biopsy Other SURGICAL PATHOLOGY EXAM Bridger Alejandro MD 7/10/2020 11:07 AM    B :  Biopsy Stomach, Body SURGICAL PATHOLOGY EXAM Bridger Alejandro MD 7/10/2020 11:09 AM    C :  Biopsy Esophagus, middle SURGICAL PATHOLOGY EXAM Bridger Alejandro MD 7/10/2020 11:10 AM    D : terminal illeum \"illeitus\" per md Biopsy Other SURGICAL PATHOLOGY EXAM Bridger Alejandro MD 7/10/2020 11:26 AM    E : random right colon Biopsy Colon SURGICAL PATHOLOGY EXAM Bridger Alejandro MD 7/10/2020 11:27 AM    F : random transverse colon Biopsy Large Intestine, Transverse SURGICAL PATHOLOGY EXAM Bridger Alejandro MD 7/10/2020 11:28 AM    G : random Biopsy Large Intestine, Left/Descending SURGICAL PATHOLOGY EXAM Bridger Alejandro MD 7/10/2020 11:29 AM    H : random Biopsy Large Intestine, Rectum SURGICAL PATHOLOGY EXAM Bridger Alejandro MD 7/10/2020 11:30 AM      Findings:   External Hemorrhoids, Terminal Ileitis, GERD, Small Hiatal Hernia.  Complications: None.  Implants: * No implants in log *        "

## 2020-07-11 NOTE — ED NOTES
Discharge teaching done, AVS reviewed with pt, questions answered. Pt verbalized understanding and is agreeable with discharge plan. Pt discharged to home.

## 2020-07-11 NOTE — ED PROVIDER NOTES
History     Chief Complaint   Patient presents with     Abdominal Pain     colonoscopy this am and now pain increasing to LUQ and LLQ. Called nurse triage and was told to come to the ED.      HPI  Kathia Milton is a 30 year old female who presents emergency department with complaints of severely worsening left upper quadrant and left lower quadrant abdominal pain after colonoscopy this morning.  Patient denies fever, lightheadedness, shortness of breath.  She has not had significant bleeding from the rectum.  She has not taken anything for her symptoms.  Negative pregnancy test this morning.      Allergies:  Allergies   Allergen Reactions     Seafood Anaphylaxis     Shellfish Allergy      Shellfish Derived Products       Problem List:    Patient Active Problem List    Diagnosis Date Noted     Mass of scalp 10/29/2019     Priority: Medium     Added automatically from request for surgery [id number]       Encounter for triage in pregnant patient 2018     Priority: Medium     Endometriosis of pelvis 2016     Priority: Medium     Seasonal allergies      Priority: Medium      delivery 05/15/2013     Priority: Medium     Begin hydroxy progesterone at 16-18 weeks  Cervical length 16 - 22 weeks   ctx.  Received BMZ x two in [place].   TDAP done       Anemia in pregnancy 2013     Priority: Medium     Abdominal pain, acute 2013     Priority: Medium     Tobacco use disorder 10/18/2012     Priority: Medium     quit       Condyloma acuminatum due to human papillomavirus 2011     Priority: Medium     Overview:   IMO Update 10/11          Past Medical History:    Past Medical History:   Diagnosis Date     Benign neoplasm of vagina 2011     Condyloma acuminatum 2011     Constipation      Seasonal allergies      Tobacco use disorder 10/18/2012       Past Surgical History:    Past Surgical History:   Procedure Laterality Date     APPENDECTOMY  14    [facility]       SECTION      C section     COLONOSCOPY  11/10/15    Dr. Singh     COMBINED  SECTION, TUBAL OCCLUSION Bilateral 3/20/2019    Procedure: COMBINED  SECTION, BILATERAL  TUBAL OCCLUSION;  Surgeon: Damon Fletcher MD;  Location: [facility]     DILATION AND CURETTAGE SUCTION, TREAT MISSED , 1ST TRIMESTER       ENDOSCOPY  11/10/15    Dr. Singh      EXCISE MASS HEAD N/A 2019    Procedure: EXCISION POSTERIOR SCALP MASS;  Surgeon: Babak Stephenson MD;  Location: [facility]     LAPAROSCOPIC CYSTECTOMY OVARIAN (BENIGN) Left 2018    Procedure: LAPAROSCOPIC CYSTECTOMY OVARIAN (BENIGN);  EXPLORATORY  LAPAROSCOPY, LEFT OVARIAN CYSTECTOMY;  Surgeon: Damon Fletcher MD;  Location: [facility]     LAPAROSCOPY  11/30/15    Dr. Sun; lysis of pelvic adhesions, cauterization of minimal endometriosis     LAPAROSCOPY  2018    lysis of adhesions; pelvic pain, endometriosis; Dr. Sun       Family History:    Family History   Problem Relation Age of Onset     Diabetes Other         Grandmother     Heart Disease Other         Heart Disease - Grandmother     Other - See Comments Other         Renal Disease - Grandmother     Unknown/Adopted Mother      Unknown/Adopted Father      Unknown/Adopted Maternal Grandmother      Unknown/Adopted Maternal Grandfather      Unknown/Adopted Paternal Grandmother      Unknown/Adopted Paternal Grandfather      Breast Cancer No family hx of        Social History:  Marital Status:  Single [1]  Social History     Tobacco Use     Smoking status: Former Smoker     Types: Cigarettes     Last attempt to quit: 2012     Years since quittin.6     Smokeless tobacco: Never Used     Tobacco comment: trying to quit 8/10/18   Substance Use Topics     Alcohol use: No     Alcohol/week: 0.0 standard drinks     Drug use: No        Medications:    linaclotide (LINZESS) 72 MCG capsule  ibuprofen (ADVIL/MOTRIN) 800 MG tablet  olopatadine (PATADAY) 0.2 % ophthalmic solution  pantoprazole  (PROTONIX) 40 MG EC tablet          Review of Systems   Constitutional: Negative.    Respiratory: Negative.    Cardiovascular: Negative.    Gastrointestinal: Positive for abdominal pain and nausea. Negative for blood in stool and vomiting.   Neurological: Negative.        Physical Exam   BP: 114/78  Pulse: 90  Temp: 97.3  F (36.3  C)  Resp: 16  Weight: 63.5 kg (140 lb)  SpO2: 100 %      Physical Exam  Constitutional:       General: She is not in acute distress.     Appearance: She is not diaphoretic.   HENT:      Head: Atraumatic.      Mouth/Throat:      Pharynx: No oropharyngeal exudate.   Eyes:      General: No scleral icterus.     Pupils: Pupils are equal, round, and reactive to light.   Cardiovascular:      Heart sounds: Normal heart sounds.   Pulmonary:      Effort: No respiratory distress.      Breath sounds: Normal breath sounds.   Abdominal:      General: Bowel sounds are normal.      Palpations: Abdomen is soft.      Tenderness: There is abdominal tenderness in the left upper quadrant and left lower quadrant. There is guarding.   Genitourinary:     Comments: deferred  Musculoskeletal:         General: No tenderness.   Skin:     General: Skin is warm.      Findings: No rash.         ED Course        Procedures  Severity the pain concerning for possible perforated colon given recent colonoscopy.  CT was unremarkable.  Recommended ibuprofen or Tylenol for pain relief and follow-up with primary care provider if no improvement in symptoms over the next 3 to 4 days.  Return emergency department if new onset fever, severely worsening pain.                 Results for orders placed or performed during the hospital encounter of 07/10/20 (from the past 24 hour(s))   CBC with platelets differential   Result Value Ref Range    WBC 9.2 4.0 - 11.0 10e9/L    RBC Count 4.83 3.8 - 5.2 10e12/L    Hemoglobin 13.4 11.7 - 15.7 g/dL    Hematocrit 39.6 35.0 - 47.0 %    MCV 82 78 - 100 fl    MCH 27.7 26.5 - 33.0 pg    MCHC 33.8  31.5 - 36.5 g/dL    RDW 12.4 10.0 - 15.0 %    Platelet Count 336 150 - 450 10e9/L    Diff Method Automated Method     % Neutrophils 66.0 %    % Lymphocytes 24.8 %    % Monocytes 6.5 %    % Eosinophils 2.1 %    % Basophils 0.3 %    % Immature Granulocytes 0.3 %    Nucleated RBCs 0 0 /100    Absolute Neutrophil 6.1 1.6 - 8.3 10e9/L    Absolute Lymphocytes 2.3 0.8 - 5.3 10e9/L    Absolute Monocytes 0.6 0.0 - 1.3 10e9/L    Absolute Eosinophils 0.2 0.0 - 0.7 10e9/L    Absolute Basophils 0.0 0.0 - 0.2 10e9/L    Abs Immature Granulocytes 0.0 0 - 0.4 10e9/L    Absolute Nucleated RBC 0.0    Comprehensive metabolic panel   Result Value Ref Range    Sodium 138 133 - 144 mmol/L    Potassium 3.6 3.4 - 5.3 mmol/L    Chloride 107 94 - 109 mmol/L    Carbon Dioxide 29 20 - 32 mmol/L    Anion Gap 2 (L) 3 - 14 mmol/L    Glucose 88 70 - 99 mg/dL    Urea Nitrogen 10 7 - 30 mg/dL    Creatinine 0.74 0.52 - 1.04 mg/dL    GFR Estimate >90 >60 mL/min/[1.73_m2]    GFR Estimate If Black >90 >60 mL/min/[1.73_m2]    Calcium 8.7 8.5 - 10.1 mg/dL    Bilirubin Total 0.2 0.2 - 1.3 mg/dL    Albumin 3.9 3.4 - 5.0 g/dL    Protein Total 7.4 6.8 - 8.8 g/dL    Alkaline Phosphatase 75 40 - 150 U/L    ALT 16 0 - 50 U/L    AST 13 0 - 45 U/L   CT Abdomen Pelvis w Contrast    Narrative    CT ABDOMEN PELVIS W CONTRAST    CLINICAL HISTORY: Female, age 30 years,  severe pain LUQ, LLQ after  colonoscopy today.;    Comparison:  None.    TECHNIQUE:  CT was performed of the abdomen and pelvis with IV and  oral contrast. Sagittal, coronal and axial reconstructions were  reviewed.     FINDINGS:    Abdomen/Pelvis CT:  Lung Bases:  Minimal dependent atelectasis in both lungs.    Esophagus/stomach: Normal.    Liver:  Focal fatty infiltration adjacent to the falciform ligament.  Portal venous system: Patent.  Gallbladder: Normal.    Spleen: Normal.    Pancreas: Normal.    Adrenal Glands: Normal.    Kidneys: Normal.  Ureters: Normal.  Urinary bladder: Normal.    Abdominal  Aorta: Normal.  IVC: Normal.    Lymph Nodes: Normal.    Large and Small Bowel: There are a few diverticula in the distal  colon. No diverticulitis or acute abnormality.  Appendix: Not seen. No secondary signs of appendicitis.    Pelvic Organs:  No acute abnormality. The appearance of the uterus  suggests previous  section.  Peritoneum: No free air. Minimal free fluid in the lower pelvis.  Bony structures: No acute abnormality.    Other Findings:  Inguinal lymph nodes are normal.      Impression    IMPRESSION:   No acute abnormality.    Few diverticula suggests within the distal colon. No diverticulitis.  No perforation.    Focal low dense area anteriorly in the liver adjacent to the falciform  ligament suggesting focal fatty infiltration.    JAMIE MICHELLE MD       Medications   morphine (PF) injection 4 mg (4 mg Intravenous Given 7/10/20 1931)   iopamidol (ISOVUE-300) IV solution 61% 100 mL (100 mLs Intravenous Given 7/10/20 2002)   diatrizoate meglumine-sodium (GASTROGRAFIN/GASTROVIEW) 66-10 % solution 30 mL (20 mLs Oral Given 7/10/20 2001)   sodium chloride (PF) 0.9% PF flush 60 mL (60 mLs Intravenous Given 7/10/20 2002)       Assessments & Plan (with Medical Decision Making)     I have reviewed the nursing notes.    I have reviewed the findings, diagnosis, plan and need for follow up with the patient.    Discharge Medication List as of 7/10/2020  8:57 PM          Final diagnoses:   Abdominal pain, generalized     STACI Burris on 7/10/2020 at 11:10 PM   7/10/2020   [facility]     Oc Cassidy PA  07/10/20 3222

## 2020-07-11 NOTE — ED NOTES
Pt presents with abdominal pain. Pt reports she had an EGD and colonoscopy earlier today. Pt reports she developed pain in LLQ and Lt low anterior chest wall pain. Pt denies nausea. Pt reports the pain is sharp in nature and gets worse with palpation. Abdomen is soft and with hypoactive BS.

## 2020-07-11 NOTE — DISCHARGE INSTRUCTIONS
May take up to 800 mg ibuprofen every 8 hours as needed for pain taken with food or milk.    May take two extra strength Tylenol (acetaminophen 500 mg) every 8 hours as needed for pain.

## 2020-07-13 LAB
BACTERIA SPEC CULT: NORMAL
COPATH REPORT: NORMAL
E COLI SXT1+2 STL IA: NORMAL
Lab: NORMAL
SPECIMEN SOURCE: NORMAL

## 2020-07-21 ENCOUNTER — MYC MEDICAL ADVICE (OUTPATIENT)
Dept: FAMILY MEDICINE | Facility: OTHER | Age: 30
End: 2020-07-21

## 2020-07-22 NOTE — TELEPHONE ENCOUNTER
This was done by Dr. Alejandro, not general surgery at [facility]. Please instruct the patient to contact Dr. Alejandro's office.

## 2020-07-23 ENCOUNTER — MYC MEDICAL ADVICE (OUTPATIENT)
Dept: FAMILY MEDICINE | Facility: OTHER | Age: 30
End: 2020-07-23

## 2020-07-23 NOTE — TELEPHONE ENCOUNTER
Fatty deposits in the liver are fairly common.    Liver labs were normal that day in the ER.  Would just advise general good health habits - limit alcohol, maintain healthy weight, etc.

## 2020-08-02 DIAGNOSIS — H10.13 ALLERGIC CONJUNCTIVITIS, BILATERAL: ICD-10-CM

## 2020-08-05 NOTE — TELEPHONE ENCOUNTER
olopatadine      Last Written Prescription Date:  5/28/20  Last Fill Quantity: 1 bottle,   # refills: 1  Last Office Visit: 6/18/20  Future Office visit:

## 2020-08-06 RX ORDER — OLOPATADINE HYDROCHLORIDE 2 MG/ML
SOLUTION/ DROPS OPHTHALMIC
Qty: 2.5 ML | Refills: 0 | Status: SHIPPED | OUTPATIENT
Start: 2020-08-06 | End: 2021-05-09

## 2020-12-14 ENCOUNTER — HEALTH MAINTENANCE LETTER (OUTPATIENT)
Age: 30
End: 2020-12-14

## 2021-03-03 NOTE — H&P
Antepartum Admit    Kathia Milton is a  at 27 1/7 weeks who presents with regular contractions. Pt has a h/o a  at 28 weeks for  labor. Uterine incision was T'd so delivery via  is planned in this pregnancy. Her 3 losses were first trimester. Pt denies any urinary symptoms or recent sexual activity. She denies any bleeding or LOF. She was seen for a similar complaint one week ago and was transferred to [place] where she received magnesium and betamethasone x 2.    ROS:  Constitutional, neuro, endocrine, gastrointestinal, genitourinary and psychiatric systems are otherwise negative.      Past Medical History:   Diagnosis Date     Benign neoplasm of vagina 2011     Condyloma acuminatum 2011     Seasonal allergies      Supervision of other normal pregnancy     MICHELLE: 2013     Tobacco use disorder 10/18/2012     Past Surgical History:   Procedure Laterality Date     APPENDECTOMY  14    [place]      SECTION      C section     COLONOSCOPY  11/10/15    Dr. Singh     DILATION AND CURETTAGE SUCTION, TREAT MISSED , 1ST TRIMESTER       ENDOSCOPY  11/10/15    Dr. Singh      LAPAROSCOPIC CYSTECTOMY OVARIAN (BENIGN) Left 2018    Procedure: LAPAROSCOPIC CYSTECTOMY OVARIAN (BENIGN);  EXPLORATORY  LAPAROSCOPY, LEFT OVARIAN CYSTECTOMY;  Surgeon: Damon Fletcher MD;  Location: [facility]     LAPAROSCOPY  11/30/15    Dr. Sun; lysis of pelvic adhesions, cauterization of minimal endometriosis     LAPAROSCOPY  2018    lysis of adhesions; pelvic pain, endometriosis; Dr. Sun        Allergies   Allergen Reactions     Seafood Anaphylaxis     Shellfish Allergy      Shellfish Derived Products     Family History   Problem Relation Age of Onset     Diabetes Other         Grandmother     Heart Disease Other         Heart Disease - Grandmother     Other - See Comments Other         Renal Disease - Grandmother     Unknown/Adopted Mother      Unknown/Adopted Father   [Dear  ___] : Dear  [unfilled], "    Unknown/Adopted Maternal Grandmother      Unknown/Adopted Maternal Grandfather      Unknown/Adopted Paternal Grandmother      Unknown/Adopted Paternal Grandfather      Breast Cancer No family hx of      EXAM  /71 (BP Location: Right arm)   Pulse 96   Temp 98.5  F (36.9  C) (Oral)   Resp 12   Ht 1.651 m (5' 5\")   Wt 78 kg (172 lb)   LMP 2018 (Approximate)   SpO2 97%   BMI 28.62 kg/m    Gen - NAD  Abdomen - gravid, non-tender  VE - external os slightly open internal os closed.  NST - 130's reactive  [place] - + ctx's q 3 - 6  Extremities - No CT, no edema    A/P  contractions   1. Admit for observation over night   2. FFN is negative   3. Contractions are regular and noted by patient   4. Start IVF   5. Start Nifedipine 10mg q 6   6. If pt appears to be laboring plan transfer to [place]   7. Under emergent circumstances pt would be delivered via  here and baby stabilized for transfer.    ALICIA KLINE MD       " [Courtesy Letter:] : I had the pleasure of seeing your patient, [unfilled], in my office today. [Please see my note below.] : Please see my note below. [Consult Closing:] : Thank you very much for allowing me to participate in the care of this patient.  If you have any questions, please do not hesitate to contact me. [Sincerely,] : Sincerely, [FreeTextEntry2] : Osmin Carranza MD\par [address] \par [place] [address]\par Phone: [phone]  [FreeTextEntry3] : Pippa Madison MD, MPH, FAAP\par Attending Physician\par [facility]\par Hematology /Oncology and Stem Cell Transplantation\par  of Pediatrics\par [employer] [facility]

## 2021-03-17 ENCOUNTER — OFFICE VISIT (OUTPATIENT)
Dept: FAMILY MEDICINE | Facility: OTHER | Age: 31
End: 2021-03-17
Attending: FAMILY MEDICINE
Payer: COMMERCIAL

## 2021-03-17 VITALS
SYSTOLIC BLOOD PRESSURE: 128 MMHG | RESPIRATION RATE: 19 BRPM | HEIGHT: 65 IN | HEART RATE: 99 BPM | OXYGEN SATURATION: 99 % | TEMPERATURE: 97.7 F | WEIGHT: 140 LBS | BODY MASS INDEX: 23.32 KG/M2 | DIASTOLIC BLOOD PRESSURE: 78 MMHG

## 2021-03-17 DIAGNOSIS — K08.89 TOOTH PAIN: Primary | ICD-10-CM

## 2021-03-17 DIAGNOSIS — K04.7 DENTAL INFECTION: ICD-10-CM

## 2021-03-17 DIAGNOSIS — K08.9 POOR DENTITION: ICD-10-CM

## 2021-03-17 PROCEDURE — G0463 HOSPITAL OUTPT CLINIC VISIT: HCPCS

## 2021-03-17 PROCEDURE — G0463 HOSPITAL OUTPT CLINIC VISIT: HCPCS | Mod: 25

## 2021-03-17 PROCEDURE — 99213 OFFICE O/P EST LOW 20 MIN: CPT | Performed by: FAMILY MEDICINE

## 2021-03-17 RX ORDER — KETOROLAC TROMETHAMINE 10 MG/1
10 TABLET, FILM COATED ORAL EVERY 6 HOURS PRN
Qty: 12 TABLET | Refills: 0 | Status: SHIPPED | OUTPATIENT
Start: 2021-03-17 | End: 2021-06-27

## 2021-03-17 ASSESSMENT — PAIN SCALES - GENERAL: PAINLEVEL: EXTREME PAIN (9)

## 2021-03-17 ASSESSMENT — MIFFLIN-ST. JEOR: SCORE: 1355.92

## 2021-03-17 NOTE — PROGRESS NOTES
Assessment & Plan     Tooth pain  - amoxicillin-clavulanate (AUGMENTIN) 875-125 MG tablet; Take 1 tablet by mouth 2 times daily for 7 days  - ketorolac (TORADOL) 10 MG tablet; Take 1 tablet (10 mg) by mouth every 6 hours as needed for moderate pain    Poor dentition  - amoxicillin-clavulanate (AUGMENTIN) 875-125 MG tablet; Take 1 tablet by mouth 2 times daily for 7 days  - ketorolac (TORADOL) 10 MG tablet; Take 1 tablet (10 mg) by mouth every 6 hours as needed for moderate pain    Dental infection  - amoxicillin-clavulanate (AUGMENTIN) 875-125 MG tablet; Take 1 tablet by mouth 2 times daily for 7 days  - ketorolac (TORADOL) 10 MG tablet; Take 1 tablet (10 mg) by mouth every 6 hours as needed for moderate pain       Patient Instructions   Complete antibiotic course.  Good oral hygiene.   Toradol in place of Ibuprofen.  Can use Tylenol with it up to 1000 mg 4 times per day.  Dental/oral surgeon for definitive cares - extraction.       unavailable during visit.  Will route chart to her regarding dental resources/coverage for extraction.      Lainey Jones MD  [facility] - SERAFIN Bae is a 31 year old who presents for the following health issues     HPI     Concern - Tooth pain on upper right jaw  Onset: Couple months of dealing with this. Worse past couple days.  States most dental places don't take her insurance.   Description: upper and lower jaw pain from tooth, states her back tooth is chipped unsure if it is infected. Radiating to her right ear.  Intensity: moderate  Progression of Symptoms:  constant  Accompanying Signs & Symptoms: none  Previous history of similar problem: yes  Precipitating factors:        Worsened by: nothing  Alleviating factors:        Improved by: nothing  Therapies tried and outcome: using Ibuprofen prn and ice prn   No recent antibiotics.  Last dental appointment - 2 years or more?  Dentures on top.  Planning to pull top.      Review  "of Systems   Constitutional, HEENT, cardiovascular, pulmonary, gi and gu systems are negative, except as otherwise noted.      Objective    /78   Pulse 99   Temp 97.7  F (36.5  C) (Tympanic)   Resp 19   Ht 1.651 m (5' 5\")   Wt 63.5 kg (140 lb)   SpO2 99%   BMI 23.30 kg/m    Body mass index is 23.3 kg/m .  Physical Exam   GENERAL: healthy, alert and no distress  EYES: Eyes grossly normal to inspection, PERRL and conjunctivae and sclerae normal  HENT: normal cephalic/atraumatic, ear canals and TM's normal, oropharynx clear, oral mucous membranes moist and edentulous upper palate; bottom palate with very poor hygiene, caries, cracked teeth, with tenderness right lower posterior   NECK: no adenopathy, no asymmetry, masses, or scars and thyroid normal to palpation  RESP: lungs clear to auscultation - no rales, rhonchi or wheezes  CV: regular rate and rhythm, normal S1 S2, no S3 or S4, no murmur, click or rub, no peripheral edema and peripheral pulses strong  MS: no gross musculoskeletal defects noted, no edema  PSYCH: mentation appears normal and affect flat                "

## 2021-03-17 NOTE — Clinical Note
Hi! Tried to catch you.  Patient needs dental extractions.  Insurance issues.   Resources??  Told her you'd reach out when you could.    Thank you!

## 2021-03-17 NOTE — NURSING NOTE
"Chief Complaint   Patient presents with     Dental Pain       Initial /78   Pulse 99   Temp 97.7  F (36.5  C) (Tympanic)   Resp 19   Ht 1.651 m (5' 5\")   Wt 63.5 kg (140 lb)   SpO2 99%   BMI 23.30 kg/m   Estimated body mass index is 23.3 kg/m  as calculated from the following:    Height as of this encounter: 1.651 m (5' 5\").    Weight as of this encounter: 63.5 kg (140 lb).  Medication Reconciliation: complete  Wanda Raza LPN    "

## 2021-03-17 NOTE — PATIENT INSTRUCTIONS
Complete antibiotic course.  Good oral hygiene.   Toradol in place of Ibuprofen.  Can use Tylenol with it up to 1000 mg 4 times per day.  Dental/oral surgeon for definitive cares - extraction.

## 2021-03-24 ENCOUNTER — TELEPHONE (OUTPATIENT)
Dept: CASE MANAGEMENT | Facility: HOSPITAL | Age: 31
End: 2021-03-24

## 2021-03-24 NOTE — TELEPHONE ENCOUNTER
Care Transitions focused note:      Called patient and left a message for her to call this SW back regarding dental issues / insurance / coverage / providers.

## 2021-03-29 NOTE — TELEPHONE ENCOUNTER
Care Transitions focused note:      Called and left another message for patient.  Told her this SW would await her call back and not call again.  Happy to help with resources [place] [facility].

## 2021-04-18 ENCOUNTER — HEALTH MAINTENANCE LETTER (OUTPATIENT)
Age: 31
End: 2021-04-18

## 2021-05-09 DIAGNOSIS — H10.13 ALLERGIC CONJUNCTIVITIS, BILATERAL: ICD-10-CM

## 2021-05-09 RX ORDER — OLOPATADINE HYDROCHLORIDE 2 MG/ML
SOLUTION/ DROPS OPHTHALMIC
Qty: 2.5 ML | Refills: 0 | Status: SHIPPED | OUTPATIENT
Start: 2021-05-09 | End: 2021-06-10

## 2021-05-09 NOTE — TELEPHONE ENCOUNTER
S[facility]       Last Written Prescription Date:  8/6/2020  Last Fill Quantity: 2.5mL,   # refills: 0  Last Office Visit: 3/17/2021  Future Office visit:

## 2021-06-10 DIAGNOSIS — H10.13 ALLERGIC CONJUNCTIVITIS, BILATERAL: ICD-10-CM

## 2021-06-10 RX ORDER — OLOPATADINE HYDROCHLORIDE 2 MG/ML
SOLUTION/ DROPS OPHTHALMIC
Qty: 2.5 ML | Refills: 0 | Status: SHIPPED | OUTPATIENT
Start: 2021-06-10

## 2021-06-27 ENCOUNTER — APPOINTMENT (OUTPATIENT)
Dept: CT IMAGING | Facility: HOSPITAL | Age: 31
End: 2021-06-27
Attending: PHYSICIAN ASSISTANT
Payer: COMMERCIAL

## 2021-06-27 ENCOUNTER — HOSPITAL ENCOUNTER (EMERGENCY)
Facility: HOSPITAL | Age: 31
Discharge: HOME | End: 2021-06-28
Attending: PHYSICIAN ASSISTANT | Admitting: PHYSICIAN ASSISTANT
Payer: COMMERCIAL

## 2021-06-27 DIAGNOSIS — R10.32 LEFT LOWER QUADRANT ABDOMINAL PAIN OF UNKNOWN ETIOLOGY: ICD-10-CM

## 2021-06-27 LAB
ALBUMIN SERPL-MCNC: 4.5 G/DL (ref 3.4–5)
ALBUMIN UR-MCNC: NEGATIVE MG/DL
ALP SERPL-CCNC: 81 U/L (ref 40–150)
ALT SERPL W P-5'-P-CCNC: 15 U/L (ref 0–50)
ANION GAP SERPL CALCULATED.3IONS-SCNC: 5 MMOL/L (ref 3–14)
APPEARANCE UR: CLEAR
AST SERPL W P-5'-P-CCNC: 16 U/L (ref 0–45)
BASOPHILS # BLD AUTO: 0.1 10E9/L (ref 0–0.2)
BASOPHILS NFR BLD AUTO: 0.7 %
BILIRUB SERPL-MCNC: 0.4 MG/DL (ref 0.2–1.3)
BILIRUB UR QL STRIP: NEGATIVE
BUN SERPL-MCNC: 7 MG/DL (ref 7–30)
CALCIUM SERPL-MCNC: 9.1 MG/DL (ref 8.5–10.1)
CHLORIDE SERPL-SCNC: 104 MMOL/L (ref 94–109)
CO2 SERPL-SCNC: 29 MMOL/L (ref 20–32)
COLOR UR AUTO: NORMAL
CREAT SERPL-MCNC: 0.74 MG/DL (ref 0.52–1.04)
DIFFERENTIAL METHOD BLD: NORMAL
EOSINOPHIL # BLD AUTO: 0.4 10E9/L (ref 0–0.7)
EOSINOPHIL NFR BLD AUTO: 5 %
ERYTHROCYTE [DISTWIDTH] IN BLOOD BY AUTOMATED COUNT: 12.2 % (ref 10–15)
GFR SERPL CREATININE-BSD FRML MDRD: >90 ML/MIN/{1.73_M2}
GLUCOSE SERPL-MCNC: 88 MG/DL (ref 70–99)
GLUCOSE UR STRIP-MCNC: NEGATIVE MG/DL
HCG UR QL: NEGATIVE
HCT VFR BLD AUTO: 40.9 % (ref 35–47)
HGB BLD-MCNC: 13.9 G/DL (ref 11.7–15.7)
HGB UR QL STRIP: NEGATIVE
IMM GRANULOCYTES # BLD: 0 10E9/L (ref 0–0.4)
IMM GRANULOCYTES NFR BLD: 0.3 %
KETONES UR STRIP-MCNC: NEGATIVE MG/DL
LEUKOCYTE ESTERASE UR QL STRIP: NEGATIVE
LYMPHOCYTES # BLD AUTO: 2.9 10E9/L (ref 0.8–5.3)
LYMPHOCYTES NFR BLD AUTO: 41.5 %
MCH RBC QN AUTO: 28.6 PG (ref 26.5–33)
MCHC RBC AUTO-ENTMCNC: 34 G/DL (ref 31.5–36.5)
MCV RBC AUTO: 84 FL (ref 78–100)
MONOCYTES # BLD AUTO: 0.5 10E9/L (ref 0–1.3)
MONOCYTES NFR BLD AUTO: 7.3 %
NEUTROPHILS # BLD AUTO: 3.2 10E9/L (ref 1.6–8.3)
NEUTROPHILS NFR BLD AUTO: 45.2 %
NITRATE UR QL: NEGATIVE
NRBC # BLD AUTO: 0 10*3/UL
NRBC BLD AUTO-RTO: 0 /100
PH UR STRIP: 7 PH (ref 4.7–8)
PLATELET # BLD AUTO: 350 10E9/L (ref 150–450)
POTASSIUM SERPL-SCNC: 3.6 MMOL/L (ref 3.4–5.3)
PROT SERPL-MCNC: 8.2 G/DL (ref 6.8–8.8)
RBC # BLD AUTO: 4.86 10E12/L (ref 3.8–5.2)
SODIUM SERPL-SCNC: 138 MMOL/L (ref 133–144)
SOURCE: NORMAL
SP GR UR STRIP: 1.01 (ref 1–1.03)
UROBILINOGEN UR STRIP-MCNC: NORMAL MG/DL (ref 0–2)
WBC # BLD AUTO: 7 10E9/L (ref 4–11)

## 2021-06-27 PROCEDURE — 74177 CT ABD & PELVIS W/CONTRAST: CPT

## 2021-06-27 PROCEDURE — 96374 THER/PROPH/DIAG INJ IV PUSH: CPT | Mod: XU

## 2021-06-27 PROCEDURE — 96375 TX/PRO/DX INJ NEW DRUG ADDON: CPT

## 2021-06-27 PROCEDURE — 36415 COLL VENOUS BLD VENIPUNCTURE: CPT

## 2021-06-27 PROCEDURE — 99284 EMERGENCY DEPT VISIT MOD MDM: CPT | Performed by: PHYSICIAN ASSISTANT

## 2021-06-27 PROCEDURE — 81003 URINALYSIS AUTO W/O SCOPE: CPT | Performed by: PHYSICIAN ASSISTANT

## 2021-06-27 PROCEDURE — 255N000002 HC RX 255 OP 636: Performed by: PHYSICIAN ASSISTANT

## 2021-06-27 PROCEDURE — 99285 EMERGENCY DEPT VISIT HI MDM: CPT | Mod: 25

## 2021-06-27 PROCEDURE — 85025 COMPLETE CBC W/AUTO DIFF WBC: CPT | Performed by: PHYSICIAN ASSISTANT

## 2021-06-27 PROCEDURE — 96376 TX/PRO/DX INJ SAME DRUG ADON: CPT

## 2021-06-27 PROCEDURE — 80053 COMPREHEN METABOLIC PANEL: CPT | Performed by: PHYSICIAN ASSISTANT

## 2021-06-27 PROCEDURE — 258N000003 HC RX IP 258 OP 636: Performed by: PHYSICIAN ASSISTANT

## 2021-06-27 PROCEDURE — 81025 URINE PREGNANCY TEST: CPT | Performed by: PHYSICIAN ASSISTANT

## 2021-06-27 PROCEDURE — 250N000011 HC RX IP 250 OP 636: Performed by: PHYSICIAN ASSISTANT

## 2021-06-27 RX ORDER — HYDROMORPHONE HYDROCHLORIDE 1 MG/ML
0.5 INJECTION, SOLUTION INTRAMUSCULAR; INTRAVENOUS; SUBCUTANEOUS
Status: COMPLETED | OUTPATIENT
Start: 2021-06-27 | End: 2021-06-27

## 2021-06-27 RX ORDER — KETOROLAC TROMETHAMINE 30 MG/ML
30 INJECTION, SOLUTION INTRAMUSCULAR; INTRAVENOUS ONCE
Status: COMPLETED | OUTPATIENT
Start: 2021-06-27 | End: 2021-06-27

## 2021-06-27 RX ORDER — ONDANSETRON 2 MG/ML
4 INJECTION INTRAMUSCULAR; INTRAVENOUS EVERY 30 MIN PRN
Status: DISCONTINUED | OUTPATIENT
Start: 2021-06-27 | End: 2021-06-28 | Stop reason: HOSPADM

## 2021-06-27 RX ORDER — OXYCODONE AND ACETAMINOPHEN 5; 325 MG/1; MG/1
1 TABLET ORAL EVERY 6 HOURS PRN
Qty: 10 TABLET | Refills: 0 | Status: SHIPPED | OUTPATIENT
Start: 2021-06-27 | End: 2021-08-23

## 2021-06-27 RX ORDER — IOPAMIDOL 612 MG/ML
100 INJECTION, SOLUTION INTRAVASCULAR ONCE
Status: COMPLETED | OUTPATIENT
Start: 2021-06-27 | End: 2021-06-27

## 2021-06-27 RX ORDER — ONDANSETRON 4 MG/1
4 TABLET, ORALLY DISINTEGRATING ORAL EVERY 8 HOURS PRN
Qty: 10 TABLET | Refills: 0 | Status: SHIPPED | OUTPATIENT
Start: 2021-06-27

## 2021-06-27 RX ORDER — METOCLOPRAMIDE HYDROCHLORIDE 5 MG/ML
10 INJECTION INTRAMUSCULAR; INTRAVENOUS ONCE
Status: COMPLETED | OUTPATIENT
Start: 2021-06-27 | End: 2021-06-27

## 2021-06-27 RX ADMIN — SODIUM CHLORIDE 1000 ML: 9 INJECTION, SOLUTION INTRAVENOUS at 20:47

## 2021-06-27 RX ADMIN — KETOROLAC TROMETHAMINE 30 MG: 30 INJECTION, SOLUTION INTRAMUSCULAR; INTRAVENOUS at 20:47

## 2021-06-27 RX ADMIN — HYDROMORPHONE HYDROCHLORIDE 0.5 MG: 1 INJECTION, SOLUTION INTRAMUSCULAR; INTRAVENOUS; SUBCUTANEOUS at 21:49

## 2021-06-27 RX ADMIN — ONDANSETRON 4 MG: 2 INJECTION INTRAMUSCULAR; INTRAVENOUS at 21:48

## 2021-06-27 RX ADMIN — METOCLOPRAMIDE 10 MG: 5 INJECTION, SOLUTION INTRAMUSCULAR; INTRAVENOUS at 22:58

## 2021-06-27 RX ADMIN — HYDROMORPHONE HYDROCHLORIDE 0.5 MG: 1 INJECTION, SOLUTION INTRAMUSCULAR; INTRAVENOUS; SUBCUTANEOUS at 20:47

## 2021-06-27 RX ADMIN — IOPAMIDOL 100 ML: 612 INJECTION, SOLUTION INTRAVENOUS at 21:35

## 2021-06-27 ASSESSMENT — ENCOUNTER SYMPTOMS
FEVER: 0
FLANK PAIN: 0
VOMITING: 0
ACTIVITY CHANGE: 0
NAUSEA: 0
CHILLS: 0
SHORTNESS OF BREATH: 0
ABDOMINAL PAIN: 1
BACK PAIN: 0
APPETITE CHANGE: 0

## 2021-06-28 ENCOUNTER — APPOINTMENT (OUTPATIENT)
Dept: ULTRASOUND IMAGING | Facility: HOSPITAL | Age: 31
End: 2021-06-28
Attending: PHYSICIAN ASSISTANT
Payer: COMMERCIAL

## 2021-06-28 VITALS
RESPIRATION RATE: 16 BRPM | DIASTOLIC BLOOD PRESSURE: 78 MMHG | OXYGEN SATURATION: 96 % | SYSTOLIC BLOOD PRESSURE: 105 MMHG | TEMPERATURE: 98.9 F | HEART RATE: 78 BPM

## 2021-06-28 PROCEDURE — 96376 TX/PRO/DX INJ SAME DRUG ADON: CPT

## 2021-06-28 PROCEDURE — 250N000011 HC RX IP 250 OP 636: Performed by: EMERGENCY MEDICINE

## 2021-06-28 PROCEDURE — 76830 TRANSVAGINAL US NON-OB: CPT

## 2021-06-28 RX ORDER — HYDROMORPHONE HYDROCHLORIDE 1 MG/ML
0.5 INJECTION, SOLUTION INTRAMUSCULAR; INTRAVENOUS; SUBCUTANEOUS ONCE
Status: COMPLETED | OUTPATIENT
Start: 2021-06-28 | End: 2021-06-28

## 2021-06-28 RX ADMIN — HYDROMORPHONE HYDROCHLORIDE 0.5 MG: 1 INJECTION, SOLUTION INTRAMUSCULAR; INTRAVENOUS; SUBCUTANEOUS at 03:28

## 2021-06-28 NOTE — ED NOTES
"Pt with 2-3 hours of sudden onset LLQ abdominal pain. Pt reports her \"usually heavy\" period was supposed to start 3 days ago, which started but has been significantly lighter than usual. Pt with multiple abdominal surgeries, including c-sections and tubal ligation, ovarian cyst removal, and endometriosis. Pt reports no n/v. No changes or pain with urination.  "

## 2021-06-28 NOTE — ED PROVIDER NOTES
History     Chief Complaint   Patient presents with     Abdominal Pain     The history is provided by the patient.     Kathia Milton is a 31 year old female who presented to the emergency department ambulatory for evaluation approximate 3-hour history of left lower quadrant abdominal pain.  Pain is described as a 9 or 10.  Sharp and colicky in nature.  Worse with ambulation.  Multiple intra-abdominal surgeries in the past including appendectomy, C-sections, tubal ligation, ovarian cyst removal, as well as endometriosis.  Denies any fevers or chills.  Denies any nausea or vomiting.  Denies any diarrhea, hematochezia, melena or hematemesis.  Denies any lower urinary tract symptoms.    Allergies:  Allergies   Allergen Reactions     Seafood Anaphylaxis     Shellfish Allergy      Shellfish Derived Products       Problem List:    Patient Active Problem List    Diagnosis Date Noted     Mass of scalp 10/29/2019     Priority: Medium     Added automatically from request for surgery [id number]       Encounter for triage in pregnant patient 2018     Priority: Medium     Endometriosis of pelvis 2016     Priority: Medium     Seasonal allergies      Priority: Medium      delivery 05/15/2013     Priority: Medium     Begin hydroxy progesterone at 16-18 weeks  Cervical length 16 - 22 weeks   ctx.  Received BMZ x two in [place].   TDAP done       Anemia in pregnancy 2013     Priority: Medium     Abdominal pain, acute 2013     Priority: Medium     Tobacco use disorder 10/18/2012     Priority: Medium     quit       Condyloma acuminatum due to human papillomavirus 2011     Priority: Medium     Overview:   IMO Update 10/11          Past Medical History:    Past Medical History:   Diagnosis Date     Benign neoplasm of vagina 2011     Condyloma acuminatum 2011     Constipation      Seasonal allergies      Tobacco use disorder 10/18/2012       Past Surgical History:    Past Surgical  History:   Procedure Laterality Date     APPENDECTOMY  14    [facility]      SECTION      C section     COLONOSCOPY  11/10/15    Dr. Singh     COMBINED  SECTION, TUBAL OCCLUSION Bilateral 3/20/2019    Procedure: COMBINED  SECTION, BILATERAL  TUBAL OCCLUSION;  Surgeon: Damon Fletcher MD;  Location: [facility]     DILATION AND CURETTAGE SUCTION, TREAT MISSED , 1ST TRIMESTER       ENDOSCOPY  11/10/15    Dr. Singh      ENDOSCOPY UPPER, COLONOSCOPY, COMBINED N/A 7/10/2020    Procedure: UPPER ENDOSCOPY with biopsy   AND COLONOSCOPY with cultuer and biopsy;  Surgeon: Bridger Alejandro MD;  Location: [facility]     EXCISE MASS HEAD N/A 2019    Procedure: EXCISION POSTERIOR SCALP MASS;  Surgeon: Babak Stephenson MD;  Location: [facility]     LAPAROSCOPIC CYSTECTOMY OVARIAN (BENIGN) Left 2018    Procedure: LAPAROSCOPIC CYSTECTOMY OVARIAN (BENIGN);  EXPLORATORY  LAPAROSCOPY, LEFT OVARIAN CYSTECTOMY;  Surgeon: Damon Fletcher MD;  Location: [facility]     LAPAROSCOPY  11/30/15    Dr. Sun; lysis of pelvic adhesions, cauterization of minimal endometriosis     LAPAROSCOPY  2018    lysis of adhesions; pelvic pain, endometriosis; Dr. Sun       Family History:    Family History   Problem Relation Age of Onset     Diabetes Other         Grandmother     Heart Disease Other         Heart Disease - Grandmother     Other - See Comments Other         Renal Disease - Grandmother     Unknown/Adopted Mother      Unknown/Adopted Father      Unknown/Adopted Maternal Grandmother      Unknown/Adopted Maternal Grandfather      Unknown/Adopted Paternal Grandmother      Unknown/Adopted Paternal Grandfather      Breast Cancer No family hx of        Social History:  Marital Status:  Single [1]  Social History     Tobacco Use     Smoking status: Former Smoker     Types: Cigarettes     Quit date: 2012     Years since quittin.5     Smokeless tobacco: Never Used     Tobacco comment: trying to quit 8/10/18    Substance Use Topics     Alcohol use: No     Alcohol/week: 0.0 standard drinks     Drug use: No        Medications:    ibuprofen (ADVIL/MOTRIN) 800 MG tablet  olopatadine (PATADAY) 0.2 % ophthalmic solution  ondansetron (ZOFRAN-ODT) 4 MG ODT tab  oxyCODONE-acetaminophen (PERCOCET) 5-325 MG tablet          Review of Systems   Constitutional: Negative for activity change, appetite change, chills and fever.   Respiratory: Negative for shortness of breath.    Gastrointestinal: Positive for abdominal pain. Negative for nausea and vomiting.   Genitourinary: Positive for pelvic pain. Negative for flank pain, urgency, vaginal bleeding and vaginal discharge.   Musculoskeletal: Negative for back pain.   Skin: Negative.        Physical Exam   BP: 112/76  Pulse: 92  Temp: 98.9  F (37.2  C)  Resp: 16  SpO2: 99 %      Physical Exam  Vitals signs and nursing note reviewed.   Constitutional:       General: She is not in acute distress.     Appearance: Normal appearance. She is normal weight. She is not ill-appearing, toxic-appearing or diaphoretic.   Cardiovascular:      Rate and Rhythm: Normal rate.   Pulmonary:      Effort: Pulmonary effort is normal.   Abdominal:      Palpations: Abdomen is soft.      Tenderness: There is abdominal tenderness. There is guarding.       Skin:     General: Skin is warm and dry.      Capillary Refill: Capillary refill takes less than 2 seconds.   Neurological:      General: No focal deficit present.      Mental Status: She is alert and oriented to person, place, and time.         ED Course     ED Course as of Jun 29 1057   Sun Jun 27, 2021   2231 Pain is improved.  Patient continues to have nausea.  Trial Reglan.        Procedures               Critical Care time:  none               No results found for this or any previous visit (from the past 24 hour(s)).    Medications   0.9% sodium chloride BOLUS (0 mLs Intravenous Stopped 6/27/21 6486)   ketorolac (TORADOL) injection 30 mg (30 mg Intravenous  Given 6/27/21 2047)   HYDROmorphone (PF) (DILAUDID) injection 0.5 mg (0.5 mg Intravenous Given 6/27/21 2047)   iopamidol (ISOVUE-300) IV solution 61% 100 mL (100 mLs Intravenous Given 6/27/21 2135)   sodium chloride (PF) 0.9% PF flush 60 mL (50 mLs Intravenous Given 6/27/21 2135)   HYDROmorphone (PF) (DILAUDID) injection 0.5 mg (0.5 mg Intravenous Given 6/27/21 2149)   metoclopramide (REGLAN) injection 10 mg (10 mg Intravenous Given 6/27/21 2258)   HYDROmorphone (PF) (DILAUDID) injection 0.5 mg (0.5 mg Intravenous Given 6/28/21 0328)       Assessments & Plan (with Medical Decision Making)   Findings as above.  31-year-old female presented with an abrupt onset of left lower quadrant abdominal pain.  Pain described as severe in nature.  CT scan showed no emergent findings.  Pelvic ultrasound showed no evidence of tubo-ovarian abscess or ovarian torsion.  Pain improved with the above medications.  Small mount of pain medications for home.  Close follow-up.  Return here for any new symptoms, worsening symptoms, or other concerns.  HPI, exam, symptoms, and work-up are not consistent with acute mesenteric ischemia, small bowel obstruction, aortic dissection, acute pancreatitis, tubo-ovarian abscess, tubal pregnancy, ovarian torsion, or other intra-abdominal catastrophe.    This document was prepared using a combination of typing and voice generated software.  While every attempt was made for accuracy, spelling and grammatical errors may exist.    I have reviewed the nursing notes.    I have reviewed the findings, diagnosis, plan and need for follow up with the patient.          Discharge Medication List as of 6/28/2021  3:37 AM      START taking these medications    Details   ondansetron (ZOFRAN-ODT) 4 MG ODT tab Take 1 tablet (4 mg) by mouth every 8 hours as needed for nausea, Disp-10 tablet, R-0, [facility]      oxyCODONE-acetaminophen (PERCOCET) 5-325 MG tablet Take 1 tablet by mouth every 6 hours as needed for severe  pain, Disp-10 tablet, R-0, [facility]             Final diagnoses:   Left lower quadrant abdominal pain of unknown etiology       6/27/2021   [facility]     Steven Dickson PA-C  06/29/21 1057

## 2021-06-28 NOTE — DISCHARGE INSTRUCTIONS
What to expect when you have contrast    During your exam, we will inject  contrast  into your vein or artery. (Contrast is a clear liquid with iodine in it. It shows up on X-rays.)    You may feel warm or hot. You may have a metal taste in your mouth and a slight upset stomach. You may also feel pressure near the kidneys and bladder. These effects will last about 1 to 3 minutes.    Please tell us if you have:   Sneezing    Itching   Hives    Swelling in the face   A hoarse voice   Breathing problems   Other new symptoms    Serious problems are rare.  They may include:   Irregular heartbeat    Seizures   Kidney failure             Tissue damage   Shock     Death    If you have any problems during the exam, we  will treat them right away.    When you get home    Call your hospital if you have any new symptoms in the next 2 days, like hives or swelling. (Phone numbers are at the bottom of this page.) Or call your family doctor.     If you have wheezing or trouble breathing, call 911.    Self-care  -Drink at least 4 extra glasses of water today.   This reduces the stress on your kidneys.  -Keep taking your regular medicines.    The contrast will pass out of your body in your  Urine(pee). This will happen in the next 24 hours. You  will not feel this. Your urine will not  change color.    If you have kidney problems or take metformin    Drink 4 to 8 large glasses of water for the next  2 days, if you are not on a fluid restriction.    ?If you take metformin (Glucophage or Glucovance) for diabetes, keep taking it.      ?Your kidney function tests are abnormal.  If you take Metformin, do not take it for 48 hours. Please go to your clinic for a blood test within 3 days after your exam before the restarting this medicine.     (Note to provider:please give patient prescription for lab tests.)    ?Special instructions:     I have read and understand the above information.    Patient Sign  Here:______________________________________Date:________Time:______    Staff Sign Here:________________________________________Date:_______Time:______      Radiology Departments:     ?[facility]: [phone] ?[place]: [phone]     ?[place]: [phone] ?[place]:[phone]      ?[place]: [phone]  ?[place]: [phone]  ?[place]:[phone]    ?[facility]:[phone]  ?[facility]:[phone]    Rest and stay hydrated.    Pain medications as needed.    Follow-up in the clinic this week for recheck.    Return here for any worsening symptoms, new symptoms, or other concerns.

## 2021-08-23 ENCOUNTER — APPOINTMENT (OUTPATIENT)
Dept: GENERAL RADIOLOGY | Facility: HOSPITAL | Age: 31
End: 2021-08-23
Attending: NURSE PRACTITIONER
Payer: COMMERCIAL

## 2021-08-23 ENCOUNTER — HOSPITAL ENCOUNTER (EMERGENCY)
Facility: HOSPITAL | Age: 31
Discharge: HOME | End: 2021-08-23
Attending: NURSE PRACTITIONER | Admitting: NURSE PRACTITIONER
Payer: COMMERCIAL

## 2021-08-23 VITALS
OXYGEN SATURATION: 99 % | TEMPERATURE: 100.3 F | SYSTOLIC BLOOD PRESSURE: 121 MMHG | HEART RATE: 100 BPM | RESPIRATION RATE: 16 BRPM | DIASTOLIC BLOOD PRESSURE: 88 MMHG

## 2021-08-23 DIAGNOSIS — S90.32XA CONTUSION OF LEFT FOOT, INITIAL ENCOUNTER: ICD-10-CM

## 2021-08-23 PROCEDURE — 73610 X-RAY EXAM OF ANKLE: CPT | Mod: LT

## 2021-08-23 PROCEDURE — 250N000011 HC RX IP 250 OP 636: Performed by: NURSE PRACTITIONER

## 2021-08-23 PROCEDURE — 99214 OFFICE O/P EST MOD 30 MIN: CPT | Performed by: NURSE PRACTITIONER

## 2021-08-23 PROCEDURE — 73630 X-RAY EXAM OF FOOT: CPT | Mod: LT

## 2021-08-23 PROCEDURE — 96372 THER/PROPH/DIAG INJ SC/IM: CPT | Performed by: NURSE PRACTITIONER

## 2021-08-23 PROCEDURE — G0463 HOSPITAL OUTPT CLINIC VISIT: HCPCS | Mod: 25

## 2021-08-23 RX ORDER — KETOROLAC TROMETHAMINE 30 MG/ML
30 INJECTION, SOLUTION INTRAMUSCULAR; INTRAVENOUS ONCE
Status: DISCONTINUED | OUTPATIENT
Start: 2021-08-23 | End: 2021-08-23 | Stop reason: CLARIF

## 2021-08-23 RX ORDER — KETOROLAC TROMETHAMINE 30 MG/ML
30 INJECTION, SOLUTION INTRAMUSCULAR; INTRAVENOUS ONCE
Status: COMPLETED | OUTPATIENT
Start: 2021-08-23 | End: 2021-08-23

## 2021-08-23 RX ADMIN — KETOROLAC TROMETHAMINE 30 MG: 30 INJECTION, SOLUTION INTRAMUSCULAR; INTRAVENOUS at 21:32

## 2021-08-23 ASSESSMENT — ENCOUNTER SYMPTOMS
VOMITING: 0
FEVER: 1
NUMBNESS: 1
NAUSEA: 0
ACTIVITY CHANGE: 1
COLOR CHANGE: 1

## 2021-08-24 NOTE — ED TRIAGE NOTES
Pt presents with her mom and has pain, swelling and bruising to her left foot since tonight when her bicycle trailer fell on her left foot and then she twisted her left ankle also.

## 2021-08-24 NOTE — ED TRIAGE NOTES
Patient reports 1 hour ago was adjusted her child in a bike trailer; the trailer collapsed and landed on the top of her right foot.  Also notes she rolled same ankle as she was trying to get away.  Swelling noted; small scrap noted to lower leg; CMS intact.  Patient unable to bear weight.

## 2021-08-24 NOTE — ED PROVIDER NOTES
History     Chief Complaint   Patient presents with     Foot Injury     Trauma     HPI  Kathia Milton is a 31 year old female who who is accompanied per her mom.  She presents with left foot and ankle pain after a bicycle trailer fell on his foot.  Accompanied with low grade fever, numbness in her toes and throbs up into her knee and lower leg. Has applied ice. Denies previous injuries. Smoker. Denies chills, nausea, and vomiting.    Musculoskeletal problem/pain      Duration: tonight    Description  Location: left foot and ankle    Intensity:  8/10 throbs up into the knee    Accompanying signs and symptoms: numbness in toes    History  Previous similar problem: no   Previous evaluation:  none    Precipitating or alleviating factors:  Trauma or overuse: YES- dropped bicycle trailer on her left foot  Aggravating factors include: walking    Therapies tried and outcome: ice     Allergies:  Allergies   Allergen Reactions     Seafood Anaphylaxis     Shellfish Allergy      Shellfish Derived Products       Problem List:    Patient Active Problem List    Diagnosis Date Noted     Mass of scalp 10/29/2019     Priority: Medium     Added automatically from request for surgery [id number]       Encounter for triage in pregnant patient 2018     Priority: Medium     Endometriosis of pelvis 2016     Priority: Medium     Seasonal allergies      Priority: Medium      delivery 05/15/2013     Priority: Medium     Begin hydroxy progesterone at 16-18 weeks  Cervical length 16 - 22 weeks   ctx.  Received BMZ x two in [place].   TDAP done       Anemia in pregnancy 2013     Priority: Medium     Abdominal pain, acute 2013     Priority: Medium     Tobacco use disorder 10/18/2012     Priority: Medium     quit       Condyloma acuminatum due to human papillomavirus 2011     Priority: Medium     Overview:   IMO Update 10/11          Past Medical History:    Past Medical History:   Diagnosis Date      Benign neoplasm of vagina 2011     Condyloma acuminatum 2011     Constipation      Seasonal allergies      Tobacco use disorder 10/18/2012       Past Surgical History:    Past Surgical History:   Procedure Laterality Date     APPENDECTOMY  14    [place]      SECTION      C section     COLONOSCOPY  11/10/15    Dr. Singh     COMBINED  SECTION, TUBAL OCCLUSION Bilateral 3/20/2019    Procedure: COMBINED  SECTION, BILATERAL  TUBAL OCCLUSION;  Surgeon: Damon Fletcher MD;  [facility]     DILATION AND CURETTAGE SUCTION, TREAT MISSED , 1ST TRIMESTER       ENDOSCOPY  11/10/15    Dr. Singh      ENDOSCOPY UPPER, COLONOSCOPY, COMBINED N/A 7/10/2020    Procedure: UPPER ENDOSCOPY with biopsy   AND COLONOSCOPY with cultuer and biopsy;  Surgeon: Bridger Alejandro MD;  [facility]: [facility]     EXCISE MASS HEAD N/A 2019    Procedure: EXCISION POSTERIOR SCALP MASS;  Surgeon: Babak Stephenson MD;  [facility]     LAPAROSCOPIC CYSTECTOMY OVARIAN (BENIGN) Left 2018    Procedure: LAPAROSCOPIC CYSTECTOMY OVARIAN (BENIGN);  EXPLORATORY  LAPAROSCOPY, LEFT OVARIAN CYSTECTOMY;  Surgeon: Damon Fletcher MD;  [facility]     LAPAROSCOPY  11/30/15    Dr. Sun; lysis of pelvic adhesions, cauterization of minimal endometriosis     LAPAROSCOPY  2018    lysis of adhesions; pelvic pain, endometriosis; Dr. Sun       Family History:    Family History   Problem Relation Age of Onset     Diabetes Other         Grandmother     Heart Disease Other         Heart Disease - Grandmother     Other - See Comments Other         Renal Disease - Grandmother     Unknown/Adopted Mother      Unknown/Adopted Father      Unknown/Adopted Maternal Grandmother      Unknown/Adopted Maternal Grandfather      Unknown/Adopted Paternal Grandmother      Unknown/Adopted Paternal Grandfather      Breast Cancer No family hx of        Social History:  Marital Status:  Single [1]  Social History      Tobacco Use     Smoking status: Former Smoker     Types: Cigarettes     Quit date: 2012     Years since quittin.7     Smokeless tobacco: Never Used     Tobacco comment: trying to quit 8/10/18   Substance Use Topics     Alcohol use: No     Alcohol/week: 0.0 standard drinks     Drug use: No        Medications:    ibuprofen (ADVIL/MOTRIN) 800 MG tablet  olopatadine (PATADAY) 0.2 % ophthalmic solution  ondansetron (ZOFRAN-ODT) 4 MG ODT tab          Review of Systems   Constitutional: Positive for activity change and fever.   Gastrointestinal: Negative for nausea and vomiting.   Musculoskeletal:        Left ankle and foot pain.    Skin: Positive for color change (ecchymotic).   Neurological: Positive for numbness (toes).       Physical Exam   BP: 121/88  Pulse: 100  Temp: 100.3  F (37.9  C)  Resp: 16  SpO2: 99 %      Physical Exam  Vitals and nursing note reviewed.   Constitutional:       General: She is in acute distress (Moderate).      Appearance: She is normal weight.   Cardiovascular:      Pulses:           Dorsalis pedis pulses are 3+ on the left side.        Posterior tibial pulses are 3+ on the left side.   Pulmonary:      Effort: Pulmonary effort is normal.   Musculoskeletal:         General: Swelling (Mild over left ankle and proximal region of foot) and tenderness present.        Feet:    Feet:      Left foot:      Skin integrity: No skin breakdown or erythema.   Skin:     General: Skin is dry.      Findings: No bruising or erythema.   Neurological:      Mental Status: She is alert and oriented to person, place, and time.   Psychiatric:         Behavior: Behavior normal.         ED Course        Procedures             Results for orders placed or performed during the hospital encounter of 21 (from the past 24 hour(s))   Foot XR, G/E 3 views, left    Narrative    PROCEDURE:  XR FOOT LEFT G/E 3 VIEWS    HISTORY: left foot purple,pain and swelling- bicycle trailer fell on  her left foot and  then she twisted her left ankle also    COMPARISON:  None.    TECHNIQUE:  3 views of the left foot were obtained.    FINDINGS:  No fracture or dislocation is identified. The joint spaces  are preserved.        Impression    IMPRESSION: Normal left foot    JORDON RAINEY MD         SYSTEM ID:  [id number]   XR Ankle Left G/E 3 Views    Narrative    PROCEDURE:  XR ANKLE LEFT G/E 3 VIEWS    HISTORY: pain to left ankle due to a bicycle trailer falling on it  ,swelling also    COMPARISON:  None.    TECHNIQUE:  3 views of the left ankle were obtained.    FINDINGS:  No fracture or dislocation is identified. The joint spaces  are preserved.        Impression    IMPRESSION: No acute fracture.      JORDON RAINEY MD         SYSTEM ID:  [id number]       Medications   ketorolac (TORADOL) injection 30 mg (30 mg Intramuscular Given 8/23/21 2132)       Assessments & Plan (with Medical Decision Making)     I have reviewed the nursing notes.    I have reviewed the findings, diagnosis, plan and need for follow up with the patient.  (S90.32XA) Contusion of left foot, initial encounter  Comment: 31 year old female who who is accompanied per her mom.  She presents with left foot and ankle pain after a bicycle trailer fell on her foot.  Accompanied with low grade fever, numbness in her toes, and throbbing that radiates into her lower leg and knee. Has applied ice. Denies previous injuries. Smoker. Denies chills, nausea, and vomiting.    MDM:mild swelling over anterior portion of ankle and distal region of left lower leg. 1 cm linear, superficial, abrasion noted left lower leg. Pedal  Pulses 3+    Left foot and ankle x-ray reviewed and per radiology; normal left foot and no acute fracture noted left ankle.    Ace wrap applied per LPN.  Ketorolac 30 mg given IM.    Plan: Education provided for foot contusion.  Keep affected extremity elevated as much as possible for next 24 - 48 hours. Ice to affected area 20 minutes every hour as  needed for comfort. After 48 hours you can apply heat. Ibuprofen 600 to 800 mg (3 - 4 tabs of over the counter med) every six to eight hours as needed;not to exceed maximum amount of 3200 mg in 24 hours. Take with food. Tylenol 650 to 1000 mg every four to six hours as needed (not to exceed more than 4000 mg in a 24 hour period). May use interchangeably. Suggest medicating around the clock for the next 24-48 hours. Use ace wrap  until you can walk on your foot without having pain. Slowly start to wiggle your toes and move foot as often as possible but not beyond the point of pain. Follow up with primary provider or orthopedics as needed    These discharge instructions and medications were reviewed with her and understanding verbalized.    This document was prepared using a combination of typing and voice generated software.  While every attempt was made for accuracy, spelling and grammatical errors may exist.    Discharge Medication List as of 8/23/2021  9:32 PM          Final diagnoses:   Contusion of left foot, initial encounter       8/23/2021   [facility]       Violet Vicente, CNP  08/24/21 7605

## 2021-08-24 NOTE — DISCHARGE INSTRUCTIONS
Keep affected extremity elevated as much as possible for next 24 - 48 hours. Ice to affected area 20 minutes every hour as needed for comfort. After 48 hours you can apply heat. Ibuprofen 600 to 800 mg (3 - 4 tabs of over the counter med) every six to eight hours as needed;not to exceed maximum amount of 3200 mg in 24 hours. Take with food. Tylenol 650 to 1000 mg every four to six hours as needed (not to exceed more than 4000 mg in a 24 hour period). May use interchangeably. Suggest medicating around the clock for the next 24-48 hours. Use ace wrap  until you can walk on your foot without having pain. Slowly start to wiggle your toes and move foot as often as possible but not beyond the point of pain. Follow up with primary provider or orthopedics as needed

## 2021-10-03 ENCOUNTER — HEALTH MAINTENANCE LETTER (OUTPATIENT)
Age: 31
End: 2021-10-03

## 2021-10-17 NOTE — PROGRESS NOTES
Pt educated on the need to turn every 2 hours to prevent any skin integrity breakdown. Pt verbalizes understanding. Pt compliant with q2 hour turns. See documentation. covid testing preop rg

## 2022-01-22 ENCOUNTER — HEALTH MAINTENANCE LETTER (OUTPATIENT)
Age: 32
End: 2022-01-22

## 2022-05-13 ENCOUNTER — HOSPITAL ENCOUNTER (EMERGENCY)
Facility: HOSPITAL | Age: 32
Discharge: HOME | End: 2022-05-14
Attending: EMERGENCY MEDICINE | Admitting: EMERGENCY MEDICINE
Payer: COMMERCIAL

## 2022-05-13 ENCOUNTER — APPOINTMENT (OUTPATIENT)
Dept: CT IMAGING | Facility: HOSPITAL | Age: 32
End: 2022-05-13
Attending: EMERGENCY MEDICINE
Payer: COMMERCIAL

## 2022-05-13 DIAGNOSIS — S06.0X0A CONCUSSION WITHOUT LOSS OF CONSCIOUSNESS, INITIAL ENCOUNTER: ICD-10-CM

## 2022-05-13 PROCEDURE — 250N000013 HC RX MED GY IP 250 OP 250 PS 637: Performed by: EMERGENCY MEDICINE

## 2022-05-13 PROCEDURE — 70450 CT HEAD/BRAIN W/O DYE: CPT

## 2022-05-13 PROCEDURE — 99284 EMERGENCY DEPT VISIT MOD MDM: CPT | Performed by: EMERGENCY MEDICINE

## 2022-05-13 PROCEDURE — 99284 EMERGENCY DEPT VISIT MOD MDM: CPT | Mod: 25

## 2022-05-13 RX ORDER — ACETAMINOPHEN 325 MG/1
975 TABLET ORAL ONCE
Status: COMPLETED | OUTPATIENT
Start: 2022-05-13 | End: 2022-05-13

## 2022-05-13 RX ORDER — OLANZAPINE 5 MG/1
5 TABLET, ORALLY DISINTEGRATING ORAL ONCE
Status: COMPLETED | OUTPATIENT
Start: 2022-05-13 | End: 2022-05-13

## 2022-05-13 RX ORDER — ONDANSETRON 4 MG/1
4 TABLET, ORALLY DISINTEGRATING ORAL ONCE
Status: DISCONTINUED | OUTPATIENT
Start: 2022-05-13 | End: 2022-05-13

## 2022-05-13 RX ORDER — ACETAMINOPHEN 500 MG
1000 TABLET ORAL EVERY 6 HOURS PRN
COMMUNITY

## 2022-05-13 RX ORDER — OLANZAPINE 5 MG/1
5 TABLET, ORALLY DISINTEGRATING ORAL EVERY 8 HOURS PRN
Qty: 6 TABLET | Refills: 0 | Status: SHIPPED | OUTPATIENT
Start: 2022-05-13

## 2022-05-13 RX ADMIN — ACETAMINOPHEN 975 MG: 325 TABLET ORAL at 22:56

## 2022-05-13 RX ADMIN — OLANZAPINE 5 MG: 5 TABLET, ORALLY DISINTEGRATING ORAL at 23:22

## 2022-05-14 VITALS
SYSTOLIC BLOOD PRESSURE: 116 MMHG | BODY MASS INDEX: 26.51 KG/M2 | OXYGEN SATURATION: 98 % | RESPIRATION RATE: 14 BRPM | WEIGHT: 159.3 LBS | HEART RATE: 83 BPM | DIASTOLIC BLOOD PRESSURE: 84 MMHG | TEMPERATURE: 98.6 F

## 2022-05-14 NOTE — ED TRIAGE NOTES
Pt states 1 week ago her garage door came down and hit her on the right side of head. States at first she felt sore and stiff but over the last week has had increasing headache, nausea and right side of face feeling tingly to pt. Pt denies any confusion or fevers. Denies any vomiting. Pupils equal and reactive in triage.

## 2022-05-23 ASSESSMENT — ENCOUNTER SYMPTOMS
FEVER: 0
SHORTNESS OF BREATH: 0
COUGH: 0
CHILLS: 0

## 2022-05-23 NOTE — ED PROVIDER NOTES
ED Provider Note  [facility]      History     Chief Complaint   Patient presents with     Headache     HPI  Kathia Milton is a 32 year old female who is here with headache.  States garage door landed on the right side of her head.  Since that trauma, headache has been more or less persistent with breaks every now and then.  Acetaminophen ibuprofen has not resolved headache.  No vision changes, no dizziness, does have nausea and right-sided face feels tingly.  No vomiting.  No neck pain or stiffness.  No weakness or numbness of lower extremities, no bowel or bladder incontinence.  Headache is right-sided, throbbing.  Severe.          Review of Systems   Constitutional: Negative for chills and fever.   Respiratory: Negative for cough and shortness of breath.    All other systems reviewed and are negative.        Physical Exam   BP: 131/86  Pulse: 95  Temp: 98.6  F (37  C)  Resp: 16  Weight: 72.3 kg (159 lb 4.8 oz)  SpO2: 100 %  Physical Exam  Constitutional:       General: She is not in acute distress.     Appearance: She is not diaphoretic.   HENT:      Head: Normocephalic and atraumatic.      Right Ear: External ear normal.      Left Ear: External ear normal.      Nose: No congestion or rhinorrhea.      Mouth/Throat:      Pharynx: Oropharynx is clear. No oropharyngeal exudate.   Eyes:      General: No scleral icterus.     Pupils: Pupils are equal, round, and reactive to light.   Cardiovascular:      Rate and Rhythm: Normal rate and regular rhythm.      Heart sounds: Normal heart sounds.   Pulmonary:      Effort: No respiratory distress.      Breath sounds: Normal breath sounds.   Abdominal:      General: Bowel sounds are normal.      Palpations: Abdomen is soft.      Tenderness: There is no abdominal tenderness.   Musculoskeletal:         General: No tenderness.      Cervical back: Normal range of motion and neck supple.      Right lower leg: No edema.      Left lower leg: No edema.    Skin:     General: Skin is warm.      Capillary Refill: Capillary refill takes less than 2 seconds.      Findings: No rash.   Neurological:      General: No focal deficit present.      Mental Status: She is oriented to person, place, and time. Mental status is at baseline.      Cranial Nerves: No cranial nerve deficit.      Sensory: No sensory deficit.      Motor: No weakness.      Coordination: Coordination normal.      Gait: Gait normal.      Comments: CN II through XII intact, finger-nose quick and coordinated bilaterally, gait steady   Psychiatric:         Mood and Affect: Mood normal.         Behavior: Behavior normal.         ED Course      Procedures                     Results for orders placed or performed during the hospital encounter of 05/13/22   Head CT w/o contrast     Status: None    Narrative    PROCEDURE: CT HEAD W/O CONTRAST   5/13/2022 11:23 PM    HISTORY:Female, age,  32 years, , , Cerebral hemorrhage suspected    COMPARISON: 9/14/2013    TECHNIQUE: CT of the brain without contrast.    FINDINGS: Ventricles and sulci are normal in size and shape . Gray and  white matter demonstrate normal differentiation.    There is no evidence of mass, mass effect or midline shift. No  evidence of acute hemorrhage.    The bones are unremarkable. No fracture.       Impression    IMPRESSION:   No acute intracranial abnormality.  No acute fracture. Normal  examination.    This report is in agreement with the preliminary report.    JAMIE MICHELLE MD         SYSTEM ID:  [id number]     Medications   acetaminophen (TYLENOL) tablet 975 mg (975 mg Oral Given 5/13/22 2259)   OLANZapine zydis (zyPREXA) ODT tab 5 mg (5 mg Oral Given 5/13/22 9052)        Assessments & Plan (with Medical Decision Making)     32-year-old female here with probable concussion versus migraine.  Due to trauma obtain CT head which is unremarkable.  Per [facility] no need for CT spine.  Headache improved after Zyprexa, wrote small prescription for  that.  Okay to follow-up with her primary care provider.    I have reviewed the nursing notes. I have reviewed the findings, diagnosis, plan and need for follow up with the patient.    Discharge Medication List as of 5/14/2022 12:04 AM      START taking these medications    Details   OLANZapine zydis (ZYPREXA) 5 MG ODT Take 1 tablet (5 mg) by mouth every 8 hours as needed for other (migraine), Disp-6 tablet, R-0, E-Prescribe             Final diagnoses:   Concussion without loss of consciousness, initial encounter       --    [facility]  5/13/2022     Ervin Peterson MD  05/23/22 9169

## 2022-09-04 ENCOUNTER — HEALTH MAINTENANCE LETTER (OUTPATIENT)
Age: 32
End: 2022-09-04

## 2022-10-13 NOTE — ED AVS SNAPSHOT
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      Follow-up Information     Follow up with Lainey Rose MD In 10 days.    Specialty:  Family Practice    Why:  Or Urgent Care, for Staple removal, sooner if redness/swelling develop    Contact information:    [address] [place] [address]  [phone]          Follow up with [facility].    Specialty:  EMERGENCY MEDICINE    Why:  If further concerns develop    Contact information:    [address]  [place] [address] [phone]    Additional information:    From [place]: Take [address]. Turn left at [address] [facility]. Turn left at the first stoplight on [address]. At the first stop sign, take a right onto [address]. Take a left into the parking lot and continue through until you reach the North enterance of the building.       From [place]: Take [address]. Take the [address] ramp towards [place]. Turn left onto [address]. Take a slight right onto [address]. Turn left at the first stoplight on [address]. At the first stop sign, take a right onto [address]. Take a left into the parking lot and continue through until you reach the North enterance of the building.       From [place]: Take [address]. Take a right at [address]. At the first stop sign, take a right onto [address]. Take a left into the parking lot and continue through until you reach the North enterance of the building.       Discharge References/Attachments   OFFICE VISIT    Patient: Myriam Ortiz   : 1975 MRN: [id number]    SUBJECTIVE:  Chief Complaint   Patient presents with   • Office Visit   • Follow-up   • Follow-up Hypertension       Patient has given consent to record this visit for documentation in their clinical record.    A 47 year old male presents for follow up of multiple medical conditions.    HISTORY OF PRESENT ILLNESS:  Healthcare maintenance:   Colon cancer screening: Due for colon cancer screening.   Prostate cancer screening: The last PSA was in 2022 which was normal.   Immunizations: Had Tdap vaccine in  and three doses of COVID-19 vaccine. Due for pneumonia vaccine.     Essential hypertension: Blood pressure is good. Currently, on Losartan 25 mg; has no side effects.       Uncontrolled type 2 diabetes mellitus with hyperglycemia (CMS/HCC): HbA1c was in 6's% last time. Currently, on Metformin 500 mg; has no side effects.      Overweight (BMI 25.0-29.9): He is overweight.      Decreased GFR: His GFR was decreased. Avoiding nsaid's and taking adequate hydration      Autoimmune disorder (CMS/HCC); Leukopenia, unspecified type: JACQUI positive. BC were low. He is seeing Dr. Fermin Davis.     PAST MEDICAL HISTORY:  Past Medical History:   Diagnosis Date   • Diabetes mellitus (CMS/HCC)    • Leukopenia      MEDICATIONS:  Current Outpatient Medications   Medication Sig Dispense Refill   • metFORMIN (GLUCOPHAGE-XR) 500 MG 24 hr tablet TAKE 1 TABLET BY MOUTH EVERY DAY WITH BREAKFAST 90 tablet 1   • Glucose Blood (BLOOD GLUCOSE TEST STRIPS) Strip DM type 2 100 strip 11   • losartan (COZAAR) 25 MG tablet Take 1 tablet by mouth daily. 90 tablet 2   • Blood Glucose Monitoring Suppl w/Device Kit Check sugars once daily 1 kit 11   • Alcohol Swabs (Alcohol Wipes) 70 % Pads 2 times daily. 100 each 11   • clobetasol (TEMOVATE) 0.05 % cream Apply topically 2 times daily. 30 g 2     No current facility-administered medications for this visit.      ALLERGIES:  Allergies as of 10/12/2022   • (No Known Allergies)     FAMILY HISTORY:  Family History   Problem Relation Age of Onset   • Diabetes Mother    • Hyperlipidemia Father    • Cancer, Colon Maternal Grandmother         diagnosed at age 70   • Diabetes Paternal Grandmother      SOCIAL HISTORY:  Social History     Tobacco Use   • Smoking status: Current Every Day Smoker     Types: Cigars   • Smokeless tobacco: Never Used   Vaping Use   • Vaping Use: never used   Substance Use Topics   • Alcohol use: Yes     Comment: social   • Drug use: No     Past Surgical HISTORY  History reviewed. No pertinent surgical history.    REVIEW OF SYSTEMS:  All systems assessed and are negative except as per HPI.    OBJECTIVE:  Visit Vitals  /75   Pulse 63   Temp 98.2 °F (36.8 °C)   Resp 16   Ht 6' 1\"   Wt 103.1 kg (227 lb 4.7 oz)   SpO2 97%   BMI 29.99 kg/m²       PHYSICAL EXAM:  GENERAL APPEARANCE: Overweight, alert and cooperative, and appears to be in no acute distress.  HEAD: normocephalic  EYES: PERRL, EOMI. vision is grossly intact, no icterus, pallor or cyanosis  EARS: External auditory canals and tympanic membranes clear, no local tenderness, hearing grossly intact.  NOSE: No nasal discharge.  THROAT: Oral cavity and pharynx normal. No inflammation, swelling, exudate, or lesions.  CARDIAC: Normal S1 and S2. No S3, S4 or murmurs. pulse is regular and good volume.  LUNGS: Clear to auscultation without rales, rhonchi, wheezing or diminished breath sounds. No accessory muscle usage, no local tenderness  GI/abdomen: soft, non tender, no hepatosplenomegaly, bowel sounds normal,  MUSCULOSKELETAL: no joint swelling or redness.  NECK: Neck supple, non-tender without cervical or supraclavicular lymphadenopathy, or masses  EXTREMITIES: No edema  NEUROLOGICAL: Normal gait. Strength, sensory and reflexes symmetric and intact throughout bilaterally upper and lower limbs  SKIN: Skin normal color and temperature, no abnormal     WOUND CARE (ENGLISH)         Review of your medicines      Our records show that you are taking the medicines listed below. If these are incorrect, please call your family doctor or clinic.        Dose / Directions Last dose taken    azelastine 0.05 % Soln ophthalmic solution   Commonly known as:  OPTIVAR   Dose:  1 drop   Quantity:  1 Bottle        Apply 1 drop to eye 2 times daily   Refills:  0        cetirizine 10 MG tablet   Commonly known as:  zyrTEC   Dose:  10 mg        Take 10 mg by mouth daily   Refills:  0        cyclobenzaprine 10 MG tablet   Commonly known as:  FLEXERIL   Dose:  5-10 mg   Quantity:  30 tablet        Take 0.5-1 tablets (5-10 mg) by mouth 3 times daily as needed for muscle spasms   Refills:  1        DEPO-PROVERA IM   Dose:  150 mg        Inject 150 mg into the muscle every 3 months   Refills:  0        ibuprofen 800 MG tablet   Commonly known as:  ADVIL/MOTRIN   Dose:  800 mg   Quantity:  40 tablet        Take 1 tablet (800 mg) by mouth every 8 hours as needed for moderate pain   Refills:  1        ondansetron 4 MG tablet   Commonly known as:  ZOFRAN   Dose:  4 mg   Quantity:  30 tablet        Take 1 tablet (4 mg) by mouth every 6 hours as needed for nausea or vomiting   Refills:  1                Orders Needing Specimen Collection     None      Pending Results     No orders found from 6/14/2018 to 6/17/2018.            Pending Culture Results     No orders found from 6/14/2018 to 6/17/2018.            Thank you for choosing [facility]       Thank you for choosing [facility] for your care. Our goal is always to provide you with excellent care. Hearing back from our patients is one way we can continue to improve our services. Please take a few minutes to complete the written survey that you may receive in the mail after you visit with us. Thank you!        [facility] Information     [facility] gives you secure access to your electronic health record. If you see a primary care provider, you can  also send messages to your care team and make appointments. If you have questions, please call your primary care clinic.  If you do not have a primary care provider, please call [phone] and they will assist you.        Care EveryWhere ID     This is your Care EveryWhere ID. This could be used by other organizations to access your [facility] medical records  [id number]        Equal Access to Services     JOESPH OAKLEY : Leighton Birch, ramiro george, leonides goodson, terrell resendiz . So [facility] [phone].    ATENCIÓN: Si habla español, tiene a black disposición servicios gratuitos de asistencia lingüística. Sarai al [phone].    We comply with applicable federal civil rights laws and [place] laws. We do not discriminate on the basis of race, color, national origin, age, disability, sex, sexual orientation, or gender identity.            After Visit Summary       This is your record. Keep this with you and show to your community pharmacist(s) and doctor(s) at your next visit.                   moles or lesions.  PSYCHIATRIC: oriented to person, place, and time. No suicidal or homicidal ideation.     DIAGNOSTIC STUDIES:  LAB RESULTS:  No visits with results within 1 Month(s) from this visit.   Latest known visit with results is:   Lab Services on 07/15/2022   Component Date Value Ref Range Status   • Cholesterol 07/15/2022 113  <=199 mg/dL Final   • Triglycerides 07/15/2022 69  <=149 mg/dL Final   • HDL 07/15/2022 44  >=40 mg/dL Final   • LDL 07/15/2022 55  <=129 mg/dL Final   • Non-HDL Cholesterol 07/15/2022 69  mg/dL Final   • Cholesterol/ HDL Ratio 07/15/2022 2.6  <=4.4 Final   • Prostate Specific Antigen 07/15/2022 0.89  <=2.50 ng/mL Final       ASSESSMENT AND PLAN:  This 47 year old male presents with :  1. Essential hypertension    2. Uncontrolled type 2 diabetes mellitus with hyperglycemia (CMS/HCC)    3. Overweight (BMI 25.0-29.9)    4. Decreased GFR    5. Autoimmune disorder (CMS/HCC)    6. Leukopenia, unspecified type    7. Healthcare maintenance    8. Colon cancer screening        Orders Placed This Encounter   • OPEN ACCESS COLONOSCOPY   • Glycohemoglobin   • Microalbumin Urine Random   • Thyroid Stimulating Hormone Reflex   • Occult Blood - iFOB (aka FIT)       PLAN:  Healthcare maintenance:   Labs: Ordered Thyroid Stimulating Hormone Reflex.   Colon cancer screening: Ordered open access colonoscopy and Occult Blood - iFOB (aka FIT).   Immunizations: Recommended new COVID-19 booster vaccine and pneumonia vaccine.    Essential hypertension:  Continue same medication.   Recommended low salt diet.       Uncontrolled type 2 diabetes mellitus with hyperglycemia (CMS/HCC):  Continue same medication.   Ordered Glycohemoglobin.   Titrate medications as per test results.  Low-carb and low fat diet recommended.    Eat small meals.   Recommended exercise.       Overweight (BMI 25.0-29.9):  Lifestyle changes recommended.  Eat small meals.   Recommended exercise for 5-15 minutes daily.     Decreased  GFR:  Ordered Microalbumin Urine Random.   Avoid taking Aleve or Ibuprofen.  Recommended hydration.    Autoimmune disorder (CMS/HCC); Leukopenia, unspecified type:  Monitor closely.    Continue following up with rheumatologist.     Follow up in three months.   Patient was requested to go to the ER if any symptoms worsen or if there are any new symptoms.    Refer to orders.  Medical compliance with plan discussed and risks of non-compliance reviewed.  Patient education completed on disease process, etiology & prognosis.  Proper usage and side effects of medications reviewed & discussed.  Return to clinic as clinically indicated as discussed with the patient who verbalized understanding of the plan and is in agreement with the plan.    I,  Dr. Jorge Schulz, have created a visit summary document based on the audio recording between Dr. Wild Wetzel MD and this patient for the physician to review, edit as needed, and authenticate.    Creation Date: 10/12/2022     I have reviewed and edited the visit summary above and attest that it is accurate. A written education, counseling, referral, and plan for obtaining appropriate screening services has been given to patient. See patient instructions    Patient Privacy Notice      The 21st Century Cures Act makes medical notes like these available to patients in the interest of transparency.   Please be advised that this is a medical document.   Medical documents are intended to carry relevant information and the clinical opinion of the practitioner.   The medical note is intended as medical provider to provider communication, and may appear blunt or direct.   It is written in medical language, and may contain abbreviations or verbiage that are unfamiliar.

## 2022-12-05 ENCOUNTER — APPOINTMENT (OUTPATIENT)
Dept: GENERAL RADIOLOGY | Facility: HOSPITAL | Age: 32
End: 2022-12-05
Attending: INTERNAL MEDICINE
Payer: COMMERCIAL

## 2022-12-05 ENCOUNTER — HOSPITAL ENCOUNTER (EMERGENCY)
Facility: HOSPITAL | Age: 32
Discharge: HOME | End: 2022-12-05
Attending: INTERNAL MEDICINE | Admitting: INTERNAL MEDICINE
Payer: COMMERCIAL

## 2022-12-05 VITALS
RESPIRATION RATE: 22 BRPM | SYSTOLIC BLOOD PRESSURE: 113 MMHG | TEMPERATURE: 100.5 F | HEART RATE: 115 BPM | DIASTOLIC BLOOD PRESSURE: 78 MMHG | OXYGEN SATURATION: 97 %

## 2022-12-05 DIAGNOSIS — J10.1 INFLUENZA A: ICD-10-CM

## 2022-12-05 LAB
FLUAV RNA SPEC QL NAA+PROBE: POSITIVE
FLUBV RNA RESP QL NAA+PROBE: NEGATIVE
RSV RNA SPEC NAA+PROBE: NEGATIVE
SARS-COV-2 RNA RESP QL NAA+PROBE: NEGATIVE

## 2022-12-05 PROCEDURE — 99284 EMERGENCY DEPT VISIT MOD MDM: CPT | Mod: 25,CS

## 2022-12-05 PROCEDURE — 71045 X-RAY EXAM CHEST 1 VIEW: CPT

## 2022-12-05 PROCEDURE — 999N000157 HC STATISTIC RCP TIME EA 10 MIN

## 2022-12-05 PROCEDURE — C9803 HOPD COVID-19 SPEC COLLECT: HCPCS

## 2022-12-05 PROCEDURE — 250N000013 HC RX MED GY IP 250 OP 250 PS 637: Performed by: INTERNAL MEDICINE

## 2022-12-05 PROCEDURE — 99284 EMERGENCY DEPT VISIT MOD MDM: CPT | Mod: CS | Performed by: INTERNAL MEDICINE

## 2022-12-05 PROCEDURE — 87637 SARSCOV2&INF A&B&RSV AMP PRB: CPT | Performed by: INTERNAL MEDICINE

## 2022-12-05 PROCEDURE — 94640 AIRWAY INHALATION TREATMENT: CPT

## 2022-12-05 RX ORDER — LORAZEPAM 1 MG/1
1 TABLET ORAL ONCE
Status: COMPLETED | OUTPATIENT
Start: 2022-12-05 | End: 2022-12-05

## 2022-12-05 RX ORDER — OXYCODONE HYDROCHLORIDE 5 MG/1
5 TABLET ORAL ONCE
Status: COMPLETED | OUTPATIENT
Start: 2022-12-05 | End: 2022-12-05

## 2022-12-05 RX ORDER — MAGNESIUM HYDROXIDE/ALUMINUM HYDROXICE/SIMETHICONE 120; 1200; 1200 MG/30ML; MG/30ML; MG/30ML
30 SUSPENSION ORAL ONCE
Status: COMPLETED | OUTPATIENT
Start: 2022-12-05 | End: 2022-12-05

## 2022-12-05 RX ORDER — ACETAMINOPHEN 325 MG/1
975 TABLET ORAL ONCE
Status: COMPLETED | OUTPATIENT
Start: 2022-12-05 | End: 2022-12-05

## 2022-12-05 RX ORDER — ALBUTEROL SULFATE 90 UG/1
2 AEROSOL, METERED RESPIRATORY (INHALATION) ONCE
Status: COMPLETED | OUTPATIENT
Start: 2022-12-05 | End: 2022-12-05

## 2022-12-05 RX ORDER — OSELTAMIVIR PHOSPHATE 75 MG/1
75 CAPSULE ORAL 2 TIMES DAILY
Qty: 10 CAPSULE | Refills: 0 | Status: SHIPPED | OUTPATIENT
Start: 2022-12-05 | End: 2023-07-31

## 2022-12-05 RX ADMIN — LORAZEPAM 1 MG: 1 TABLET ORAL at 19:39

## 2022-12-05 RX ADMIN — ALBUTEROL SULFATE 2 PUFF: 90 AEROSOL, METERED RESPIRATORY (INHALATION) at 20:56

## 2022-12-05 RX ADMIN — OXYCODONE HYDROCHLORIDE 5 MG: 5 TABLET ORAL at 20:36

## 2022-12-05 RX ADMIN — ACETAMINOPHEN 975 MG: 325 TABLET, FILM COATED ORAL at 20:36

## 2022-12-05 RX ADMIN — ALUMINUM HYDROXIDE, MAGNESIUM HYDROXIDE, AND SIMETHICONE 30 ML: 200; 200; 20 SUSPENSION ORAL at 19:38

## 2022-12-05 ASSESSMENT — ENCOUNTER SYMPTOMS
NERVOUS/ANXIOUS: 1
MYALGIAS: 0
DIAPHORESIS: 0
WHEEZING: 0
NUMBNESS: 0
ABDOMINAL DISTENTION: 0
NECK PAIN: 0
CHEST TIGHTNESS: 0
NECK STIFFNESS: 0
ABDOMINAL PAIN: 0
CHILLS: 1
PALPITATIONS: 0
BACK PAIN: 0
SHORTNESS OF BREATH: 1
CONFUSION: 0
VOICE CHANGE: 0
LIGHT-HEADEDNESS: 0
HEMATURIA: 0
NAUSEA: 0
COUGH: 0
HEADACHES: 0
BLOOD IN STOOL: 0
COLOR CHANGE: 0
DIZZINESS: 0
FEVER: 1
WOUND: 0
ARTHRALGIAS: 0
FLANK PAIN: 0
ANAL BLEEDING: 0
DYSURIA: 0
VOMITING: 0

## 2022-12-05 ASSESSMENT — ACTIVITIES OF DAILY LIVING (ADL): ADLS_ACUITY_SCORE: 35

## 2022-12-06 NOTE — ED NOTES
Discharge instructions reviewed and patient verbalizes understanding. Prescription reviewed with patient. Instructed to return with any worsening symptoms or concerns.

## 2022-12-06 NOTE — ED PROVIDER NOTES
History     Chief Complaint   Patient presents with     Shortness of Breath     The history is provided by the patient.   Shortness of Breath  Severity:  Moderate  Onset quality:  Gradual  Progression:  Worsening  Chronicity:  New  Associated symptoms: chest pain and fever    Associated symptoms: no abdominal pain, no cough, no diaphoresis, no headaches, no neck pain, no rash, no vomiting and no wheezing          Allergies:  Allergies   Allergen Reactions     Seafood Anaphylaxis     Shellfish Allergy      Shellfish Derived Products       Problem List:    Patient Active Problem List    Diagnosis Date Noted     Mass of scalp 10/29/2019     Priority: Medium     Added automatically from request for surgery [id number]       Encounter for triage in pregnant patient 2018     Priority: Medium     Endometriosis of pelvis 2016     Priority: Medium     Seasonal allergies      Priority: Medium      delivery 05/15/2013     Priority: Medium     Begin hydroxy progesterone at 16-18 weeks  Cervical length 16 - 22 weeks   ctx.  Received BMZ x two in [place].   TDAP done       Anemia in pregnancy 2013     Priority: Medium     Abdominal pain, acute 2013     Priority: Medium     Tobacco use disorder 10/18/2012     Priority: Medium     quit       Condyloma acuminatum due to human papillomavirus 2011     Priority: Medium     Overview:   IMO Update 10/11          Past Medical History:    Past Medical History:   Diagnosis Date     Benign neoplasm of vagina 2011     Condyloma acuminatum 2011     Constipation      Seasonal allergies      Tobacco use disorder 10/18/2012       Past Surgical History:    Past Surgical History:   Procedure Laterality Date     APPENDECTOMY  14    [facility]      SECTION      C section     COLONOSCOPY  11/10/15    Dr. Singh     COMBINED  SECTION, TUBAL OCCLUSION Bilateral 3/20/2019    Procedure: COMBINED  SECTION, BILATERAL  TUBAL  OCCLUSION;  Surgeon: Damon Fletcher MD;  [facility]: [facility]     DILATION AND CURETTAGE SUCTION, TREAT MISSED , 1ST TRIMESTER       ENDOSCOPY  11/10/15    Dr. Singh      ENDOSCOPY UPPER, COLONOSCOPY, COMBINED N/A 7/10/2020    Procedure: UPPER ENDOSCOPY with biopsy   AND COLONOSCOPY with cultuer and biopsy;  Surgeon: Bridger Alejandro MD;  Location: [facility]     EXCISE MASS HEAD N/A 2019    Procedure: EXCISION POSTERIOR SCALP MASS;  Surgeon: Babak Stephenson MD;  Location: [facility]     LAPAROSCOPIC CYSTECTOMY OVARIAN (BENIGN) Left 2018    Procedure: LAPAROSCOPIC CYSTECTOMY OVARIAN (BENIGN);  EXPLORATORY  LAPAROSCOPY, LEFT OVARIAN CYSTECTOMY;  Surgeon: Damon Fletcher MD;  [facility]: [facility]     LAPAROSCOPY  11/30/15    Dr. Sun; lysis of pelvic adhesions, cauterization of minimal endometriosis     LAPAROSCOPY  2018    lysis of adhesions; pelvic pain, endometriosis; Dr. Sun       Family History:    Family History   Problem Relation Age of Onset     Diabetes Other         Grandmother     Heart Disease Other         Heart Disease - Grandmother     Other - See Comments Other         Renal Disease - Grandmother     Unknown/Adopted Mother      Unknown/Adopted Father      Unknown/Adopted Maternal Grandmother      Unknown/Adopted Maternal Grandfather      Unknown/Adopted Paternal Grandmother      Unknown/Adopted Paternal Grandfather      Breast Cancer No family hx of        Social History:  Marital Status:  Single [1]  Social History     Tobacco Use     Smoking status: Former     Types: Cigarettes     Quit date: 2012     Years since quitting: 10.0     Smokeless tobacco: Never     Tobacco comments:     trying to quit 8/10/18   Substance Use Topics     Alcohol use: No     Alcohol/week: 0.0 standard drinks     Drug use: No        Medications:    oseltamivir (TAMIFLU) 75 MG capsule  acetaminophen (TYLENOL) 500 MG tablet  ibuprofen (ADVIL/MOTRIN) 800 MG tablet  OLANZapine zydis (ZYPREXA) 5 MG  ODT  olopatadine (PATADAY) 0.2 % ophthalmic solution  ondansetron (ZOFRAN-ODT) 4 MG ODT tab          Review of Systems   Constitutional: Positive for chills and fever. Negative for diaphoresis.   HENT: Negative for voice change.    Eyes: Negative for visual disturbance.   Respiratory: Positive for shortness of breath. Negative for cough, chest tightness and wheezing.    Cardiovascular: Positive for chest pain. Negative for palpitations and leg swelling.   Gastrointestinal: Negative for abdominal distention, abdominal pain, anal bleeding, blood in stool, nausea and vomiting.   Genitourinary: Negative for decreased urine volume, dysuria, flank pain and hematuria.   Musculoskeletal: Negative for arthralgias, back pain, gait problem, myalgias, neck pain and neck stiffness.   Skin: Negative for color change, pallor, rash and wound.   Neurological: Negative for dizziness, syncope, light-headedness, numbness and headaches.   Psychiatric/Behavioral: Negative for confusion and suicidal ideas. The patient is nervous/anxious.        Physical Exam   BP: 133/76  Pulse: (!) 136  Temp: 99  F (37.2  C)  Resp: (!) 28  SpO2: 100 %      Physical Exam  Vitals and nursing note reviewed.   Constitutional:       Appearance: She is well-developed and well-nourished.   HENT:      Head: Normocephalic and atraumatic.      Mouth/Throat:      Pharynx: No oropharyngeal exudate.   Eyes:      Conjunctiva/sclera: Conjunctivae normal.      Pupils: Pupils are equal, round, and reactive to light.   Neck:      Thyroid: No thyromegaly.      Vascular: No JVD.      Trachea: No tracheal deviation.   Cardiovascular:      Rate and Rhythm: Regular rhythm. Tachycardia present.      Pulses: Intact distal pulses.      Heart sounds: Normal heart sounds. No murmur heard.    No friction rub. No gallop.   Pulmonary:      Effort: Tachypnea present. No respiratory distress.      Breath sounds: Normal breath sounds. No stridor. No wheezing or rales.   Chest:      Chest  wall: No tenderness.          Comments: Chest wall tenderness on mid anterior chest wall  Abdominal:      General: Bowel sounds are normal. There is no distension.      Palpations: Abdomen is soft. There is no mass.      Tenderness: There is no abdominal tenderness. There is no guarding or rebound.   Musculoskeletal:         General: No tenderness or edema. Normal range of motion.      Cervical back: Normal range of motion and neck supple.   Lymphadenopathy:      Cervical: No cervical adenopathy.   Skin:     General: Skin is warm and dry.      Coloration: Skin is not pale.      Findings: No erythema or rash.   Neurological:      Mental Status: She is alert and oriented to person, place, and time.   Psychiatric:         Behavior: Behavior normal.         ED Course                 Procedures           Results for orders placed or performed during the hospital encounter of 12/05/22 (from the past 24 hour(s))   Symptomatic; Unknown Influenza A/B & SARS-CoV2 (COVID-19) Virus PCR Multiplex Nasopharyngeal    Specimen: Nasopharyngeal; Swab   Result Value Ref Range    Influenza A PCR Positive (A) Negative    Influenza B PCR Negative Negative    RSV PCR Negative Negative    SARS CoV2 PCR Negative Negative    Narrative    Testing was performed using the Xpert Xpress CoV2/Flu/RSV Assay on the Cepheid GeneXpert Instrument. This test should be ordered for the detection of SARS-CoV-2 and influenza viruses in individuals who meet clinical and/or epidemiological criteria. Test performance is unknown in asymptomatic patients. This test is for in vitro diagnostic use under the FDA EUA for laboratories certified under CLIA to perform high or moderate complexity testing. This test has not been FDA cleared or approved. A negative result does not rule out the presence of PCR inhibitors in the specimen or target RNA in concentration below the limit of detection for the assay. If only one viral target is positive but coinfection with  multiple targets is suspected, the sample should be re-tested with another FDA cleared, approved, or authorized test, if coinfection would change clinical management. This test was validated by the [facility]. These laboratories are certified under the Clinical Laboratory Improvement Amendments of 1988 (CLIA-88) as qualified to perform high complexity laboratory testing.   XR Chest Port 1 View    Narrative    PROCEDURE:  XR CHEST PORT 1 VIEW    HISTORY:  sob.     COMPARISON:  None.    FINDINGS:   The cardiac silhouette is normal in size. The pulmonary vasculature is  normal.  The lungs are clear. No pleural effusion or pneumothorax.      Impression    IMPRESSION:  No acute cardiopulmonary disease.      JORDON RAINEY MD         SYSTEM ID:  [id number]       Medications   alum & mag hydroxide-simethicone (MAALOX) suspension 30 mL (30 mLs Oral Given 12/5/22 1938)   LORazepam (ATIVAN) tablet 1 mg (1 mg Oral Given 12/5/22 1939)   acetaminophen (TYLENOL) tablet 975 mg (975 mg Oral Given 12/5/22 2036)   oxyCODONE (ROXICODONE) tablet 5 mg (5 mg Oral Given 12/5/22 2036)   albuterol (PROVENTIL HFA/VENTOLIN HFA) inhaler (2 puffs Inhalation Given 12/5/22 2056)       Assessments & Plan (with Medical Decision Making)   Flu like symptoms  Chest wall pain, anxiety    Influenza test positive  CXR: no acute finding  Symptoms improved, after receiving ativan, Maalox, oxycodone , tylenol + inhaler  I advised oral hydration at home  Return to ER if symptoms got worse  She understood and agreed.   I have reviewed the nursing notes.    I have reviewed the findings, diagnosis, plan and need for follow up with the patient.      New Prescriptions    OSELTAMIVIR (TAMIFLU) 75 MG CAPSULE    Take 1 capsule (75 mg) by mouth 2 times daily       Final diagnoses:   Influenza A       12/5/2022   [facility]     Harvey Gallegos MD  12/05/22 2116

## 2022-12-06 NOTE — ED NOTES
Patient here with complaints on coughing and pain to left anterior lower chest that radiates down left side. Worse with deep breaths, incessant cough. O2 sats 99% on room air. Lungs clear equal but shallow/rapid breathing d/t pain.

## 2022-12-06 NOTE — ED TRIAGE NOTES
Pt presents with cough, SOB, and chest pain. Pt reports that this started yesterday. Pt is tearful in triage and only able to get 2-3 words out at a time. Hurts worse to take a deep breath. Pt also reports that her hands keep getting numb and tingling.

## 2023-01-13 ENCOUNTER — NURSE TRIAGE (OUTPATIENT)
Dept: FAMILY MEDICINE | Facility: OTHER | Age: 33
End: 2023-01-13

## 2023-01-13 NOTE — TELEPHONE ENCOUNTER
"Patient calling and requesting antibiotic for abscessed tooth.     Reports pain and facial swelling that started two weeks ago.     Reports first available appointment with a dentist is two weeks out.     Patient would like Rx sent to [facility].   PCP out. Please advise, thank you.     Reason for Disposition    [1] Face is swollen AND [2] no fever    Additional Information    Negative: Shock suspected (e.g., cold/pale/clammy skin, too weak to stand, low BP, rapid pulse)    Negative: [1] Similar pain previously AND [2] it was from \"heart attack\"    Negative: [1] Similar pain previously AND [2] it was from \"angina\" AND [3] not relieved by nitroglycerin    Negative: Sounds like a life-threatening emergency to the triager    Negative: Chest pain    Negative: Toothache followed tooth injury    Negative: Canker sore (i.e., aphthous ulcer)    Negative: Toothache or mouth pain after tooth extraction    Negative: Tongue is very swollen and tender    Negative: [1] Face is swollen AND [2] fever    Negative: Patient sounds very sick or weak to the triager    Negative: [1] SEVERE pain (e.g., excruciating, unable to do any normal activities) AND [2] not improved 2 hours after pain medicine    Negative: Face is very swollen    Negative: Fever    Answer Assessment - Initial Assessment Questions  1. LOCATION: \"Which tooth is hurting?\"  (e.g., right-side/left-side, upper/lower, front/back)      Left lower back   2. ONSET: \"When did the toothache start?\"  (e.g., hours, days)       Two nights ago  3. SEVERITY: \"How bad is the toothache?\"  (Scale 1-10; mild, moderate or severe)    - MILD (1-3): doesn't interfere with chewing     - MODERATE (4-7): interferes with chewing, interferes with normal activities, awakens from sleep      - SEVERE (8-10): unable to eat, unable to do any normal activities, excruciating pain         Mild   4. SWELLING: \"Is there any visible swelling of your face?\"      Yes   5. OTHER SYMPTOMS: \"Do you have " "any other symptoms?\" (e.g., fever)      nausea  6. PREGNANCY: \"Is there any chance you are pregnant?\" \"When was your last menstrual period?\"      No    Protocols used: TOOTHACHE-A-AH      "

## 2023-01-13 NOTE — TELEPHONE ENCOUNTER
Juana Acosta, NP  You 2 hours ago (12:36 PM)     BH  If face is swollen, needs to be seen. I would recommend the UC.

## 2023-04-09 ENCOUNTER — HOSPITAL ENCOUNTER (EMERGENCY)
Facility: HOSPITAL | Age: 33
Discharge: HOME | End: 2023-04-09
Attending: NURSE PRACTITIONER | Admitting: NURSE PRACTITIONER
Payer: COMMERCIAL

## 2023-04-09 VITALS
SYSTOLIC BLOOD PRESSURE: 115 MMHG | OXYGEN SATURATION: 98 % | HEART RATE: 84 BPM | RESPIRATION RATE: 16 BRPM | TEMPERATURE: 98.3 F | DIASTOLIC BLOOD PRESSURE: 81 MMHG

## 2023-04-09 DIAGNOSIS — T78.40XA ALLERGIC REACTION, INITIAL ENCOUNTER: ICD-10-CM

## 2023-04-09 PROCEDURE — 99284 EMERGENCY DEPT VISIT MOD MDM: CPT | Mod: 25

## 2023-04-09 PROCEDURE — 99284 EMERGENCY DEPT VISIT MOD MDM: CPT | Performed by: NURSE PRACTITIONER

## 2023-04-09 PROCEDURE — 96374 THER/PROPH/DIAG INJ IV PUSH: CPT

## 2023-04-09 PROCEDURE — 250N000011 HC RX IP 250 OP 636: Performed by: NURSE PRACTITIONER

## 2023-04-09 PROCEDURE — 96375 TX/PRO/DX INJ NEW DRUG ADDON: CPT

## 2023-04-09 RX ORDER — DIPHENHYDRAMINE HYDROCHLORIDE 50 MG/ML
25 INJECTION INTRAMUSCULAR; INTRAVENOUS ONCE
Status: COMPLETED | OUTPATIENT
Start: 2023-04-09 | End: 2023-04-09

## 2023-04-09 RX ORDER — METHYLPREDNISOLONE SODIUM SUCCINATE 125 MG/2ML
125 INJECTION, POWDER, LYOPHILIZED, FOR SOLUTION INTRAMUSCULAR; INTRAVENOUS ONCE
Status: COMPLETED | OUTPATIENT
Start: 2023-04-09 | End: 2023-04-09

## 2023-04-09 RX ORDER — FAMOTIDINE 40 MG/1
40 TABLET, FILM COATED ORAL DAILY
Qty: 5 TABLET | Refills: 0 | Status: SHIPPED | OUTPATIENT
Start: 2023-04-09 | End: 2023-04-14

## 2023-04-09 RX ORDER — PREDNISONE 20 MG/1
TABLET ORAL
Qty: 10 TABLET | Refills: 0 | Status: SHIPPED | OUTPATIENT
Start: 2023-04-09 | End: 2023-07-31

## 2023-04-09 RX ADMIN — DIPHENHYDRAMINE HYDROCHLORIDE 25 MG: 50 INJECTION, SOLUTION INTRAMUSCULAR; INTRAVENOUS at 14:17

## 2023-04-09 RX ADMIN — METHYLPREDNISOLONE SODIUM SUCCINATE 125 MG: 125 INJECTION, POWDER, FOR SOLUTION INTRAMUSCULAR; INTRAVENOUS at 14:17

## 2023-04-09 RX ADMIN — FAMOTIDINE 40 MG: 10 INJECTION, SOLUTION INTRAVENOUS at 14:18

## 2023-04-09 ASSESSMENT — ACTIVITIES OF DAILY LIVING (ADL): ADLS_ACUITY_SCORE: 35

## 2023-04-09 NOTE — ED TRIAGE NOTES
"Patient presets to ED with c/o allergic reaction. Patient reports full body hives, swollen lips, dizziness, chest pain, swelling in throat, tongue, lips, and hard to breath. Patient reports taking 25 mg of benadryl @ 1345. Patient unsure what she is reacting to.       When ambulating patient back to room, patient states, \"I think I am going to pass out\" Was able to get patient to cot without loss of consciousness.   "

## 2023-04-09 NOTE — DISCHARGE INSTRUCTIONS
Allergic reactions:   Do not take hot showers, saunas, or use hot tubs until this clears.  Only clean with cool water.  Using warm/hot water dilates your arteries and veins, allowing the allergic reaction mediators to go to more places in your body, ultimately making your allergic reaction worse.     Steroids can be used to help reduce the swelling and inflammation caused by the allergic reaction.  These can be given by mouth or with the use of a cream.  If it is a cream, do not place in the eyes, nose, or ears.  Only apply to the affected area and use as prescribed.     You can use Benadryl for itching if needed.  You may be prescribed hydroxyzine for itching.  These medications are nearly the same so do not use together (use one or the other).  These medications can make you sedated so no using these during times you need to be alert (such as driving).       Follow up in 1 week if there is no improvement in your symptoms or sooner if they worsen.  If you develop difficulty breathing, wheezing, swelling of the throat/tongue, call 911 or go to the ER immediately.            Follow-up with your primary care provider for reevaluation.  Contact your primary care provider if you have any questions or concerns.  Do not hesitate to return to the ER if any new or worsening symptoms.     Please read the attached instructions (if any).  They highlight more specific treatments and interventions for you at home.              Thank you for letting me participate in your care and wish you a fast and uneventful recovery,    Juan CONCEPCION CNP    Do not hesitate to contact me with questions or concerns.  [email or web address]  [email or web address]

## 2023-04-09 NOTE — ED PROVIDER NOTES
EMERGENCY MEDICINE NOTE - TREMAINE CONCEPCION, CNP    ID:   Kathia Milton    CC:  Chief Complaint   Patient presents with     Allergic Reaction       MEANS OF ARRIVAL:  private car    PCP:   Lain[facility]    SUBJECTIVE:   HPI:   Kathia Milton is a 33 year old individual with history of endometriosis, tobacco use disorder, comes in today for allergic reaction.   Patient states that she was at her mother's house and suddenly developed hives over the body with sensation of swollen lips, numbness and tingling in the mouth and tongue with complaints of difficulty breathing.   Patient denies any new foods, medications, detergents, etc.  States has had allergic reactions in the past.  Now only complains of pruritus and hives scattered over the body.  Also states has the sensation of swollen lips with numbness in the tongue and throat.  Did take diphenhydramine 25 mg p.o. prior to coming in.    Review of Systems:  Significant positives/negatives were reviewed and mentioned in HPI.  All remaining body systems are negative or non-contributory.    I have reviewed the PMH, Meds, Allergies, and SH, including:  ALLERGIES:  Allergies   Allergen Reactions     Seafood Anaphylaxis     Shellfish Allergy      Shellfish Derived Products       CURRENT MEDICATIONS:  Current Outpatient Rx   Medication Sig Dispense Refill     acetaminophen (TYLENOL) 500 MG tablet Take 1,000 mg by mouth every 6 hours as needed for mild pain       ibuprofen (ADVIL/MOTRIN) 800 MG tablet TAKE 1 TABLET BY MOUTH THREE TIMES DAILY WITH FOOD FOR PRIMARY PAIN CONTROL 60 tablet 1     OLANZapine zydis (ZYPREXA) 5 MG ODT Take 1 tablet (5 mg) by mouth every 8 hours as needed for other (migraine) 6 tablet 0     olopatadine (PATADAY) 0.2 % ophthalmic solution INSTILL 1 DROP IN BOTH EYES DAILY 2.5 mL 0     ondansetron (ZOFRAN-ODT) 4 MG ODT tab Take 1 tablet (4 mg) by mouth every 8 hours as needed for nausea 10 tablet 0     oseltamivir (TAMIFLU) 75 MG capsule  Take 1 capsule (75 mg) by mouth 2 times daily 10 capsule 0        PMH:  Patient Active Problem List   Diagnosis     Abdominal pain, acute     Anemia in pregnancy     Seasonal allergies     Tobacco use disorder     Endometriosis of pelvis     Condyloma acuminatum due to human papillomavirus      delivery     Encounter for triage in pregnant patient     Mass of scalp     Past Surgical History:   Procedure Laterality Date     APPENDECTOMY  14    [facility]      SECTION      C section     COLONOSCOPY  11/10/15    Dr. Singh     COMBINED  SECTION, TUBAL OCCLUSION Bilateral 3/20/2019    Procedure: COMBINED  SECTION, BILATERAL  TUBAL OCCLUSION;  Surgeon: Damon Fletcher MD;  [facility]     DILATION AND CURETTAGE SUCTION, TREAT MISSED , 1ST TRIMESTER       ENDOSCOPY  11/10/15    Dr. Singh      ENDOSCOPY UPPER, COLONOSCOPY, COMBINED N/A 7/10/2020    Procedure: UPPER ENDOSCOPY with biopsy   AND COLONOSCOPY with cultuer and biopsy;  Surgeon: Bridger Alejandro MD;  [facility]     EXCISE MASS HEAD N/A 2019    Procedure: EXCISION POSTERIOR SCALP MASS;  Surgeon: Babak Stephenson MD;  [facility]     LAPAROSCOPIC CYSTECTOMY OVARIAN (BENIGN) Left 2018    Procedure: LAPAROSCOPIC CYSTECTOMY OVARIAN (BENIGN);  EXPLORATORY  LAPAROSCOPY, LEFT OVARIAN CYSTECTOMY;  Surgeon: Damon Fletcher MD;  [facility]     LAPAROSCOPY  11/30/15    Dr. Sun; lysis of pelvic adhesions, cauterization of minimal endometriosis     LAPAROSCOPY  2018    lysis of adhesions; pelvic pain, endometriosis; Dr. Sun     Social History     Tobacco Use     Smoking status: Former     Types: Cigarettes     Quit date: 2012     Years since quitting: 10.3     Smokeless tobacco: Never     Tobacco comments:     trying to quit 8/10/18   Substance Use Topics     Alcohol use: No     Alcohol/week: 0.0 standard drinks of alcohol     Drug use: No       OBJECTIVE:     Vitals:    23 1413    BP: 123/95   Pulse: 86   Resp: 18   SpO2: 95%     There is no height or weight on file to calculate BMI.  GENERAL APPEARANCE:  The patient is a 33 year old well-developed, well-nourished individual in no acute distress that appears as stated age.  THROAT:  Oropharynx is clear.  Uvula is midline and without swelling.  Mouth revealed excessively poor dentition.  Voice is clear.  No stridor present.  NECK:  Supple.  Trachea is midline.    LUNGS:  Breathing is easy.  Breath sounds are equal and clear bilaterally.  No wheezes, rhonchi, or rales.  HEART:  Regular rate and rhythm with normal S1 and S2.  No murmurs, gallops, or rubs.  NEUROLOGIC:  No focal sensory or motor deficits are noted.    PSYCHIATRIC:  The patient is awake, alert, and oriented x4.  Recent and remote memory is intact.  Appropriate mood and affect.  Calm and cooperative with history and physical exam.  SKIN:  Warm, dry, and well perfused.  Good turgor.  Scattered red patches over chest, back, and arms.  No raising of these patches present.  No open areas present.    Comment: Discrepancies between my note and notes on behalf of the nursing team or other care providers are secondary to my findings reflecting my physical examination and questioning of the patient.  Any conflicting information provided is not in line with my examination of the patient.    ED COURSE/ MDM/ ASSESSMENT/ PLAN:   Assessment:   Patient presents to the ER today allergic reaction.  Upon arrival, vitals signs are normal.  Examination was completed.  The patient is alert.  No respiratory distress noted.  Does have red patches scattered over chest, back, and arms.  No raising of these patches present.  Has no airway abnormalities upon examination.  Subjective complaints of numbness of the tongue, throat.  Rest of examination benign.    Potential diagnosis which have been considered and evaluated include allergic reaction, stress reaction, as well as others. Many of these have been  excluded using the various modalities and assessment as noted on the chart. At the present time, the diagnosis given seems to be the most likely allergic reaction to unknown substance.    ED Course/MDM/Plan:   Patient comes in with complaints of allergic reaction to unknown substance.  Apparently developed hives over her body with sensations of lip fullness, numbness of tongue and throat.  Patient in no distress upon arrival.  Does have red patches that are not raised over thorax and arms.  No tongue swelling or throat abnormalities noted upon examination.  No stridor present.  No respiratory distress.  IV was established and diphenhydramine 25 mg, famotidine 40 mg IV, in addition to Solu-Medrol 125 given.  After 30 minutes, red patches did dissipate and patient states she is feeling better.  Monitor patient for 1 hour and had no reoccurrence.  Patient will be discharged home on steroid burst consisting of prednisone 40 mg daily x5 days to start tomorrow.  Also will start on famotidine 40 mg daily x5 days.  Advised patient to use diphenhydramine for pruritus.  Allergic reaction instructions given as discussed in discharge handout.  Advised patient to follow-up with PCP.  Return to ER if worsening symptoms occur.  Patient verbalized understand agrees with plan of care.  Patient discharged home.      DIAGNOSIS:   (T78.40XA) Allergic reaction, initial encounter        Critical Care Time: None    Impression and plan discussed with patient. Questions answered, concerns addressed, indications for urgent re-evaluation reviewed, and  given. Patient/Parent/Caregiver agree with treatment plan and have no further questions at this time.  AVS provided at discharge.    This note was created by the Dragon Voice Dictation System. Inadvertent typographical errors, due to software recognition problems, may still exist.      Juan CONCEPCION, CNP  [facility]  EMERGENCY DEPARTMENT  [address] [place] [address]  [phone]       Juan Garcia, CARLENE CNP  04/09/23 8230

## 2023-04-29 ENCOUNTER — HEALTH MAINTENANCE LETTER (OUTPATIENT)
Age: 33
End: 2023-04-29

## 2023-07-31 ENCOUNTER — APPOINTMENT (OUTPATIENT)
Dept: GENERAL RADIOLOGY | Facility: HOSPITAL | Age: 33
End: 2023-07-31
Attending: NURSE PRACTITIONER
Payer: COMMERCIAL

## 2023-07-31 ENCOUNTER — HOSPITAL ENCOUNTER (EMERGENCY)
Facility: HOSPITAL | Age: 33
Discharge: HOME | End: 2023-07-31
Attending: NURSE PRACTITIONER | Admitting: NURSE PRACTITIONER
Payer: COMMERCIAL

## 2023-07-31 VITALS
SYSTOLIC BLOOD PRESSURE: 118 MMHG | OXYGEN SATURATION: 100 % | HEART RATE: 98 BPM | TEMPERATURE: 98.5 F | DIASTOLIC BLOOD PRESSURE: 80 MMHG | RESPIRATION RATE: 16 BRPM

## 2023-07-31 DIAGNOSIS — S69.91XA HAND INJURY, RIGHT, INITIAL ENCOUNTER: Primary | ICD-10-CM

## 2023-07-31 PROCEDURE — G0463 HOSPITAL OUTPT CLINIC VISIT: HCPCS

## 2023-07-31 PROCEDURE — 99213 OFFICE O/P EST LOW 20 MIN: CPT | Performed by: NURSE PRACTITIONER

## 2023-07-31 PROCEDURE — 73130 X-RAY EXAM OF HAND: CPT | Mod: RT

## 2023-07-31 ASSESSMENT — ENCOUNTER SYMPTOMS
JOINT SWELLING: 1
MYALGIAS: 1
NUMBNESS: 1
ARTHRALGIAS: 1

## 2023-07-31 ASSESSMENT — ACTIVITIES OF DAILY LIVING (ADL): ADLS_ACUITY_SCORE: 35

## 2023-07-31 NOTE — Clinical Note
Katiha Milton was seen and treated in our emergency department on 7/31/2023.  She may return to work on 08/02/2023.       If you have any questions or concerns, please don't hesitate to call.      Mpofu, Prudence, CNP

## 2023-08-01 NOTE — DISCHARGE INSTRUCTIONS
Your x-rays did not show any signs of broken bone or anything that is out of place.  Continue taking Tylenol alternating with ibuprofen as needed for the pain.  Apply ice packs to your hand and fingers.    Follow-up with your doctor if no improvement in 10 to 14 days.  Return to urgent care or emergency department for any worsening or concerning symptoms.

## 2023-08-01 NOTE — ED PROVIDER NOTES
History     Chief Complaint   Patient presents with    Hand Injury     HPI  Kathia Milton is a 33 year old female who presents ambulatory to urgent care for evaluation of a right hand injury.  Patient notes that she was on her deck this evening when she tripped and fell resulting in an injury to her right hand.  She denies hitting her head or loss of consciousness.  Reports injuries to both hands when she landed.  Currently is noting significant pain to her right middle finger.  Also has pain into her right index and ring finger along with some decreased range of motion.  She describes the pain as throbbing and constant along with some intermittent numbness to the 3 affected fingers..  Denies any neck pain.  She took ibuprofen about an hour prior to this arrival.    She is right-hand dominant.    Allergies:  Allergies   Allergen Reactions    Seafood Anaphylaxis    Shellfish Allergy      Shellfish Derived Products       Problem List:    Patient Active Problem List    Diagnosis Date Noted    Mass of scalp 10/29/2019     Priority: Medium     Added automatically from request for surgery [id number]      Encounter for triage in pregnant patient 2018     Priority: Medium    Endometriosis of pelvis 2016     Priority: Medium    Seasonal allergies      Priority: Medium     delivery 05/15/2013     Priority: Medium     Begin hydroxy progesterone at 16-18 weeks  Cervical length 16 - 22 weeks   ctx.  Received BMZ x two in [place].   TDAP done      Anemia in pregnancy 2013     Priority: Medium    Abdominal pain, acute 2013     Priority: Medium    Tobacco use disorder 10/18/2012     Priority: Medium     quit      Condyloma acuminatum due to human papillomavirus 2011     Priority: Medium     Overview:   IMO Update 10/11          Past Medical History:    Past Medical History:   Diagnosis Date    Benign neoplasm of vagina 2011    Condyloma acuminatum 2011    Constipation      Seasonal allergies     Tobacco use disorder 10/18/2012       Past Surgical History:    Past Surgical History:   Procedure Laterality Date    APPENDECTOMY  14    [facility]     SECTION      C section    COLONOSCOPY  11/10/15    Dr. Singh    COMBINED  SECTION, TUBAL OCCLUSION Bilateral 3/20/2019    Procedure: COMBINED  SECTION, BILATERAL  TUBAL OCCLUSION;  Surgeon: Damon Fletcher MD;  [facility]: [facility]    DILATION AND CURETTAGE SUCTION, TREAT MISSED , 1ST TRIMESTER      ENDOSCOPY  11/10/15    Dr. Singh     ENDOSCOPY UPPER, COLONOSCOPY, COMBINED N/A 7/10/2020    Procedure: UPPER ENDOSCOPY with biopsy   AND COLONOSCOPY with cultuer and biopsy;  Surgeon: Bridger Alejandro MD;  Location: [facility]    EXCISE MASS HEAD N/A 2019    Procedure: EXCISION POSTERIOR SCALP MASS;  Surgeon: Babak Stephenson MD;  Location: [facility]    LAPAROSCOPIC CYSTECTOMY OVARIAN (BENIGN) Left 2018    Procedure: LAPAROSCOPIC CYSTECTOMY OVARIAN (BENIGN);  EXPLORATORY  LAPAROSCOPY, LEFT OVARIAN CYSTECTOMY;  Surgeon: Damon Fletcher MD;  Location: [facility]    LAPAROSCOPY  11/30/15    Dr. Sun; lysis of pelvic adhesions, cauterization of minimal endometriosis    LAPAROSCOPY  2018    lysis of adhesions; pelvic pain, endometriosis; Dr. Sun       Family History:    Family History   Problem Relation Age of Onset    Diabetes Other         Grandmother    Heart Disease Other         Heart Disease - Grandmother    Other - See Comments Other         Renal Disease - Grandmother    Unknown/Adopted Mother     Unknown/Adopted Father     Unknown/Adopted Maternal Grandmother     Unknown/Adopted Maternal Grandfather     Unknown/Adopted Paternal Grandmother     Unknown/Adopted Paternal Grandfather     Breast Cancer No family hx of        Social History:  Marital Status:  Single [1]  Social History     Tobacco Use    Smoking status: Former     Types: Cigarettes     Quit date: 2012     Years since quitting: 10.6     Smokeless tobacco: Never    Tobacco comments:     trying to quit 8/10/18   Substance Use Topics    Alcohol use: No     Alcohol/week: 0.0 standard drinks of alcohol    Drug use: No        Medications:    ibuprofen (ADVIL/MOTRIN) 800 MG tablet  olopatadine (PATADAY) 0.2 % ophthalmic solution  acetaminophen (TYLENOL) 500 MG tablet  OLANZapine zydis (ZYPREXA) 5 MG ODT  ondansetron (ZOFRAN-ODT) 4 MG ODT tab          Review of Systems   Musculoskeletal:  Positive for arthralgias, joint swelling and myalgias.   Neurological:  Positive for numbness.   All other systems reviewed and are negative.      Physical Exam   BP: 118/80  Pulse: 98  Temp: 98.5  F (36.9  C)  Resp: 16  SpO2: 100 %      Physical Exam  Vitals and nursing note reviewed.   Constitutional:       General: She is not in acute distress.     Appearance: Normal appearance. She is well-developed.   HENT:      Head: Normocephalic and atraumatic.   Eyes:      Conjunctiva/sclera: Conjunctivae normal.      Pupils: Pupils are equal, round, and reactive to light.   Cardiovascular:      Rate and Rhythm: Normal rate.   Pulmonary:      Effort: Pulmonary effort is normal. No respiratory distress.   Abdominal:      General: Abdomen is flat.   Musculoskeletal:      Cervical back: Normal range of motion and neck supple.      Comments: Swelling to right middle finger with tenderness to palpation noted to entire finger.  Mild swelling to right index and ring finger with tenderness to palpation mostly to the proximal phalanx of both fingers.  Tenderness to palpation over MCP joint of right index finger.  Decreased range of motion to right middle and ring finger.  No obvious deformity.  Cap refill less than 2 seconds.  Right radial pulse 2+.   Skin:     General: Skin is warm and dry.      Findings: No rash.   Neurological:      Mental Status: She is alert and oriented to person, place, and time.         ED Course                 Procedures           Results for orders placed or  performed during the hospital encounter of 07/31/23 (from the past 24 hour(s))   XR Hand Right G/E 3 Views    Narrative    XR HAND RIGHT G/E 3 VIEWS    HISTORY: 33 years Female fell on deck and injured right hand 2 hrs  ago: pain and swelling to right ring, middle and index finger.  Decreased ROM to middle and index fingers    COMPARISON: None    TECHNIQUE: Right hand 3 views    FINDINGS: Joint spaces are congruent. Articular surfaces are smooth.  There is no evidence of fracture or dislocation.      Impression    IMPRESSION: No evidence of fracture or dislocation of the right hand.    LEBRON ANDINO MD         SYSTEM ID:  [id number]       Medications - No data to display    Assessments & Plan (with Medical Decision Making)     I have reviewed the nursing notes.    I have reviewed the findings, diagnosis, plan and need for follow up with the patient.  33-year-old female that presented for evaluation of right hand pain following an injury this evening.  Significant tenderness to palpation to the right middle finger.  Also tender to the right ring and index finger.  X-ray of her right hand was negative for acute fractures or dislocations.  I discussed these findings with patient.  Recommended continued icing fingers and hand.  Tylenol or ibuprofen as needed for pain.  Work excuse note given.  Follow-up with primary doctor in 10 to 14 days for reevaluation if no significant improvement to symptoms.  Return to urgent care or emergency department for any worsening or concerning symptoms.    This document was prepared using a combination of typing and voice generated software.  While every attempt was made for accuracy, spelling and grammatical errors may exist.         Discharge Medication List as of 7/31/2023  9:11 PM          Final diagnoses:   Hand injury, right, initial encounter       7/31/2023   [facility]       Mporamon, Marquisncekaterina, CNP  07/31/23 [id number]

## 2023-08-01 NOTE — ED TRIAGE NOTES
Patient presents with c/o right hand middle and ring finger pain and swelling after falling today. Denies loss of consciousness or hitting head.

## 2023-08-01 NOTE — ED TRIAGE NOTES
Patient presents to urgent care for right hand index, middle, ring finger from a fall on her deck today. Patient states fingers are painful and swollen after the fall. Denies any loss of consciousness or hitting her head. Patient  reports her fingers are throbbing, and go numb off and on.

## 2024-04-21 ENCOUNTER — HOSPITAL ENCOUNTER (EMERGENCY)
Facility: HOSPITAL | Age: 34
Discharge: HOME | End: 2024-04-21
Attending: NURSE PRACTITIONER | Admitting: NURSE PRACTITIONER

## 2024-04-21 VITALS
OXYGEN SATURATION: 96 % | RESPIRATION RATE: 16 BRPM | HEART RATE: 91 BPM | DIASTOLIC BLOOD PRESSURE: 82 MMHG | TEMPERATURE: 97.4 F | SYSTOLIC BLOOD PRESSURE: 121 MMHG

## 2024-04-21 DIAGNOSIS — M25.562 ACUTE PAIN OF BOTH KNEES: ICD-10-CM

## 2024-04-21 DIAGNOSIS — R20.2 PARESTHESIAS: ICD-10-CM

## 2024-04-21 DIAGNOSIS — S80.10XA CONTUSION OF LOWER LEG, UNSPECIFIED LATERALITY, INITIAL ENCOUNTER: ICD-10-CM

## 2024-04-21 DIAGNOSIS — M25.561 ACUTE PAIN OF BOTH KNEES: ICD-10-CM

## 2024-04-21 LAB
ALBUMIN SERPL BCG-MCNC: 4.5 G/DL (ref 3.5–5.2)
ALP SERPL-CCNC: 62 U/L (ref 40–150)
ALT SERPL W P-5'-P-CCNC: 9 U/L (ref 0–50)
ANION GAP SERPL CALCULATED.3IONS-SCNC: 8 MMOL/L (ref 7–15)
APTT PPP: 27 SECONDS (ref 22–38)
AST SERPL W P-5'-P-CCNC: 19 U/L (ref 0–45)
BASOPHILS # BLD AUTO: 0 10E3/UL (ref 0–0.2)
BASOPHILS NFR BLD AUTO: 1 %
BILIRUB SERPL-MCNC: 0.2 MG/DL
BUN SERPL-MCNC: 8.2 MG/DL (ref 6–20)
CALCIUM SERPL-MCNC: 9.2 MG/DL (ref 8.6–10)
CHLORIDE SERPL-SCNC: 97 MMOL/L (ref 98–107)
CREAT SERPL-MCNC: 0.72 MG/DL (ref 0.51–0.95)
CRP SERPL-MCNC: <3 MG/L
DEPRECATED HCO3 PLAS-SCNC: 28 MMOL/L (ref 22–29)
EGFRCR SERPLBLD CKD-EPI 2021: >90 ML/MIN/1.73M2
EOSINOPHIL # BLD AUTO: 0.1 10E3/UL (ref 0–0.7)
EOSINOPHIL NFR BLD AUTO: 1 %
ERYTHROCYTE [DISTWIDTH] IN BLOOD BY AUTOMATED COUNT: 13.2 % (ref 10–15)
GLUCOSE SERPL-MCNC: 92 MG/DL (ref 70–99)
HCT VFR BLD AUTO: 38.6 % (ref 35–47)
HGB BLD-MCNC: 13.1 G/DL (ref 11.7–15.7)
HOLD SPECIMEN: NORMAL
IMM GRANULOCYTES # BLD: 0 10E3/UL
IMM GRANULOCYTES NFR BLD: 0 %
INR PPP: 1.02 (ref 0.85–1.15)
LYMPHOCYTES # BLD AUTO: 2.9 10E3/UL (ref 0.8–5.3)
LYMPHOCYTES NFR BLD AUTO: 39 %
MAGNESIUM SERPL-MCNC: 2.1 MG/DL (ref 1.7–2.3)
MCH RBC QN AUTO: 28.1 PG (ref 26.5–33)
MCHC RBC AUTO-ENTMCNC: 33.9 G/DL (ref 31.5–36.5)
MCV RBC AUTO: 83 FL (ref 78–100)
MONOCYTES # BLD AUTO: 0.5 10E3/UL (ref 0–1.3)
MONOCYTES NFR BLD AUTO: 6 %
NEUTROPHILS # BLD AUTO: 4 10E3/UL (ref 1.6–8.3)
NEUTROPHILS NFR BLD AUTO: 53 %
NRBC # BLD AUTO: 0 10E3/UL
NRBC BLD AUTO-RTO: 0 /100
PHOSPHATE SERPL-MCNC: 4 MG/DL (ref 2.5–4.5)
PLATELET # BLD AUTO: 358 10E3/UL (ref 150–450)
POTASSIUM SERPL-SCNC: 3.9 MMOL/L (ref 3.4–5.3)
PROT SERPL-MCNC: 7.7 G/DL (ref 6.4–8.3)
RBC # BLD AUTO: 4.66 10E6/UL (ref 3.8–5.2)
SODIUM SERPL-SCNC: 133 MMOL/L (ref 135–145)
WBC # BLD AUTO: 7.5 10E3/UL (ref 4–11)

## 2024-04-21 PROCEDURE — 99284 EMERGENCY DEPT VISIT MOD MDM: CPT

## 2024-04-21 PROCEDURE — 84100 ASSAY OF PHOSPHORUS: CPT | Performed by: NURSE PRACTITIONER

## 2024-04-21 PROCEDURE — 250N000011 HC RX IP 250 OP 636: Performed by: NURSE PRACTITIONER

## 2024-04-21 PROCEDURE — 85025 COMPLETE CBC W/AUTO DIFF WBC: CPT | Performed by: NURSE PRACTITIONER

## 2024-04-21 PROCEDURE — 99284 EMERGENCY DEPT VISIT MOD MDM: CPT | Performed by: NURSE PRACTITIONER

## 2024-04-21 PROCEDURE — 85610 PROTHROMBIN TIME: CPT | Performed by: NURSE PRACTITIONER

## 2024-04-21 PROCEDURE — 80053 COMPREHEN METABOLIC PANEL: CPT | Performed by: NURSE PRACTITIONER

## 2024-04-21 PROCEDURE — 85730 THROMBOPLASTIN TIME PARTIAL: CPT | Performed by: NURSE PRACTITIONER

## 2024-04-21 PROCEDURE — 83735 ASSAY OF MAGNESIUM: CPT | Performed by: NURSE PRACTITIONER

## 2024-04-21 PROCEDURE — 96372 THER/PROPH/DIAG INJ SC/IM: CPT | Performed by: NURSE PRACTITIONER

## 2024-04-21 PROCEDURE — 86140 C-REACTIVE PROTEIN: CPT | Performed by: NURSE PRACTITIONER

## 2024-04-21 PROCEDURE — 36415 COLL VENOUS BLD VENIPUNCTURE: CPT | Performed by: NURSE PRACTITIONER

## 2024-04-21 RX ORDER — KETOROLAC TROMETHAMINE 30 MG/ML
60 INJECTION, SOLUTION INTRAMUSCULAR; INTRAVENOUS ONCE
Status: COMPLETED | OUTPATIENT
Start: 2024-04-21 | End: 2024-04-21

## 2024-04-21 RX ADMIN — KETOROLAC TROMETHAMINE 60 MG: 30 INJECTION, SOLUTION INTRAMUSCULAR at 20:22

## 2024-04-21 ASSESSMENT — ACTIVITIES OF DAILY LIVING (ADL)
ADLS_ACUITY_SCORE: 35
ADLS_ACUITY_SCORE: 35

## 2024-04-21 ASSESSMENT — ENCOUNTER SYMPTOMS
GASTROINTESTINAL NEGATIVE: 1
CARDIOVASCULAR NEGATIVE: 1
ENDOCRINE NEGATIVE: 1
FEVER: 0
CHILLS: 0
HEMATOLOGIC/LYMPHATIC NEGATIVE: 1
ALLERGIC/IMMUNOLOGIC NEGATIVE: 1
EYES NEGATIVE: 1
NUMBNESS: 1
PSYCHIATRIC NEGATIVE: 1
COLOR CHANGE: 1
FATIGUE: 1
RESPIRATORY NEGATIVE: 1

## 2024-04-21 ASSESSMENT — COLUMBIA-SUICIDE SEVERITY RATING SCALE - C-SSRS
1. IN THE PAST MONTH, HAVE YOU WISHED YOU WERE DEAD OR WISHED YOU COULD GO TO SLEEP AND NOT WAKE UP?: NO
2. HAVE YOU ACTUALLY HAD ANY THOUGHTS OF KILLING YOURSELF IN THE PAST MONTH?: NO
6. HAVE YOU EVER DONE ANYTHING, STARTED TO DO ANYTHING, OR PREPARED TO DO ANYTHING TO END YOUR LIFE?: NO

## 2024-04-21 NOTE — ED TRIAGE NOTES
"Patient presents with c/o multiple bruises throughout bilateral legs that are painful, worse on right leg. Today there is pain behind right leg that shoots up right upper leg. C/o generalized \"not feeling well.\" Reports feeling nauseated and feeling weak.         "

## 2024-04-22 NOTE — ED PROVIDER NOTES
History     Chief Complaint   Patient presents with    Leg Pain     HPI  Kathia Milton is a 34 year old individual with history of endometriosis, tobacco use disorder, comes in for leg pain.  Patient states has been having bruising of her lower extremities that she wakes up with.  States that they are never painful.  Today states that there is a numb area on right knee (points to the medial distal aspect of the right knee) and states there is numbness and then pain shooting up the back of her leg.  Also has complaints of feeling fatigued and nauseated.  No trauma reported.  No loss of range of motion.  States did have intermittent numbness in the right face.    Allergies:  Allergies   Allergen Reactions    Seafood Anaphylaxis    Shellfish Allergy      Shellfish Derived Products       Problem List:    Patient Active Problem List    Diagnosis Date Noted    Mass of scalp 10/29/2019     Priority: Medium     Added automatically from request for surgery [id number]      Encounter for triage in pregnant patient 2018     Priority: Medium    Endometriosis of pelvis 2016     Priority: Medium    Seasonal allergies      Priority: Medium     delivery 05/15/2013     Priority: Medium     Begin hydroxy progesterone at 16-18 weeks  Cervical length 16 - 22 weeks   ctx.  Received BMZ x two in [place].   TDAP done      Anemia in pregnancy 2013     Priority: Medium    Abdominal pain, acute 2013     Priority: Medium    Tobacco use disorder 10/18/2012     Priority: Medium     quit      Condyloma acuminatum due to human papillomavirus 2011     Priority: Medium     Overview:   IMO Update 10/11          Past Medical History:    Past Medical History:   Diagnosis Date    Benign neoplasm of vagina 2011    Condyloma acuminatum 2011    Constipation     Seasonal allergies     Tobacco use disorder 10/18/2012       Past Surgical History:    Past Surgical History:   Procedure Laterality Date     APPENDECTOMY  14    [facility]     SECTION      C section    COLONOSCOPY  11/10/15    Dr. Singh    COMBINED  SECTION, TUBAL OCCLUSION Bilateral 3/20/2019    Procedure: COMBINED  SECTION, BILATERAL  TUBAL OCCLUSION;  Surgeon: Damon Fletcher MD;  [facility]    DILATION AND CURETTAGE SUCTION, TREAT MISSED , 1ST TRIMESTER      ENDOSCOPY  11/10/15    Dr. Singh     ENDOSCOPY UPPER, COLONOSCOPY, COMBINED N/A 7/10/2020    Procedure: UPPER ENDOSCOPY with biopsy   AND COLONOSCOPY with cultuer and biopsy;  Surgeon: Bridger Alejandro MD;  [facility]    EXCISE MASS HEAD N/A 2019    Procedure: EXCISION POSTERIOR SCALP MASS;  Surgeon: Babak Stephenson MD;  [facility]    LAPAROSCOPIC CYSTECTOMY OVARIAN (BENIGN) Left 2018    Procedure: LAPAROSCOPIC CYSTECTOMY OVARIAN (BENIGN);  EXPLORATORY  LAPAROSCOPY, LEFT OVARIAN CYSTECTOMY;  Surgeon: Damon Fletcher MD;  [facility]    LAPAROSCOPY  11/30/15    Dr. Sun; lysis of pelvic adhesions, cauterization of minimal endometriosis    LAPAROSCOPY  2018    lysis of adhesions; pelvic pain, endometriosis; Dr. Sun       Family History:    Family History   Problem Relation Age of Onset    Diabetes Other         Grandmother    Heart Disease Other         Heart Disease - Grandmother    Other - See Comments Other         Renal Disease - Grandmother    Unknown/Adopted Mother     Unknown/Adopted Father     Unknown/Adopted Maternal Grandmother     Unknown/Adopted Maternal Grandfather     Unknown/Adopted Paternal Grandmother     Unknown/Adopted Paternal Grandfather     Breast Cancer No family hx of        Social History:  Marital Status:  Single [1]  Social History     Tobacco Use    Smoking status: Every Day     Current packs/day: 0.50     Average packs/day: 0.5 packs/day for 5.3 years (2.7 ttl pk-yrs)     Types: Cigarettes     Start date:      Last attempt to quit: 2012    Smokeless tobacco: Never    Tobacco  comments:     trying to quit 8/10/18   Substance Use Topics    Alcohol use: No     Alcohol/week: 0.0 standard drinks of alcohol    Drug use: No        Medications:    acetaminophen (TYLENOL) 500 MG tablet  ibuprofen (ADVIL/MOTRIN) 800 MG tablet  OLANZapine zydis (ZYPREXA) 5 MG ODT  olopatadine (PATADAY) 0.2 % ophthalmic solution  ondansetron (ZOFRAN-ODT) 4 MG ODT tab          Review of Systems   Constitutional:  Positive for fatigue. Negative for chills and fever.   HENT: Negative.     Eyes: Negative.    Respiratory: Negative.     Cardiovascular: Negative.    Gastrointestinal: Negative.    Endocrine: Negative.    Genitourinary: Negative.    Musculoskeletal:         Right posterior knee pain.   Skin:  Positive for color change (Scattered bruises to bilateral lower extremities.).   Allergic/Immunologic: Negative.    Neurological:  Positive for numbness (Right face, right knee).   Hematological: Negative.    Psychiatric/Behavioral: Negative.         Physical Exam   BP: 121/82  Pulse: 91  Temp: 97.4  F (36.3  C)  Resp: 16  SpO2: 96 %      GENERAL APPEARANCE:  The patient is a 34 year old well-developed, well-nourished individual in no acute distress that appears as stated age.  NECK:  Supple.  Trachea is midline.    LUNGS:  Breathing is easy.  Breath sounds are equal and clear bilaterally.  No wheezes, rhonchi, or rales.  HEART:  Regular rate and rhythm with normal S1 and S2.  No murmurs, gallops, or rubs.  EXTREMITIES:  No cyanosis, clubbing, or edema.  Pedal and post-tibial pulses are 2+ bilaterally.  Radial pulses are 2+ bilaterally.  MUSCULOSKELETAL:  Normal gait and station.  Full range of motion of right hip, right knee, right ankle, digits right foot.  MENTAL STATUS:   The patient was alert and oriented to person, place, time and purpose. Registration and recall intact. No difficulty with concentration.   CRANIAL NERVES:  PERRL. EOMI; no nystagmus.  Full visual fields.  Trapezius and sternocleidomastoid are full  strength. Tongue was midline and protrudes midline. Uvula was midline and raises midline. Facial sensation was intact to pain and light touch at all distributions. No speech disturbance. Hearing intact to conversation and whisper.  No facial asymmetry.  MOTOR: Strength was 5/5 at upper extremities, lower extremities and trunk.  No drift.  Speed and dexterity was unremarkable. Bulk and tone were unremarkable. There was no evidence of atrophy/atrophy of intrinsic hand muscles or foot muscles.  No abnormal movements or fasciculations were observed.  SENSORY:  Sensation intact to pain and light touch at all distributions.   No neglect.  REFLEXES: 2+ throughout.  There was no clonus present.  Toes down-going.  CEREBELLAR FUNCTIONING: No difficulty with finger-to-nose, finger tapping and heel-to-shin tasks. No dysmetria or dysdiadochokinesia observed.  PSYCH:   Euthymic affect. Thought content unremarkable.    SKIN:  Warm, dry, and well perfused.  Good turgor.  No lesions, nodules, or rashes are noted.  Scattered bruises to bilateral lower extremities.      Comment: Discrepancies between my note and notes on behalf of the nursing team or other care providers are secondary to my findings reflecting my physical examination and questioning of the patient.  Any conflicting information provided is not in line with my examination of the patient.       ED Course     ED Course as of 04/21/24 2051   Sun Apr 21, 2024 1919 In to see patient and history/physical completed.    1919 Labs ordered   2042 No acute findings on lab.  No neurological abnormality.  For this reason we will discharge patient home as no life-threatening findings noted.  Acetaminophen/ibuprofen for pain control.  Close follow-up with PCP for further testing.                 Results for orders placed or performed during the hospital encounter of 04/21/24 (from the past 24 hour(s))   CBC with platelets differential    Narrative    The following orders were  created for panel order CBC with platelets differential.  Procedure                               Abnormality         Status                     ---------                               -----------         ------                     CBC with platelets and d...[[id number]]                      Final result                 Please view results for these tests on the individual orders.   Comprehensive metabolic panel   Result Value Ref Range    Sodium 133 (L) 135 - 145 mmol/L    Potassium 3.9 3.4 - 5.3 mmol/L    Carbon Dioxide (CO2) 28 22 - 29 mmol/L    Anion Gap 8 7 - 15 mmol/L    Urea Nitrogen 8.2 6.0 - 20.0 mg/dL    Creatinine 0.72 0.51 - 0.95 mg/dL    GFR Estimate >90 >60 mL/min/1.73m2    Calcium 9.2 8.6 - 10.0 mg/dL    Chloride 97 (L) 98 - 107 mmol/L    Glucose 92 70 - 99 mg/dL    Alkaline Phosphatase 62 40 - 150 U/L    AST 19 0 - 45 U/L    ALT 9 0 - 50 U/L    Protein Total 7.7 6.4 - 8.3 g/dL    Albumin 4.5 3.5 - 5.2 g/dL    Bilirubin Total 0.2 <=1.2 mg/dL   Magnesium   Result Value Ref Range    Magnesium 2.1 1.7 - 2.3 mg/dL   Phosphorus   Result Value Ref Range    Phosphorus 4.0 2.5 - 4.5 mg/dL   CBC with platelets and differential   Result Value Ref Range    WBC Count 7.5 4.0 - 11.0 10e3/uL    RBC Count 4.66 3.80 - 5.20 10e6/uL    Hemoglobin 13.1 11.7 - 15.7 g/dL    Hematocrit 38.6 35.0 - 47.0 %    MCV 83 78 - 100 fL    MCH 28.1 26.5 - 33.0 pg    MCHC 33.9 31.5 - 36.5 g/dL    RDW 13.2 10.0 - 15.0 %    Platelet Count 358 150 - 450 10e3/uL    % Neutrophils 53 %    % Lymphocytes 39 %    % Monocytes 6 %    % Eosinophils 1 %    % Basophils 1 %    % Immature Granulocytes 0 %    NRBCs per 100 WBC 0 <1 /100    Absolute Neutrophils 4.0 1.6 - 8.3 10e3/uL    Absolute Lymphocytes 2.9 0.8 - 5.3 10e3/uL    Absolute Monocytes 0.5 0.0 - 1.3 10e3/uL    Absolute Eosinophils 0.1 0.0 - 0.7 10e3/uL    Absolute Basophils 0.0 0.0 - 0.2 10e3/uL    Absolute Immature Granulocytes 0.0 <=0.4 10e3/uL    Absolute NRBCs 0.0 10e3/uL   Extra Tube     Narrative    The following orders were created for panel order Extra Tube.  Procedure                               Abnormality         Status                     ---------                               -----------         ------                     Extra Blue Top Tube[[id number]]                              In process                 Extra Red Top Tube[[id number]]                               In process                 Extra Heparinized Syringe[[id number]]                        Final result                 Please view results for these tests on the individual orders.   Extra Heparinized Syringe   Result Value Ref Range    Hold Specimen OK    INR   Result Value Ref Range    INR 1.02 0.85 - 1.15   Partial thromboplastin time   Result Value Ref Range    aPTT 27 22 - 38 Seconds   CRP inflammation   Result Value Ref Range    CRP Inflammation <3.00 <5.00 mg/L       Medications   ketorolac (TORADOL) injection 60 mg (60 mg Intramuscular $Given 4/21/24 2022)       Assessments & Plan (with Medical Decision Making)     I have reviewed the nursing notes.    I have reviewed the findings, diagnosis, plan and need for follow up with the patient.    Summary:  Patient presents to the ER today for leg pain and bruising with numbness.  Potential diagnosis which have been considered and evaluated include DVT, electrolyte abnormality, inflammation, bleeding disorder, as well as others. Many of these have been excluded using the various modalities and assessment as noted on the chart. At the present time, the diagnosis given seems to be the most likely bruising of bilateral lower extremities without etiology, knee pain, and intermittent paresthesias without etiology.  Upon arrival, vitals signs are normal.  The patient is alert and oriented.  Cardiac and respiratory examination normal.  Neurological examination completely normal.  Patient has bruising to bilateral lower extremities.  Tenderness to palpation behind bilateral knees.   CMS intact.  Patient has totally normal examination.  Did complain of intermittent numbness to right face and to lateral aspect of the right leg.  At this time sensation intact and neurological examination normal making and ED CVA/TIA highly unlikely.  Only has pain behind the knees with no swelling of the thighs or calves making DVT highly unlikely.  Lab work was obtained showing WBC of 7.5 with hemoglobin 13.1.  Sodium mildly low at 133 with potassium 3.9.  Phosphorus 4.0 with a magnesium 2.1.  Renal and hepatic functions otherwise benign.  Coags normal.  CRP also negative.  No life-threatening findings on lab work or examination.  For this reason we will discharge patient home.  Acetaminophen/ibuprofen for pain control.  Possibility of autoimmune disorders that could be culprit so advised patient to follow-up with PCP for further investigation.  Advised patient to return to ER if worsening, otherwise follow-up as described above.  Patient verbalized understand agrees plan of care.  Patient discharged home.      Critical Care Time: None    Impression and plan discussed with patient. Questions answered, concerns addressed, indications for urgent re-evaluation reviewed, and  given. Patient/Parent/Caregiver agree with treatment plan and have no further questions at this time.  AVS provided at discharge.    This note was created by the Dragon Voice Dictation System. Inadvertent typographical errors, due to software recognition problems, may still exist.             New Prescriptions    No medications on file       Final diagnoses:   Acute pain of both knees   Contusion of lower leg, unspecified laterality, initial encounter   Paresthesias       4/21/2024   [facility]       Juan Garcia APRN CNP  04/21/24 2051

## 2024-04-22 NOTE — DISCHARGE INSTRUCTIONS
Pain control:   If your past medical conditions, allergies, current medications, or current status does not prevent you from using acetaminophen and/or ibuprofen, use the following:   Acetaminophen 650-1000 mg every 6 hours as needed for pain in addition to ibuprofen 400-600 mg every 6 hours as needed for pain.  Take these two medications together if wanted.    Remember that these are for AS NEEDED.  If not needed, do not take.          Follow-up with your primary care provider for reevaluation.  Contact your primary care provider if you have any questions or concerns.  Do not hesitate to return to the ER if any new or worsening symptoms.     Please read the attached instructions (if any).  They highlight more specific treatments and interventions for you at home.              Thank you for letting me participate in your care and wish you a fast and uneventful recovery,    Juan CONCEPCION CNP    Do not hesitate to contact me with questions or concerns.  [email or web address]  [email or web address]

## 2024-04-22 NOTE — ED NOTES
Reports scattered painful bruising on lower extremities today noted RLE was painful in areas. Also today is nauseated and just not feeling well which concerned her. Gets heavy periods but no Hx of bleeding/ clotting disorders. Denies injury.  No swelling or redness noted.

## 2024-05-08 ENCOUNTER — OFFICE VISIT (OUTPATIENT)
Dept: FAMILY MEDICINE | Facility: OTHER | Age: 34
End: 2024-05-08
Attending: STUDENT IN AN ORGANIZED HEALTH CARE EDUCATION/TRAINING PROGRAM

## 2024-05-08 VITALS
RESPIRATION RATE: 16 BRPM | WEIGHT: 138.06 LBS | HEART RATE: 101 BPM | OXYGEN SATURATION: 100 % | DIASTOLIC BLOOD PRESSURE: 74 MMHG | SYSTOLIC BLOOD PRESSURE: 109 MMHG | HEIGHT: 65 IN | BODY MASS INDEX: 23 KG/M2 | TEMPERATURE: 99.4 F

## 2024-05-08 DIAGNOSIS — F17.200 TOBACCO USE DISORDER: ICD-10-CM

## 2024-05-08 DIAGNOSIS — R10.12 LUQ ABDOMINAL PAIN: ICD-10-CM

## 2024-05-08 DIAGNOSIS — R23.3 EASY BRUISABILITY: ICD-10-CM

## 2024-05-08 DIAGNOSIS — N80.30 ENDOMETRIOSIS OF PELVIC PERITONEUM: Primary | ICD-10-CM

## 2024-05-08 DIAGNOSIS — R53.83 FATIGUE, UNSPECIFIED TYPE: ICD-10-CM

## 2024-05-08 DIAGNOSIS — Z76.89 ENCOUNTER TO ESTABLISH CARE: ICD-10-CM

## 2024-05-08 LAB
ALBUMIN SERPL BCG-MCNC: 4.7 G/DL (ref 3.5–5.2)
ALP SERPL-CCNC: 64 U/L (ref 40–150)
ALT SERPL W P-5'-P-CCNC: 11 U/L (ref 0–50)
ANION GAP SERPL CALCULATED.3IONS-SCNC: 9 MMOL/L (ref 7–15)
APTT PPP: 26 SECONDS (ref 22–38)
AST SERPL W P-5'-P-CCNC: 22 U/L (ref 0–45)
BASOPHILS # BLD AUTO: 0.1 10E3/UL (ref 0–0.2)
BASOPHILS NFR BLD AUTO: 1 %
BILIRUB SERPL-MCNC: 0.3 MG/DL
BUN SERPL-MCNC: 6.1 MG/DL (ref 6–20)
CALCIUM SERPL-MCNC: 9.6 MG/DL (ref 8.6–10)
CHLORIDE SERPL-SCNC: 101 MMOL/L (ref 98–107)
CREAT SERPL-MCNC: 0.68 MG/DL (ref 0.51–0.95)
CRP SERPL-MCNC: <3 MG/L
DEPRECATED HCO3 PLAS-SCNC: 28 MMOL/L (ref 22–29)
EGFRCR SERPLBLD CKD-EPI 2021: >90 ML/MIN/1.73M2
EOSINOPHIL # BLD AUTO: 0.1 10E3/UL (ref 0–0.7)
EOSINOPHIL NFR BLD AUTO: 1 %
ERYTHROCYTE [DISTWIDTH] IN BLOOD BY AUTOMATED COUNT: 13.2 % (ref 10–15)
ERYTHROCYTE [SEDIMENTATION RATE] IN BLOOD BY WESTERGREN METHOD: 14 MM/HR (ref 0–20)
GLUCOSE SERPL-MCNC: 91 MG/DL (ref 70–99)
HCT VFR BLD AUTO: 40.3 % (ref 35–47)
HGB BLD-MCNC: 13.7 G/DL (ref 11.7–15.7)
IMM GRANULOCYTES # BLD: 0 10E3/UL
IMM GRANULOCYTES NFR BLD: 0 %
INR PPP: 1.03 (ref 0.85–1.15)
LIPASE SERPL-CCNC: 26 U/L (ref 13–60)
LYMPHOCYTES # BLD AUTO: 2.3 10E3/UL (ref 0.8–5.3)
LYMPHOCYTES NFR BLD AUTO: 33 %
MCH RBC QN AUTO: 28.4 PG (ref 26.5–33)
MCHC RBC AUTO-ENTMCNC: 34 G/DL (ref 31.5–36.5)
MCV RBC AUTO: 84 FL (ref 78–100)
MONOCYTES # BLD AUTO: 0.4 10E3/UL (ref 0–1.3)
MONOCYTES NFR BLD AUTO: 6 %
NEUTROPHILS # BLD AUTO: 4.1 10E3/UL (ref 1.6–8.3)
NEUTROPHILS NFR BLD AUTO: 59 %
NRBC # BLD AUTO: 0 10E3/UL
NRBC BLD AUTO-RTO: 0 /100
PLATELET # BLD AUTO: 407 10E3/UL (ref 150–450)
POTASSIUM SERPL-SCNC: 3.8 MMOL/L (ref 3.4–5.3)
PROT SERPL-MCNC: 8.2 G/DL (ref 6.4–8.3)
RBC # BLD AUTO: 4.82 10E6/UL (ref 3.8–5.2)
SODIUM SERPL-SCNC: 138 MMOL/L (ref 135–145)
TSH SERPL DL<=0.005 MIU/L-ACNC: 1.46 UIU/ML (ref 0.3–4.2)
WBC # BLD AUTO: 6.9 10E3/UL (ref 4–11)

## 2024-05-08 PROCEDURE — 82607 VITAMIN B-12: CPT | Performed by: STUDENT IN AN ORGANIZED HEALTH CARE EDUCATION/TRAINING PROGRAM

## 2024-05-08 PROCEDURE — 86140 C-REACTIVE PROTEIN: CPT | Performed by: STUDENT IN AN ORGANIZED HEALTH CARE EDUCATION/TRAINING PROGRAM

## 2024-05-08 PROCEDURE — 82306 VITAMIN D 25 HYDROXY: CPT | Performed by: STUDENT IN AN ORGANIZED HEALTH CARE EDUCATION/TRAINING PROGRAM

## 2024-05-08 PROCEDURE — 85610 PROTHROMBIN TIME: CPT | Performed by: STUDENT IN AN ORGANIZED HEALTH CARE EDUCATION/TRAINING PROGRAM

## 2024-05-08 PROCEDURE — 80050 GENERAL HEALTH PANEL: CPT | Performed by: STUDENT IN AN ORGANIZED HEALTH CARE EDUCATION/TRAINING PROGRAM

## 2024-05-08 PROCEDURE — 85652 RBC SED RATE AUTOMATED: CPT | Performed by: STUDENT IN AN ORGANIZED HEALTH CARE EDUCATION/TRAINING PROGRAM

## 2024-05-08 PROCEDURE — 36415 COLL VENOUS BLD VENIPUNCTURE: CPT | Performed by: STUDENT IN AN ORGANIZED HEALTH CARE EDUCATION/TRAINING PROGRAM

## 2024-05-08 PROCEDURE — 99214 OFFICE O/P EST MOD 30 MIN: CPT | Performed by: STUDENT IN AN ORGANIZED HEALTH CARE EDUCATION/TRAINING PROGRAM

## 2024-05-08 PROCEDURE — 83690 ASSAY OF LIPASE: CPT | Performed by: STUDENT IN AN ORGANIZED HEALTH CARE EDUCATION/TRAINING PROGRAM

## 2024-05-08 PROCEDURE — 85730 THROMBOPLASTIN TIME PARTIAL: CPT | Performed by: STUDENT IN AN ORGANIZED HEALTH CARE EDUCATION/TRAINING PROGRAM

## 2024-05-08 ASSESSMENT — PATIENT HEALTH QUESTIONNAIRE - PHQ9
SUM OF ALL RESPONSES TO PHQ QUESTIONS 1-9: 4
10. IF YOU CHECKED OFF ANY PROBLEMS, HOW DIFFICULT HAVE THESE PROBLEMS MADE IT FOR YOU TO DO YOUR WORK, TAKE CARE OF THINGS AT HOME, OR GET ALONG WITH OTHER PEOPLE: SOMEWHAT DIFFICULT
SUM OF ALL RESPONSES TO PHQ QUESTIONS 1-9: 4

## 2024-05-08 ASSESSMENT — ANXIETY QUESTIONNAIRES
8. IF YOU CHECKED OFF ANY PROBLEMS, HOW DIFFICULT HAVE THESE MADE IT FOR YOU TO DO YOUR WORK, TAKE CARE OF THINGS AT HOME, OR GET ALONG WITH OTHER PEOPLE?: SOMEWHAT DIFFICULT
1. FEELING NERVOUS, ANXIOUS, OR ON EDGE: NOT AT ALL
4. TROUBLE RELAXING: SEVERAL DAYS
GAD7 TOTAL SCORE: 4
7. FEELING AFRAID AS IF SOMETHING AWFUL MIGHT HAPPEN: NOT AT ALL
2. NOT BEING ABLE TO STOP OR CONTROL WORRYING: SEVERAL DAYS
GAD7 TOTAL SCORE: 4
5. BEING SO RESTLESS THAT IT IS HARD TO SIT STILL: NOT AT ALL
GAD7 TOTAL SCORE: 4
3. WORRYING TOO MUCH ABOUT DIFFERENT THINGS: SEVERAL DAYS
6. BECOMING EASILY ANNOYED OR IRRITABLE: SEVERAL DAYS
IF YOU CHECKED OFF ANY PROBLEMS ON THIS QUESTIONNAIRE, HOW DIFFICULT HAVE THESE PROBLEMS MADE IT FOR YOU TO DO YOUR WORK, TAKE CARE OF THINGS AT HOME, OR GET ALONG WITH OTHER PEOPLE: SOMEWHAT DIFFICULT
7. FEELING AFRAID AS IF SOMETHING AWFUL MIGHT HAPPEN: NOT AT ALL

## 2024-05-08 ASSESSMENT — PAIN SCALES - GENERAL: PAINLEVEL: MODERATE PAIN (5)

## 2024-05-08 NOTE — PROGRESS NOTES
Assessment & Plan     Encounter to establish care  Patient presents to clinic today to establish care.  Previously under care of Dr. Rose here at [place] but has not been seen in over 3 years.    Comes in with several acute concerns, see below  Medical, surgical, social and family history reviewed.    Endometriosis of pelvis  Stable.  No acute symptoms    Tobacco use disorder  Continues to smoke.  Strongly encourage cessation.  Will review and discuss at follow-up in 2 weeks    Easy bruisability  No evidence of gingival or other mucosal bleeding- denies bleeding gums, blood in stool or hematuria.   Denies ever having any hemarthrosis.    Will obtain lab work  Bruising is mainly on bilateral lower extremities, posterior calf.  Physical exam otherwise reassuring  Add vitamin C supplement and invest in good multivitamin  - CBC with platelets and differential; Future  - ESR: Erythrocyte sedimentation rate; Future  - CRP, inflammation; Future  - Comprehensive metabolic panel; Future  - INR; Future  - Partial thromboplastin time; Future    Fatigue, unspecified type  Will check labs  - TSH with free T4 reflex; Future  - Vitamin D Deficiency; Future  - Vitamin B12; Future    LUQ abdominal pain  Acute, lasting one hour, resolved.  Continue to monitor clinically  - Lipase; Future      25 minutes spent by me on the date of the encounter doing chart review, history and exam, documentation and further activities per the note      Nicotine/Tobacco Cessation  She reports that she has been smoking cigarettes. She started smoking about 5 years ago. She has a 2.7 pack-year smoking history. She has never used smokeless tobacco.  Nicotine/Tobacco Cessation Plan  Self help information given to patient      Return in about 2 weeks (around 5/22/2024).    Karlos Bae is a 34 year old, presenting for the following health issues:  Establish Care        5/8/2024    10:30 AM   Additional Questions   Roomed by Margaux Briones    Accompanied by None         5/8/2024    10:30 AM   Patient Reported Additional Medications   Patient reports taking the following new medications None     History of Present Illness       Reason for visit:  Multiple concerns  Symptom onset:  More than a month  Symptoms include:  Bruises, fatigue weight loss, numbness in hands and feet, pain  Symptom intensity:  Moderate  Symptom progression:  Worsening  Had these symptoms before:  No  What makes it worse:  Sometimes it hurts to just move around  What makes it better:  No    She eats 0-1 servings of fruits and vegetables daily.She consumes 1 sweetened beverage(s) daily.She exercises with enough effort to increase her heart rate 9 or less minutes per day.  She exercises with enough effort to increase her heart rate 3 or less days per week.   She is taking medications regularly.       Right sided knee pain, sharp radiating down the leg into the foot.  Had labs done and US on the leg for blood clot.    Bruising started about 3 months ago- mainly legs.  Spontaneous bruising to the legs- right leg is worse than right.  Sometimes back and back of arms.  No bleeding gums, no blood in the urine, no blood in the stool  No family history of bleeding disorders  Adopted.  2 kids.  3 pregnancy losses.   Youngest is 5.    Had bruising the summer she had benji.    She tells me her whole body aches.  More tired in the last month than she has ever been  Not feeling down depressed or hopeless  Generally does not sleep well.  Long time of insomnia.  Sleeps average of 6 hours.  Ankles swell.  No swelling in any joints   No family history of autoimmune disease.      Last night had 1 hour of sharp shooting LUQ pain.  No trauma.  Not associated with food.  Resolved completely.  No diarrhea.      Concern - Bruising   Onset: ongoing happened around 5 years ago and then it stopped.  In the last 2-3 months it has been worse   Description: patient reports bruising mainly in the legs, more  "so in the right leg some bruising in other parts of the body more common in the legs   Accompanying Signs & Symptoms: swelling in the ankles, pain at bruising site    Previous history of similar problem: ongoing   Precipitating factors:        Worsened by: none   Alleviating factors:        Improved by: none   Therapies tried and outcome: None      Review of Systems  Constitutional, HEENT, cardiovascular, pulmonary, GI, , musculoskeletal, neuro, skin, endocrine and psych systems are negative, except as otherwise noted.      Objective    /74 (BP Location: Right arm, Patient Position: Sitting, Cuff Size: Adult Regular)   Pulse 101   Temp 99.4  F (37.4  C) (Tympanic)   Resp 16   Ht 1.651 m (5' 5\")   Wt 62.6 kg (138 lb 1 oz)   LMP 04/18/2024 (Exact Date)   SpO2 100%   BMI 22.97 kg/m    Body mass index is 22.97 kg/m .  Physical Exam   GENERAL: alert and no distress  EYES: Eyes grossly normal to inspection, PERRL and conjunctivae and sclerae normal  HENT: ear canals and TM's normal, nose and mouth without ulcers or lesions  NECK: no adenopathy, no asymmetry, masses, or scars  RESP: lungs clear to auscultation - no rales, rhonchi or wheezes  CV: regular rate and rhythm, normal S1 S2, no S3 or S4, no murmur, click or rub, no peripheral edema  ABDOMEN: soft, nontender, no hepatosplenomegaly, no masses and bowel sounds normal  MS: no gross musculoskeletal defects noted, no edema  SKIN: no suspicious lesions or rashes, round ecchymoses about 1-2 cm in diameter on posterior calves bilaterally  NEURO: Normal strength and tone, mentation intact and speech normal  PSYCH: mentation appears normal, affect normal/bright          Signed Electronically by: Melly Stockton MD    "

## 2024-05-08 NOTE — PATIENT INSTRUCTIONS
Recommend adding vitamin C supplement and taking a daily multivitamin-   [employer], vitamin code multivitamin.

## 2024-05-10 LAB
VIT B12 SERPL-MCNC: 285 PG/ML (ref 232–1245)
VIT D+METAB SERPL-MCNC: 17 NG/ML (ref 20–50)

## 2024-06-28 ENCOUNTER — TELEPHONE (OUTPATIENT)
Dept: FAMILY MEDICINE | Facility: OTHER | Age: 34
End: 2024-06-28

## 2024-06-28 NOTE — TELEPHONE ENCOUNTER
----- Message from Leslie CANDELARIA sent at 6/28/2024  4:11 PM CDT -----  Regarding: Please call to schedule emmie't  For What: Patient established care with Dr. Stockton on 5/8/2024 and Dr. Stockton wanted a follow-up scheduled in 2 weeks... I don't see that this was scheduled. Can you please call her? Thanks!    With: Melly Stockton     For When: Per PCP's schedule availability and patient is agreeable.    Thank you,  WOODROW Nelson.   [facility]

## 2024-06-28 NOTE — TELEPHONE ENCOUNTER
Attempt # 1  Outcome: Left Message   Comment: LVM for patient to call to schedule a follow-up with Dr. Stockton.

## 2024-07-01 NOTE — TELEPHONE ENCOUNTER
Attempt # 2  Outcome: Left Message   Comment: LVM for patient to call to schedule a follow-up with Dr. Stockton that was supposed to happen in May.

## 2024-07-02 NOTE — TELEPHONE ENCOUNTER
Attempt # 3  Outcome: Left Message   Comment: LVM for patient to call to schedule a follow-up with Dr. Stockton that was  supposed to happen in May.  Sending a letter.

## 2024-07-06 ENCOUNTER — HEALTH MAINTENANCE LETTER (OUTPATIENT)
Age: 34
End: 2024-07-06

## 2025-01-23 ENCOUNTER — TELEPHONE (OUTPATIENT)
Dept: FAMILY MEDICINE | Facility: OTHER | Age: 35
End: 2025-01-23

## 2025-07-13 ENCOUNTER — HEALTH MAINTENANCE LETTER (OUTPATIENT)
Age: 35
End: 2025-07-13

## (undated) DEVICE — LIGHT HANDLE COVER

## (undated) DEVICE — UTERINE MANIPULATOR-KRONNER MANIPUJECTOR

## (undated) DEVICE — DRSG-SPONGE STERILE 8 X 4

## (undated) DEVICE — FORCEP-COLON BIOPSY LARGE W/NEEDLE 240CM

## (undated) DEVICE — CAUTERY PENCIL-SMOKE EVACUATION

## (undated) DEVICE — TOPICAL SKIN ADHESIVE EXOFIN

## (undated) DEVICE — SCD SLEEVE-KNEE REG.

## (undated) DEVICE — GLV-7.5 PROTEXIS PI CLASSIC LF/PF

## (undated) DEVICE — BLANKET-BAIR LOWER EXTREMITY

## (undated) DEVICE — DRSG-NON ADHERING 3 X 8 TELFA

## (undated) DEVICE — CAUTERY-MEGADYNE TIP

## (undated) DEVICE — CATH TRAY-16FR METER W/STATLOCK LATEX]

## (undated) DEVICE — SUTURE-VICRYL 0 CT J358H

## (undated) DEVICE — MARKER-SKIN REG

## (undated) DEVICE — IRRIGATION-H2O 1000ML

## (undated) DEVICE — DRAPE-U DRAPE SPLIT SHEET 72" X 122"

## (undated) DEVICE — DRSG-SPONGE X-RAY 4 X 4

## (undated) DEVICE — APPLICATOR-CHLORAPREP 26ML TINTED CHG 2%+ 70% IPA-SURGICAL

## (undated) DEVICE — SPONGE-LAPAROTOMY PADS 18 X 18

## (undated) DEVICE — CANISTER-SUCTION 2000CC

## (undated) DEVICE — CAUTERY PAD-POLYHESIVE II ADULT

## (undated) DEVICE — GOWN-SURG XXL LVL 3 REINFORCED

## (undated) DEVICE — GLV-8.5 BIOGEL LATEX

## (undated) DEVICE — TUBING-INSUFFLATION/LAPAROFLATOR W/FILTER

## (undated) DEVICE — SUTURE-PDS 0 CTXB ZB370

## (undated) DEVICE — TUBE-LUKI-MUCOUS SPEC. TRAP

## (undated) DEVICE — LABEL-STERILE PREPRINTED FOR OR

## (undated) DEVICE — PACK-C-SECTION-CUSTOM

## (undated) DEVICE — SURGICAL BINDER-ABDOMINAL 46-62

## (undated) DEVICE — DRSG-KERLIX 6 X 6 3/4 FLUFF

## (undated) DEVICE — NDL-INSUFFLATION 120MM

## (undated) DEVICE — CATH-URETHRAL 14FR

## (undated) DEVICE — SUTURE-VICRYL 0 UR-6 J603H

## (undated) DEVICE — SPATULA TIP FOR SUCTION IRRIGATION PROBE

## (undated) DEVICE — TUBING-SUCTION 20FT

## (undated) DEVICE — Device

## (undated) DEVICE — GOWN-SURG XL LVL 3 REINFORCED

## (undated) DEVICE — MOUTHPIECE W/GUARD FOR ENDOSCOPY

## (undated) DEVICE — SUTURE-MONOCRYL 3-0 PS-1 Y936H

## (undated) DEVICE — CONNECTOR-ERBEFLO 2 PORT

## (undated) DEVICE — PACK-LAP LAVH-CUSTOM

## (undated) DEVICE — STERI-STRIP-1/2" X 4"

## (undated) DEVICE — TRAY-SKIN PREP POVIDONE/IODINE

## (undated) DEVICE — SWABSTICK-BENZOIN

## (undated) DEVICE — PACK-SET UP-CUSTOM

## (undated) DEVICE — SYRINGE-ASEPTO IRRIGATION

## (undated) DEVICE — TROCAR-8X100MM OPTICAL BLADELESS

## (undated) DEVICE — TROCAR-5X100MM BLADED W/FIXATION

## (undated) DEVICE — LIGASURE-5MM BLUNT TIP LAPAROSCOPIC

## (undated) DEVICE — IRRIGATION-NACL 1000ML

## (undated) DEVICE — SUTURE-CHROMIC GUT 1 CTX 905H

## (undated) DEVICE — GOWN-SURG LG LVL 3 REINFORCED

## (undated) DEVICE — PUNCTURE CLOSURE DEVICE

## (undated) DEVICE — NDL COUNTER-20-40 CT MAGNET/FOAM BLOCK

## (undated) DEVICE — SENSOR-OXISENSOR II ADULT

## (undated) DEVICE — SUCTION-IRRIGATION STRYKEFLOW II (STRYKER)

## (undated) DEVICE — SOL-NACL 0.9% 1000ML

## (undated) DEVICE — TROCAR-11X100MM BLADED W/FIXATION

## (undated) RX ORDER — OXYTOCIN 10 [USP'U]/ML
INJECTION, SOLUTION INTRAMUSCULAR; INTRAVENOUS
Status: DISPENSED
Start: 2019-03-20

## (undated) RX ORDER — ONDANSETRON 2 MG/ML
INJECTION INTRAMUSCULAR; INTRAVENOUS
Status: DISPENSED
Start: 2018-05-25

## (undated) RX ORDER — FENTANYL CITRATE 50 UG/ML
INJECTION, SOLUTION INTRAMUSCULAR; INTRAVENOUS
Status: DISPENSED
Start: 2018-05-25

## (undated) RX ORDER — FENTANYL CITRATE 50 UG/ML
INJECTION, SOLUTION INTRAMUSCULAR; INTRAVENOUS
Status: DISPENSED
Start: 2019-11-06

## (undated) RX ORDER — DEXAMETHASONE SODIUM PHOSPHATE 10 MG/ML
INJECTION, SOLUTION INTRAMUSCULAR; INTRAVENOUS
Status: DISPENSED
Start: 2018-05-25

## (undated) RX ORDER — ONDANSETRON 2 MG/ML
INJECTION INTRAMUSCULAR; INTRAVENOUS
Status: DISPENSED
Start: 2019-03-20

## (undated) RX ORDER — LIDOCAINE HYDROCHLORIDE 20 MG/ML
INJECTION, SOLUTION EPIDURAL; INFILTRATION; INTRACAUDAL; PERINEURAL
Status: DISPENSED
Start: 2018-05-25

## (undated) RX ORDER — KETOROLAC TROMETHAMINE 30 MG/ML
INJECTION, SOLUTION INTRAMUSCULAR; INTRAVENOUS
Status: DISPENSED
Start: 2020-07-10

## (undated) RX ORDER — LIDOCAINE HYDROCHLORIDE 20 MG/ML
INJECTION, SOLUTION EPIDURAL; INFILTRATION; INTRACAUDAL; PERINEURAL
Status: DISPENSED
Start: 2019-11-06

## (undated) RX ORDER — PROPOFOL 10 MG/ML
INJECTION, EMULSION INTRAVENOUS
Status: DISPENSED
Start: 2019-11-06

## (undated) RX ORDER — DEXAMETHASONE SODIUM PHOSPHATE 10 MG/ML
INJECTION, SOLUTION INTRAMUSCULAR; INTRAVENOUS
Status: DISPENSED
Start: 2019-03-20

## (undated) RX ORDER — HYDROMORPHONE HYDROCHLORIDE 2 MG/ML
INJECTION, SOLUTION INTRAMUSCULAR; INTRAVENOUS; SUBCUTANEOUS
Status: DISPENSED
Start: 2019-03-20